# Patient Record
Sex: FEMALE | Race: WHITE | Employment: OTHER | URBAN - METROPOLITAN AREA
[De-identification: names, ages, dates, MRNs, and addresses within clinical notes are randomized per-mention and may not be internally consistent; named-entity substitution may affect disease eponyms.]

---

## 2017-01-27 ENCOUNTER — OFFICE VISIT (OUTPATIENT)
Dept: PULMONOLOGY | Age: 67
End: 2017-01-27

## 2017-01-27 VITALS
TEMPERATURE: 98 F | BODY MASS INDEX: 41.08 KG/M2 | HEIGHT: 66 IN | WEIGHT: 255.6 LBS | OXYGEN SATURATION: 98 % | HEART RATE: 79 BPM | RESPIRATION RATE: 18 BRPM | SYSTOLIC BLOOD PRESSURE: 122 MMHG | DIASTOLIC BLOOD PRESSURE: 74 MMHG

## 2017-01-27 DIAGNOSIS — Z78.9 DIFFICULTY WITH BIPAP USE: ICD-10-CM

## 2017-01-27 DIAGNOSIS — Z72.0 TOBACCO ABUSE: ICD-10-CM

## 2017-01-27 DIAGNOSIS — R06.83 SNORING: ICD-10-CM

## 2017-01-27 DIAGNOSIS — R06.81 WITNESSED APNEIC SPELLS: ICD-10-CM

## 2017-01-27 DIAGNOSIS — J44.9 CHRONIC OBSTRUCTIVE PULMONARY DISEASE, UNSPECIFIED COPD TYPE (HCC): ICD-10-CM

## 2017-01-27 DIAGNOSIS — G47.33 OSA (OBSTRUCTIVE SLEEP APNEA): Primary | ICD-10-CM

## 2017-01-27 DIAGNOSIS — E66.01 OBESITY, CLASS III, BMI 40-49.9 (MORBID OBESITY) (HCC): ICD-10-CM

## 2017-01-27 PROCEDURE — 1090F PRES/ABSN URINE INCON ASSESS: CPT | Performed by: NURSE PRACTITIONER

## 2017-01-27 PROCEDURE — 3014F SCREEN MAMMO DOC REV: CPT | Performed by: NURSE PRACTITIONER

## 2017-01-27 PROCEDURE — 3023F SPIROM DOC REV: CPT | Performed by: NURSE PRACTITIONER

## 2017-01-27 PROCEDURE — 4004F PT TOBACCO SCREEN RCVD TLK: CPT | Performed by: NURSE PRACTITIONER

## 2017-01-27 PROCEDURE — 1123F ACP DISCUSS/DSCN MKR DOCD: CPT | Performed by: NURSE PRACTITIONER

## 2017-01-27 PROCEDURE — 3017F COLORECTAL CA SCREEN DOC REV: CPT | Performed by: NURSE PRACTITIONER

## 2017-01-27 PROCEDURE — G8926 SPIRO NO PERF OR DOC: HCPCS | Performed by: NURSE PRACTITIONER

## 2017-01-27 PROCEDURE — G8484 FLU IMMUNIZE NO ADMIN: HCPCS | Performed by: NURSE PRACTITIONER

## 2017-01-27 PROCEDURE — G8599 NO ASA/ANTIPLAT THER USE RNG: HCPCS | Performed by: NURSE PRACTITIONER

## 2017-01-27 PROCEDURE — 99214 OFFICE O/P EST MOD 30 MIN: CPT | Performed by: NURSE PRACTITIONER

## 2017-01-27 PROCEDURE — G8400 PT W/DXA NO RESULTS DOC: HCPCS | Performed by: NURSE PRACTITIONER

## 2017-01-27 PROCEDURE — 4040F PNEUMOC VAC/ADMIN/RCVD: CPT | Performed by: NURSE PRACTITIONER

## 2017-01-27 PROCEDURE — G8427 DOCREV CUR MEDS BY ELIG CLIN: HCPCS | Performed by: NURSE PRACTITIONER

## 2017-01-27 PROCEDURE — G8419 CALC BMI OUT NRM PARAM NOF/U: HCPCS | Performed by: NURSE PRACTITIONER

## 2017-01-27 RX ORDER — OXYCODONE HYDROCHLORIDE AND ACETAMINOPHEN 5; 325 MG/1; MG/1
1 TABLET ORAL EVERY 8 HOURS PRN
Status: ON HOLD | COMMUNITY
End: 2018-02-16

## 2017-01-27 RX ORDER — ALBUTEROL SULFATE 90 UG/1
2 AEROSOL, METERED RESPIRATORY (INHALATION) EVERY 4 HOURS PRN
COMMUNITY
End: 2018-04-05

## 2017-01-27 ASSESSMENT — SLEEP AND FATIGUE QUESTIONNAIRES
HOW LIKELY ARE YOU TO NOD OFF OR FALL ASLEEP WHEN YOU ARE A PASSENGER IN A CAR FOR AN HOUR WITHOUT A BREAK: 0
HOW LIKELY ARE YOU TO NOD OFF OR FALL ASLEEP WHILE LYING DOWN TO REST IN THE AFTERNOON WHEN CIRCUMSTANCES PERMIT: 0
NECK CIRCUMFERENCE (INCHES): 16.5
HOW LIKELY ARE YOU TO NOD OFF OR FALL ASLEEP WHILE WATCHING TV: 3
HOW LIKELY ARE YOU TO NOD OFF OR FALL ASLEEP WHILE SITTING INACTIVE IN A PUBLIC PLACE: 1
HOW LIKELY ARE YOU TO NOD OFF OR FALL ASLEEP IN A CAR, WHILE STOPPED FOR A FEW MINUTES IN TRAFFIC: 0
HOW LIKELY ARE YOU TO NOD OFF OR FALL ASLEEP WHILE SITTING AND TALKING TO SOMEONE: 0
ESS TOTAL SCORE: 4
HOW LIKELY ARE YOU TO NOD OFF OR FALL ASLEEP WHILE SITTING AND READING: 0
HOW LIKELY ARE YOU TO NOD OFF OR FALL ASLEEP WHILE SITTING QUIETLY AFTER LUNCH WITHOUT ALCOHOL: 0

## 2017-05-25 ENCOUNTER — TELEPHONE (OUTPATIENT)
Dept: PULMONOLOGY | Age: 67
End: 2017-05-25

## 2017-08-21 ENCOUNTER — TELEPHONE (OUTPATIENT)
Dept: CASE MANAGEMENT | Age: 67
End: 2017-08-21

## 2017-08-31 ENCOUNTER — TELEPHONE (OUTPATIENT)
Dept: PULMONOLOGY | Age: 67
End: 2017-08-31

## 2017-09-26 LAB
AVERAGE GLUCOSE: NORMAL
HBA1C MFR BLD: 5.6 %

## 2017-09-28 ENCOUNTER — TELEPHONE (OUTPATIENT)
Dept: PULMONOLOGY | Age: 67
End: 2017-09-28

## 2017-10-04 ENCOUNTER — TELEPHONE (OUTPATIENT)
Dept: CASE MANAGEMENT | Age: 67
End: 2017-10-04

## 2017-10-09 ENCOUNTER — TELEPHONE (OUTPATIENT)
Dept: PULMONOLOGY | Age: 67
End: 2017-10-09

## 2017-10-09 NOTE — TELEPHONE ENCOUNTER
Pt called, states that she uses [employer], but they do not have the machine that works for pt. Pt asking to have orders sent to [facility] in [place] to get a better machine. Pt also asking for concentrator order to be faxed so everything is at the same company.

## 2017-10-09 NOTE — TELEPHONE ENCOUNTER
Please advise which pressure settings to order for pt for new BIPAP? Also, pt was asking for concentrator order as well, but per note, pt was only using O2 during interim while waiting for BIPAP. Pt did not complete split night as ordered at last office visit. OV 1/27/17:    Assessment:      · Severe MEG- BIPAP returned due to noncompliance  · RLS on Requip/neurontin- per PCP  · Snoring and witnessed apnea    · Nocturnal hypoxemia   ·  pack year smoking   · 3 L O2 at night  · Obesity    Not discussed today:  · Severe COPD   · Chronic cough                          Plan:      · Split night PSG- Called [facility] patient needs new study to requalify for BIPAP- per [facility] split night will qualify  · Discussed oral appliance option however patient has dentures  · Discussed acclimation techniques with patient  · Patient to call for sooner appointment if having issues with using BIPAP  · Discussed severity of MEG and importance of BIPAP use  · Continue O2 at night until can get BIPAP back Positional therapy, sleep in nonsupine position. · Decrease sedating medications where able   · Sleep hygiene  · Avoid sedatives, alcohol and caffeinated drinks at bed time. · No driving motorized vehicles or operating heavy machinery while fatigue, drowsy or sleepy. · Weight loss is also recommended as a long-term intervention. · Complications of MEG if not treated were discussed with patient patient to include systemic hypertension, pulmonary hypertension, cardiovascular morbidities, car accidents and all cause mortality. · Smoking cessation counseling.      Previous pulmonary plan- not discussed today:  · Advised to ronn Symbicort 160/4.5 2 puffs BID  · Incruse Ellipta 1 puff daily and Albuterol. · Patient is up to date with Pneumococcal vaccine and influenza vaccine   · Pulmonary rehab referral   · Acapella QID to mobilize respiratory secretions.   · CT chest, low dose protocol, screening for lung cancer 8/2017.  Risks and benefits were discussed with patient.           Follow up with Dr. Rut Barber next available for COPD  Follow up sleep 31-90 days, sooner if needed

## 2017-10-19 NOTE — TELEPHONE ENCOUNTER
I spoke with the pt this morning and she is scheduled for today 10/19/17 with another sleep MD at [facility] sleep center. She will have repeat sleep study there ordered by that sleep MD due to location. No further action needed on this encounter.

## 2017-10-24 NOTE — TELEPHONE ENCOUNTER
Patient called states she has been inpt at [address] [facility] in [address] was on a vent unable to call to cancel appt. States she is still currently inpt and is being released to rehab at local NH will call once released.
Patient called with message left for patient to call back to office.
Pt is scheduled for followup 11/7/17
Spoke with patient whom is scheduled for 11/7/17.
COPD  Follow up sleep 31-90 days, sooner if needed

## 2017-11-07 ENCOUNTER — OFFICE VISIT (OUTPATIENT)
Dept: PULMONOLOGY | Age: 67
End: 2017-11-07

## 2017-11-07 ENCOUNTER — HOSPITAL ENCOUNTER (OUTPATIENT)
Dept: OTHER | Age: 67
Discharge: HOME | End: 2017-11-07
Attending: INTERNAL MEDICINE | Admitting: INTERNAL MEDICINE

## 2017-11-07 VITALS
WEIGHT: 260 LBS | SYSTOLIC BLOOD PRESSURE: 138 MMHG | RESPIRATION RATE: 20 BRPM | HEIGHT: 66 IN | TEMPERATURE: 97.8 F | OXYGEN SATURATION: 96 % | DIASTOLIC BLOOD PRESSURE: 76 MMHG | HEART RATE: 81 BPM | BODY MASS INDEX: 41.78 KG/M2

## 2017-11-07 DIAGNOSIS — R07.9 CHEST PAIN, UNSPECIFIED TYPE: ICD-10-CM

## 2017-11-07 DIAGNOSIS — J44.1 COPD EXACERBATION (HCC): ICD-10-CM

## 2017-11-07 DIAGNOSIS — Z87.891 PERSONAL HISTORY OF TOBACCO USE, PRESENTING HAZARDS TO HEALTH: ICD-10-CM

## 2017-11-07 DIAGNOSIS — F17.200 CURRENT SMOKER: ICD-10-CM

## 2017-11-07 DIAGNOSIS — R07.9 CHEST PAIN, UNSPECIFIED TYPE: Primary | ICD-10-CM

## 2017-11-07 DIAGNOSIS — G25.81 RLS (RESTLESS LEGS SYNDROME): ICD-10-CM

## 2017-11-07 DIAGNOSIS — G47.34 NOCTURNAL HYPOXIA: ICD-10-CM

## 2017-11-07 DIAGNOSIS — G47.33 MODERATE OBSTRUCTIVE SLEEP APNEA: ICD-10-CM

## 2017-11-07 PROCEDURE — G8400 PT W/DXA NO RESULTS DOC: HCPCS | Performed by: INTERNAL MEDICINE

## 2017-11-07 PROCEDURE — G8484 FLU IMMUNIZE NO ADMIN: HCPCS | Performed by: INTERNAL MEDICINE

## 2017-11-07 PROCEDURE — 4040F PNEUMOC VAC/ADMIN/RCVD: CPT | Performed by: INTERNAL MEDICINE

## 2017-11-07 PROCEDURE — 3023F SPIROM DOC REV: CPT | Performed by: INTERNAL MEDICINE

## 2017-11-07 PROCEDURE — G8417 CALC BMI ABV UP PARAM F/U: HCPCS | Performed by: INTERNAL MEDICINE

## 2017-11-07 PROCEDURE — 99214 OFFICE O/P EST MOD 30 MIN: CPT | Performed by: INTERNAL MEDICINE

## 2017-11-07 PROCEDURE — 1123F ACP DISCUSS/DSCN MKR DOCD: CPT | Performed by: INTERNAL MEDICINE

## 2017-11-07 PROCEDURE — G8926 SPIRO NO PERF OR DOC: HCPCS | Performed by: INTERNAL MEDICINE

## 2017-11-07 PROCEDURE — 1090F PRES/ABSN URINE INCON ASSESS: CPT | Performed by: INTERNAL MEDICINE

## 2017-11-07 PROCEDURE — 1036F TOBACCO NON-USER: CPT | Performed by: INTERNAL MEDICINE

## 2017-11-07 PROCEDURE — G8599 NO ASA/ANTIPLAT THER USE RNG: HCPCS | Performed by: INTERNAL MEDICINE

## 2017-11-07 PROCEDURE — 3017F COLORECTAL CA SCREEN DOC REV: CPT | Performed by: INTERNAL MEDICINE

## 2017-11-07 PROCEDURE — 3014F SCREEN MAMMO DOC REV: CPT | Performed by: INTERNAL MEDICINE

## 2017-11-07 PROCEDURE — G8427 DOCREV CUR MEDS BY ELIG CLIN: HCPCS | Performed by: INTERNAL MEDICINE

## 2017-11-07 RX ORDER — BUDESONIDE AND FORMOTEROL FUMARATE DIHYDRATE 160; 4.5 UG/1; UG/1
2 AEROSOL RESPIRATORY (INHALATION) 2 TIMES DAILY
COMMUNITY
Start: 2017-10-02 | End: 2018-04-05

## 2017-11-07 RX ORDER — AMLODIPINE BESYLATE 5 MG/1
TABLET ORAL DAILY
Status: ON HOLD | COMMUNITY
Start: 2017-10-25 | End: 2018-02-05

## 2017-11-07 RX ORDER — GUAIFENESIN 600 MG/1
600 TABLET, EXTENDED RELEASE ORAL 2 TIMES DAILY
Qty: 60 TABLET | Refills: 1 | Status: SHIPPED | OUTPATIENT
Start: 2017-11-07 | End: 2018-02-04

## 2017-11-07 RX ORDER — PREDNISONE 1 MG/1
5 TABLET ORAL DAILY
COMMUNITY
End: 2018-02-04

## 2017-11-07 RX ORDER — BUSPIRONE HYDROCHLORIDE 15 MG/1
15 TABLET ORAL 2 TIMES DAILY
COMMUNITY
Start: 2017-10-20 | End: 2019-05-07

## 2017-11-07 RX ORDER — ROPINIROLE 2 MG/1
3 TABLET, FILM COATED ORAL NIGHTLY
COMMUNITY

## 2017-11-07 RX ORDER — PREDNISONE 10 MG/1
TABLET ORAL
Qty: 30 TABLET | Refills: 0 | Status: SHIPPED | OUTPATIENT
Start: 2017-11-07 | End: 2017-11-17

## 2017-11-07 RX ORDER — CELECOXIB 200 MG/1
200 CAPSULE ORAL DAILY
Status: ON HOLD | COMMUNITY
Start: 2017-11-06 | End: 2018-02-16 | Stop reason: HOSPADM

## 2017-11-07 RX ORDER — ROPINIROLE 1 MG/1
TABLET, FILM COATED ORAL
Status: ON HOLD | COMMUNITY
Start: 2017-11-06 | End: 2018-02-14 | Stop reason: CLARIF

## 2017-11-07 ASSESSMENT — SLEEP AND FATIGUE QUESTIONNAIRES
HOW LIKELY ARE YOU TO NOD OFF OR FALL ASLEEP WHILE SITTING AND TALKING TO SOMEONE: 0
HOW LIKELY ARE YOU TO NOD OFF OR FALL ASLEEP WHILE SITTING AND READING: 0
HOW LIKELY ARE YOU TO NOD OFF OR FALL ASLEEP WHILE SITTING INACTIVE IN A PUBLIC PLACE: 0
HOW LIKELY ARE YOU TO NOD OFF OR FALL ASLEEP WHILE SITTING QUIETLY AFTER LUNCH WITHOUT ALCOHOL: 0
ESS TOTAL SCORE: 6
NECK CIRCUMFERENCE (INCHES): 18.5
HOW LIKELY ARE YOU TO NOD OFF OR FALL ASLEEP IN A CAR, WHILE STOPPED FOR A FEW MINUTES IN TRAFFIC: 0
HOW LIKELY ARE YOU TO NOD OFF OR FALL ASLEEP WHEN YOU ARE A PASSENGER IN A CAR FOR AN HOUR WITHOUT A BREAK: 0
HOW LIKELY ARE YOU TO NOD OFF OR FALL ASLEEP WHILE WATCHING TV: 3
HOW LIKELY ARE YOU TO NOD OFF OR FALL ASLEEP WHILE LYING DOWN TO REST IN THE AFTERNOON WHEN CIRCUMSTANCES PERMIT: 3

## 2017-11-07 ASSESSMENT — COPD QUESTIONNAIRES: COPD: 1

## 2017-11-07 NOTE — PATIENT INSTRUCTIONS
few pounds more. Plus, you can help prevent some weight gain by being more active and eating less. Taking the medicine bupropion might help control weight gain. What else can I do to improve my chances of quitting?  You can:  ?Start exercising. ?Stay away from smokers and places that you associate with smoking. If people close to you smoke, ask them to quit with you. ? Keep gum, hard candy, or something to put in your mouth handy. If you get a craving for a cigarette, try one of these instead. ?Dont give up, even if you start smoking again. It takes most people a few tries before they succeed. You can also get help from a free phone line (8-239-QUIT-NOW) or go online to www.smokefree.gov. Your pulmonary team is here to help you quit. Call for assistance [phone] 50 84    Sleep Hygiene. .. Tips for better sleep. .. Avoid naps. This will ensure you are sleepy at bedtime. If you have to take a nap, sleep less than 1 hour, before 3 pm.  Sleep only when sleepy; this reduces the time you are awake in bed. Regular exercise is recommended to help you deepen your sleep, but not within 4-6 hours of your bedtime. Timing of exercise is important, aim to exercise early in the morning or early afternoon. A light snack may help you fall asleep. Warm milk and foods high in the amino acid tryptophan, such as bananas, may help you to sleep  Be sure to avoid heavy, spicy or sugary foods 4-6 hours before bedtime and avoid at snack time. Stay away from stimulants such as caffeine and nicotine for at least 4-6 hours before bed. Stimulants can interfere with your ability to fall asleep. Caffeine is found in tea, cola, coffee, cocoa and chocolate and is best avoided at bedtime. Nicotine is found in tobacco products. Avoid alcohol 4-6 hours before bedtime. Alcohol has an immediate sleep-inducing effect, after a few hours when alcohol levels fall there is a stimulant or wake-up effect and will cause fragmented sleep. Sleep rituals are important. Give your body clues it is time to slow down and sleep. Examples include; yoga, deep breathing, listen to relaxing music, a hot bath or a few minutes of reading. Have a fixed bedtime and awakening time, Even on weekends! You will feel better keeping a regular sleep cycle, even if you are retired or not working. Get into your favorite sleep position. If not asleep in 30 minutes, get up and do something boring until you feel sleepy. Remember not to expose yourself to bright lights such as TV, phone or tablet screens. Only use your bed for sleeping. Do not use your bed as an office, workroom or recreation room. Use comfortable bedding. Uncomfortable bedding can prevent good sleep. Ensure your bedroom is quiet and comfortable. A cooler room along with enough blankets to stay warm is recommended. If your room is too noisy, try a white noise machine. If too bright, try black out shades or an eye mask. Dont take worries to bed. Leave worries about work, school etc. behind you when you go to bed. Some people find it helpful to assign a worry period in the evening or late afternoon to write down your worries and get them out of your system.

## 2017-11-07 NOTE — PROGRESS NOTES
every 8 hours as needed for Pain ., Disp: , Rfl:     albuterol sulfate HFA (PROAIR HFA) 108 (90 BASE) MCG/ACT inhaler, Inhale 2 puffs into the lungs every 4 hours as needed for Wheezing or Shortness of Breath, Disp: , Rfl:     furosemide (LASIX) 40 MG tablet, Take 40 mg by mouth daily, Disp: , Rfl:     Umeclidinium Bromide (INCRUSE ELLIPTA) 62.5 MCG/INH AEPB, Inhale into the lungs, Disp: , Rfl:     busPIRone (BUSPAR) 10 MG tablet, Take 10 mg by mouth 2 times daily, Disp: , Rfl:     celecoxib (CELEBREX) 100 MG capsule, Take 100 mg by mouth daily, Disp: , Rfl:     gabapentin (NEURONTIN) 600 MG tablet, Take 600 mg by mouth 3 times daily, Disp: , Rfl:     Glycopyrrolate (ROBINUL PO), Take 4 mg by mouth nightly, Disp: , Rfl:     potassium chloride (K-DUR) 10 MEQ tablet,  Take 10 mEq by mouth , Disp: , Rfl:     methadone (DOLOPHINE) 5 MG tablet,  5 mg every 6 hours as needed , Disp: , Rfl:     aspirin 81 MG chewable tablet, Take 1 tablet by mouth daily, Disp: 30 tablet, Rfl: 3    venlafaxine (EFFEXOR-XR) 150 MG XR capsule, Take 150 mg by mouth daily, Disp: , Rfl:     ezetimibe (ZETIA) 10 MG tablet, Take 10 mg by mouth daily, Disp: , Rfl:     Multiple Vitamins-Minerals (THERAPEUTIC MULTIVITAMIN-MINERALS) tablet, Take 1 tablet by mouth daily, Disp: , Rfl:     QUEtiapine (SEROQUEL XR) 300 MG XR tablet, Take 450 mg by mouth nightly , Disp: , Rfl:     ARIPiprazole (ABILIFY) 10 MG tablet,  Take 10 mg by mouth nightly , Disp: , Rfl:     simvastatin (ZOCOR) 20 MG tablet, Take 20 mg by mouth nightly, Disp: , Rfl:     OXYGEN, Inhale into the lungs Indications: 2.5 L nightly , Disp: , Rfl:     budesonide-formoterol (SYMBICORT) 160-4.5 MCG/ACT AERO, Inhale 2 puffs into the lungs daily, Disp: , Rfl:     busPIRone (BUSPAR) 15 MG tablet, daily, Disp: , Rfl:     celecoxib (CELEBREX) 200 MG capsule, daily, Disp: , Rfl:     Pseudoephedrine-DM-GG (TIDAFEN DM PO), Take 300 mg by mouth 3 times daily, Disp: , Rfl:

## 2017-11-08 ENCOUNTER — TELEPHONE (OUTPATIENT)
Dept: PULMONOLOGY | Age: 67
End: 2017-11-08

## 2017-11-08 DIAGNOSIS — R05.9 COUGH: Primary | ICD-10-CM

## 2017-11-08 DIAGNOSIS — J44.9 CHRONIC OBSTRUCTIVE PULMONARY DISEASE, UNSPECIFIED COPD TYPE (HCC): ICD-10-CM

## 2017-11-08 RX ORDER — METRONIDAZOLE 500 MG/1
500 TABLET ORAL EVERY 8 HOURS
Qty: 36 TABLET | Refills: 0 | Status: SHIPPED | OUTPATIENT
Start: 2017-11-08 | End: 2017-11-20

## 2017-11-08 RX ORDER — LEVOFLOXACIN 500 MG/1
500 TABLET, FILM COATED ORAL DAILY
Qty: 7 TABLET | Refills: 0 | Status: SHIPPED | OUTPATIENT
Start: 2017-11-08 | End: 2017-11-15

## 2017-11-14 NOTE — TELEPHONE ENCOUNTER
Patient requesting recent lab results. She is also stating that she finished her last dose of Levaquin 500mg but still feels like she has an infection. Patient wondering if she needs another round of antibiotics. Please advise.

## 2017-12-05 ENCOUNTER — TELEPHONE (OUTPATIENT)
Dept: PULMONOLOGY | Age: 67
End: 2017-12-05

## 2017-12-05 DIAGNOSIS — R07.9 CHEST PAIN, UNSPECIFIED TYPE: Primary | ICD-10-CM

## 2017-12-13 NOTE — TELEPHONE ENCOUNTER
Patient stated she had gone to the medical [place] multiple times and they had not received the order. I faxed the order twice and was a succuss. Patient requesting order be mailed to new address. Address updated in [facility]. I will re-fax order. Spoke with Treva Alonso to confirm the fax. Faxed to [phone]. Will f/u to make sure order was received and to contact patient informing her as well.

## 2018-01-13 LAB
AVERAGE GLUCOSE: NORMAL
HBA1C MFR BLD: 5.9 %

## 2018-01-26 ENCOUNTER — HOSPITAL ENCOUNTER (OUTPATIENT)
Dept: OTHER | Age: 68
Discharge: HOME | End: 2018-01-26
Attending: NURSE PRACTITIONER | Admitting: NURSE PRACTITIONER

## 2018-01-26 DIAGNOSIS — R05.9 COUGH: ICD-10-CM

## 2018-01-26 LAB
A/G RATIO: 1.2 (ref 1.1–2.2)
ALBUMIN SERPL-MCNC: 4.2 G/DL (ref 3.4–5)
ALP BLD-CCNC: 98 U/L (ref 40–129)
ALT SERPL-CCNC: 17 U/L (ref 10–40)
ANION GAP SERPL CALCULATED.3IONS-SCNC: 11 MMOL/L (ref 3–16)
AST SERPL-CCNC: 17 U/L (ref 15–37)
BILIRUB SERPL-MCNC: <0.2 MG/DL (ref 0–1)
BUN BLDV-MCNC: 11 MG/DL (ref 7–20)
CALCIUM SERPL-MCNC: 9.7 MG/DL (ref 8.3–10.6)
CHLORIDE BLD-SCNC: 99 MMOL/L (ref 99–110)
CO2: 31 MMOL/L (ref 21–32)
CREAT SERPL-MCNC: 0.6 MG/DL (ref 0.6–1.2)
GFR AFRICAN AMERICAN: >60
GFR NON-AFRICAN AMERICAN: >60
GLOBULIN: 3.4 G/DL
GLUCOSE BLD-MCNC: 114 MG/DL (ref 70–99)
POTASSIUM SERPL-SCNC: 5.1 MMOL/L (ref 3.5–5.1)
PRO-BNP: 116 PG/ML (ref 0–124)
SODIUM BLD-SCNC: 141 MMOL/L (ref 136–145)
TOTAL PROTEIN: 7.6 G/DL (ref 6.4–8.2)

## 2018-01-30 ENCOUNTER — TELEPHONE (OUTPATIENT)
Dept: PULMONOLOGY | Age: 68
End: 2018-01-30

## 2018-01-30 NOTE — TELEPHONE ENCOUNTER
Tried calling [phone] (M) # not accepting calls at this time. Called [phone] Left message asking patient to return call to office.

## 2018-02-05 PROBLEM — J96.01 ACUTE RESPIRATORY FAILURE WITH HYPOXEMIA (HCC): Status: ACTIVE | Noted: 2018-02-05

## 2018-02-05 PROBLEM — J18.9 PNEUMONIA: Status: ACTIVE | Noted: 2018-02-05

## 2018-02-07 PROBLEM — J94.2 HEMOTHORAX ON RIGHT: Status: ACTIVE | Noted: 2018-02-07

## 2018-02-07 PROBLEM — S22.31XA RIGHT RIB FRACTURE: Status: ACTIVE | Noted: 2018-02-07

## 2018-02-08 PROBLEM — J41.0 SIMPLE CHRONIC BRONCHITIS (HCC): Status: ACTIVE | Noted: 2018-02-08

## 2018-02-08 PROBLEM — J98.11 ATELECTASIS: Status: ACTIVE | Noted: 2018-02-08

## 2018-02-16 PROBLEM — I48.0 PAF (PAROXYSMAL ATRIAL FIBRILLATION) (HCC): Status: ACTIVE | Noted: 2018-02-16

## 2018-02-21 RX ORDER — DILTIAZEM HYDROCHLORIDE 120 MG/1
120 CAPSULE, COATED, EXTENDED RELEASE ORAL DAILY
Qty: 30 CAPSULE | Refills: 3 | Status: SHIPPED | OUTPATIENT
Start: 2018-02-21 | End: 2018-05-23 | Stop reason: SDUPTHER

## 2018-02-22 ENCOUNTER — TELEPHONE (OUTPATIENT)
Dept: CARDIOLOGY CLINIC | Age: 68
End: 2018-02-22

## 2018-02-22 ENCOUNTER — OFFICE VISIT (OUTPATIENT)
Dept: PULMONOLOGY | Age: 68
End: 2018-02-22

## 2018-02-22 ENCOUNTER — TELEPHONE (OUTPATIENT)
Dept: CARDIOTHORACIC SURGERY | Age: 68
End: 2018-02-22

## 2018-02-22 VITALS
HEIGHT: 66 IN | OXYGEN SATURATION: 89 % | DIASTOLIC BLOOD PRESSURE: 68 MMHG | HEART RATE: 81 BPM | RESPIRATION RATE: 18 BRPM | SYSTOLIC BLOOD PRESSURE: 130 MMHG | BODY MASS INDEX: 41.3 KG/M2 | TEMPERATURE: 98.8 F | WEIGHT: 257 LBS

## 2018-02-22 DIAGNOSIS — G47.33 OSA (OBSTRUCTIVE SLEEP APNEA): ICD-10-CM

## 2018-02-22 DIAGNOSIS — J44.9 STAGE 3 SEVERE COPD BY GOLD CLASSIFICATION (HCC): ICD-10-CM

## 2018-02-22 DIAGNOSIS — J94.2 HEMOTHORAX: Primary | ICD-10-CM

## 2018-02-22 DIAGNOSIS — G25.81 RLS (RESTLESS LEGS SYNDROME): ICD-10-CM

## 2018-02-22 DIAGNOSIS — R09.02 HYPOXIA: ICD-10-CM

## 2018-02-22 PROCEDURE — 1036F TOBACCO NON-USER: CPT | Performed by: INTERNAL MEDICINE

## 2018-02-22 PROCEDURE — 99214 OFFICE O/P EST MOD 30 MIN: CPT | Performed by: INTERNAL MEDICINE

## 2018-02-22 PROCEDURE — 1123F ACP DISCUSS/DSCN MKR DOCD: CPT | Performed by: INTERNAL MEDICINE

## 2018-02-22 PROCEDURE — 1090F PRES/ABSN URINE INCON ASSESS: CPT | Performed by: INTERNAL MEDICINE

## 2018-02-22 PROCEDURE — 3014F SCREEN MAMMO DOC REV: CPT | Performed by: INTERNAL MEDICINE

## 2018-02-22 PROCEDURE — G8417 CALC BMI ABV UP PARAM F/U: HCPCS | Performed by: INTERNAL MEDICINE

## 2018-02-22 PROCEDURE — G8482 FLU IMMUNIZE ORDER/ADMIN: HCPCS | Performed by: INTERNAL MEDICINE

## 2018-02-22 PROCEDURE — 4040F PNEUMOC VAC/ADMIN/RCVD: CPT | Performed by: INTERNAL MEDICINE

## 2018-02-22 PROCEDURE — 1111F DSCHRG MED/CURRENT MED MERGE: CPT | Performed by: INTERNAL MEDICINE

## 2018-02-22 PROCEDURE — G8400 PT W/DXA NO RESULTS DOC: HCPCS | Performed by: INTERNAL MEDICINE

## 2018-02-22 PROCEDURE — 3023F SPIROM DOC REV: CPT | Performed by: INTERNAL MEDICINE

## 2018-02-22 PROCEDURE — G8598 ASA/ANTIPLAT THER USED: HCPCS | Performed by: INTERNAL MEDICINE

## 2018-02-22 PROCEDURE — 3017F COLORECTAL CA SCREEN DOC REV: CPT | Performed by: INTERNAL MEDICINE

## 2018-02-22 PROCEDURE — G8926 SPIRO NO PERF OR DOC: HCPCS | Performed by: INTERNAL MEDICINE

## 2018-02-22 PROCEDURE — G8427 DOCREV CUR MEDS BY ELIG CLIN: HCPCS | Performed by: INTERNAL MEDICINE

## 2018-02-22 RX ORDER — OXYCODONE HYDROCHLORIDE AND ACETAMINOPHEN 5; 325 MG/1; MG/1
1 TABLET ORAL EVERY 8 HOURS PRN
COMMUNITY
End: 2018-02-27

## 2018-02-22 RX ORDER — FLUTICASONE FUROATE AND VILANTEROL 100; 25 UG/1; UG/1
1 POWDER RESPIRATORY (INHALATION) DAILY
COMMUNITY
End: 2020-05-12

## 2018-02-22 RX ORDER — FUROSEMIDE 40 MG/1
40 TABLET ORAL DAILY
COMMUNITY
End: 2018-03-07 | Stop reason: CLARIF

## 2018-02-22 RX ORDER — IPRATROPIUM BROMIDE AND ALBUTEROL SULFATE 2.5; .5 MG/3ML; MG/3ML
1 SOLUTION RESPIRATORY (INHALATION) EVERY 4 HOURS PRN
COMMUNITY
Start: 2017-03-18 | End: 2018-04-05

## 2018-02-22 RX ORDER — ATORVASTATIN CALCIUM 10 MG/1
10 TABLET, FILM COATED ORAL DAILY
COMMUNITY
End: 2018-03-28 | Stop reason: ALTCHOICE

## 2018-02-22 ASSESSMENT — SLEEP AND FATIGUE QUESTIONNAIRES
HOW LIKELY ARE YOU TO NOD OFF OR FALL ASLEEP IN A CAR, WHILE STOPPED FOR A FEW MINUTES IN TRAFFIC: 0
HOW LIKELY ARE YOU TO NOD OFF OR FALL ASLEEP WHILE WATCHING TV: 2
HOW LIKELY ARE YOU TO NOD OFF OR FALL ASLEEP WHILE LYING DOWN TO REST IN THE AFTERNOON WHEN CIRCUMSTANCES PERMIT: 3
HOW LIKELY ARE YOU TO NOD OFF OR FALL ASLEEP WHILE SITTING INACTIVE IN A PUBLIC PLACE: 0
NECK CIRCUMFERENCE (INCHES): 17
HOW LIKELY ARE YOU TO NOD OFF OR FALL ASLEEP WHILE SITTING AND READING: 1
HOW LIKELY ARE YOU TO NOD OFF OR FALL ASLEEP WHILE SITTING AND TALKING TO SOMEONE: 1
HOW LIKELY ARE YOU TO NOD OFF OR FALL ASLEEP WHILE SITTING QUIETLY AFTER LUNCH WITHOUT ALCOHOL: 2
HOW LIKELY ARE YOU TO NOD OFF OR FALL ASLEEP WHEN YOU ARE A PASSENGER IN A CAR FOR AN HOUR WITHOUT A BREAK: 1
ESS TOTAL SCORE: 10

## 2018-02-22 ASSESSMENT — COPD QUESTIONNAIRES: COPD: 1

## 2018-02-22 NOTE — PATIENT INSTRUCTIONS
Please keep all of your future appointments scheduled by [address], [facility] Pulmonary office. Plan:      · CXR in 6 weeks   · 2-5LPM O2. Advised to titrate O2 using her pulse oximeter- target O2 sat 90-92%. · Breo and DuoNeb QID   · Patient is up to date with Pneumococcal vaccine and influenza vaccine   · Acapella QID to mobilize respiratory secretions. · Advised to continue with smoking cessation.            Tips to Help You Stop Smoking (taken from Up-To-Date)      Cigarette smoking is a preventable cause of death in the [place]. Quitting smoking now can decrease your risk of getting smoking-related illnesses like heart disease, stroke, cancer & COPD. S = Set a quit date. T = Tell family, friends, and the people around you that you plan to quit. A = Anticipate or plan ahead for the tough times you'll face while quitting. R = Remove cigarettes and other tobacco products from your home, car, and work. T = Talk to your doctor about getting help to quit. Your doctor may give you medicines to reduce your craving for cigarettes & the unpleasant symptoms that happen when you stop smoking (called withdrawal symptoms). What are the symptoms of withdrawal?  The symptoms include:   ?Trouble sleeping   ? Being irritable, anxious or restless   ? Getting frustrated or angry   ? Having trouble thinking clearly  Nicotine replacement therapy eases withdrawal and reduces your bodys craving for nicotine, the main drug found in cigarettes. Non-prescription forms of nicotine replacement include skin patches, lozenges, and gum. Two prescription medications are available that have been proven to help people stop smoking:  ? Bupropion is a prescription medicine that reduces your desire to smoke. This medicine is sold under the brand names Zyban and Wellbutrin & as a generic formulation. ? Varenicline (brand name: Chantix) is a prescription medicine that reduces withdrawal symptoms and cigarette

## 2018-02-22 NOTE — PROGRESS NOTES
2/14/18  · Moderate MEG. Refusing BiPAP. O2 3LPM at night   ·  pack year smoking       Plan:      CXR in 6 weeks   2-5LPM O2. Advised to titrate O2 using her pulse oximeter- target O2 sat 90-92%. Breo and DuoNeb QID   Overnight pulse oximetry on 3 L  Patient is up to date with Pneumococcal vaccine and influenza vaccine   Advised to continue with smoking cessation.    Aggressive PT OT at the nursing home

## 2018-02-22 NOTE — TELEPHONE ENCOUNTER
[facility]. She underwent RVATS w/ decortication, evac of hematoma & blood clots on 2/8/18 with Dr. Nini Chadwick. Discharged to SNF on 2/16/18. Spoke to patient's nurse today. Unfortunately this is her first day with this patient and patient has been away from facility all day at other doctor's appts. She does report though that VSS, O2 94% on oxygen. Incisions WNL. Patient is going to see Dr. Valerie Ahuja today due to weight gain and O2 requirements. From surgical standpoint, stable. No needs at this time. Advised to please call our office if they should have any questions or concerns. Patient already scheduled for follow up with our office on 3/7/18 with Dr. Nini Chadwick.

## 2018-02-23 ENCOUNTER — TELEPHONE (OUTPATIENT)
Dept: PULMONOLOGY | Age: 68
End: 2018-02-23

## 2018-02-26 ENCOUNTER — TELEPHONE (OUTPATIENT)
Dept: PULMONOLOGY | Age: 68
End: 2018-02-26

## 2018-03-07 ENCOUNTER — OFFICE VISIT (OUTPATIENT)
Dept: CARDIOTHORACIC SURGERY | Age: 68
End: 2018-03-07

## 2018-03-07 VITALS
SYSTOLIC BLOOD PRESSURE: 130 MMHG | HEART RATE: 74 BPM | OXYGEN SATURATION: 92 % | WEIGHT: 250 LBS | DIASTOLIC BLOOD PRESSURE: 70 MMHG | BODY MASS INDEX: 40.18 KG/M2 | HEIGHT: 66 IN | TEMPERATURE: 99.2 F

## 2018-03-07 DIAGNOSIS — J94.2 HEMOTHORAX ON RIGHT: ICD-10-CM

## 2018-03-07 DIAGNOSIS — S22.41XG CLOSED FRACTURE OF MULTIPLE RIBS OF RIGHT SIDE WITH DELAYED HEALING, SUBSEQUENT ENCOUNTER: Primary | ICD-10-CM

## 2018-03-07 PROCEDURE — 99999 PR OFFICE/OUTPT VISIT,PROCEDURE ONLY: CPT | Performed by: THORACIC SURGERY (CARDIOTHORACIC VASCULAR SURGERY)

## 2018-03-07 RX ORDER — TORSEMIDE 20 MG/1
20 TABLET ORAL DAILY
COMMUNITY
End: 2018-03-28 | Stop reason: SDUPTHER

## 2018-03-07 NOTE — PROGRESS NOTES
Post op follow-up  Right video-assisted thoracoscopy with evacuation of hematoma  Vital signs stable afebrile  S1 plus S2 +0 no additional sounds  Bilaterally clear to additional sounds  Incision is dry and clean  Soft and distended nontender bowel sounds positive  Plan  Follow-up with us in a p.r.n.  Basis  Patient was encouraged to increase physical activity could benefit from pulmonary rehab I will leave that to her pulmonologist  Wean oxygen as tolerated   follow-up in a p.r.n. basis

## 2018-03-23 ENCOUNTER — HOSPITAL ENCOUNTER (OUTPATIENT)
Dept: OTHER | Age: 68
Discharge: HOME | End: 2018-03-23
Attending: NURSE PRACTITIONER | Admitting: NURSE PRACTITIONER

## 2018-03-23 LAB
A/G RATIO: 1.1 (ref 1.1–2.2)
ALBUMIN SERPL-MCNC: 4.1 G/DL (ref 3.4–5)
ALP BLD-CCNC: 100 U/L (ref 40–129)
ALT SERPL-CCNC: 30 U/L (ref 10–40)
ANION GAP SERPL CALCULATED.3IONS-SCNC: 12 MMOL/L (ref 3–16)
AST SERPL-CCNC: 20 U/L (ref 15–37)
BILIRUB SERPL-MCNC: 0.3 MG/DL (ref 0–1)
BUN BLDV-MCNC: 20 MG/DL (ref 7–20)
CALCIUM SERPL-MCNC: 9.1 MG/DL (ref 8.3–10.6)
CHLORIDE BLD-SCNC: 91 MMOL/L (ref 99–110)
CO2: 33 MMOL/L (ref 21–32)
CREAT SERPL-MCNC: 0.7 MG/DL (ref 0.6–1.2)
GFR AFRICAN AMERICAN: >60
GFR NON-AFRICAN AMERICAN: >60
GLOBULIN: 3.7 G/DL
GLUCOSE BLD-MCNC: 140 MG/DL (ref 70–99)
POTASSIUM SERPL-SCNC: 3.9 MMOL/L (ref 3.5–5.1)
SODIUM BLD-SCNC: 136 MMOL/L (ref 136–145)
TOTAL PROTEIN: 7.8 G/DL (ref 6.4–8.2)

## 2018-03-28 ENCOUNTER — OFFICE VISIT (OUTPATIENT)
Dept: CARDIOLOGY CLINIC | Age: 68
End: 2018-03-28

## 2018-03-28 VITALS
BODY MASS INDEX: 39.86 KG/M2 | DIASTOLIC BLOOD PRESSURE: 78 MMHG | SYSTOLIC BLOOD PRESSURE: 112 MMHG | HEART RATE: 74 BPM | OXYGEN SATURATION: 94 % | HEIGHT: 66 IN | WEIGHT: 248 LBS

## 2018-03-28 DIAGNOSIS — I48.0 PAF (PAROXYSMAL ATRIAL FIBRILLATION) (HCC): ICD-10-CM

## 2018-03-28 DIAGNOSIS — I50.30 DIASTOLIC CONGESTIVE HEART FAILURE, UNSPECIFIED CONGESTIVE HEART FAILURE CHRONICITY: Primary | ICD-10-CM

## 2018-03-28 PROCEDURE — 4040F PNEUMOC VAC/ADMIN/RCVD: CPT | Performed by: INTERNAL MEDICINE

## 2018-03-28 PROCEDURE — 99214 OFFICE O/P EST MOD 30 MIN: CPT | Performed by: INTERNAL MEDICINE

## 2018-03-28 PROCEDURE — G8427 DOCREV CUR MEDS BY ELIG CLIN: HCPCS | Performed by: INTERNAL MEDICINE

## 2018-03-28 PROCEDURE — G8400 PT W/DXA NO RESULTS DOC: HCPCS | Performed by: INTERNAL MEDICINE

## 2018-03-28 PROCEDURE — G8417 CALC BMI ABV UP PARAM F/U: HCPCS | Performed by: INTERNAL MEDICINE

## 2018-03-28 PROCEDURE — G8482 FLU IMMUNIZE ORDER/ADMIN: HCPCS | Performed by: INTERNAL MEDICINE

## 2018-03-28 PROCEDURE — 3017F COLORECTAL CA SCREEN DOC REV: CPT | Performed by: INTERNAL MEDICINE

## 2018-03-28 PROCEDURE — 93000 ELECTROCARDIOGRAM COMPLETE: CPT | Performed by: INTERNAL MEDICINE

## 2018-03-28 PROCEDURE — G8598 ASA/ANTIPLAT THER USED: HCPCS | Performed by: INTERNAL MEDICINE

## 2018-03-28 PROCEDURE — 1090F PRES/ABSN URINE INCON ASSESS: CPT | Performed by: INTERNAL MEDICINE

## 2018-03-28 PROCEDURE — 1123F ACP DISCUSS/DSCN MKR DOCD: CPT | Performed by: INTERNAL MEDICINE

## 2018-03-28 PROCEDURE — 1036F TOBACCO NON-USER: CPT | Performed by: INTERNAL MEDICINE

## 2018-03-28 PROCEDURE — 3014F SCREEN MAMMO DOC REV: CPT | Performed by: INTERNAL MEDICINE

## 2018-03-28 RX ORDER — HYDROXYZINE 50 MG/1
50 TABLET, FILM COATED ORAL
COMMUNITY
End: 2021-01-29 | Stop reason: ALTCHOICE

## 2018-03-28 RX ORDER — TORSEMIDE 20 MG/1
20 TABLET ORAL 2 TIMES DAILY
Qty: 30 TABLET | Refills: 3 | Status: SHIPPED | OUTPATIENT
Start: 2018-03-28 | End: 2018-08-25

## 2018-03-28 RX ORDER — TOPIRAMATE 50 MG/1
50 TABLET, FILM COATED ORAL NIGHTLY
COMMUNITY
End: 2021-10-19

## 2018-03-28 RX ORDER — FUROSEMIDE 80 MG
80 TABLET ORAL 2 TIMES DAILY
COMMUNITY
End: 2018-03-28 | Stop reason: ALTCHOICE

## 2018-03-28 NOTE — LETTER
[address] [place] [address] Cardiology - [facility]  [address] [facility], [place], operated by [facility] [address]  Phone: [phone]  Fax: [phone]    Jahaira Barney MD        March 29, 2018     Vesta Richard PA-C  [address]  [address] [facility] [place] [address]    Patient: Dusty Wang  MR Number: [id number]  YOB: 1950  Date of Visit: 3/28/2018    Dear Dr. Vesta Richard:    Thank you for the request for consultation for Dusty Wang to me for the evaluation. Below are the relevant portions of my assessment and plan of care. [address] Office Note  3/28/2018     Subjective:  Ms. Yolanda Marx presents for cardiology  follow up for [facility]  S/P fall and multiple rt rib fractures  Requiring VATS early feb 2018. c/o swelling of legs short of breath this week. Just finished medrol dose pack and levaquin for pneumonia. Today she reports being SOB and increased edema. EKG today shows sinus arrhythmia, RBBB, rate 76. She was recently taking levequin and a medrol dose pack for pneumonia. She states she just doesn't feel good. She denies chest pain, dizziness, and syncope. HPI:  This patient is a 50-year-old white female with no prior history of heart disease. She presented to the hospital on 06/25/2015 with acute onset of shortness of breath and subsequent chest pain. The patient states she was at [facility] approximately 2 weeks ago with small bowel obstruction. That was complicated by renal failure. That eventually resolved by conservative therapy, and she went home about a week ago. The patient has been improving gradually. However, she has continued to have diarrhea ever since she was released from previous hospital. On the day of hospitalization she felt poorly. She was slightly short of breath in the morning with her daily activity; but as the day went on, she became more short of breath.  She also experienced substernal pressure type of chest  nicotine (NICODERM CQ) 14 MG/24HR Place 1 patch onto the skin daily 30 patch 0    sennosides-docusate sodium (SENOKOT-S) 8.6-50 MG tablet Take 2 tablets by mouth 2 times daily 30 tablet 0    albuterol sulfate HFA (PROAIR HFA) 108 (90 BASE) MCG/ACT inhaler Inhale 2 puffs into the lungs every 4 hours as needed for Wheezing or Shortness of Breath      Glycopyrrolate (ROBINUL PO) Take 4 mg by mouth nightly       No facility-administered encounter medications on file as of 3/28/2018. Lab Data:  Imaging:  ECHO 7/8/16  Normal global wall motion. Visual EF is 55-60%  Doppler evidence of grade I (impaired) diastolic dysfunction. Calculated EF 52%. Left atrial cavity is mildly dilated. Right ventricle cavity is mildly dilated. Structurally normal mitral valve with mild regurgitation. Structurally normal tricuspid valve with trace regurgitation. IVC is dilated with respiratory response of <50%. CXR 2/16/16  atelectasis with bronchial wall thickening      CATH 7/28/15    Signed by Mecca Davila MD on 07/28/15 at 1114    Document Text    Expand All Collapse All   PATIENT NAME: PA #: MR Bonilla Pathak [id number]     1. Selective coronary angiography. 2. Left heart catheterization. 3. Left ventriculography. 4. Right heart catheterization. ANGIOGRAPHIC FINDINGS:   1. No coronary artery disease. 2. Normal left ventricular function, EF greater than 60%. 3. Normal left and right hemodynamics. CONCLUSIONS: No evidence for coronary artery disease and preserved left  ventricular dysfunction with normal hemodynamics. RECOMMENDATIONS: At this point is noncardiac symptoms. Recommend follow-up  with Dr. Fabien Randolph and Alize Barber in 1 to 2 weeks. Bridget Riley MD      ECHO 5/26/15  Summary   Normal left ventricle size and wall thickness. Estimated ejection fraction   of 55%. Apical lateral and mid anterolateral hypokinesia. Impaired left   ventricular relaxation. Trivial mitral regurgitation is present. The aortic leaflets appear to open adequately. Trace aortic regurgitation is present. No evidence of aortic valve stenosis. Assessment:  Sushil Luther was seen today for follow-up from hospital, congestive heart failure, hyperlipidemia, discuss labs, shortness of breath, dizziness, edema and fatigue. Diagnoses and all orders for this visit:    Diastolic congestive heart failure, unspecified congestive heart failure chronicity (HCC)  -     EKG 12 lead    PAF (paroxysmal atrial fibrillation) (HCC)  -     EKG 12 lead    Other orders  -     torsemide (DEMADEX) 20 MG tablet; Take 1 tablet by mouth 2 times daily          Plan:  1. Watch fluid intake - NO MORE THAN 8 CUPS A DAY  (this includes anything that turns to liquid)  2. Watch sodium intake. Do not add any extra salt to your food  3. Return to see me in 8 weeks  4. BMP in 2 weeks. QUALITY MEASURES  1. Tobacco Cessation Counseling: Yes  2. Retake of BP if >140/90:   NA  3. Documentation to PCP/referring for new patient:  Sent to PCP at close of office visit  4. CAD patient on anti-platelet: Yes  5. CAD patient on STATIN therapy:  Yes  6. Patient with CHF and aFib on anticoagulation:  NA      Kye Wright MD 3/28/2018 2:57 PM               If you have questions, please do not hesitate to call me. I look forward to following Sushil Luther along with you.     Sincerely,        Kye Wright MD

## 2018-03-28 NOTE — PROGRESS NOTES
[address] Office Note  3/28/2018     Subjective:  Ms. Sandra Flowers presents for cardiology  follow up for [facility]  S/P fall and multiple rt rib fractures  Requiring VATS early feb 2018. c/o swelling of legs short of breath this week. Just finished medrol dose pack and levaquin for pneumonia. Today she reports being SOB and increased edema. EKG today shows sinus arrhythmia, RBBB, rate 76. She was recently taking levequin and a medrol dose pack for pneumonia. She states she just doesn't feel good. She denies chest pain, dizziness, and syncope. HPI:  This patient is a 68-year-old white female with no prior history of heart disease. She presented to the hospital on 06/25/2015 with acute onset of shortness of breath and subsequent chest pain. The patient states she was at [facility] approximately 2 weeks ago with small bowel obstruction. That was complicated by renal failure. That eventually resolved by conservative therapy, and she went home about a week ago. The patient has been improving gradually. However, she has continued to have diarrhea ever since she was released from previous hospital. On the day of hospitalization she felt poorly. She was slightly short of breath in the morning with her daily activity; but as the day went on, she became more short of breath. She also experienced substernal pressure type of chest pain as if someone was sitting on her. She felt palpitations in her ear. She did not have any nausea or vomiting but had diarrhea. The patient was diaphoretic. Symptoms of chest pressure and shortness of breath lasted for several hours. Subsequently the patient came to the emergency room, and she was admitted with a diagnosis of acute pulmonary edema. She was diuresed and improved. Her troponins were elevated at that time. She was Dx with CDiff during her admission. Echo at that time with EF of 55%. Most recent cardiac cath on 07/28/2015 with normal coronaries.      Review tablet Take 3 mg by mouth nightly       Umeclidinium Bromide (INCRUSE ELLIPTA) 62.5 MCG/INH AEPB Inhale into the lungs daily       gabapentin (NEURONTIN) 600 MG tablet Take 600 mg by mouth 3 times daily      potassium chloride (K-DUR) 10 MEQ tablet Take 20 mEq by mouth daily       venlafaxine (EFFEXOR-XR) 150 MG XR capsule Take 150 mg by mouth daily      ezetimibe (ZETIA) 10 MG tablet Take 10 mg by mouth daily      Multiple Vitamins-Minerals (THERAPEUTIC MULTIVITAMIN-MINERALS) tablet Take 1 tablet by mouth daily      QUEtiapine (SEROQUEL XR) 300 MG XR tablet Take 300 mg by mouth nightly       simvastatin (ZOCOR) 20 MG tablet Take 20 mg by mouth nightly      OXYGEN Inhale into the lungs Indications: 2.5 L nightly       [DISCONTINUED] furosemide (LASIX) 80 MG tablet Take 80 mg by mouth 2 times daily      [DISCONTINUED] HYDRALAZINE HCL PO Take 50 mg by mouth 1-2 tablet 4 times daily as needed      [DISCONTINUED] torsemide (DEMADEX) 20 MG tablet Take 20 mg by mouth daily      [DISCONTINUED] levofloxacin (LEVAQUIN) 250 MG tablet Take 250 mg by mouth daily      [DISCONTINUED] diltiazem (CARDIZEM 12 HR) 120 MG extended release capsule Take 120 mg by mouth nightly      [DISCONTINUED] atorvastatin (LIPITOR) 10 MG tablet Take 10 mg by mouth daily      nicotine (NICODERM CQ) 14 MG/24HR Place 1 patch onto the skin daily 30 patch 0    sennosides-docusate sodium (SENOKOT-S) 8.6-50 MG tablet Take 2 tablets by mouth 2 times daily 30 tablet 0    albuterol sulfate HFA (PROAIR HFA) 108 (90 BASE) MCG/ACT inhaler Inhale 2 puffs into the lungs every 4 hours as needed for Wheezing or Shortness of Breath      Glycopyrrolate (ROBINUL PO) Take 4 mg by mouth nightly       No facility-administered encounter medications on file as of 3/28/2018. Lab Data:  Imaging:  ECHO 7/8/16  Normal global wall motion. Visual EF is 55-60%  Doppler evidence of grade I (impaired) diastolic dysfunction. Calculated EF 52%.   Left TRIG 201 (H) 02/10/2018    TRIG 90 11/02/2015    TRIG 158 (H) 07/28/2015     Lab Results   Component Value Date    HDL 65 11/02/2015    HDL 45 07/28/2015     Lab Results   Component Value Date    LDLCALC 97 11/02/2015    LDLCALC 86 07/28/2015     Lab Results   Component Value Date    LABVLDL 32 07/28/2015     No results found for: CHOLHDLRATIO  PT/INR: No results for input(s): PROTIME, INR in the last 72 hours. A1C:   Lab Results   Component Value Date    LABA1C 5.9 01/13/2018     BNP:  No results for input(s): BNP in the last 72 hours. EKG: Sinus tachycardia with Premature supraventricular complexesRight bundle branch blockCannot rule out Inferior infarct , age undeterminedAnterolateral infarct (cited on or before 25-JUN-2015)Abnormal ECGWhen compared with ECG of 25-JUN-2015 18:02, (unconfirmed)Premature ventricular complexes are no longer PresentPremature supraventricular complexes are now PresentConfirmed by Shilpa Raza MD, [address] ([facility]) on 6/26/2015 6:38:48 AM    Echo: 06/26/2015  Normal left ventricle size and wall thickness. Estimated ejection   fraction  of 55%. Apical lateral and mid anterolateral hypokinesia. Impaired left  ventricular relaxation. Mitral valve is structurally normal.  Mitral valve leaflets appear to open adequately. Trivial mitral regurgitation is present. The aortic leaflets appear to open adequately. Trace aortic regurgitation is present. No evidence of aortic valve stenosis. Assessment:  Jeff Leija was seen today for follow-up from hospital, congestive heart failure, hyperlipidemia, discuss labs, shortness of breath, dizziness, edema and fatigue. Diagnoses and all orders for this visit:    Diastolic congestive heart failure, unspecified congestive heart failure chronicity (HCC)  -     EKG 12 lead    PAF (paroxysmal atrial fibrillation) (HCC)  -     EKG 12 lead    Other orders  -     torsemide (DEMADEX) 20 MG tablet; Take 1 tablet by mouth 2 times daily          Plan:  1.  Watch fluid intake - NO MORE THAN 8 CUPS A DAY  (this includes anything that turns to liquid)  2. Watch sodium intake. Do not add any extra salt to your food  3. Return to see me in 8 weeks  4. BMP in 2 weeks. QUALITY MEASURES  1. Tobacco Cessation Counseling: Yes  2. Retake of BP if >140/90:   NA  3. Documentation to PCP/referring for new patient:  Sent to PCP at close of office visit  4. CAD patient on anti-platelet: Yes  5. CAD patient on STATIN therapy:  Yes  6.  Patient with CHF and aFib on anticoagulation:  NA      Jesica Valerio MD 3/28/2018 2:57 PM

## 2018-03-28 NOTE — PATIENT INSTRUCTIONS
Plan:  1. Watch fluid intake - NO MORE THAN 8 CUPS A DAY  (this includes anything that turns to liquid)  2. Watch sodium intake. Do not add any extra salt to your food  3. Return to see me in 8 weeks  4. BMP in 2 weeks.

## 2018-03-29 NOTE — COMMUNICATION BODY
tablet Take 3 mg by mouth nightly       Umeclidinium Bromide (INCRUSE ELLIPTA) 62.5 MCG/INH AEPB Inhale into the lungs daily       gabapentin (NEURONTIN) 600 MG tablet Take 600 mg by mouth 3 times daily      potassium chloride (K-DUR) 10 MEQ tablet Take 20 mEq by mouth daily       venlafaxine (EFFEXOR-XR) 150 MG XR capsule Take 150 mg by mouth daily      ezetimibe (ZETIA) 10 MG tablet Take 10 mg by mouth daily      Multiple Vitamins-Minerals (THERAPEUTIC MULTIVITAMIN-MINERALS) tablet Take 1 tablet by mouth daily      QUEtiapine (SEROQUEL XR) 300 MG XR tablet Take 300 mg by mouth nightly       simvastatin (ZOCOR) 20 MG tablet Take 20 mg by mouth nightly      OXYGEN Inhale into the lungs Indications: 2.5 L nightly       [DISCONTINUED] furosemide (LASIX) 80 MG tablet Take 80 mg by mouth 2 times daily      [DISCONTINUED] HYDRALAZINE HCL PO Take 50 mg by mouth 1-2 tablet 4 times daily as needed      [DISCONTINUED] torsemide (DEMADEX) 20 MG tablet Take 20 mg by mouth daily      [DISCONTINUED] levofloxacin (LEVAQUIN) 250 MG tablet Take 250 mg by mouth daily      [DISCONTINUED] diltiazem (CARDIZEM 12 HR) 120 MG extended release capsule Take 120 mg by mouth nightly      [DISCONTINUED] atorvastatin (LIPITOR) 10 MG tablet Take 10 mg by mouth daily      nicotine (NICODERM CQ) 14 MG/24HR Place 1 patch onto the skin daily 30 patch 0    sennosides-docusate sodium (SENOKOT-S) 8.6-50 MG tablet Take 2 tablets by mouth 2 times daily 30 tablet 0    albuterol sulfate HFA (PROAIR HFA) 108 (90 BASE) MCG/ACT inhaler Inhale 2 puffs into the lungs every 4 hours as needed for Wheezing or Shortness of Breath      Glycopyrrolate (ROBINUL PO) Take 4 mg by mouth nightly       No facility-administered encounter medications on file as of 3/28/2018. Lab Data:  Imaging:  ECHO 7/8/16  Normal global wall motion. Visual EF is 55-60%  Doppler evidence of grade I (impaired) diastolic dysfunction. Calculated EF 52%.   Left atrial cavity is mildly dilated. Right ventricle cavity is mildly dilated. Structurally normal mitral valve with mild regurgitation. Structurally normal tricuspid valve with trace regurgitation. IVC is dilated with respiratory response of <50%. CXR 2/16/16  atelectasis with bronchial wall thickening      CATH 7/28/15    Signed by Bebe Talbert MD on 07/28/15 at 1114    Document Text    Expand All Collapse All   PATIENT NAME: PA #: MR Jens Willis [id number]     1. Selective coronary angiography. 2. Left heart catheterization. 3. Left ventriculography. 4. Right heart catheterization. ANGIOGRAPHIC FINDINGS:   1. No coronary artery disease. 2. Normal left ventricular function, EF greater than 60%. 3. Normal left and right hemodynamics. CONCLUSIONS: No evidence for coronary artery disease and preserved left  ventricular dysfunction with normal hemodynamics. RECOMMENDATIONS: At this point is noncardiac symptoms. Recommend follow-up  with Dr. Mc Priest and Ryann Rios in 1 to 2 weeks. Katie Momin MD      ECHO 5/26/15  Summary   Normal left ventricle size and wall thickness. Estimated ejection fraction   of 55%. Apical lateral and mid anterolateral hypokinesia. Impaired left   ventricular relaxation.   Mitral valve is structurally normal.   Mitral valve leaflets appear to open adequately.   Trivial mitral regurgitation is present.   The aortic leaflets appear to open adequately.   Trace aortic regurgitation is present.   No evidence of aortic valve stenosis    CBC: No results for input(s): WBC, HGB, HCT, MCV, PLT in the last 72 hours. BMP: No results for input(s): NA, K, CL, CO2, PHOS, BUN, CREATININE in the last 72 hours. Invalid input(s): CA  LIVER PROFILE: No results for input(s): AST, ALT, LIPASE, BILIDIR, BILITOT, ALKPHOS in the last 72 hours. Invalid input(s):   AMYLASE,  ALB  LIPID:   No components found for: CHLPL,  CHOL  Lab Results   Component Value Date

## 2018-04-05 ENCOUNTER — OFFICE VISIT (OUTPATIENT)
Dept: PULMONOLOGY | Age: 68
End: 2018-04-05

## 2018-04-05 ENCOUNTER — HOSPITAL ENCOUNTER (OUTPATIENT)
Dept: OTHER | Age: 68
Discharge: HOME | End: 2018-04-05
Attending: INTERNAL MEDICINE | Admitting: INTERNAL MEDICINE

## 2018-04-05 VITALS
SYSTOLIC BLOOD PRESSURE: 115 MMHG | HEART RATE: 71 BPM | WEIGHT: 248 LBS | OXYGEN SATURATION: 96 % | DIASTOLIC BLOOD PRESSURE: 68 MMHG | RESPIRATION RATE: 16 BRPM | BODY MASS INDEX: 39.86 KG/M2 | HEIGHT: 66 IN | TEMPERATURE: 98.1 F

## 2018-04-05 DIAGNOSIS — R09.02 HYPOXIA: ICD-10-CM

## 2018-04-05 DIAGNOSIS — J44.9 COPD, SEVERE (HCC): Primary | ICD-10-CM

## 2018-04-05 DIAGNOSIS — J94.2 HEMOTHORAX: ICD-10-CM

## 2018-04-05 DIAGNOSIS — G25.81 RLS (RESTLESS LEGS SYNDROME): ICD-10-CM

## 2018-04-05 DIAGNOSIS — G47.33 MODERATE OBSTRUCTIVE SLEEP APNEA: ICD-10-CM

## 2018-04-05 PROCEDURE — 1036F TOBACCO NON-USER: CPT | Performed by: INTERNAL MEDICINE

## 2018-04-05 PROCEDURE — 1090F PRES/ABSN URINE INCON ASSESS: CPT | Performed by: INTERNAL MEDICINE

## 2018-04-05 PROCEDURE — G8417 CALC BMI ABV UP PARAM F/U: HCPCS | Performed by: INTERNAL MEDICINE

## 2018-04-05 PROCEDURE — 99214 OFFICE O/P EST MOD 30 MIN: CPT | Performed by: INTERNAL MEDICINE

## 2018-04-05 PROCEDURE — G8400 PT W/DXA NO RESULTS DOC: HCPCS | Performed by: INTERNAL MEDICINE

## 2018-04-05 PROCEDURE — 4040F PNEUMOC VAC/ADMIN/RCVD: CPT | Performed by: INTERNAL MEDICINE

## 2018-04-05 PROCEDURE — 3017F COLORECTAL CA SCREEN DOC REV: CPT | Performed by: INTERNAL MEDICINE

## 2018-04-05 PROCEDURE — G8427 DOCREV CUR MEDS BY ELIG CLIN: HCPCS | Performed by: INTERNAL MEDICINE

## 2018-04-05 PROCEDURE — 1123F ACP DISCUSS/DSCN MKR DOCD: CPT | Performed by: INTERNAL MEDICINE

## 2018-04-05 PROCEDURE — G8598 ASA/ANTIPLAT THER USED: HCPCS | Performed by: INTERNAL MEDICINE

## 2018-04-05 PROCEDURE — 3014F SCREEN MAMMO DOC REV: CPT | Performed by: INTERNAL MEDICINE

## 2018-04-05 PROCEDURE — G8926 SPIRO NO PERF OR DOC: HCPCS | Performed by: INTERNAL MEDICINE

## 2018-04-05 PROCEDURE — 3023F SPIROM DOC REV: CPT | Performed by: INTERNAL MEDICINE

## 2018-04-05 RX ORDER — ALBUTEROL SULFATE 2.5 MG/3ML
2.5 SOLUTION RESPIRATORY (INHALATION) EVERY 6 HOURS PRN
COMMUNITY
End: 2020-03-27 | Stop reason: SDUPTHER

## 2018-04-05 RX ORDER — ALBUTEROL SULFATE 90 UG/1
2 AEROSOL, METERED RESPIRATORY (INHALATION) EVERY 6 HOURS PRN
COMMUNITY
End: 2020-05-12

## 2018-04-05 ASSESSMENT — SLEEP AND FATIGUE QUESTIONNAIRES
HOW LIKELY ARE YOU TO NOD OFF OR FALL ASLEEP WHILE WATCHING TV: 3
HOW LIKELY ARE YOU TO NOD OFF OR FALL ASLEEP IN A CAR, WHILE STOPPED FOR A FEW MINUTES IN TRAFFIC: 0
HOW LIKELY ARE YOU TO NOD OFF OR FALL ASLEEP WHILE SITTING AND READING: 0
HOW LIKELY ARE YOU TO NOD OFF OR FALL ASLEEP WHILE SITTING INACTIVE IN A PUBLIC PLACE: 3
HOW LIKELY ARE YOU TO NOD OFF OR FALL ASLEEP WHILE SITTING AND TALKING TO SOMEONE: 3
NECK CIRCUMFERENCE (INCHES): 18
HOW LIKELY ARE YOU TO NOD OFF OR FALL ASLEEP WHILE SITTING QUIETLY AFTER LUNCH WITHOUT ALCOHOL: 3
HOW LIKELY ARE YOU TO NOD OFF OR FALL ASLEEP WHEN YOU ARE A PASSENGER IN A CAR FOR AN HOUR WITHOUT A BREAK: 3
HOW LIKELY ARE YOU TO NOD OFF OR FALL ASLEEP WHILE LYING DOWN TO REST IN THE AFTERNOON WHEN CIRCUMSTANCES PERMIT: 3
ESS TOTAL SCORE: 18

## 2018-04-05 ASSESSMENT — COPD QUESTIONNAIRES: COPD: 1

## 2018-04-17 ENCOUNTER — HOSPITAL ENCOUNTER (OUTPATIENT)
Dept: SLEEP MEDICINE | Age: 68
Discharge: HOME | End: 2018-04-17
Attending: INTERNAL MEDICINE | Admitting: INTERNAL MEDICINE

## 2018-04-17 DIAGNOSIS — G47.33 MODERATE OBSTRUCTIVE SLEEP APNEA: ICD-10-CM

## 2018-04-18 DIAGNOSIS — G47.33 OSA (OBSTRUCTIVE SLEEP APNEA): Primary | ICD-10-CM

## 2018-04-30 ENCOUNTER — HOSPITAL ENCOUNTER (OUTPATIENT)
Dept: OTHER | Age: 68
Discharge: HOME | End: 2018-04-30
Attending: INTERNAL MEDICINE | Admitting: INTERNAL MEDICINE

## 2018-04-30 ENCOUNTER — TELEPHONE (OUTPATIENT)
Dept: CARDIOLOGY CLINIC | Age: 68
End: 2018-04-30

## 2018-04-30 LAB
ANION GAP SERPL CALCULATED.3IONS-SCNC: 9 MMOL/L (ref 3–16)
BUN BLDV-MCNC: 11 MG/DL (ref 7–20)
CALCIUM SERPL-MCNC: 9.8 MG/DL (ref 8.3–10.6)
CHLORIDE BLD-SCNC: 97 MMOL/L (ref 99–110)
CO2: 36 MMOL/L (ref 21–32)
CREAT SERPL-MCNC: 0.6 MG/DL (ref 0.6–1.2)
GFR AFRICAN AMERICAN: >60
GFR NON-AFRICAN AMERICAN: >60
GLUCOSE BLD-MCNC: 94 MG/DL (ref 70–99)
POTASSIUM SERPL-SCNC: 3.1 MMOL/L (ref 3.5–5.1)
SODIUM BLD-SCNC: 142 MMOL/L (ref 136–145)

## 2018-04-30 RX ORDER — SPIRONOLACTONE 25 MG/1
25 TABLET ORAL DAILY
Qty: 30 TABLET | Refills: 3 | Status: SHIPPED | OUTPATIENT
Start: 2018-04-30 | End: 2018-08-25

## 2018-05-23 ENCOUNTER — OFFICE VISIT (OUTPATIENT)
Dept: CARDIOLOGY CLINIC | Age: 68
End: 2018-05-23

## 2018-05-23 VITALS
WEIGHT: 235 LBS | OXYGEN SATURATION: 95 % | DIASTOLIC BLOOD PRESSURE: 82 MMHG | SYSTOLIC BLOOD PRESSURE: 120 MMHG | BODY MASS INDEX: 37.77 KG/M2 | HEIGHT: 66 IN | HEART RATE: 79 BPM

## 2018-05-23 DIAGNOSIS — I48.0 PAF (PAROXYSMAL ATRIAL FIBRILLATION) (HCC): ICD-10-CM

## 2018-05-23 DIAGNOSIS — J44.1 CHRONIC OBSTRUCTIVE PULMONARY DISEASE WITH ACUTE EXACERBATION (HCC): ICD-10-CM

## 2018-05-23 DIAGNOSIS — E78.2 MIXED HYPERLIPIDEMIA: ICD-10-CM

## 2018-05-23 DIAGNOSIS — I50.32 CHRONIC DIASTOLIC CONGESTIVE HEART FAILURE (HCC): Primary | ICD-10-CM

## 2018-05-23 PROCEDURE — G8417 CALC BMI ABV UP PARAM F/U: HCPCS | Performed by: INTERNAL MEDICINE

## 2018-05-23 PROCEDURE — 1090F PRES/ABSN URINE INCON ASSESS: CPT | Performed by: INTERNAL MEDICINE

## 2018-05-23 PROCEDURE — G8926 SPIRO NO PERF OR DOC: HCPCS | Performed by: INTERNAL MEDICINE

## 2018-05-23 PROCEDURE — G8400 PT W/DXA NO RESULTS DOC: HCPCS | Performed by: INTERNAL MEDICINE

## 2018-05-23 PROCEDURE — 99214 OFFICE O/P EST MOD 30 MIN: CPT | Performed by: INTERNAL MEDICINE

## 2018-05-23 PROCEDURE — 3023F SPIROM DOC REV: CPT | Performed by: INTERNAL MEDICINE

## 2018-05-23 PROCEDURE — 1123F ACP DISCUSS/DSCN MKR DOCD: CPT | Performed by: INTERNAL MEDICINE

## 2018-05-23 PROCEDURE — G8598 ASA/ANTIPLAT THER USED: HCPCS | Performed by: INTERNAL MEDICINE

## 2018-05-23 PROCEDURE — G8427 DOCREV CUR MEDS BY ELIG CLIN: HCPCS | Performed by: INTERNAL MEDICINE

## 2018-05-23 PROCEDURE — 4040F PNEUMOC VAC/ADMIN/RCVD: CPT | Performed by: INTERNAL MEDICINE

## 2018-05-23 PROCEDURE — 3017F COLORECTAL CA SCREEN DOC REV: CPT | Performed by: INTERNAL MEDICINE

## 2018-05-23 PROCEDURE — 4004F PT TOBACCO SCREEN RCVD TLK: CPT | Performed by: INTERNAL MEDICINE

## 2018-05-23 RX ORDER — SIMVASTATIN 20 MG
20 TABLET ORAL NIGHTLY
Qty: 30 TABLET | Refills: 11 | Status: SHIPPED | OUTPATIENT
Start: 2018-05-23 | End: 2019-05-07

## 2018-05-23 RX ORDER — DILTIAZEM HYDROCHLORIDE 120 MG/1
120 CAPSULE, COATED, EXTENDED RELEASE ORAL DAILY
Qty: 30 CAPSULE | Refills: 11 | Status: SHIPPED | OUTPATIENT
Start: 2018-05-23 | End: 2019-05-07 | Stop reason: SDUPTHER

## 2018-05-23 RX ORDER — EZETIMIBE 10 MG/1
10 TABLET ORAL DAILY
Qty: 30 TABLET | Refills: 11 | Status: SHIPPED | OUTPATIENT
Start: 2018-05-23 | End: 2019-05-07 | Stop reason: SDUPTHER

## 2018-06-08 ENCOUNTER — HOSPITAL ENCOUNTER (OUTPATIENT)
Dept: OTHER | Age: 68
Discharge: HOME | End: 2018-06-08
Attending: INTERNAL MEDICINE | Admitting: INTERNAL MEDICINE

## 2018-06-08 LAB
A/G RATIO: 1.1 (ref 1.1–2.2)
ALBUMIN SERPL-MCNC: 4.1 G/DL (ref 3.4–5)
ALP BLD-CCNC: 120 U/L (ref 40–129)
ALT SERPL-CCNC: 13 U/L (ref 10–40)
ANION GAP SERPL CALCULATED.3IONS-SCNC: 11 MMOL/L (ref 3–16)
AST SERPL-CCNC: 16 U/L (ref 15–37)
BILIRUB SERPL-MCNC: <0.2 MG/DL (ref 0–1)
BUN BLDV-MCNC: 13 MG/DL (ref 7–20)
CALCIUM SERPL-MCNC: 9.5 MG/DL (ref 8.3–10.6)
CHLORIDE BLD-SCNC: 101 MMOL/L (ref 99–110)
CHOLESTEROL, FASTING: 145 MG/DL (ref 0–199)
CO2: 29 MMOL/L (ref 21–32)
CREAT SERPL-MCNC: 0.7 MG/DL (ref 0.6–1.2)
GFR AFRICAN AMERICAN: >60
GFR NON-AFRICAN AMERICAN: >60
GLOBULIN: 3.6 G/DL
GLUCOSE FASTING: 106 MG/DL (ref 70–99)
HDLC SERPL-MCNC: 54 MG/DL (ref 40–60)
LDL CHOLESTEROL CALCULATED: 58 MG/DL
POTASSIUM SERPL-SCNC: 3.8 MMOL/L (ref 3.5–5.1)
SODIUM BLD-SCNC: 141 MMOL/L (ref 136–145)
TOTAL PROTEIN: 7.7 G/DL (ref 6.4–8.2)
TRIGLYCERIDE, FASTING: 167 MG/DL (ref 0–150)
VLDLC SERPL CALC-MCNC: 33 MG/DL

## 2018-06-11 ENCOUNTER — TELEPHONE (OUTPATIENT)
Dept: CARDIOLOGY CLINIC | Age: 68
End: 2018-06-11

## 2018-07-05 RX ORDER — APIXABAN 5 MG/1
5 TABLET, FILM COATED ORAL 2 TIMES DAILY
Qty: 180 TABLET | Refills: 3 | Status: SHIPPED | OUTPATIENT
Start: 2018-07-05 | End: 2019-04-11 | Stop reason: SDUPTHER

## 2018-07-19 ENCOUNTER — TELEPHONE (OUTPATIENT)
Dept: PULMONOLOGY | Age: 68
End: 2018-07-19

## 2018-07-20 ENCOUNTER — OFFICE VISIT (OUTPATIENT)
Dept: PULMONOLOGY | Age: 68
End: 2018-07-20

## 2018-07-20 VITALS
DIASTOLIC BLOOD PRESSURE: 84 MMHG | HEART RATE: 76 BPM | BODY MASS INDEX: 36.8 KG/M2 | OXYGEN SATURATION: 95 % | SYSTOLIC BLOOD PRESSURE: 128 MMHG | RESPIRATION RATE: 18 BRPM | HEIGHT: 66 IN | WEIGHT: 229 LBS

## 2018-07-20 DIAGNOSIS — G25.81 RLS (RESTLESS LEGS SYNDROME): ICD-10-CM

## 2018-07-20 DIAGNOSIS — Z72.0 TOBACCO ABUSE DISORDER: ICD-10-CM

## 2018-07-20 DIAGNOSIS — G47.33 OSA (OBSTRUCTIVE SLEEP APNEA): Primary | ICD-10-CM

## 2018-07-20 DIAGNOSIS — R09.02 HYPOXIA: ICD-10-CM

## 2018-07-20 DIAGNOSIS — J44.1 COPD WITH ACUTE EXACERBATION (HCC): ICD-10-CM

## 2018-07-20 PROCEDURE — 1101F PT FALLS ASSESS-DOCD LE1/YR: CPT | Performed by: NURSE PRACTITIONER

## 2018-07-20 PROCEDURE — G8926 SPIRO NO PERF OR DOC: HCPCS | Performed by: NURSE PRACTITIONER

## 2018-07-20 PROCEDURE — 1090F PRES/ABSN URINE INCON ASSESS: CPT | Performed by: NURSE PRACTITIONER

## 2018-07-20 PROCEDURE — 1123F ACP DISCUSS/DSCN MKR DOCD: CPT | Performed by: NURSE PRACTITIONER

## 2018-07-20 PROCEDURE — G8400 PT W/DXA NO RESULTS DOC: HCPCS | Performed by: NURSE PRACTITIONER

## 2018-07-20 PROCEDURE — 3017F COLORECTAL CA SCREEN DOC REV: CPT | Performed by: NURSE PRACTITIONER

## 2018-07-20 PROCEDURE — 3023F SPIROM DOC REV: CPT | Performed by: NURSE PRACTITIONER

## 2018-07-20 PROCEDURE — G8427 DOCREV CUR MEDS BY ELIG CLIN: HCPCS | Performed by: NURSE PRACTITIONER

## 2018-07-20 PROCEDURE — 4040F PNEUMOC VAC/ADMIN/RCVD: CPT | Performed by: NURSE PRACTITIONER

## 2018-07-20 PROCEDURE — 99214 OFFICE O/P EST MOD 30 MIN: CPT | Performed by: NURSE PRACTITIONER

## 2018-07-20 PROCEDURE — G8598 ASA/ANTIPLAT THER USED: HCPCS | Performed by: NURSE PRACTITIONER

## 2018-07-20 PROCEDURE — 4004F PT TOBACCO SCREEN RCVD TLK: CPT | Performed by: NURSE PRACTITIONER

## 2018-07-20 PROCEDURE — G8417 CALC BMI ABV UP PARAM F/U: HCPCS | Performed by: NURSE PRACTITIONER

## 2018-07-20 RX ORDER — FUROSEMIDE 80 MG
TABLET ORAL
COMMUNITY
End: 2019-05-07

## 2018-07-20 RX ORDER — OXYCODONE HYDROCHLORIDE AND ACETAMINOPHEN 5; 325 MG/1; MG/1
1 TABLET ORAL EVERY 4 HOURS PRN
COMMUNITY
End: 2018-08-25

## 2018-07-20 RX ORDER — DOXYCYCLINE HYCLATE 100 MG
100 TABLET ORAL 2 TIMES DAILY
Qty: 10 TABLET | Refills: 0 | Status: SHIPPED | OUTPATIENT
Start: 2018-07-20 | End: 2018-07-25

## 2018-07-20 RX ORDER — PREDNISONE 10 MG/1
TABLET ORAL
Qty: 15 TABLET | Refills: 0 | Status: ON HOLD | OUTPATIENT
Start: 2018-07-20 | End: 2018-08-25

## 2018-07-20 ASSESSMENT — SLEEP AND FATIGUE QUESTIONNAIRES
NECK CIRCUMFERENCE (INCHES): 16.25
HOW LIKELY ARE YOU TO NOD OFF OR FALL ASLEEP WHILE SITTING INACTIVE IN A PUBLIC PLACE: 0
ESS TOTAL SCORE: 5
HOW LIKELY ARE YOU TO NOD OFF OR FALL ASLEEP WHILE WATCHING TV: 3
HOW LIKELY ARE YOU TO NOD OFF OR FALL ASLEEP WHILE LYING DOWN TO REST IN THE AFTERNOON WHEN CIRCUMSTANCES PERMIT: 1
HOW LIKELY ARE YOU TO NOD OFF OR FALL ASLEEP WHEN YOU ARE A PASSENGER IN A CAR FOR AN HOUR WITHOUT A BREAK: 1
HOW LIKELY ARE YOU TO NOD OFF OR FALL ASLEEP WHILE SITTING AND READING: 0
HOW LIKELY ARE YOU TO NOD OFF OR FALL ASLEEP IN A CAR, WHILE STOPPED FOR A FEW MINUTES IN TRAFFIC: 0
HOW LIKELY ARE YOU TO NOD OFF OR FALL ASLEEP WHILE SITTING QUIETLY AFTER LUNCH WITHOUT ALCOHOL: 0
HOW LIKELY ARE YOU TO NOD OFF OR FALL ASLEEP WHILE SITTING AND TALKING TO SOMEONE: 0

## 2018-07-20 NOTE — PROGRESS NOTES
Take 40 mg by mouth 2 times daily, Disp: , Rfl:     hydrOXYzine (ATARAX) 50 MG tablet, Take 50 mg by mouth 1-2 tabs every 4 hours prn, Disp: , Rfl:     ferrous sulfate 325 (65 Fe) MG tablet, Take 325 mg by mouth daily (with breakfast), Disp: , Rfl:     fluticasone-vilanterol (BREO ELLIPTA) 100-25 MCG/INH AEPB inhaler, Inhale 1 puff into the lungs daily, Disp: , Rfl:     nicotine (NICODERM CQ) 14 MG/24HR, Place 1 patch onto the skin daily, Disp: 30 patch, Rfl: 0    busPIRone (BUSPAR) 15 MG tablet, Take 15 mg by mouth 2 times daily , Disp: , Rfl:     rOPINIRole (REQUIP) 2 MG tablet, Take 3 mg by mouth nightly , Disp: , Rfl:     Umeclidinium Bromide (INCRUSE ELLIPTA) 62.5 MCG/INH AEPB, Inhale into the lungs daily , Disp: , Rfl:     gabapentin (NEURONTIN) 600 MG tablet, Take 300 mg by mouth 3 times daily . , Disp: , Rfl:     Glycopyrrolate (ROBINUL PO), Take 4 mg by mouth nightly, Disp: , Rfl:     venlafaxine (EFFEXOR-XR) 150 MG XR capsule, Take 150 mg by mouth daily, Disp: , Rfl:     Multiple Vitamins-Minerals (THERAPEUTIC MULTIVITAMIN-MINERALS) tablet, Take 1 tablet by mouth daily, Disp: , Rfl:     QUEtiapine (SEROQUEL XR) 300 MG XR tablet, Take 300 mg by mouth nightly , Disp: , Rfl:     OXYGEN, Inhale into the lungs Indications: 2.5 L nightly , Disp: , Rfl:     torsemide (DEMADEX) 20 MG tablet, Take 1 tablet by mouth 2 times daily, Disp: 30 tablet, Rfl: 3    sennosides-docusate sodium (SENOKOT-S) 8.6-50 MG tablet, Take 2 tablets by mouth 2 times daily, Disp: 30 tablet, Rfl: 0        Objective:   PHYSICAL EXAM:    /84   Pulse 76   Resp 18   Ht 5' 6\" (1.676 m)   Wt 229 lb (103.9 kg)   SpO2 95% Comment: RA  BMI 36.96 kg/m²     Physical exam:  Gen: No distress. Obese. BMI of 36.96.    Eyes: PERRL. No sclera icterus. No conjunctival injection. ENT: No discharge. Pharynx clear. Mallampati class IV. Neck: Trachea midline. No obvious mass.  Neck circumference  16.25\"  Resp: No accessory muscle use. No crackles. +wheezes. No rhonchi. No dullness on percussion. Good air entry. CV: Regular rate. Regular rhythm. No murmur or rub. 1+ BLE ankle edema. GI: Non-tender. Non-distended. No hernia. Skin: Warm and dry. No nodule on exposed extremities. Lymph: No cervical LAD. No supraclavicular LAD. M/S: No cyanosis. No joint deformity. No clubbing. Neuro: Awake. Alert. Moves all four extremities. Psych: Oriented x 3. No anxiety. DATA reviewed by me:   PFTs 08/19/2015 FVC 1.78(52%) FEV1 1.27(49%) FEV1/FVC 71 % TLC 4.52(82%)   DLCO 10.23(43%) 6MW 980 F LO2 89%        CT chest 2/27/2018   No evidence of pulmonary embolism  Decreased right-sided effusion with improved aeration of the right middle and lower lobes. Mild persistent bilateral airspace disease which may represent atelectasis or pneumonia. Multiple displaced right-sided rib fractures are again noted with decreased size of the extrapleural hematoma.         Chest x-ray 4/5/2018  images reviewed by me and showed: Residual pleural and parenchymal scarring noted in both lung bases right greater than left -overall appearance is improved over the past 2 months. No definite acute pulmonary finding. Stable mild cardiomegaly.         PSG 8/25/16 AHI 28.4 desaturation to 71%  BiPAP titration 9/9/16 BiPAP 15/11 cmH2O    Download compliance: unavailable for review       Assessment:      Severe COPD with acute exacerbation  Hypoxia  RLS on Requip and Neurontin  Moderate MEG. BiPAP 15/11 cm H2O. Nasal pillow mask. Unknown compliance and efficacy, patient forgot machine, and no download available at this time  Right traumatic displaced rib fractures with hemothorax required VATS decortication and chest tube removed 2/14/18. In improving chest x-ray 4/5/18  801 20-pack-year smokingquit 9/2017. Restarted smoking 2/3 ppd over the last few months. Patient knows she needs to quit.   Wants to quit cold turkey   Plan:    · Doxycycline 100 mg PO BID

## 2018-08-23 ENCOUNTER — HOSPITAL ENCOUNTER (OUTPATIENT)
Dept: GENERAL RADIOLOGY | Age: 68
Discharge: HOME | DRG: 189 | End: 2018-08-23
Payer: MEDICARE

## 2018-08-23 ENCOUNTER — HOSPITAL ENCOUNTER (OUTPATIENT)
Age: 68
Discharge: HOME | DRG: 189 | End: 2018-08-23
Payer: MEDICARE

## 2018-08-23 DIAGNOSIS — J20.9 ACUTE BRONCHITIS, UNSPECIFIED ORGANISM: ICD-10-CM

## 2018-08-23 PROCEDURE — 71046 X-RAY EXAM CHEST 2 VIEWS: CPT

## 2018-08-25 ENCOUNTER — HOSPITAL ENCOUNTER (INPATIENT)
Age: 68
LOS: 2 days | Discharge: HOME | DRG: 189 | End: 2018-08-27
Attending: EMERGENCY MEDICINE | Admitting: INTERNAL MEDICINE
Payer: MEDICARE

## 2018-08-25 ENCOUNTER — APPOINTMENT (OUTPATIENT)
Dept: GENERAL RADIOLOGY | Age: 68
DRG: 189 | End: 2018-08-25
Payer: MEDICARE

## 2018-08-25 DIAGNOSIS — J44.1 COPD EXACERBATION (HCC): Primary | ICD-10-CM

## 2018-08-25 LAB
A/G RATIO: 1.3 (ref 1.1–2.2)
ALBUMIN SERPL-MCNC: 4.4 G/DL (ref 3.4–5)
ALP BLD-CCNC: 131 U/L (ref 40–129)
ALT SERPL-CCNC: 27 U/L (ref 10–40)
ANION GAP SERPL CALCULATED.3IONS-SCNC: 14 MMOL/L (ref 3–16)
AST SERPL-CCNC: 22 U/L (ref 15–37)
BASOPHILS ABSOLUTE: 0.1 K/UL (ref 0–0.2)
BASOPHILS RELATIVE PERCENT: 0.8 %
BILIRUB SERPL-MCNC: 0.4 MG/DL (ref 0–1)
BUN BLDV-MCNC: 21 MG/DL (ref 7–20)
CALCIUM SERPL-MCNC: 9.8 MG/DL (ref 8.3–10.6)
CHLORIDE BLD-SCNC: 101 MMOL/L (ref 99–110)
CO2: 23 MMOL/L (ref 21–32)
CREAT SERPL-MCNC: 0.7 MG/DL (ref 0.6–1.2)
EOSINOPHILS ABSOLUTE: 0.2 K/UL (ref 0–0.6)
EOSINOPHILS RELATIVE PERCENT: 2.9 %
GFR AFRICAN AMERICAN: >60
GFR NON-AFRICAN AMERICAN: >60
GLOBULIN: 3.3 G/DL
GLUCOSE BLD-MCNC: 138 MG/DL (ref 70–99)
HCT VFR BLD CALC: 44.3 % (ref 36–48)
HEMOGLOBIN: 14.2 G/DL (ref 12–16)
LACTIC ACID: 2.5 MMOL/L (ref 0.4–2)
LYMPHOCYTES ABSOLUTE: 2.8 K/UL (ref 1–5.1)
LYMPHOCYTES RELATIVE PERCENT: 36.9 %
MCH RBC QN AUTO: 28.2 PG (ref 26–34)
MCHC RBC AUTO-ENTMCNC: 32.2 G/DL (ref 31–36)
MCV RBC AUTO: 87.8 FL (ref 80–100)
MONOCYTES ABSOLUTE: 0.6 K/UL (ref 0–1.3)
MONOCYTES RELATIVE PERCENT: 7.6 %
NEUTROPHILS ABSOLUTE: 4 K/UL (ref 1.7–7.7)
NEUTROPHILS RELATIVE PERCENT: 51.8 %
PDW BLD-RTO: 14.4 % (ref 12.4–15.4)
PLATELET # BLD: 312 K/UL (ref 135–450)
PMV BLD AUTO: 6.7 FL (ref 5–10.5)
POTASSIUM SERPL-SCNC: 3.8 MMOL/L (ref 3.5–5.1)
PRO-BNP: 64 PG/ML (ref 0–124)
RBC # BLD: 5.04 M/UL (ref 4–5.2)
SODIUM BLD-SCNC: 138 MMOL/L (ref 136–145)
TOTAL PROTEIN: 7.7 G/DL (ref 6.4–8.2)
TROPONIN: <0.01 NG/ML
WBC # BLD: 7.6 K/UL (ref 4–11)

## 2018-08-25 PROCEDURE — 2700000000 HC OXYGEN THERAPY PER DAY

## 2018-08-25 PROCEDURE — 99285 EMERGENCY DEPT VISIT HI MDM: CPT

## 2018-08-25 PROCEDURE — 6370000000 HC RX 637 (ALT 250 FOR IP): Performed by: HOSPITALIST

## 2018-08-25 PROCEDURE — 85025 COMPLETE CBC W/AUTO DIFF WBC: CPT

## 2018-08-25 PROCEDURE — 80053 COMPREHEN METABOLIC PANEL: CPT

## 2018-08-25 PROCEDURE — 93005 ELECTROCARDIOGRAM TRACING: CPT | Performed by: EMERGENCY MEDICINE

## 2018-08-25 PROCEDURE — 6370000000 HC RX 637 (ALT 250 FOR IP): Performed by: INTERNAL MEDICINE

## 2018-08-25 PROCEDURE — 36415 COLL VENOUS BLD VENIPUNCTURE: CPT

## 2018-08-25 PROCEDURE — 87040 BLOOD CULTURE FOR BACTERIA: CPT

## 2018-08-25 PROCEDURE — 94640 AIRWAY INHALATION TREATMENT: CPT

## 2018-08-25 PROCEDURE — 96365 THER/PROPH/DIAG IV INF INIT: CPT

## 2018-08-25 PROCEDURE — 2060000000 HC ICU INTERMEDIATE R&B

## 2018-08-25 PROCEDURE — 83605 ASSAY OF LACTIC ACID: CPT

## 2018-08-25 PROCEDURE — 94664 DEMO&/EVAL PT USE INHALER: CPT

## 2018-08-25 PROCEDURE — 93010 ELECTROCARDIOGRAM REPORT: CPT | Performed by: INTERNAL MEDICINE

## 2018-08-25 PROCEDURE — 83880 ASSAY OF NATRIURETIC PEPTIDE: CPT

## 2018-08-25 PROCEDURE — 94761 N-INVAS EAR/PLS OXIMETRY MLT: CPT

## 2018-08-25 PROCEDURE — 2580000003 HC RX 258: Performed by: INTERNAL MEDICINE

## 2018-08-25 PROCEDURE — 96375 TX/PRO/DX INJ NEW DRUG ADDON: CPT

## 2018-08-25 PROCEDURE — 84484 ASSAY OF TROPONIN QUANT: CPT

## 2018-08-25 PROCEDURE — 6360000002 HC RX W HCPCS: Performed by: EMERGENCY MEDICINE

## 2018-08-25 PROCEDURE — 6370000000 HC RX 637 (ALT 250 FOR IP): Performed by: EMERGENCY MEDICINE

## 2018-08-25 PROCEDURE — G0378 HOSPITAL OBSERVATION PER HR: HCPCS

## 2018-08-25 PROCEDURE — 71045 X-RAY EXAM CHEST 1 VIEW: CPT

## 2018-08-25 RX ORDER — SODIUM CHLORIDE 0.9 % (FLUSH) 0.9 %
10 SYRINGE (ML) INJECTION EVERY 12 HOURS SCHEDULED
Status: DISCONTINUED | OUTPATIENT
Start: 2018-08-25 | End: 2018-08-27 | Stop reason: HOSPADM

## 2018-08-25 RX ORDER — POTASSIUM CHLORIDE 1500 MG/1
20 TABLET, FILM COATED, EXTENDED RELEASE ORAL DAILY
COMMUNITY
End: 2019-05-07

## 2018-08-25 RX ORDER — HYDROXYZINE HYDROCHLORIDE 10 MG/1
50 TABLET, FILM COATED ORAL 3 TIMES DAILY PRN
Status: DISCONTINUED | OUTPATIENT
Start: 2018-08-25 | End: 2018-08-27 | Stop reason: HOSPADM

## 2018-08-25 RX ORDER — QUETIAPINE FUMARATE 50 MG/1
100 TABLET, EXTENDED RELEASE ORAL NIGHTLY
Status: DISCONTINUED | OUTPATIENT
Start: 2018-08-25 | End: 2018-08-27 | Stop reason: HOSPADM

## 2018-08-25 RX ORDER — LEVOFLOXACIN 5 MG/ML
500 INJECTION, SOLUTION INTRAVENOUS ONCE
Status: COMPLETED | OUTPATIENT
Start: 2018-08-25 | End: 2018-08-25

## 2018-08-25 RX ORDER — QUETIAPINE 200 MG/1
200 TABLET, FILM COATED, EXTENDED RELEASE ORAL NIGHTLY
Status: DISCONTINUED | OUTPATIENT
Start: 2018-08-25 | End: 2018-08-27 | Stop reason: HOSPADM

## 2018-08-25 RX ORDER — IPRATROPIUM BROMIDE AND ALBUTEROL SULFATE 2.5; .5 MG/3ML; MG/3ML
1 SOLUTION RESPIRATORY (INHALATION) EVERY 4 HOURS
Status: DISCONTINUED | OUTPATIENT
Start: 2018-08-25 | End: 2018-08-26

## 2018-08-25 RX ORDER — SENNA AND DOCUSATE SODIUM 50; 8.6 MG/1; MG/1
2 TABLET, FILM COATED ORAL 2 TIMES DAILY
Status: DISCONTINUED | OUTPATIENT
Start: 2018-08-25 | End: 2018-08-27 | Stop reason: HOSPADM

## 2018-08-25 RX ORDER — SODIUM CHLORIDE 0.9 % (FLUSH) 0.9 %
10 SYRINGE (ML) INJECTION PRN
Status: DISCONTINUED | OUTPATIENT
Start: 2018-08-25 | End: 2018-08-27 | Stop reason: HOSPADM

## 2018-08-25 RX ORDER — ACETAMINOPHEN 325 MG/1
650 TABLET ORAL EVERY 4 HOURS PRN
Status: DISCONTINUED | OUTPATIENT
Start: 2018-08-25 | End: 2018-08-27 | Stop reason: HOSPADM

## 2018-08-25 RX ORDER — FUROSEMIDE 40 MG/1
80 TABLET ORAL 2 TIMES DAILY
Status: DISCONTINUED | OUTPATIENT
Start: 2018-08-25 | End: 2018-08-26

## 2018-08-25 RX ORDER — LEVOFLOXACIN 5 MG/ML
500 INJECTION, SOLUTION INTRAVENOUS EVERY 24 HOURS
Status: DISCONTINUED | OUTPATIENT
Start: 2018-08-26 | End: 2018-08-26

## 2018-08-25 RX ORDER — ALBUTEROL SULFATE 2.5 MG/3ML
2.5 SOLUTION RESPIRATORY (INHALATION) ONCE
Status: COMPLETED | OUTPATIENT
Start: 2018-08-25 | End: 2018-08-25

## 2018-08-25 RX ORDER — NICOTINE 21 MG/24HR
1 PATCH, TRANSDERMAL 24 HOURS TRANSDERMAL DAILY
Status: DISCONTINUED | OUTPATIENT
Start: 2018-08-25 | End: 2018-08-27 | Stop reason: HOSPADM

## 2018-08-25 RX ORDER — OXYCODONE HYDROCHLORIDE AND ACETAMINOPHEN 5; 325 MG/1; MG/1
1 TABLET ORAL EVERY 8 HOURS PRN
COMMUNITY
End: 2018-11-28 | Stop reason: ALTCHOICE

## 2018-08-25 RX ORDER — TOPIRAMATE 25 MG/1
50 TABLET ORAL NIGHTLY
Status: DISCONTINUED | OUTPATIENT
Start: 2018-08-25 | End: 2018-08-27 | Stop reason: HOSPADM

## 2018-08-25 RX ORDER — SIMVASTATIN 10 MG
20 TABLET ORAL NIGHTLY
Status: DISCONTINUED | OUTPATIENT
Start: 2018-08-25 | End: 2018-08-27 | Stop reason: HOSPADM

## 2018-08-25 RX ORDER — GABAPENTIN 300 MG/1
300 CAPSULE ORAL 3 TIMES DAILY
Status: DISCONTINUED | OUTPATIENT
Start: 2018-08-25 | End: 2018-08-27 | Stop reason: HOSPADM

## 2018-08-25 RX ORDER — VENLAFAXINE HYDROCHLORIDE 75 MG/1
150 CAPSULE, EXTENDED RELEASE ORAL DAILY
Status: DISCONTINUED | OUTPATIENT
Start: 2018-08-26 | End: 2018-08-27 | Stop reason: HOSPADM

## 2018-08-25 RX ORDER — QUETIAPINE 150 MG/1
300 TABLET, FILM COATED, EXTENDED RELEASE ORAL NIGHTLY
Status: DISCONTINUED | OUTPATIENT
Start: 2018-08-25 | End: 2018-08-25 | Stop reason: CLARIF

## 2018-08-25 RX ORDER — IPRATROPIUM BROMIDE AND ALBUTEROL SULFATE 2.5; .5 MG/3ML; MG/3ML
1 SOLUTION RESPIRATORY (INHALATION)
Status: DISCONTINUED | OUTPATIENT
Start: 2018-08-25 | End: 2018-08-25

## 2018-08-25 RX ORDER — DILTIAZEM HYDROCHLORIDE 120 MG/1
120 CAPSULE, COATED, EXTENDED RELEASE ORAL DAILY
Status: DISCONTINUED | OUTPATIENT
Start: 2018-08-25 | End: 2018-08-27 | Stop reason: HOSPADM

## 2018-08-25 RX ORDER — IPRATROPIUM BROMIDE AND ALBUTEROL SULFATE 2.5; .5 MG/3ML; MG/3ML
1 SOLUTION RESPIRATORY (INHALATION) ONCE
Status: COMPLETED | OUTPATIENT
Start: 2018-08-25 | End: 2018-08-25

## 2018-08-25 RX ORDER — ROPINIROLE 1 MG/1
3 TABLET, FILM COATED ORAL NIGHTLY
Status: DISCONTINUED | OUTPATIENT
Start: 2018-08-25 | End: 2018-08-27 | Stop reason: HOSPADM

## 2018-08-25 RX ORDER — ONDANSETRON 2 MG/ML
4 INJECTION INTRAMUSCULAR; INTRAVENOUS EVERY 6 HOURS PRN
Status: DISCONTINUED | OUTPATIENT
Start: 2018-08-25 | End: 2018-08-25 | Stop reason: CLARIF

## 2018-08-25 RX ORDER — METHYLPREDNISOLONE SODIUM SUCCINATE 125 MG/2ML
125 INJECTION, POWDER, LYOPHILIZED, FOR SOLUTION INTRAMUSCULAR; INTRAVENOUS ONCE
Status: COMPLETED | OUTPATIENT
Start: 2018-08-25 | End: 2018-08-25

## 2018-08-25 RX ORDER — FERROUS SULFATE 325(65) MG
325 TABLET ORAL
Status: DISCONTINUED | OUTPATIENT
Start: 2018-08-26 | End: 2018-08-27 | Stop reason: HOSPADM

## 2018-08-25 RX ADMIN — TOPIRAMATE 50 MG: 25 TABLET, FILM COATED ORAL at 22:13

## 2018-08-25 RX ADMIN — IPRATROPIUM BROMIDE AND ALBUTEROL SULFATE 1 AMPULE: .5; 3 SOLUTION RESPIRATORY (INHALATION) at 12:27

## 2018-08-25 RX ADMIN — METHYLPREDNISOLONE SODIUM SUCCINATE 125 MG: 125 INJECTION, POWDER, FOR SOLUTION INTRAMUSCULAR; INTRAVENOUS at 12:27

## 2018-08-25 RX ADMIN — BUSPIRONE HYDROCHLORIDE 15 MG: 10 TABLET ORAL at 22:14

## 2018-08-25 RX ADMIN — IPRATROPIUM BROMIDE AND ALBUTEROL SULFATE 1 AMPULE: .5; 3 SOLUTION RESPIRATORY (INHALATION) at 19:52

## 2018-08-25 RX ADMIN — APIXABAN 5 MG: 5 TABLET, FILM COATED ORAL at 22:14

## 2018-08-25 RX ADMIN — IPRATROPIUM BROMIDE AND ALBUTEROL SULFATE 1 AMPULE: .5; 3 SOLUTION RESPIRATORY (INHALATION) at 23:50

## 2018-08-25 RX ADMIN — ACETAMINOPHEN 650 MG: 325 TABLET ORAL at 20:30

## 2018-08-25 RX ADMIN — Medication 10 ML: at 22:15

## 2018-08-25 RX ADMIN — HYDROXYZINE HYDROCHLORIDE 50 MG: 10 TABLET, FILM COATED ORAL at 22:14

## 2018-08-25 RX ADMIN — Medication 2 PUFF: at 19:52

## 2018-08-25 RX ADMIN — SIMVASTATIN 20 MG: 10 TABLET, FILM COATED ORAL at 22:15

## 2018-08-25 RX ADMIN — ROPINIROLE HYDROCHLORIDE 3 MG: 1 TABLET, FILM COATED ORAL at 22:14

## 2018-08-25 RX ADMIN — QUETIAPINE FUMARATE 200 MG: 200 TABLET, EXTENDED RELEASE ORAL at 22:14

## 2018-08-25 RX ADMIN — QUETIAPINE FUMARATE 100 MG: 50 TABLET, EXTENDED RELEASE ORAL at 22:15

## 2018-08-25 RX ADMIN — ALBUTEROL SULFATE 2.5 MG: 2.5 SOLUTION RESPIRATORY (INHALATION) at 12:27

## 2018-08-25 RX ADMIN — LEVOFLOXACIN 500 MG: 5 INJECTION, SOLUTION INTRAVENOUS at 13:13

## 2018-08-25 ASSESSMENT — PAIN DESCRIPTION - DESCRIPTORS
DESCRIPTORS: HEADACHE
DESCRIPTORS: SORE

## 2018-08-25 ASSESSMENT — ENCOUNTER SYMPTOMS
VOMITING: 0
SHORTNESS OF BREATH: 1
SORE THROAT: 0
ABDOMINAL PAIN: 0
COUGH: 1
DIARRHEA: 0
NAUSEA: 0

## 2018-08-25 ASSESSMENT — PAIN SCALES - GENERAL
PAINLEVEL_OUTOF10: 3
PAINLEVEL_OUTOF10: 5

## 2018-08-25 ASSESSMENT — PAIN DESCRIPTION - ONSET: ONSET: ON-GOING

## 2018-08-25 ASSESSMENT — PAIN DESCRIPTION - PROGRESSION: CLINICAL_PROGRESSION: NOT CHANGED

## 2018-08-25 ASSESSMENT — PAIN DESCRIPTION - PAIN TYPE
TYPE: ACUTE PAIN
TYPE: ACUTE PAIN

## 2018-08-25 ASSESSMENT — PAIN DESCRIPTION - FREQUENCY: FREQUENCY: CONTINUOUS

## 2018-08-25 ASSESSMENT — PAIN DESCRIPTION - LOCATION
LOCATION: CHEST
LOCATION: HEAD

## 2018-08-25 NOTE — ED NOTES
Pt left in stable condition, on monitor, no questions at this time.      Amanda Cedeno RN  08/25/18 1186

## 2018-08-25 NOTE — ED NOTES
Report called to Jagruti Angel RN in sbar. No questions at this time.      Tabitha Mcgee RN  08/25/18 [id number]

## 2018-08-25 NOTE — ED NOTES
[facility] ambulance dispatch called to report that ambulance will be 15 minutes late; TORI PAREDES/ [address], RN  08/25/18 [id number]

## 2018-08-25 NOTE — Clinical Note
Patient Class: Inpatient [101]   REQUIRED: Diagnosis: COPD (chronic obstructive pulmonary disease) ([facility] [829067]   Estimated Length of Stay: Estimated stay of more than 2 midnights   Future Attending Provider: Emily Viramontes [[id number]]   Telemetry Bed Required?: Yes

## 2018-08-25 NOTE — ED PROVIDER NOTES
HISTORY  family history includes Cancer in her mother; Heart Failure in her father. SOCIAL HISTORY   reports that she has been smoking Cigarettes. She has a 50.00 pack-year smoking history. She has never used smokeless tobacco. She reports that she does not drink alcohol or use drugs. HOME MEDICATIONS     Prior to Admission medications    Medication Sig Start Date End Date Taking? Authorizing Provider   furosemide (LASIX) 80 MG tablet Lasix 80 mg tablet   Take 1 tablet twice a day by oral route. Yes Historical Provider, MD   predniSONE (DELTASONE) 10 MG tablet Take 30 mg daily for 5 days.  7/20/18  Yes SATURNINO Roa - CNP   ELIQUIS 5 MG TABS tablet TAKE 1 TABLET BY MOUTH 2 TIMES DAILY 7/5/18  Yes Otila Schaumann, MD   Cholecalciferol (VITAMIN D PO) Take by mouth daily   Yes Historical Provider, MD   simvastatin (ZOCOR) 20 MG tablet Take 1 tablet by mouth nightly 5/23/18  Yes Otila Schaumann, MD   ezetimibe (ZETIA) 10 MG tablet Take 1 tablet by mouth daily 5/23/18  Yes Otila Schaumann, MD   diltiazem (CARDIZEM CD) 120 MG extended release capsule Take 1 capsule by mouth daily 5/23/18  Yes Otila Schaumann, MD   albuterol sulfate HFA (VENTOLIN HFA) 108 (90 Base) MCG/ACT inhaler Inhale 2 puffs into the lungs every 6 hours as needed for Wheezing   Yes Historical Provider, MD   albuterol (PROVENTIL) (2.5 MG/3ML) 0.083% nebulizer solution Take 2.5 mg by nebulization every 6 hours as needed for Wheezing   Yes Historical Provider, MD   topiramate (TOPAMAX) 50 MG tablet Take 50 mg by mouth nightly   Yes Historical Provider, MD   OMEPRAZOLE PO Take 40 mg by mouth 2 times daily   Yes Historical Provider, MD   hydrOXYzine (ATARAX) 50 MG tablet Take 50 mg by mouth 1-2 tabs every 4 hours prn   Yes Historical Provider, MD   ferrous sulfate 325 (65 Fe) MG tablet Take 325 mg by mouth daily (with breakfast)   Yes Historical Provider, MD   fluticasone-vilanterol (BREO ELLIPTA) 100-25 MCG/INH AEPB inhaler Inhale 1 puff into the lungs daily   Yes Historical Provider, MD   nicotine (NICODERM CQ) 14 MG/24HR Place 1 patch onto the skin daily 2/17/18  Yes Little Gaucher, MD   sennosides-docusate sodium (SENOKOT-S) 8.6-50 MG tablet Take 2 tablets by mouth 2 times daily 2/16/18  Yes Little Gaucher, MD   busPIRone (BUSPAR) 15 MG tablet Take 15 mg by mouth 2 times daily  10/20/17  Yes Historical Provider, MD   rOPINIRole (REQUIP) 2 MG tablet Take 3 mg by mouth nightly    Yes Historical Provider, MD   Umeclidinium Bromide (INCRUSE ELLIPTA) 62.5 MCG/INH AEPB Inhale into the lungs daily    Yes Historical Provider, MD   gabapentin (NEURONTIN) 600 MG tablet Take 300 mg by mouth 3 times daily . Yes Historical Provider, MD   Glycopyrrolate (ROBINUL PO) Take 4 mg by mouth nightly   Yes Historical Provider, MD   venlafaxine (EFFEXOR-XR) 150 MG XR capsule Take 150 mg by mouth daily   Yes Historical Provider, MD   Multiple Vitamins-Minerals (THERAPEUTIC MULTIVITAMIN-MINERALS) tablet Take 1 tablet by mouth daily   Yes Historical Provider, MD   QUEtiapine (SEROQUEL XR) 300 MG XR tablet Take 300 mg by mouth nightly    Yes Historical Provider, MD   OXYGEN Inhale into the lungs Indications: 2.5 L nightly    Yes Historical Provider, MD        ALLERGIES  is allergic to lisinopril and penicillins. BP (!) 171/89   Pulse 82   Temp 98.4 °F (36.9 °C) (Oral)   Resp 18   Ht 5' 6\" (1.676 m)   Wt 218 lb (98.9 kg)   SpO2 99%   BMI 35.19 kg/m²      Physical Exam   Constitutional: She is oriented to person, place, and time. She appears well-developed. No distress. HENT:   Head: Normocephalic and atraumatic. Right Ear: External ear normal.   Left Ear: External ear normal.   Mouth/Throat: Oropharynx is clear and moist. No oropharyngeal exudate. Eyes: Pupils are equal, round, and reactive to light. Conjunctivae are normal. Right eye exhibits no discharge. Neck: Normal range of motion. Neck supple.    Cardiovascular: Normal rate, regular rhythm, normal heart sounds and intact distal pulses. Exam reveals no gallop and no friction rub. No murmur heard. Pulmonary/Chest: Effort normal. No stridor. Breath sounds are diminished in the bases with expiratory wheezes throughout and scattered rhonchi. Abdominal: Soft. Bowel sounds are normal. There is no tenderness. There is no rebound and no guarding. Musculoskeletal: Normal range of motion. She exhibits no edema, tenderness or deformity. Neurological: She is alert and oriented to person, place, and time. She exhibits normal muscle tone. Skin: Skin is warm and dry. No rash noted. She is not diaphoretic. Psychiatric: She has a normal mood and affect. Her behavior is normal.   Nursing note and vitals reviewed.       Procedures    MDM      Labs  Results for orders placed or performed during the hospital encounter of 08/25/18   CBC Auto Differential   Result Value Ref Range    WBC 7.6 4.0 - 11.0 K/uL    RBC 5.04 4.00 - 5.20 M/uL    Hemoglobin 14.2 12.0 - 16.0 g/dL    Hematocrit 44.3 36.0 - 48.0 %    MCV 87.8 80.0 - 100.0 fL    MCH 28.2 26.0 - 34.0 pg    MCHC 32.2 31.0 - 36.0 g/dL    RDW 14.4 12.4 - 15.4 %    Platelets 175 502 - 655 K/uL    MPV 6.7 5.0 - 10.5 fL    Neutrophils % 51.8 %    Lymphocytes % 36.9 %    Monocytes % 7.6 %    Eosinophils % 2.9 %    Basophils % 0.8 %    Neutrophils # 4.0 1.7 - 7.7 K/uL    Lymphocytes # 2.8 1.0 - 5.1 K/uL    Monocytes # 0.6 0.0 - 1.3 K/uL    Eosinophils # 0.2 0.0 - 0.6 K/uL    Basophils # 0.1 0.0 - 0.2 K/uL   Comprehensive Metabolic Panel   Result Value Ref Range    Sodium 138 136 - 145 mmol/L    Potassium 3.8 3.5 - 5.1 mmol/L    Chloride 101 99 - 110 mmol/L    CO2 23 21 - 32 mmol/L    Anion Gap 14 3 - 16    Glucose 138 (H) 70 - 99 mg/dL    BUN 21 (H) 7 - 20 mg/dL    CREATININE 0.7 0.6 - 1.2 mg/dL    GFR Non-African American >60 >60    GFR African American >60 >60    Calcium 9.8 8.3 - 10.6 mg/dL    Total Protein 7.7 6.4 - 8.2 g/dL    Alb 4.4 3.4 - 5.0 g/dL    Albumin/Globulin Joo Carrasco, a trained medical scribe. The creation of this document is based on the provider's statements to the medical scribe. The document has been reviewed and approved by the provider. Mau Chowdhury, 11:50 AM 8/25/18 scribing for and in the presence of Adeline Jacques MD.        This dictation was generated by voice recognition computer software. Although all attempts are made to edit the dictation for accuracy, there may be errors in the transcription that are not intended.      The Clinical Impression is COPD exacerbation       Adeline Jacques MD  08/25/18 [id number]

## 2018-08-26 PROCEDURE — 94640 AIRWAY INHALATION TREATMENT: CPT

## 2018-08-26 PROCEDURE — 6370000000 HC RX 637 (ALT 250 FOR IP): Performed by: INTERNAL MEDICINE

## 2018-08-26 PROCEDURE — 94761 N-INVAS EAR/PLS OXIMETRY MLT: CPT

## 2018-08-26 PROCEDURE — 6370000000 HC RX 637 (ALT 250 FOR IP): Performed by: HOSPITALIST

## 2018-08-26 PROCEDURE — 99223 1ST HOSP IP/OBS HIGH 75: CPT | Performed by: INTERNAL MEDICINE

## 2018-08-26 PROCEDURE — 2060000000 HC ICU INTERMEDIATE R&B

## 2018-08-26 PROCEDURE — 2580000003 HC RX 258: Performed by: INTERNAL MEDICINE

## 2018-08-26 PROCEDURE — 2700000000 HC OXYGEN THERAPY PER DAY

## 2018-08-26 PROCEDURE — 6360000002 HC RX W HCPCS: Performed by: INTERNAL MEDICINE

## 2018-08-26 PROCEDURE — 94664 DEMO&/EVAL PT USE INHALER: CPT

## 2018-08-26 RX ORDER — IPRATROPIUM BROMIDE AND ALBUTEROL SULFATE 2.5; .5 MG/3ML; MG/3ML
1 SOLUTION RESPIRATORY (INHALATION)
Status: DISCONTINUED | OUTPATIENT
Start: 2018-08-26 | End: 2018-08-27 | Stop reason: HOSPADM

## 2018-08-26 RX ORDER — LEVOFLOXACIN 5 MG/ML
750 INJECTION, SOLUTION INTRAVENOUS EVERY 24 HOURS
Status: DISCONTINUED | OUTPATIENT
Start: 2018-08-26 | End: 2018-08-27 | Stop reason: HOSPADM

## 2018-08-26 RX ORDER — FUROSEMIDE 40 MG/1
40 TABLET ORAL
Status: DISCONTINUED | OUTPATIENT
Start: 2018-08-26 | End: 2018-08-27 | Stop reason: HOSPADM

## 2018-08-26 RX ORDER — PREDNISONE 20 MG/1
40 TABLET ORAL DAILY
Status: DISCONTINUED | OUTPATIENT
Start: 2018-08-26 | End: 2018-08-27 | Stop reason: HOSPADM

## 2018-08-26 RX ADMIN — DILTIAZEM HYDROCHLORIDE 120 MG: 120 CAPSULE, COATED, EXTENDED RELEASE ORAL at 10:59

## 2018-08-26 RX ADMIN — LEVOFLOXACIN 750 MG: 5 INJECTION, SOLUTION INTRAVENOUS at 13:26

## 2018-08-26 RX ADMIN — TOPIRAMATE 50 MG: 25 TABLET, FILM COATED ORAL at 21:27

## 2018-08-26 RX ADMIN — IPRATROPIUM BROMIDE AND ALBUTEROL SULFATE 1 AMPULE: .5; 3 SOLUTION RESPIRATORY (INHALATION) at 11:46

## 2018-08-26 RX ADMIN — BUSPIRONE HYDROCHLORIDE 15 MG: 10 TABLET ORAL at 10:39

## 2018-08-26 RX ADMIN — PREDNISONE 40 MG: 20 TABLET ORAL at 11:56

## 2018-08-26 RX ADMIN — ROPINIROLE HYDROCHLORIDE 3 MG: 1 TABLET, FILM COATED ORAL at 21:28

## 2018-08-26 RX ADMIN — APIXABAN 5 MG: 5 TABLET, FILM COATED ORAL at 10:40

## 2018-08-26 RX ADMIN — Medication 2 PUFF: at 06:57

## 2018-08-26 RX ADMIN — QUETIAPINE FUMARATE 100 MG: 50 TABLET, EXTENDED RELEASE ORAL at 21:29

## 2018-08-26 RX ADMIN — HYDROXYZINE HYDROCHLORIDE 50 MG: 10 TABLET, FILM COATED ORAL at 21:44

## 2018-08-26 RX ADMIN — Medication 10 ML: at 10:41

## 2018-08-26 RX ADMIN — SIMVASTATIN 20 MG: 10 TABLET, FILM COATED ORAL at 21:28

## 2018-08-26 RX ADMIN — GABAPENTIN 300 MG: 300 CAPSULE ORAL at 15:19

## 2018-08-26 RX ADMIN — FUROSEMIDE 40 MG: 40 TABLET ORAL at 11:01

## 2018-08-26 RX ADMIN — Medication 10 ML: at 21:28

## 2018-08-26 RX ADMIN — GABAPENTIN 300 MG: 300 CAPSULE ORAL at 10:40

## 2018-08-26 RX ADMIN — IPRATROPIUM BROMIDE AND ALBUTEROL SULFATE 1 AMPULE: .5; 3 SOLUTION RESPIRATORY (INHALATION) at 15:06

## 2018-08-26 RX ADMIN — IPRATROPIUM BROMIDE AND ALBUTEROL SULFATE 1 AMPULE: .5; 3 SOLUTION RESPIRATORY (INHALATION) at 19:25

## 2018-08-26 RX ADMIN — VENLAFAXINE HYDROCHLORIDE 150 MG: 75 CAPSULE, EXTENDED RELEASE ORAL at 10:40

## 2018-08-26 RX ADMIN — IPRATROPIUM BROMIDE AND ALBUTEROL SULFATE 1 AMPULE: .5; 3 SOLUTION RESPIRATORY (INHALATION) at 06:57

## 2018-08-26 RX ADMIN — IPRATROPIUM BROMIDE AND ALBUTEROL SULFATE 1 AMPULE: .5; 3 SOLUTION RESPIRATORY (INHALATION) at 03:46

## 2018-08-26 RX ADMIN — APIXABAN 5 MG: 5 TABLET, FILM COATED ORAL at 21:28

## 2018-08-26 RX ADMIN — BUSPIRONE HYDROCHLORIDE 15 MG: 10 TABLET ORAL at 21:27

## 2018-08-26 RX ADMIN — QUETIAPINE FUMARATE 200 MG: 200 TABLET, EXTENDED RELEASE ORAL at 21:30

## 2018-08-26 RX ADMIN — HYDROXYZINE HYDROCHLORIDE 50 MG: 10 TABLET, FILM COATED ORAL at 15:19

## 2018-08-26 RX ADMIN — ACETAMINOPHEN 650 MG: 325 TABLET ORAL at 21:40

## 2018-08-26 RX ADMIN — FERROUS SULFATE TAB 325 MG (65 MG ELEMENTAL FE) 325 MG: 325 (65 FE) TAB at 10:41

## 2018-08-26 ASSESSMENT — PAIN SCALES - GENERAL: PAINLEVEL_OUTOF10: 3

## 2018-08-26 NOTE — PROGRESS NOTES
Sounds: Absent bilaterally and/or with wheezes = 3    Sputum   ,  , Sputum How Obtained: Cough on request  Cough: Strong, spontaneous, non-productive = 0    Vital Signs   BP (!) 153/74   Pulse 80   Temp 99.1 °F (37.3 °C) (Oral)   Resp 16   Ht 5' 6\" (1.676 m)   Wt 218 lb (98.9 kg)   SpO2 94%   BMI 35.19 kg/m²   SPO2 (COPD values may differ): 88-89% on room air or greater than 92% on FiO2 28- 35% = 2    Peak Flow (asthma only): not applicable = 0    RSI: 3-68 = TID (three times daily) and Q4hr PRN for dyspnea        Plan       Goals: medication delivery    Patient/caregiver was educated on the proper method of use for Respiratory Care Devices:  Yes      Level of patient/caregiver understanding able to:   [] Verbalize understanding   [] Demonstrate understanding       [] Teach back        [] Needs reinforcement       []  No available caregiver               []  Other:     Response to education:  Good     Is patient being placed on Home Treatment Regimen? No     Does the patient have everything they need prior to discharge? NA     Comments: chart reviewed and patient assessed    Plan of Care: change duoneb q4wa to duoneb q4 (copd exac x 24)    Electronically signed by Frankie Bazzi RCP on 8/25/2018 at 8:51 PM    Respiratory Protocol Guidelines     1. Assessment and treatment by Respiratory Therapy will be initiated for medication and therapeutic interventions upon initiation of aerosolized medication. 2. Physician will be contacted for respiratory rate (RR) greater than 35 breaths per minute. Therapy will be held for heart rate (HR) greater than 140 beats per minute, pending direction from physician. 3. Bronchodilators will be administered via Metered Dose Inhaler (MDI) with spacer when the following criteria are met:  a. Alert and cooperative     b. HR < 140 bpm  c. RR < 30 bpm                d. Can demonstrate a 2-3 second inspiratory hold  4.  Bronchodilators will be administered via Hand Held Nebulizer (HHN) to patients when ANY of the following criteria are met  a. Incognizant or uncooperative          b. Patients treated with HHN at Home        c. Unable to demonstrate proper use of MDI with spacer     d. RR > 30 bpm   5. Bronchodilators will be delivered via Metered Dose Inhaler (MDI), HHN, Aerogen to intubated patients on mechanical ventilation. 6. Inhalation medication orders will be delivered and/or substituted as outlined below. Aerosolized Medications Ordering and Administration Guidelines:    1. All Medications will be ordered by a physician, and their frequency and/or modality will be adjusted as defined by the patients Respiratory Severity Index (RSI) score. 2. If the patient does not have documented COPD, consider discontinuing anticholinergics when RSI is less than 9.  3. If the bronchospasm worsens (increased RSI), then the bronchodilator frequency can be increased to a maximum of every 4 hours. If greater than every 4 hours is required, the physician will be contacted. 4. If the bronchospasm improves, the frequency of the bronchodilator can be decreased, based on the patient's RSI, but not less than home treatment regimen frequency. 5. Bronchodilator(s) will be discontinued if patient has a RSI less than 9 and has received no scheduled or as needed treatment for 72  Hrs. Patients Ordered on a Mucolytic Agent:    1. Must always be administered with a bronchodilator. 2. Discontinue if patient experiences worsened bronchospasm, or secretions have lessened to the point that the patient is able to clear them with a cough. Anti-inflammatory and Combination Medications:    1. If the patient lacks prior history of lung disease, is not using inhaled anti-inflammatory medication at home, and lacks wheezing by examination or by history for at least 24 hours, contact physician for possible discontinuation.

## 2018-08-26 NOTE — CONSULTS
Reason for referral and CC: SOB    HISTORY OF PRESENT ILLNESS: [de-identified] female with a history of COPD presented with several days of shortness of breath and a productive cough. Phlegm is yellow. Hypoxic on 3 L per minute of oxygen. Chest x-ray showed right lower lobe pneumonia. Past Medical History:   Diagnosis Date    A-fib [facility])     Clostridium difficile diarrhea 6/26/15    COPD (chronic obstructive pulmonary disease) (HCC)     COPD exacerbation (HCC)     [facility]     [facility]physema of lung [facility]     Hyperlipidemia     Hypertension     Pneumonia 2/5/2018    Rib pain     Sleep apnea     Small bowel obstruction (HCC)      Past Surgical History:   Procedure Laterality Date    ANKLE ARTHROSCOPY      x2    CHOLECYSTECTOMY      HYSTERECTOMY      ROTATOR CUFF REPAIR Bilateral      Family History  family history includes Cancer in her mother; Heart Failure in her father. Social History:  reports that she has been smoking Cigarettes. She has a 50.00 pack-year smoking history. She has never used smokeless tobacco.   reports that she does not drink alcohol. ALLERGIES:  Patient is allergic to lisinopril and penicillins.   Continuous Infusions:    Scheduled Meds:   busPIRone  15 mg Oral BID    diltiazem  120 mg Oral Daily    apixaban  5 mg Oral BID    ferrous sulfate  325 mg Oral Daily with breakfast    mometasone-formoterol  2 puff Inhalation BID    furosemide  80 mg Oral BID    gabapentin  300 mg Oral TID    nicotine  1 patch Transdermal Daily    rOPINIRole  3 mg Oral Nightly    sennosides-docusate sodium  2 tablet Oral BID    simvastatin  20 mg Oral Nightly    topiramate  50 mg Oral Nightly    venlafaxine  150 mg Oral Daily    levofloxacin  500 mg Intravenous Q24H    sodium chloride flush  10 mL Intravenous 2 times per day    QUEtiapine  200 mg Oral Nightly    QUEtiapine  100 mg Oral Nightly    ipratropium-albuterol  1 ampule Inhalation Q4H     PRN

## 2018-08-26 NOTE — PLAN OF CARE
Problem: OXYGENATION/RESPIRATORY FUNCTION  Goal: Patient will maintain patent airway  Outcome: Ongoing    Goal: Patient will achieve/maintain normal respiratory rate/effort  Respiratory rate and effort will be within normal limits for the patient   Outcome: Ongoing      Problem: HEMODYNAMIC STATUS  Goal: Patient has stable vital signs and fluid balance  Outcome: Ongoing      Problem: FLUID AND ELECTROLYTE IMBALANCE  Goal: Fluid and electrolyte balance are achieved/maintained  Outcome: Ongoing      Problem: ACTIVITY INTOLERANCE/IMPAIRED MOBILITY  Goal: Mobility/activity is maintained at optimum level for patient  Outcome: Ongoing      Problem: Gas Exchange - Impaired:  Goal: Levels of oxygenation will improve  Levels of oxygenation will improve   Outcome: Ongoing      Problem: PAIN  Goal: Patient's pain/discomfort is manageable  Outcome: Ongoing      Problem: SKIN INTEGRITY  Goal: Skin integrity is maintained or improved  Outcome: Ongoing

## 2018-08-26 NOTE — H&P
Yes Historical Provider, MD   furosemide (LASIX) 80 MG tablet Lasix 40 mg tablet   Take 1 tablet once a day by oral route.    Yes Historical Provider, MD   ELIQUIS 5 MG TABS tablet TAKE 1 TABLET BY MOUTH 2 TIMES DAILY 7/5/18  Yes Kierra Shrestha MD   simvastatin (ZOCOR) 20 MG tablet Take 1 tablet by mouth nightly 5/23/18  Yes Kierra Shrestha MD   ezetimibe (ZETIA) 10 MG tablet Take 1 tablet by mouth daily 5/23/18  Yes Kierra Shrestha MD   diltiazem (CARDIZEM CD) 120 MG extended release capsule Take 1 capsule by mouth daily 5/23/18  Yes Kierra Shrestha MD   albuterol sulfate HFA (VENTOLIN HFA) 108 (90 Base) MCG/ACT inhaler Inhale 2 puffs into the lungs every 6 hours as needed for Wheezing   Yes Historical Provider, MD   albuterol (PROVENTIL) (2.5 MG/3ML) 0.083% nebulizer solution Take 2.5 mg by nebulization every 6 hours as needed for Wheezing   Yes Historical Provider, MD   topiramate (TOPAMAX) 50 MG tablet Take 50 mg by mouth nightly   Yes Historical Provider, MD   OMEPRAZOLE PO Take 40 mg by mouth 2 times daily   Yes Historical Provider, MD   hydrOXYzine (ATARAX) 50 MG tablet Take 50 mg by mouth 1-2 tabs every 4 hours prn   Yes Historical Provider, MD   ferrous sulfate 325 (65 Fe) MG tablet Take 325 mg by mouth daily (with breakfast)   Yes Historical Provider, MD   fluticasone-vilanterol (BREO ELLIPTA) 100-25 MCG/INH AEPB inhaler Inhale 1 puff into the lungs daily   Yes Historical Provider, MD   nicotine (NICODERM CQ) 14 MG/24HR Place 1 patch onto the skin daily 2/17/18  Yes Fran Herron MD   sennosides-docusate sodium (SENOKOT-S) 8.6-50 MG tablet Take 2 tablets by mouth 2 times daily 2/16/18  Yes Fran Herron MD   busPIRone (BUSPAR) 15 MG tablet Take 15 mg by mouth 2 times daily  10/20/17  Yes Historical Provider, MD   rOPINIRole (REQUIP) 2 MG tablet Take 3 mg by mouth nightly    Yes Historical Provider, MD   Umeclidinium Bromide (INCRUSE ELLIPTA) 62.5 MCG/INH AEPB Inhale into the lungs daily    Yes Historical Provider, MD   gabapentin (NEURONTIN) 600 MG tablet Take 300 mg by mouth 2 times daily. .   Yes Historical Provider, MD   venlafaxine (EFFEXOR-XR) 150 MG XR capsule Take 150 mg by mouth daily   Yes Historical Provider, MD   Multiple Vitamins-Minerals (THERAPEUTIC MULTIVITAMIN-MINERALS) tablet Take 1 tablet by mouth daily   Yes Historical Provider, MD   QUEtiapine (SEROQUEL XR) 300 MG XR tablet Take 300 mg by mouth nightly    Yes Historical Provider, MD   OXYGEN Inhale into the lungs Indications: 2.5 L nightly    Yes Historical Provider, MD       Social History     Social History    Marital status:      Spouse name: N/A    Number of children: N/A    Years of education: N/A     Social History Main Topics    Smoking status: Current Some Day Smoker     Packs/day: 1.00     Years: 50.00     Types: Cigarettes    Smokeless tobacco: Never Used    Alcohol use No    Drug use: No    Sexual activity: Not Currently     Other Topics Concern    None     Social History Narrative    None       Family History   Problem Relation Age of Onset    Cancer Mother     Heart Failure Father        REVIEW OF SYSTEMS:   Constitutional: Positive  for fever   HEENT: Negative for sore throat   Eyes: Negative for redness   Respiratory: Positive for dyspnea, cough   Cardiovascular: Negative for chest pain   Gastrointestinal: Negative for vomiting, diarrhea   Genitourinary: Negative for hematuria   Musculoskeletal: Negative for arthralgias   Skin: Negative for rash   Neurological: Negative for syncope   Hematological: Negative for adenopathy   Psychiatric/Behavorial: Negative for anxiety        On Physical Examination    Vitals:    08/26/18 1152   BP: 135/72   Pulse: 72   Resp: 20   Temp: 98.6 °F (37 °C)   SpO2: 98%       Gen: No distress. Alert. Awake and well oriented to time place and person   Eyes: PERRL. No sclera icterus. No conjunctival injection. ENT: No discharge. Pharynx clear. Neck: Trachea midline. Normal thyroid. exacerbation of COPD and acute bronchitis  -Pulmonology consulted  -Continue supplemental oxygen, wean as tolerated. She normally uses oxygen 3 L at night only, currently requiring 3 L continuous  - Continue DuoNeb nebulizers every 4 hours  -Received IV Solu-Medrol yesterday, continue prednisone 40 mg daily  -Continue empiric antibiotic Levaquin    2. Atrial fibrillation   -Heart rate controlled   -In normal sinus rhythm now   -Continue anticoagulation with Eliquis   -Continue Cardizem     3. Chronic diastolic congestive heart failure  -Well compensated  -Continue Lasix as at home    4. Hypertension  -Blood pressure stable, continue home meds    5. Anxiety  -Continue home meds-Seroquel, BuSpar and hydroxyzine as needed    6.  Hyperlipidemia   - on statins    DIET GENERAL;   Full Code      Glendy Sanchez 8/26/2018 12:00 PM

## 2018-08-27 VITALS
SYSTOLIC BLOOD PRESSURE: 150 MMHG | DIASTOLIC BLOOD PRESSURE: 79 MMHG | RESPIRATION RATE: 16 BRPM | OXYGEN SATURATION: 96 % | BODY MASS INDEX: 35.92 KG/M2 | HEART RATE: 76 BPM | WEIGHT: 223.5 LBS | TEMPERATURE: 98.5 F | HEIGHT: 66 IN

## 2018-08-27 LAB
EKG ATRIAL RATE: 77 BPM
EKG DIAGNOSIS: NORMAL
EKG P AXIS: 62 DEGREES
EKG P-R INTERVAL: 158 MS
EKG Q-T INTERVAL: 420 MS
EKG QRS DURATION: 146 MS
EKG QTC CALCULATION (BAZETT): 475 MS
EKG R AXIS: 51 DEGREES
EKG T AXIS: 11 DEGREES
EKG VENTRICULAR RATE: 77 BPM

## 2018-08-27 PROCEDURE — 94640 AIRWAY INHALATION TREATMENT: CPT

## 2018-08-27 PROCEDURE — 6370000000 HC RX 637 (ALT 250 FOR IP): Performed by: INTERNAL MEDICINE

## 2018-08-27 PROCEDURE — 99238 HOSP IP/OBS DSCHRG MGMT 30/<: CPT | Performed by: INTERNAL MEDICINE

## 2018-08-27 PROCEDURE — 94761 N-INVAS EAR/PLS OXIMETRY MLT: CPT

## 2018-08-27 PROCEDURE — 2580000003 HC RX 258: Performed by: INTERNAL MEDICINE

## 2018-08-27 PROCEDURE — 99232 SBSQ HOSP IP/OBS MODERATE 35: CPT | Performed by: INTERNAL MEDICINE

## 2018-08-27 PROCEDURE — 2700000000 HC OXYGEN THERAPY PER DAY

## 2018-08-27 PROCEDURE — 6360000002 HC RX W HCPCS: Performed by: INTERNAL MEDICINE

## 2018-08-27 RX ORDER — LEVOFLOXACIN 750 MG/1
750 TABLET ORAL DAILY
Qty: 5 TABLET | Refills: 0 | Status: SHIPPED | OUTPATIENT
Start: 2018-08-27 | End: 2018-09-01

## 2018-08-27 RX ORDER — PREDNISONE 10 MG/1
TABLET ORAL
Qty: 30 TABLET | Refills: 0 | Status: SHIPPED | OUTPATIENT
Start: 2018-08-27 | End: 2018-09-14 | Stop reason: ALTCHOICE

## 2018-08-27 RX ORDER — PREDNISONE 10 MG/1
TABLET ORAL
Qty: 30 TABLET | Refills: 0 | Status: SHIPPED | OUTPATIENT
Start: 2018-08-27 | End: 2018-08-27 | Stop reason: SDUPTHER

## 2018-08-27 RX ADMIN — Medication 10 ML: at 09:13

## 2018-08-27 RX ADMIN — IPRATROPIUM BROMIDE AND ALBUTEROL SULFATE 1 AMPULE: .5; 3 SOLUTION RESPIRATORY (INHALATION) at 15:28

## 2018-08-27 RX ADMIN — DILTIAZEM HYDROCHLORIDE 120 MG: 120 CAPSULE, COATED, EXTENDED RELEASE ORAL at 09:13

## 2018-08-27 RX ADMIN — HYDROXYZINE HYDROCHLORIDE 50 MG: 10 TABLET, FILM COATED ORAL at 09:29

## 2018-08-27 RX ADMIN — BUSPIRONE HYDROCHLORIDE 15 MG: 10 TABLET ORAL at 09:13

## 2018-08-27 RX ADMIN — IPRATROPIUM BROMIDE AND ALBUTEROL SULFATE 1 AMPULE: .5; 3 SOLUTION RESPIRATORY (INHALATION) at 11:21

## 2018-08-27 RX ADMIN — IPRATROPIUM BROMIDE AND ALBUTEROL SULFATE 1 AMPULE: .5; 3 SOLUTION RESPIRATORY (INHALATION) at 07:09

## 2018-08-27 RX ADMIN — GABAPENTIN 300 MG: 300 CAPSULE ORAL at 09:13

## 2018-08-27 RX ADMIN — FERROUS SULFATE TAB 325 MG (65 MG ELEMENTAL FE) 325 MG: 325 (65 FE) TAB at 09:13

## 2018-08-27 RX ADMIN — VENLAFAXINE HYDROCHLORIDE 150 MG: 75 CAPSULE, EXTENDED RELEASE ORAL at 09:13

## 2018-08-27 RX ADMIN — GABAPENTIN 300 MG: 300 CAPSULE ORAL at 14:22

## 2018-08-27 RX ADMIN — APIXABAN 5 MG: 5 TABLET, FILM COATED ORAL at 09:13

## 2018-08-27 RX ADMIN — PREDNISONE 40 MG: 20 TABLET ORAL at 09:13

## 2018-08-27 RX ADMIN — LEVOFLOXACIN 750 MG: 5 INJECTION, SOLUTION INTRAVENOUS at 14:22

## 2018-08-27 ASSESSMENT — PAIN SCALES - GENERAL: PAINLEVEL_OUTOF10: 0

## 2018-08-27 NOTE — PROGRESS NOTES
Patient assessment per flowsheet - denied complaints of pain, slight expiratory wheezing noted. Sitting at side of bed, call light in patient's reach.

## 2018-08-27 NOTE — PROGRESS NOTES
Pulmonary Progress Note  CC: SOB, pneumonia, COPD    Subjective:  Hypoxia resolved at rest he still hypoxic on room air with activity      EXAM: BP (!) 169/76   Pulse 83   Temp 97.9 °F (36.6 °C) (Oral)   Resp 16   Ht 5' 6\" (1.676 m)   Wt 223 lb 8 oz (101.4 kg)   SpO2 97%   BMI 36.07 kg/m²  on ra  Constitutional:  No acute distress. Eyes: PERRL. Conjunctivae anicteric. ENT: Normal nose. Normal tongue. Neck:  Trachea is midline. No thyroid tenderness. Respiratory: No accessory muscle usage. decreased breath sounds. No wheezes. No rales. No Rhonchi. Cardiovascular: Normal S1S2. No digit clubbing. No digit cyanosis. No LE edema. Psychiatric: No anxiety or Agitation. Alert and Oriented to person, place and time.     Scheduled Meds:   predniSONE  40 mg Oral Daily    furosemide  40 mg Oral Lunch    levofloxacin  750 mg Intravenous Q24H    ipratropium-albuterol  1 ampule Inhalation Q4H While awake    busPIRone  15 mg Oral BID    diltiazem  120 mg Oral Daily    apixaban  5 mg Oral BID    ferrous sulfate  325 mg Oral Daily with breakfast    gabapentin  300 mg Oral TID    nicotine  1 patch Transdermal Daily    rOPINIRole  3 mg Oral Nightly    sennosides-docusate sodium  2 tablet Oral BID    simvastatin  20 mg Oral Nightly    topiramate  50 mg Oral Nightly    venlafaxine  150 mg Oral Daily    sodium chloride flush  10 mL Intravenous 2 times per day    QUEtiapine  200 mg Oral Nightly    QUEtiapine  100 mg Oral Nightly     Continuous Infusions:    PRN Meds:  hydrOXYzine, sodium chloride flush, magnesium hydroxide, prochlorperazine, acetaminophen    Labs:  CBC:   Recent Labs      08/25/18   1210   WBC  7.6   HGB  14.2   HCT  44.3   MCV  87.8   PLT  312     BMP:   Recent Labs      08/25/18   1210   NA  138   K  3.8   CL  101   CO2  23   BUN  21*   CREATININE  0.7       Cultures:  Blood cx ngtd    Chest imaging was reviewed by me and showed 8/25  In this patient with a remote history of rib

## 2018-08-27 NOTE — DISCHARGE SUMMARY
Cardizem      Chronic diastolic congestive heart failure  - Well compensated  - Continuef Lasix as at home     Hypertension  - Blood pressure stable  - continued home meds     Anxiety  - Continued home meds -Seroquel, BuSpar and hydroxyzine as needed     Hyperlipidemia   - on statins    Procedures (Please Review Full Report for Details)  N/A    Consults    Pulmonology    Physical Exam at Discharge:    /77   Pulse 64   Temp 97.8 °F (36.6 °C) (Oral)   Resp 20   Ht 5' 6\" (1.676 m)   Wt 223 lb 8 oz (101.4 kg)   SpO2 96%   BMI 36.07 kg/m²     Gen: No distress. Alert. Awake and well oriented to time place and person   Eyes: PERRL. No sclera icterus. No conjunctival injection. ENT: No discharge. Pharynx clear. Neck: Trachea midline. Normal thyroid. Resp: No accessory muscle use. Diminished breath sounds with minimal wheezes  No crackles. No rhonchi. No dullness on percussion. CV: Regular rate. Regular rhythm. No murmur or rub. No edema. GI: Non-tender. Non-distended. No masses. No organomegaly. Normal bowel sounds. No hernia. Skin: Warm and dry. No nodule on exposed extremities. No rash on exposed extremities. Lymph: No cervical LAD. No supraclavicular LAD. M/S: No cyanosis. No joint deformity. No clubbing. Intact peripheral pulses. Brisk cap refill, < 2 secs  Neuro: Awake. Reflexes 2+ symmetric bilaterally   Psych: Oriented x 3. No anxiety or agitation.      CBC:   Recent Labs      08/25/18   1210   WBC  7.6   HGB  14.2   HCT  44.3   MCV  87.8   PLT  312     BMP:   Recent Labs      08/25/18   1210   NA  138   K  3.8   CL  101   CO2  23   BUN  21*   CREATININE  0.7     LIVER PROFILE:   Recent Labs      08/25/18   1210   AST  22   ALT  27   BILITOT  0.4   ALKPHOS  131*     CULTURES    Blood cx x2: NGTD     RADIOLOGY    XR CHEST PORTABLE 8/25/2018   Final Result   In this patient with a remote history of rib fracture, there is increasing   ground-glass opacification in the right lower lung zone OMEPRAZOLE PO     OXYGEN     potassium chloride 20 MEQ Tbcr extended release tablet  Commonly known as:  KLOR-CON M     QUEtiapine 300 MG extended release tablet  Commonly known as:  SEROQUEL XR     rOPINIRole 2 MG tablet  Commonly known as:  REQUIP     sennosides-docusate sodium 8.6-50 MG tablet  Commonly known as:  SENOKOT-S  Take 2 tablets by mouth 2 times daily     simvastatin 20 MG tablet  Commonly known as:  ZOCOR  Take 1 tablet by mouth nightly     therapeutic multivitamin-minerals tablet     TOPAMAX 50 MG tablet  Generic drug:  topiramate     venlafaxine 150 MG extended release capsule  Commonly known as:  EFFEXOR XR     * VENTOLIN  (90 Base) MCG/ACT inhaler  Generic drug:  albuterol sulfate HFA     * albuterol (2.5 MG/3ML) 0.083% nebulizer solution  Commonly known as:  PROVENTIL        * This list has 2 medication(s) that are the same as other medications prescribed for you. Read the directions carefully, and ask your doctor or other care provider to review them with you. STOP taking these medications    ROBINUL PO     spironolactone 25 MG tablet  Commonly known as:  ALDACTONE     torsemide 20 MG tablet  Commonly known as:  DEMADEX           Where to Get Your Medications      These medications were sent to [place] [address] - P [phone] - F [phone]  [address] [facility] [address] [facility] [address]    Phone:  [phone]   · levofloxacin 750 MG tablet  · predniSONE 10 MG tablet           Discharged in stable condition to home    Follow Up:   Follow up with PCP in 1 week      Luciana Sun 8/27/2018 6:35 PM

## 2018-08-27 NOTE — PROGRESS NOTES
O2 Sat at rest on Room Air is 88%  O2 Sat with Activity is %    O2 Sat at rest with Oxygen at 2L/min is 97%  O2 Sat with Activity at 2L/min.  Is 95%

## 2018-08-31 LAB
BLOOD CULTURE, ROUTINE: NORMAL
CULTURE, BLOOD 2: NORMAL

## 2018-09-10 ENCOUNTER — TELEPHONE (OUTPATIENT)
Dept: CASE MANAGEMENT | Age: 68
End: 2018-09-10

## 2018-09-14 ENCOUNTER — OFFICE VISIT (OUTPATIENT)
Dept: PULMONOLOGY | Age: 68
End: 2018-09-14

## 2018-09-14 VITALS
HEART RATE: 87 BPM | SYSTOLIC BLOOD PRESSURE: 130 MMHG | BODY MASS INDEX: 35.68 KG/M2 | OXYGEN SATURATION: 96 % | WEIGHT: 222 LBS | DIASTOLIC BLOOD PRESSURE: 84 MMHG | RESPIRATION RATE: 18 BRPM | HEIGHT: 66 IN

## 2018-09-14 DIAGNOSIS — J44.1 CHRONIC OBSTRUCTIVE PULMONARY DISEASE WITH ACUTE EXACERBATION (HCC): Primary | ICD-10-CM

## 2018-09-14 DIAGNOSIS — G25.81 RLS (RESTLESS LEGS SYNDROME): ICD-10-CM

## 2018-09-14 DIAGNOSIS — J20.9 ACUTE BRONCHITIS, UNSPECIFIED ORGANISM: ICD-10-CM

## 2018-09-14 DIAGNOSIS — G47.33 OSA TREATED WITH BIPAP: ICD-10-CM

## 2018-09-14 DIAGNOSIS — R09.02 HYPOXIA: ICD-10-CM

## 2018-09-14 DIAGNOSIS — R93.89 ABNORMAL CXR: ICD-10-CM

## 2018-09-14 PROCEDURE — 1123F ACP DISCUSS/DSCN MKR DOCD: CPT | Performed by: NURSE PRACTITIONER

## 2018-09-14 PROCEDURE — 4040F PNEUMOC VAC/ADMIN/RCVD: CPT | Performed by: NURSE PRACTITIONER

## 2018-09-14 PROCEDURE — 3023F SPIROM DOC REV: CPT | Performed by: NURSE PRACTITIONER

## 2018-09-14 PROCEDURE — 1111F DSCHRG MED/CURRENT MED MERGE: CPT | Performed by: NURSE PRACTITIONER

## 2018-09-14 PROCEDURE — 3017F COLORECTAL CA SCREEN DOC REV: CPT | Performed by: NURSE PRACTITIONER

## 2018-09-14 PROCEDURE — 4004F PT TOBACCO SCREEN RCVD TLK: CPT | Performed by: NURSE PRACTITIONER

## 2018-09-14 PROCEDURE — G8400 PT W/DXA NO RESULTS DOC: HCPCS | Performed by: NURSE PRACTITIONER

## 2018-09-14 PROCEDURE — 1090F PRES/ABSN URINE INCON ASSESS: CPT | Performed by: NURSE PRACTITIONER

## 2018-09-14 PROCEDURE — G8427 DOCREV CUR MEDS BY ELIG CLIN: HCPCS | Performed by: NURSE PRACTITIONER

## 2018-09-14 PROCEDURE — G8417 CALC BMI ABV UP PARAM F/U: HCPCS | Performed by: NURSE PRACTITIONER

## 2018-09-14 PROCEDURE — 1101F PT FALLS ASSESS-DOCD LE1/YR: CPT | Performed by: NURSE PRACTITIONER

## 2018-09-14 PROCEDURE — 99214 OFFICE O/P EST MOD 30 MIN: CPT | Performed by: NURSE PRACTITIONER

## 2018-09-14 PROCEDURE — G8598 ASA/ANTIPLAT THER USED: HCPCS | Performed by: NURSE PRACTITIONER

## 2018-09-14 PROCEDURE — G8926 SPIRO NO PERF OR DOC: HCPCS | Performed by: NURSE PRACTITIONER

## 2018-09-14 RX ORDER — AZITHROMYCIN 250 MG/1
TABLET, FILM COATED ORAL
Qty: 1 PACKET | Refills: 0 | Status: SHIPPED | OUTPATIENT
Start: 2018-09-14 | End: 2018-09-18

## 2018-09-14 RX ORDER — PREDNISONE 10 MG/1
TABLET ORAL
Qty: 30 TABLET | Refills: 0 | Status: SHIPPED | OUTPATIENT
Start: 2018-09-14 | End: 2018-11-28 | Stop reason: ALTCHOICE

## 2018-09-14 ASSESSMENT — SLEEP AND FATIGUE QUESTIONNAIRES
HOW LIKELY ARE YOU TO NOD OFF OR FALL ASLEEP WHILE SITTING INACTIVE IN A PUBLIC PLACE: 0
HOW LIKELY ARE YOU TO NOD OFF OR FALL ASLEEP WHILE LYING DOWN TO REST IN THE AFTERNOON WHEN CIRCUMSTANCES PERMIT: 3
HOW LIKELY ARE YOU TO NOD OFF OR FALL ASLEEP IN A CAR, WHILE STOPPED FOR A FEW MINUTES IN TRAFFIC: 0
NECK CIRCUMFERENCE (INCHES): 16.5
HOW LIKELY ARE YOU TO NOD OFF OR FALL ASLEEP WHILE SITTING AND TALKING TO SOMEONE: 0
HOW LIKELY ARE YOU TO NOD OFF OR FALL ASLEEP WHEN YOU ARE A PASSENGER IN A CAR FOR AN HOUR WITHOUT A BREAK: 0
HOW LIKELY ARE YOU TO NOD OFF OR FALL ASLEEP WHILE SITTING AND READING: 0
ESS TOTAL SCORE: 6
HOW LIKELY ARE YOU TO NOD OFF OR FALL ASLEEP WHILE WATCHING TV: 3
HOW LIKELY ARE YOU TO NOD OFF OR FALL ASLEEP WHILE SITTING QUIETLY AFTER LUNCH WITHOUT ALCOHOL: 0

## 2018-09-14 NOTE — PROGRESS NOTES
solution, Take 2.5 mg by nebulization every 6 hours as needed for Wheezing, Disp: , Rfl:     topiramate (TOPAMAX) 50 MG tablet, Take 50 mg by mouth nightly, Disp: , Rfl:     OMEPRAZOLE PO, Take 40 mg by mouth 2 times daily, Disp: , Rfl:     hydrOXYzine (ATARAX) 50 MG tablet, Take 50 mg by mouth 1-2 tabs every 4 hours prn, Disp: , Rfl:     ferrous sulfate 325 (65 Fe) MG tablet, Take 325 mg by mouth daily (with breakfast), Disp: , Rfl:     fluticasone-vilanterol (BREO ELLIPTA) 100-25 MCG/INH AEPB inhaler, Inhale 1 puff into the lungs daily, Disp: , Rfl:     nicotine (NICODERM CQ) 14 MG/24HR, Place 1 patch onto the skin daily, Disp: 30 patch, Rfl: 0    sennosides-docusate sodium (SENOKOT-S) 8.6-50 MG tablet, Take 2 tablets by mouth 2 times daily, Disp: 30 tablet, Rfl: 0    rOPINIRole (REQUIP) 2 MG tablet, Take 3 mg by mouth nightly , Disp: , Rfl:     Umeclidinium Bromide (INCRUSE ELLIPTA) 62.5 MCG/INH AEPB, Inhale into the lungs daily , Disp: , Rfl:     gabapentin (NEURONTIN) 600 MG tablet, Take 300 mg by mouth 2 times daily. ., Disp: , Rfl:     venlafaxine (EFFEXOR-XR) 150 MG XR capsule, Take 150 mg by mouth daily, Disp: , Rfl:     Multiple Vitamins-Minerals (THERAPEUTIC MULTIVITAMIN-MINERALS) tablet, Take 1 tablet by mouth daily, Disp: , Rfl:     QUEtiapine (SEROQUEL XR) 300 MG XR tablet, Take 300 mg by mouth nightly , Disp: , Rfl:     OXYGEN, Inhale into the lungs Indications: 2.5 L nightly , Disp: , Rfl:     predniSONE (DELTASONE) 10 MG tablet, 4 tabs for 3 days 3 tabs for 3 days 2 tabs for 3 days 1 tabs for 3 days, Disp: 30 tablet, Rfl: 0    simvastatin (ZOCOR) 20 MG tablet, Take 1 tablet by mouth nightly, Disp: 30 tablet, Rfl: 11    busPIRone (BUSPAR) 15 MG tablet, Take 15 mg by mouth 2 times daily , Disp: , Rfl:         Objective:   PHYSICAL EXAM:    Ht 5' 6\" (1.676 m)   Wt 222 lb (100.7 kg)   BMI 35.83 kg/m²     Physical exam:  Gen: No distress. Obese. BMI of 35.83.   Diaphoretic from walking inside     Eyes: PERRL. No sclera icterus. No conjunctival injection. ENT: No discharge. Pharynx clear. Mallampati class IV. Neck: Trachea midline. No obvious mass. Neck circumference  16.25\"  Resp: No accessory muscle use. No crackles. +wheezes. No rhonchi. No dullness on percussion. Good air entry. CV: Regular rate. Regular rhythm. No murmur or rub. No edema. GI: Non-tender. Non-distended. No hernia. Skin: Warm and dry. No nodule on exposed extremities. Lymph: No cervical LAD. No supraclavicular LAD. M/S: No cyanosis. No joint deformity. No clubbing. Neuro: Awake. Alert. Moves all four extremities. Psych: Oriented x 3. No anxiety. DATA reviewed by me:   PFTs 08/19/2015 FVC 1.78(52%) FEV1 1.27(49%) FEV1/FVC 71 % TLC 4.52(82%)   DLCO 10.23(43%) 6MW 980 F LO2 89%        CT chest 2/27/2018   No evidence of pulmonary embolism  Decreased right-sided effusion with improved aeration of the right middle and lower lobes. Mild persistent bilateral airspace disease which may represent atelectasis or pneumonia. Multiple displaced right-sided rib fractures are again noted with decreased size of the extrapleural hematoma.         Chest x-ray 4/5/2018  images reviewed by me and showed: Residual pleural and parenchymal scarring noted in both lung bases right greater than left -overall appearance is improved over the past 2 months. No definite acute pulmonary finding. Stable mild cardiomegaly. Chest x-ray 8/25/18: Images reviewed by me and showed: increasing ground-glass opacification in the right lower lung zone with blunting of the costophrenic sulcus.       PSG 8/25/16 AHI 28.4 desaturation to 71%  BiPAP titration 9/9/16 BiPAP 15/11 cmH2O    Download compliance: unavailable for review       Assessment:      Acute bronchitis   Severe COPD with acute exacerbation   Hypoxia  Abnormal CXR - RLL air space disease.   Treated for pneumonia with levaquin for 8 days 8/25/18  RLS on Requip and Neurontin  Moderate MEG. BiPAP 15/11 cm H2O. Nasal pillow mask. Unknown compliance and efficacy, patient did not bring machine, and no download available at this time  Right traumatic displaced rib fractures with hemothorax required VATS decortication and chest tube removed 2/14/18. In improving chest x-ray 4/5/18  801 20-pack-year smokingquit 9/2017. Restarted smoking 2/3 ppd over the last few months. Patient knows she needs to quit. Wants to quit cold turkey   Plan:    · Patient schedule for yearly LDCT scan on Monday, cancel order and schedule 4-6 weeks out from 8/25/18 (abnormal CXR with RLL air space disease)   · Zpak and prednisone taper   · 2-5LPM.  Advise to titrate O2 using pulse oximeter- target O2 sat 90-92%. · Breo and Incruse Ellipta  · Albuterol 2 puffs Q4-6 hrs PRN  · Referral for pulmonary rehab - patient thinks she may want to go  · Patient is up-to-date with pneumococcal vaccine. Influenza vaccine at next visit. · Advised to use BiPAP 6-8 hrs at night and during naps. · Request download compliance   · Replacement of mask, tubing, head straps every 3-6 months or sooner if damaged. · Patient instructed to contact [employer] company for any mask, tubing or machine trouble shooting if problems arise. · Sleep hygiene discussed along with written education in AVS  · Avoid sedatives, alcohol and caffeinated drinks at bed time. · Patient counseled to never drive or operate heavy machinery while fatigue, drowsy or sleepy. · Weight loss is also recommended as a long-term intervention. · Complications of MEG if not treated were discussed with the patient to including: systemic hypertension, pulmonary hypertension, cardiovascular morbidities, car accidents and all cause mortality. · The risks related to smoking were reviewed with the patient. Recommend maintaining a smoke-free lifestyle. Products available for smoking cessation were discussed in detail.   · Options provided for psychiatry per

## 2018-09-14 NOTE — PATIENT INSTRUCTIONS
Please keep all of your future appointments scheduled by [address], [facility] office. Sleep Hygiene. .. Tips for better sleep. .. Avoid naps. This will ensure you are sleepy at bedtime. If you have to take a nap, sleep less than 1 hour, before 3 pm.  Sleep only when sleepy; this reduces the time you are awake in bed. Regular exercise is recommended to help you deepen your sleep, but not within 4-6 hours of your bedtime. Timing of exercise is important, aim to exercise early in the morning or early afternoon. A light snack may help you fall asleep. Warm milk and foods high in the amino acid tryptophan, such as bananas, may help you to sleep  Be sure to avoid heavy, spicy or sugary foods 4-6 hours before bedtime and avoid at snack time. Stay away from stimulants such as caffeine and nicotine for at least 4-6 hours before bed. Stimulants can interfere with your ability to fall asleep. Caffeine is found in tea, cola, coffee, cocoa and chocolate and is best avoided at bedtime. Nicotine is found in tobacco products. Avoid alcohol 4-6 hours before bedtime. Alcohol has an immediate sleep-inducing effect, after a few hours when alcohol levels fall there is a stimulant or wake-up effect and will cause fragmented sleep. Sleep rituals are important. Give your body clues it is time to slow down and sleep. Examples include; yoga, deep breathing, listen to relaxing music, a hot bath or a few minutes of reading. Have a fixed bedtime and awakening time, Even on weekends! You will feel better keeping a regular sleep cycle, even if you are retired or not working. Get into your favorite sleep position. If not asleep in 30 minutes, get up and do something boring until you feel sleepy. Remember not to expose yourself to bright lights such as TV, phone or tablet screens. Only use your bed for sleeping. Do not use your bed as an office, workroom or recreation room. Use comfortable bedding.

## 2018-09-18 ENCOUNTER — TELEPHONE (OUTPATIENT)
Dept: CASE MANAGEMENT | Age: 68
End: 2018-09-18

## 2018-10-02 ENCOUNTER — TELEPHONE (OUTPATIENT)
Dept: CASE MANAGEMENT | Age: 68
End: 2018-10-02

## 2018-10-05 ENCOUNTER — HOSPITAL ENCOUNTER (OUTPATIENT)
Dept: CT IMAGING | Age: 68
Discharge: HOME | End: 2018-10-05
Payer: MEDICARE

## 2018-10-05 ENCOUNTER — TELEPHONE (OUTPATIENT)
Dept: CASE MANAGEMENT | Age: 68
End: 2018-10-05

## 2018-10-05 DIAGNOSIS — F17.200 CURRENT SMOKER: ICD-10-CM

## 2018-10-05 PROCEDURE — G0297 LDCT FOR LUNG CA SCREEN: HCPCS

## 2018-11-20 RX ORDER — SPIRONOLACTONE 25 MG/1
25 TABLET ORAL DAILY
Qty: 90 TABLET | Refills: 1 | Status: SHIPPED | OUTPATIENT
Start: 2018-11-20 | End: 2019-05-07

## 2018-11-28 ENCOUNTER — OFFICE VISIT (OUTPATIENT)
Dept: PULMONOLOGY | Age: 68
End: 2018-11-28
Payer: MEDICARE

## 2018-11-28 VITALS
RESPIRATION RATE: 18 BRPM | HEIGHT: 67 IN | BODY MASS INDEX: 33.59 KG/M2 | SYSTOLIC BLOOD PRESSURE: 128 MMHG | TEMPERATURE: 97.9 F | DIASTOLIC BLOOD PRESSURE: 74 MMHG | HEART RATE: 82 BPM | OXYGEN SATURATION: 96 % | WEIGHT: 214 LBS

## 2018-11-28 DIAGNOSIS — R91.8 MULTIPLE PULMONARY NODULES: ICD-10-CM

## 2018-11-28 DIAGNOSIS — R09.02 HYPOXIA: ICD-10-CM

## 2018-11-28 DIAGNOSIS — G47.33 OSA (OBSTRUCTIVE SLEEP APNEA): ICD-10-CM

## 2018-11-28 DIAGNOSIS — J44.9 CHRONIC OBSTRUCTIVE PULMONARY DISEASE, UNSPECIFIED COPD TYPE (HCC): Primary | ICD-10-CM

## 2018-11-28 PROCEDURE — G8417 CALC BMI ABV UP PARAM F/U: HCPCS | Performed by: NURSE PRACTITIONER

## 2018-11-28 PROCEDURE — G8482 FLU IMMUNIZE ORDER/ADMIN: HCPCS | Performed by: NURSE PRACTITIONER

## 2018-11-28 PROCEDURE — 1101F PT FALLS ASSESS-DOCD LE1/YR: CPT | Performed by: NURSE PRACTITIONER

## 2018-11-28 PROCEDURE — G8427 DOCREV CUR MEDS BY ELIG CLIN: HCPCS | Performed by: NURSE PRACTITIONER

## 2018-11-28 PROCEDURE — 4040F PNEUMOC VAC/ADMIN/RCVD: CPT | Performed by: NURSE PRACTITIONER

## 2018-11-28 PROCEDURE — 3023F SPIROM DOC REV: CPT | Performed by: NURSE PRACTITIONER

## 2018-11-28 PROCEDURE — 4004F PT TOBACCO SCREEN RCVD TLK: CPT | Performed by: NURSE PRACTITIONER

## 2018-11-28 PROCEDURE — G8598 ASA/ANTIPLAT THER USED: HCPCS | Performed by: NURSE PRACTITIONER

## 2018-11-28 PROCEDURE — 1090F PRES/ABSN URINE INCON ASSESS: CPT | Performed by: NURSE PRACTITIONER

## 2018-11-28 PROCEDURE — 1123F ACP DISCUSS/DSCN MKR DOCD: CPT | Performed by: NURSE PRACTITIONER

## 2018-11-28 PROCEDURE — 99214 OFFICE O/P EST MOD 30 MIN: CPT | Performed by: NURSE PRACTITIONER

## 2018-11-28 PROCEDURE — G8926 SPIRO NO PERF OR DOC: HCPCS | Performed by: NURSE PRACTITIONER

## 2018-11-28 PROCEDURE — 3017F COLORECTAL CA SCREEN DOC REV: CPT | Performed by: NURSE PRACTITIONER

## 2018-11-28 PROCEDURE — G8400 PT W/DXA NO RESULTS DOC: HCPCS | Performed by: NURSE PRACTITIONER

## 2018-11-28 ASSESSMENT — SLEEP AND FATIGUE QUESTIONNAIRES
HOW LIKELY ARE YOU TO NOD OFF OR FALL ASLEEP IN A CAR, WHILE STOPPED FOR A FEW MINUTES IN TRAFFIC: 0
HOW LIKELY ARE YOU TO NOD OFF OR FALL ASLEEP WHILE SITTING AND TALKING TO SOMEONE: 0
HOW LIKELY ARE YOU TO NOD OFF OR FALL ASLEEP WHILE WATCHING TV: 3
HOW LIKELY ARE YOU TO NOD OFF OR FALL ASLEEP WHEN YOU ARE A PASSENGER IN A CAR FOR AN HOUR WITHOUT A BREAK: 0
HOW LIKELY ARE YOU TO NOD OFF OR FALL ASLEEP WHILE SITTING QUIETLY AFTER LUNCH WITHOUT ALCOHOL: 0
HOW LIKELY ARE YOU TO NOD OFF OR FALL ASLEEP WHILE SITTING INACTIVE IN A PUBLIC PLACE: 0
NECK CIRCUMFERENCE (INCHES): 16
ESS TOTAL SCORE: 6
HOW LIKELY ARE YOU TO NOD OFF OR FALL ASLEEP WHILE LYING DOWN TO REST IN THE AFTERNOON WHEN CIRCUMSTANCES PERMIT: 3
HOW LIKELY ARE YOU TO NOD OFF OR FALL ASLEEP WHILE SITTING AND READING: 0

## 2018-11-28 NOTE — PROGRESS NOTES
rhythm. No murmur or rub. No edema. GI: Non-tender. Non-distended. No hernia. Skin: Warm and dry. No nodule on exposed extremities. Lymph: No cervical LAD. No supraclavicular LAD. M/S: No cyanosis. No joint deformity. No clubbing. Neuro: Awake. Alert. Moves all four extremities. Psych: Oriented x 3. No anxiety. DATA reviewed by me:   PFTs 08/19/2015 FVC 1.78(52%) FEV1 1.27(49%) FEV1/FVC 71 % TLC 4.52(82%)   DLCO 10.23(43%) 6MW 980 F LO2 89%        CT chest 2/27/2018   No evidence of pulmonary embolism  Decreased right-sided effusion with improved aeration of the right middle and lower lobes. Mild persistent bilateral airspace disease which may represent atelectasis or pneumonia. Multiple displaced right-sided rib fractures are again noted with decreased size of the extrapleural hematoma.        Chest x-ray 4/5/2018  images reviewed by me and showed: Residual pleural and parenchymal scarring noted in both lung bases right greater than left -overall appearance is improved over the past 2 months. No definite acute pulmonary finding. Stable mild cardiomegaly. Chest x-ray 8/25/18: Images reviewed by me and showed: increasing ground-glass opacification in the right lower lung zone with blunting of the costophrenic sulcus.      LDCT 10/5/18:   A few scattered pulmonary nodules measuring up to 4 mm. Potentially Significant Incidentals (LungRADS Category S): Emphysema  Pulmonary incidentals:  Improvement in patchy consolidation in streaky  opacities in the lung bases especially on the right. Other incidentals:  Remote mildly displaced right rib fractures. PSG 8/25/16 AHI 28.4 desaturation to 71%  BiPAP titration 9/9/16 BiPAP 15/11 cmH2O      Assessment:      Severe COPD - stable   Hypoxia  Multiple pulmonary nodules - largest 4 mm on LDCT 10/5/18. LCS-2. Abnormal CXR - RLL air space disease. Treated for pneumonia with levaquin for 8 days 8/25/18  RLS on Requip and Neurontin  Moderate MEG. BiPAP 15/11 cm H2O. Patient declines to wear BiPAP and wants to return her machine   Right traumatic displaced rib fractures with hemothorax required VATS decortication and chest tube removed 2/14/18. In improving chest x-ray 4/5/18  80-1 20-pack-year smoking-quit 9/2017. Restarted smoking due to increased stress at home - caring for friend's 3 grandchildren. Patient knows she needs to quit but not interested at this time  Plan:      · 2-5LPM.  Advise to titrate O2 using pulse oximeter- target O2 sat 90-92%. · Breo and Incruse Ellipta  · Albuterol 2 puffs Q4-6 hrs PRN  · Referral for pulmonary rehab - patient does not think she can go any longer as she has moved further away   · Patient is up-to-date with pneumococcal vaccine. Influenza vaccine at next visit. · Recommend CT chest, low dose protocol, screening for lung cancer yearly. Risks and benefits were discussed with patient. Next 10/2019  · ONPO on 2L supplemental oxygen. Explained to patient this is not an alternative for PAP therapy. · D/C BiPAP as she refuses to use. · Avoid sedatives, alcohol and caffeinated drinks at bed time. · Patient counseled to never drive or operate heavy machinery while fatigue, drowsy or sleepy. · Weight loss is also recommended as a long-term intervention. · Complications of MEG if not treated were discussed with the patient to including: systemic hypertension, pulmonary hypertension, cardiovascular morbidities, car accidents and all cause mortality. · The risks related to smoking were reviewed with the patient. Recommend maintaining a smoke-free lifestyle. Products available for smoking cessation were discussed in detail. · Options provided for psychiatry per patient request - [address]   [address],    Follow up: 3 months with Dr. Tracie Hung

## 2018-12-07 ENCOUNTER — TELEPHONE (OUTPATIENT)
Dept: PULMONOLOGY | Age: 68
End: 2018-12-07

## 2018-12-07 DIAGNOSIS — R09.02 HYPOXEMIA: Primary | ICD-10-CM

## 2018-12-14 ENCOUNTER — TELEPHONE (OUTPATIENT)
Dept: PULMONOLOGY | Age: 68
End: 2018-12-14

## 2018-12-14 DIAGNOSIS — R09.02 HYPOXEMIA: Primary | ICD-10-CM

## 2019-01-02 ENCOUNTER — TELEPHONE (OUTPATIENT)
Dept: PULMONOLOGY | Age: 69
End: 2019-01-02

## 2019-01-02 RX ORDER — PREDNISONE 10 MG/1
TABLET ORAL
Qty: 30 TABLET | Refills: 0 | Status: SHIPPED | OUTPATIENT
Start: 2019-01-02 | End: 2019-02-14 | Stop reason: ALTCHOICE

## 2019-01-02 RX ORDER — DOXYCYCLINE HYCLATE 100 MG
100 TABLET ORAL 2 TIMES DAILY
Qty: 10 TABLET | Refills: 0 | Status: SHIPPED | OUTPATIENT
Start: 2019-01-02 | End: 2019-01-07

## 2019-01-14 RX ORDER — MELOXICAM 15 MG/1
TABLET ORAL
Qty: 30 TABLET | Refills: 0 | Status: SHIPPED | OUTPATIENT
Start: 2019-01-14 | End: 2019-05-07 | Stop reason: ALTCHOICE

## 2019-01-17 ENCOUNTER — TELEPHONE (OUTPATIENT)
Dept: PULMONOLOGY | Age: 69
End: 2019-01-17

## 2019-02-14 ENCOUNTER — OFFICE VISIT (OUTPATIENT)
Dept: PULMONOLOGY | Age: 69
End: 2019-02-14
Payer: MEDICARE

## 2019-02-14 VITALS
SYSTOLIC BLOOD PRESSURE: 120 MMHG | DIASTOLIC BLOOD PRESSURE: 76 MMHG | HEART RATE: 83 BPM | OXYGEN SATURATION: 94 % | WEIGHT: 200 LBS | HEIGHT: 67 IN | BODY MASS INDEX: 31.39 KG/M2 | TEMPERATURE: 97.7 F

## 2019-02-14 DIAGNOSIS — G25.81 RLS (RESTLESS LEGS SYNDROME): ICD-10-CM

## 2019-02-14 DIAGNOSIS — J44.9 COPD, SEVERE (HCC): Primary | ICD-10-CM

## 2019-02-14 DIAGNOSIS — Z87.891 PERSONAL HISTORY OF TOBACCO USE: ICD-10-CM

## 2019-02-14 DIAGNOSIS — G47.33 MODERATE OBSTRUCTIVE SLEEP APNEA: ICD-10-CM

## 2019-02-14 DIAGNOSIS — R09.02 HYPOXIA: ICD-10-CM

## 2019-02-14 PROCEDURE — G8482 FLU IMMUNIZE ORDER/ADMIN: HCPCS | Performed by: INTERNAL MEDICINE

## 2019-02-14 PROCEDURE — 3017F COLORECTAL CA SCREEN DOC REV: CPT | Performed by: INTERNAL MEDICINE

## 2019-02-14 PROCEDURE — 1090F PRES/ABSN URINE INCON ASSESS: CPT | Performed by: INTERNAL MEDICINE

## 2019-02-14 PROCEDURE — 3023F SPIROM DOC REV: CPT | Performed by: INTERNAL MEDICINE

## 2019-02-14 PROCEDURE — 99214 OFFICE O/P EST MOD 30 MIN: CPT | Performed by: INTERNAL MEDICINE

## 2019-02-14 PROCEDURE — G8427 DOCREV CUR MEDS BY ELIG CLIN: HCPCS | Performed by: INTERNAL MEDICINE

## 2019-02-14 PROCEDURE — 1101F PT FALLS ASSESS-DOCD LE1/YR: CPT | Performed by: INTERNAL MEDICINE

## 2019-02-14 PROCEDURE — 4004F PT TOBACCO SCREEN RCVD TLK: CPT | Performed by: INTERNAL MEDICINE

## 2019-02-14 PROCEDURE — 4040F PNEUMOC VAC/ADMIN/RCVD: CPT | Performed by: INTERNAL MEDICINE

## 2019-02-14 PROCEDURE — G8926 SPIRO NO PERF OR DOC: HCPCS | Performed by: INTERNAL MEDICINE

## 2019-02-14 PROCEDURE — G8417 CALC BMI ABV UP PARAM F/U: HCPCS | Performed by: INTERNAL MEDICINE

## 2019-02-14 PROCEDURE — G0296 VISIT TO DETERM LDCT ELIG: HCPCS | Performed by: INTERNAL MEDICINE

## 2019-02-14 PROCEDURE — G8400 PT W/DXA NO RESULTS DOC: HCPCS | Performed by: INTERNAL MEDICINE

## 2019-02-14 PROCEDURE — G8598 ASA/ANTIPLAT THER USED: HCPCS | Performed by: INTERNAL MEDICINE

## 2019-02-14 PROCEDURE — 1123F ACP DISCUSS/DSCN MKR DOCD: CPT | Performed by: INTERNAL MEDICINE

## 2019-02-14 ASSESSMENT — SLEEP AND FATIGUE QUESTIONNAIRES
NECK CIRCUMFERENCE (INCHES): 16
HOW LIKELY ARE YOU TO NOD OFF OR FALL ASLEEP WHEN YOU ARE A PASSENGER IN A CAR FOR AN HOUR WITHOUT A BREAK: 0
HOW LIKELY ARE YOU TO NOD OFF OR FALL ASLEEP WHILE SITTING INACTIVE IN A PUBLIC PLACE: 0
HOW LIKELY ARE YOU TO NOD OFF OR FALL ASLEEP WHILE SITTING QUIETLY AFTER LUNCH WITHOUT ALCOHOL: 0
ESS TOTAL SCORE: 5
HOW LIKELY ARE YOU TO NOD OFF OR FALL ASLEEP IN A CAR, WHILE STOPPED FOR A FEW MINUTES IN TRAFFIC: 0
HOW LIKELY ARE YOU TO NOD OFF OR FALL ASLEEP WHILE LYING DOWN TO REST IN THE AFTERNOON WHEN CIRCUMSTANCES PERMIT: 3
HOW LIKELY ARE YOU TO NOD OFF OR FALL ASLEEP WHILE SITTING AND READING: 0
HOW LIKELY ARE YOU TO NOD OFF OR FALL ASLEEP WHILE SITTING AND TALKING TO SOMEONE: 0
HOW LIKELY ARE YOU TO NOD OFF OR FALL ASLEEP WHILE WATCHING TV: 2

## 2019-02-14 ASSESSMENT — COPD QUESTIONNAIRES: COPD: 1

## 2019-04-12 RX ORDER — APIXABAN 5 MG/1
5 TABLET, FILM COATED ORAL 2 TIMES DAILY
Qty: 180 TABLET | Refills: 0 | Status: SHIPPED | OUTPATIENT
Start: 2019-04-12 | End: 2019-10-01 | Stop reason: SDUPTHER

## 2019-04-22 ENCOUNTER — TELEPHONE (OUTPATIENT)
Dept: PULMONOLOGY | Age: 69
End: 2019-04-22

## 2019-04-22 RX ORDER — PREDNISONE 10 MG/1
TABLET ORAL
Qty: 30 TABLET | Refills: 0 | Status: SHIPPED | OUTPATIENT
Start: 2019-04-22 | End: 2019-05-04

## 2019-04-22 RX ORDER — AZITHROMYCIN 250 MG/1
TABLET, FILM COATED ORAL
Qty: 1 PACKET | Refills: 0 | Status: SHIPPED | OUTPATIENT
Start: 2019-04-22 | End: 2019-05-07 | Stop reason: CLARIF

## 2019-04-22 NOTE — TELEPHONE ENCOUNTER
Do you have the following symptoms? Shortness of Breath  yes  Wheezing  yes  Cough  yes                  Cough Characteristics:                           Productive   yes                           Sputum Color    clear                           Hemoptysis                              Consistency of sputum   thick  Fever:      Temp:  Chills/Sweats:   What other symptoms are you having?:  \"Just doesn't feel good\"    How long have you had these symptoms? 1 week    Pharmacy: Pharmacy:  [address] [place] [address] [phone] - F [phone]          Review medications and allergies: Allergies? Allergies   Allergen Reactions    Lisinopril Other (See Comments)     Low blood pressure per patient 59/29     Penicillins Nausea Only                      Currently on Antibiotics? (Drug/Dose/Frequency and how long on?) no                   Systemic Steroids? (Drug/Dose/Frequency and how long on?) no        Pull in last office note. 02/14/19  Assessment:   · Severe  COPD   · Hypoxia  · RLS on Requip and Neurontin  · Right traumatic displaced rib fractures with  hemothorax required VATS decortication and chest tube removed 2/14/18. · Moderate MEG. O2 3LPM at night. Refusing BiPAP and interested only in O2  ·  pack year smoking- quit 9/2017                  Plan:   · 2-5LPM O2. Advised to titrate O2 using her pulse oximeter- target O2 sat 90-92%  · Continue O2 3 L at night. Complications of MEG if not treated were discussed with patient patient, including systemic hypertension, pulmonary hypertension, cardiovascular morbidities, car accidents and all cause mortality. · Continue Breo and Incruse Ellipta  · Continue Albuterol INH/Neb Q 4 hrs PRN   · CT chest, low dose protocol, screening for lung cancer 1/2020. Risks, benefits and alternatives including doing nothing were discussed with patient.   · Patient is up to date with Pneumococcal vaccine and influenza

## 2019-05-07 ENCOUNTER — OFFICE VISIT (OUTPATIENT)
Dept: CARDIOLOGY CLINIC | Age: 69
End: 2019-05-07
Payer: MEDICARE

## 2019-05-07 VITALS
SYSTOLIC BLOOD PRESSURE: 124 MMHG | HEART RATE: 87 BPM | DIASTOLIC BLOOD PRESSURE: 78 MMHG | HEIGHT: 66 IN | WEIGHT: 197 LBS | OXYGEN SATURATION: 95 % | BODY MASS INDEX: 31.66 KG/M2

## 2019-05-07 DIAGNOSIS — I48.0 PAF (PAROXYSMAL ATRIAL FIBRILLATION) (HCC): Primary | ICD-10-CM

## 2019-05-07 DIAGNOSIS — E78.2 MIXED HYPERLIPIDEMIA: ICD-10-CM

## 2019-05-07 DIAGNOSIS — I10 ESSENTIAL HYPERTENSION: ICD-10-CM

## 2019-05-07 PROCEDURE — G8400 PT W/DXA NO RESULTS DOC: HCPCS | Performed by: INTERNAL MEDICINE

## 2019-05-07 PROCEDURE — 4040F PNEUMOC VAC/ADMIN/RCVD: CPT | Performed by: INTERNAL MEDICINE

## 2019-05-07 PROCEDURE — 99214 OFFICE O/P EST MOD 30 MIN: CPT | Performed by: INTERNAL MEDICINE

## 2019-05-07 PROCEDURE — 4004F PT TOBACCO SCREEN RCVD TLK: CPT | Performed by: INTERNAL MEDICINE

## 2019-05-07 PROCEDURE — G8417 CALC BMI ABV UP PARAM F/U: HCPCS | Performed by: INTERNAL MEDICINE

## 2019-05-07 PROCEDURE — 1123F ACP DISCUSS/DSCN MKR DOCD: CPT | Performed by: INTERNAL MEDICINE

## 2019-05-07 PROCEDURE — G8427 DOCREV CUR MEDS BY ELIG CLIN: HCPCS | Performed by: INTERNAL MEDICINE

## 2019-05-07 PROCEDURE — 3017F COLORECTAL CA SCREEN DOC REV: CPT | Performed by: INTERNAL MEDICINE

## 2019-05-07 PROCEDURE — G8598 ASA/ANTIPLAT THER USED: HCPCS | Performed by: INTERNAL MEDICINE

## 2019-05-07 PROCEDURE — 1090F PRES/ABSN URINE INCON ASSESS: CPT | Performed by: INTERNAL MEDICINE

## 2019-05-07 RX ORDER — DILTIAZEM HYDROCHLORIDE 120 MG/1
120 CAPSULE, COATED, EXTENDED RELEASE ORAL DAILY
Qty: 30 CAPSULE | Refills: 11 | Status: SHIPPED | OUTPATIENT
Start: 2019-05-07 | End: 2019-08-20 | Stop reason: ALTCHOICE

## 2019-05-07 RX ORDER — VARENICLINE TARTRATE 25 MG
KIT ORAL
Qty: 1 BOX | Refills: 0 | Status: SHIPPED | OUTPATIENT
Start: 2019-05-07 | End: 2019-06-25 | Stop reason: SDUPTHER

## 2019-05-07 RX ORDER — EZETIMIBE 10 MG/1
10 TABLET ORAL DAILY
Qty: 30 TABLET | Refills: 11 | Status: SHIPPED | OUTPATIENT
Start: 2019-05-07 | End: 2020-01-22 | Stop reason: ALTCHOICE

## 2019-05-07 NOTE — PATIENT INSTRUCTIONS
[address] Office Note  5/7/2019     Subjective:  Ms. Solitario White presents for cardiology  follow up for dCHF, PAF, HTN, HLD    HPI:    She remains on   O2 at 2 liters at bedtime. Smoking 2 packs cigs  per day. She reports SOB which has been chronic with her COPD. Denies chest pain,  edema, dizziness, palpitations and syncope. She has lost 70 lbs since May 2018 cutting back on pop and sweets. She is smoking 2 packs a day and wants to quit smoking. PMH: This patient is a 56-year-old white female with no prior history of heart disease. She presented to the hospital on 06/25/2015 with acute onset of shortness of breath and subsequent chest pain. The patient states she was at [facility] approximately 2 weeks ago with small bowel obstruction. That was complicated by renal failure. That eventually resolved by conservative therapy, and she went home about a week ago. The patient has been improving gradually. However, she has continued to have diarrhea ever since she was released from previous hospital. On the day of hospitalization she felt poorly. She was slightly short of breath in the morning with her daily activity; but as the day went on, she became more short of breath. She also experienced substernal pressure type of chest pain as if someone was sitting on her. She felt palpitations in her ear. She did not have any nausea or vomiting but had diarrhea. The patient was diaphoretic. Symptoms of chest pressure and shortness of breath lasted for several hours. Subsequently the patient came to the emergency room, and she was admitted with a diagnosis of acute pulmonary edema. She was diuresed and improved. Her troponins were elevated at that time. She was Dx with CDiff during her admission. Echo at that time with EF of 55%. Most recent cardiac cath on 07/28/2015 with normal coronaries. S/P fall and multiple rt rib fractures  Requiring VATS early feb 2018.     Review of Systems:  12 point ROS negative in all areas as listed below except as in [address], EENT, Cardiovascular, pulmonary, GI, , Musculoskeletal, skin, neurological, hematological, endocrine, Psychiatric    Reviewed past medical history, social, and family history. still smoking but trying to quit. No alcohol  MOM cancer of colon  Dad massive heart attack at age 76  Past Medical History:   Diagnosis Date    A-fib [facility])     Clostridium difficile diarrhea 6/26/15    COPD (chronic obstructive pulmonary disease) (HCC)     COPD exacerbation (HCC)     [facility]     Emphysema of lung ([facility]     Hyperlipidemia     Hypertension     Pneumonia 2/5/2018    Rib pain     Sleep apnea     Small bowel obstruction (HCC)      Past Surgical History:   Procedure Laterality Date    ANKLE ARTHROSCOPY      x2    CHOLECYSTECTOMY      HYSTERECTOMY      ROTATOR CUFF REPAIR Bilateral        Objective:   /78   Pulse 87   Ht 5' 6\" (1.676 m)   Wt 197 lb (89.4 kg)   SpO2 95%   Breastfeeding? No   BMI 31.80 kg/m²      Wt Readings from Last 3 Encounters:   05/07/19 197 lb (89.4 kg)   02/14/19 200 lb (90.7 kg)   11/28/18 214 lb (97.1 kg)       Physical Exam:  General: No Respiratory distress, appears well developed and well nourished. Eyes:  Sclera nonicteric  Nose/Sinuses:  negative findings: nose shows no deformity, asymmetry, or inflammation, nasal mucosa normal, septum midline with no perforation or bleeding  Back:  no pain to palpation  Joint:  no active joint inflammation  Musculoskeletal:  negative  Skin:  Warm and dry  Neck:  Negative for JVD and Carotid Bruits. Chest:Diminished  to auscultation, respiration easy  Cardiovascular:  RRR,72 bpm  S1S2 normal, no murmur, no rub or thrill.   Cath site healed well no complaints from patient from the procedure  Extremities: no  edema, clubbing, cyanosis,  Pulses:  pedal pulses are normal.  Neuro: intact    Medications:   Outpatient Encounter Medications as of 5/7/2019 Medication Sig Dispense Refill    ezetimibe (ZETIA) 10 MG tablet Take 1 tablet by mouth daily 30 tablet 11    diltiazem (CARDIZEM CD) 120 MG extended release capsule Take 1 capsule by mouth daily 30 capsule 11    varenicline (CHANTIX STARTING MONTH GUDELIA) 0.5 MG X 11 & 1 MG X 42 tablet Take by mouth.  1 box 0    ELIQUIS 5 MG TABS tablet TAKE 1 TABLET BY MOUTH 2 TIMES DAILY 180 tablet 0    albuterol sulfate HFA (VENTOLIN HFA) 108 (90 Base) MCG/ACT inhaler Inhale 2 puffs into the lungs every 6 hours as needed for Wheezing      albuterol (PROVENTIL) (2.5 MG/3ML) 0.083% nebulizer solution Take 2.5 mg by nebulization every 6 hours as needed for Wheezing      topiramate (TOPAMAX) 50 MG tablet Take 50 mg by mouth nightly      OMEPRAZOLE PO Take 40 mg by mouth 2 times daily      hydrOXYzine (ATARAX) 50 MG tablet Take 50 mg by mouth 1-2 tabs every 4 hours prn      ferrous sulfate 325 (65 Fe) MG tablet Take 325 mg by mouth daily (with breakfast)      fluticasone-vilanterol (BREO ELLIPTA) 100-25 MCG/INH AEPB inhaler Inhale 1 puff into the lungs daily      sennosides-docusate sodium (SENOKOT-S) 8.6-50 MG tablet Take 2 tablets by mouth 2 times daily 30 tablet 0    rOPINIRole (REQUIP) 2 MG tablet Take 3 mg by mouth nightly       Umeclidinium Bromide (INCRUSE ELLIPTA) 62.5 MCG/INH AEPB Inhale into the lungs daily       venlafaxine (EFFEXOR-XR) 150 MG XR capsule Take 150 mg by mouth daily      Multiple Vitamins-Minerals (THERAPEUTIC MULTIVITAMIN-MINERALS) tablet Take 1 tablet by mouth daily      QUEtiapine (SEROQUEL XR) 300 MG XR tablet Take 300 mg by mouth nightly       OXYGEN Inhale into the lungs Indications: 2.5 L nightly       [DISCONTINUED] azithromycin (ZITHROMAX) 250 MG tablet 2 tabs day one, 1 tab days 2 through 5 1 packet 0    [DISCONTINUED] meloxicam (MOBIC) 15 MG tablet TAKE ONE TABLET BY MOUTH ONCE A DAY 30 tablet 0    [DISCONTINUED] spironolactone (ALDACTONE) 25 MG tablet TAKE 1 TABLET BY MOUTH DAILY 90 tablet 1    [DISCONTINUED] potassium chloride (KLOR-CON M) 20 MEQ TBCR extended release tablet Take 20 mEq by mouth daily      [DISCONTINUED] furosemide (LASIX) 80 MG tablet Lasix 40 mg tablet   Take 1 tablet once a day by oral route.  [DISCONTINUED] simvastatin (ZOCOR) 20 MG tablet Take 1 tablet by mouth nightly 30 tablet 11    [DISCONTINUED] ezetimibe (ZETIA) 10 MG tablet Take 1 tablet by mouth daily 30 tablet 11    [DISCONTINUED] diltiazem (CARDIZEM CD) 120 MG extended release capsule Take 1 capsule by mouth daily 30 capsule 11    [DISCONTINUED] nicotine (NICODERM CQ) 14 MG/24HR Place 1 patch onto the skin daily 30 patch 0    [DISCONTINUED] busPIRone (BUSPAR) 15 MG tablet Take 15 mg by mouth 2 times daily        No facility-administered encounter medications on file as of 5/7/2019. Lab Data:  Imaging:  Chest CT 1/16/19  Multiple rt rib fx appearing subacute scattered areas parenchymal opacity lower lobes    ECHO 7/8/16  Normal global wall motion. Visual EF is 55-60%  Doppler evidence of grade I (impaired) diastolic dysfunction. Calculated EF 52%. Left atrial cavity is mildly dilated. Right ventricle cavity is mildly dilated. Structurally normal mitral valve with mild regurgitation. Structurally normal tricuspid valve with trace regurgitation. IVC is dilated with respiratory response of <50%. CXR 2/16/16  atelectasis with bronchial wall thickening      CATH 7/28/15    Signed by Zandra Harkins MD on 07/28/15 at 1114    Document Text    Expand All Collapse All   PATIENT NAME: PA #: MR Denae Jung [id number]     1. Selective coronary angiography. 2. Left heart catheterization. 3. Left ventriculography. 4. Right heart catheterization. ANGIOGRAPHIC FINDINGS:   1. No coronary artery disease. 2. Normal left ventricular function, EF greater than 60%. 3. Normal left and right hemodynamics.   CONCLUSIONS: No evidence for coronary artery disease and preserved left  ventricular dysfunction with normal hemodynamics. RECOMMENDATIONS: At this point is noncardiac symptoms. Recommend follow-up  with Dr. Alyson Marlow and Leta Castillo in 1 to 2 weeks. Alison Wells MD        ECHO 5/26/15  Summary   Normal left ventricle size and wall thickness. Estimated ejection fraction   of 55%. Apical lateral and mid anterolateral hypokinesia. Impaired left   ventricular relaxation.   Mitral valve is structurally normal.   Mitral valve leaflets appear to open adequately.   Trivial mitral regurgitation is present.   The aortic leaflets appear to open adequately.   Trace aortic regurgitation is present.   No evidence of aortic valve stenosis    CBC: No results for input(s): WBC, HGB, HCT, MCV, PLT in the last 72 hours. BMP: No results for input(s): NA, K, CL, CO2, PHOS, BUN, CREATININE in the last 72 hours. Invalid input(s): CA  LIVER PROFILE: No results for input(s): AST, ALT, LIPASE, BILIDIR, BILITOT, ALKPHOS in the last 72 hours. Invalid input(s): AMYLASE,  ALB  LIPID:   No components found for: CHLPL,  CHOL  Lab Results   Component Value Date    TRIG 201 (H) 02/10/2018    TRIG 90 11/02/2015    TRIG 158 (H) 07/28/2015     Lab Results   Component Value Date    HDL 54 06/08/2018    HDL 65 11/02/2015    HDL 45 07/28/2015     Lab Results   Component Value Date    LDLCALC 58 06/08/2018    LDLCALC 97 11/02/2015    LDLCALC 86 07/28/2015     Lab Results   Component Value Date    LABVLDL 33 06/08/2018    LABVLDL 32 07/28/2015     No results found for: CHOLHDLRATIO  PT/INR: No results for input(s): PROTIME, INR in the last 72 hours. A1C:   Lab Results   Component Value Date    LABA1C 5.9 01/13/2018     BNP:  No results for input(s): BNP in the last 72 hours. EKG 3/28/18   shows sinus arrhythmia, RBBB, rate 76.     EKG: Sinus tachycardia with Premature supraventricular complexesRight bundle branch blockCannot rule out Inferior infarct , age undeterminedAnterolateral infarct (cited on or before 25-JUN-2015)Abnormal ECGWhen compared with ECG of 25-JUN-2015 18:02, (unconfirmed)Premature ventricular complexes are no longer PresentPremature supraventricular complexes are now PresentConfirmed by Marcelo Cummings MD, [address] ([id number]) on 6/26/2015 6:38:48 AM    Echo: 06/26/2015  Normal left ventricle size and wall thickness. Estimated ejection   fraction  of 55%. Apical lateral and mid anterolateral hypokinesia. Impaired left  ventricular relaxation. Mitral valve is structurally normal.  Mitral valve leaflets appear to open adequately. Trivial mitral regurgitation is present. The aortic leaflets appear to open adequately. Trace aortic regurgitation is present. No evidence of aortic valve stenosis. Assessment:  Beltran Martinez was seen today for 1 year follow up, atrial fibrillation, congestive heart failure, hyperlipidemia, hypertension, results, discuss labs and shortness of breath. Diagnoses and all orders for this visit:    PAF (paroxysmal atrial fibrillation) ([facility])  -     Comprehensive Metabolic Panel, Fasting; Future    Essential hypertension  -     Comprehensive Metabolic Panel, Fasting; Future    Mixed hyperlipidemia  -     Comprehensive Metabolic Panel, Fasting; Future  -     Lipid, Fasting; Future    Other orders  -     ezetimibe (ZETIA) 10 MG tablet; Take 1 tablet by mouth daily  -     diltiazem (CARDIZEM CD) 120 MG extended release capsule; Take 1 capsule by mouth daily  -     varenicline (CHANTIX STARTING MONTH PAK) 0.5 MG X 11 & 1 MG X 42 tablet; Take by mouth. Last lipids 6/8/18 I personally reviewed labs results in epic and discussed with patient        Plan:  1. Will start Chantix starter pack 0.5mg x 11 doses then 1mg thereafter  If not tolerating  or ineffective consider nortriptyline  2. Smoking cessation reviewed 1-800-quit-now  3. Healthy lifestyle education reviewed including nutrition, exercise and activity  4. Follow up in 6 months  5.  Recheck cmp, lipids in August    QUALITY

## 2019-05-07 NOTE — PROGRESS NOTES
[facility] Office Note  5/7/2019     Subjective:  Ms. Jose Milner presents for cardiology  follow up for dCHF, PAF, HTN, HLD    HPI:    She remains on   O2 at 2 liters at bedtime. Smoking 2 packs cigs  per day. She reports SOB which has been chronic with her COPD. Denies chest pain,  edema, dizziness, palpitations and syncope. She has lost 70 lbs since May 2018 cutting back on pop and sweets. She is smoking 2 packs a day and wants to quit smoking. PMH: This patient is a 69-year-old white female with no prior history of heart disease. She presented to the hospital on 06/25/2015 with acute onset of shortness of breath and subsequent chest pain. The patient states she was at [facility] approximately 2 weeks ago with small bowel obstruction. That was complicated by renal failure. That eventually resolved by conservative therapy, and she went home about a week ago. The patient has been improving gradually. However, she has continued to have diarrhea ever since she was released from previous hospital. On the day of hospitalization she felt poorly. She was slightly short of breath in the morning with her daily activity; but as the day went on, she became more short of breath. She also experienced substernal pressure type of chest pain as if someone was sitting on her. She felt palpitations in her ear. She did not have any nausea or vomiting but had diarrhea. The patient was diaphoretic. Symptoms of chest pressure and shortness of breath lasted for several hours. Subsequently the patient came to the emergency room, and she was admitted with a diagnosis of acute pulmonary edema. She was diuresed and improved. Her troponins were elevated at that time. She was Dx with CDiff during her admission. Echo at that time with EF of 55%. Most recent cardiac cath on 07/28/2015 with normal coronaries. S/P fall and multiple rt rib fractures  Requiring VATS early feb 2018.     Review of Systems:  12 point ROS negative in all areas as listed below except as in [address], EENT, Cardiovascular, pulmonary, GI, , Musculoskeletal, skin, neurological, hematological, endocrine, Psychiatric    Reviewed past medical history, social, and family history. still smoking but trying to quit. No alcohol  MOM cancer of colon  Dad massive heart attack at age 76  Past Medical History:   Diagnosis Date    A-fib [facility])     Clostridium difficile diarrhea 6/26/15    COPD (chronic obstructive pulmonary disease) (HCC)     COPD exacerbation (HCC)     [facility]     Emphysema of lung ([facility]     Hyperlipidemia     Hypertension     Pneumonia 2/5/2018    Rib pain     Sleep apnea     Small bowel obstruction (HCC)      Past Surgical History:   Procedure Laterality Date    ANKLE ARTHROSCOPY      x2    CHOLECYSTECTOMY      HYSTERECTOMY      ROTATOR CUFF REPAIR Bilateral        Objective:   /78   Pulse 87   Ht 5' 6\" (1.676 m)   Wt 197 lb (89.4 kg)   SpO2 95%   Breastfeeding? No   BMI 31.80 kg/m²     Wt Readings from Last 3 Encounters:   05/07/19 197 lb (89.4 kg)   02/14/19 200 lb (90.7 kg)   11/28/18 214 lb (97.1 kg)       Physical Exam:  General: No Respiratory distress, appears well developed and well nourished. Eyes:  Sclera nonicteric  Nose/Sinuses:  negative findings: nose shows no deformity, asymmetry, or inflammation, nasal mucosa normal, septum midline with no perforation or bleeding  Back:  no pain to palpation  Joint:  no active joint inflammation  Musculoskeletal:  negative  Skin:  Warm and dry  Neck:  Negative for JVD and Carotid Bruits. Chest:Diminished  to auscultation, respiration easy  Cardiovascular:  RRR,72 bpm  S1S2 normal, no murmur, no rub or thrill.   Cath site healed well no complaints from patient from the procedure  Extremities: no  edema, clubbing, cyanosis,  Pulses:  pedal pulses are normal.  Neuro: intact    Medications:   Outpatient Encounter Medications as of 5/7/2019 Medication Sig Dispense Refill    ezetimibe (ZETIA) 10 MG tablet Take 1 tablet by mouth daily 30 tablet 11    diltiazem (CARDIZEM CD) 120 MG extended release capsule Take 1 capsule by mouth daily 30 capsule 11    varenicline (CHANTIX STARTING MONTH GUDELIA) 0.5 MG X 11 & 1 MG X 42 tablet Take by mouth.  1 box 0    ELIQUIS 5 MG TABS tablet TAKE 1 TABLET BY MOUTH 2 TIMES DAILY 180 tablet 0    albuterol sulfate HFA (VENTOLIN HFA) 108 (90 Base) MCG/ACT inhaler Inhale 2 puffs into the lungs every 6 hours as needed for Wheezing      albuterol (PROVENTIL) (2.5 MG/3ML) 0.083% nebulizer solution Take 2.5 mg by nebulization every 6 hours as needed for Wheezing      topiramate (TOPAMAX) 50 MG tablet Take 50 mg by mouth nightly      OMEPRAZOLE PO Take 40 mg by mouth 2 times daily      hydrOXYzine (ATARAX) 50 MG tablet Take 50 mg by mouth 1-2 tabs every 4 hours prn      ferrous sulfate 325 (65 Fe) MG tablet Take 325 mg by mouth daily (with breakfast)      fluticasone-vilanterol (BREO ELLIPTA) 100-25 MCG/INH AEPB inhaler Inhale 1 puff into the lungs daily      sennosides-docusate sodium (SENOKOT-S) 8.6-50 MG tablet Take 2 tablets by mouth 2 times daily 30 tablet 0    rOPINIRole (REQUIP) 2 MG tablet Take 3 mg by mouth nightly       Umeclidinium Bromide (INCRUSE ELLIPTA) 62.5 MCG/INH AEPB Inhale into the lungs daily       venlafaxine (EFFEXOR-XR) 150 MG XR capsule Take 150 mg by mouth daily      Multiple Vitamins-Minerals (THERAPEUTIC MULTIVITAMIN-MINERALS) tablet Take 1 tablet by mouth daily      QUEtiapine (SEROQUEL XR) 300 MG XR tablet Take 300 mg by mouth nightly       OXYGEN Inhale into the lungs Indications: 2.5 L nightly       [DISCONTINUED] azithromycin (ZITHROMAX) 250 MG tablet 2 tabs day one, 1 tab days 2 through 5 1 packet 0    [DISCONTINUED] meloxicam (MOBIC) 15 MG tablet TAKE ONE TABLET BY MOUTH ONCE A DAY 30 tablet 0    [DISCONTINUED] spironolactone (ALDACTONE) 25 MG tablet TAKE 1 TABLET BY MOUTH DAILY 90 tablet 1    [DISCONTINUED] potassium chloride (KLOR-CON M) 20 MEQ TBCR extended release tablet Take 20 mEq by mouth daily      [DISCONTINUED] furosemide (LASIX) 80 MG tablet Lasix 40 mg tablet   Take 1 tablet once a day by oral route.  [DISCONTINUED] simvastatin (ZOCOR) 20 MG tablet Take 1 tablet by mouth nightly 30 tablet 11    [DISCONTINUED] ezetimibe (ZETIA) 10 MG tablet Take 1 tablet by mouth daily 30 tablet 11    [DISCONTINUED] diltiazem (CARDIZEM CD) 120 MG extended release capsule Take 1 capsule by mouth daily 30 capsule 11    [DISCONTINUED] nicotine (NICODERM CQ) 14 MG/24HR Place 1 patch onto the skin daily 30 patch 0    [DISCONTINUED] busPIRone (BUSPAR) 15 MG tablet Take 15 mg by mouth 2 times daily        No facility-administered encounter medications on file as of 5/7/2019. Lab Data:  Imaging:  Chest CT 1/16/19  Multiple rt rib fx appearing subacute scattered areas parenchymal opacity lower lobes    ECHO 7/8/16  Normal global wall motion. Visual EF is 55-60%  Doppler evidence of grade I (impaired) diastolic dysfunction. Calculated EF 52%. Left atrial cavity is mildly dilated. Right ventricle cavity is mildly dilated. Structurally normal mitral valve with mild regurgitation. Structurally normal tricuspid valve with trace regurgitation. IVC is dilated with respiratory response of <50%. CXR 2/16/16  atelectasis with bronchial wall thickening      CATH 7/28/15    Signed by Bertrand Monique MD on 07/28/15 at 1114    Document Text    Expand All Collapse All   PATIENT NAME: PA #: MR Soheila Matias [id number]     1. Selective coronary angiography. 2. Left heart catheterization. 3. Left ventriculography. 4. Right heart catheterization. ANGIOGRAPHIC FINDINGS:   1. No coronary artery disease. 2. Normal left ventricular function, EF greater than 60%. 3. Normal left and right hemodynamics.   CONCLUSIONS: No evidence for coronary artery disease and preserved left  ventricular dysfunction with normal hemodynamics. RECOMMENDATIONS: At this point is noncardiac symptoms. Recommend follow-up  with Dr. Perez Friday and Mary Miller in 1 to 2 weeks. Saundra Barnes MD        ECHO 5/26/15  Summary   Normal left ventricle size and wall thickness. Estimated ejection fraction   of 55%. Apical lateral and mid anterolateral hypokinesia. Impaired left   ventricular relaxation.   Mitral valve is structurally normal.   Mitral valve leaflets appear to open adequately.   Trivial mitral regurgitation is present.   The aortic leaflets appear to open adequately.   Trace aortic regurgitation is present.   No evidence of aortic valve stenosis    CBC: No results for input(s): WBC, HGB, HCT, MCV, PLT in the last 72 hours. BMP: No results for input(s): NA, K, CL, CO2, PHOS, BUN, CREATININE in the last 72 hours. Invalid input(s): CA  LIVER PROFILE: No results for input(s): AST, ALT, LIPASE, BILIDIR, BILITOT, ALKPHOS in the last 72 hours. Invalid input(s): AMYLASE,  ALB  LIPID:   No components found for: CHLPL,  CHOL  Lab Results   Component Value Date    TRIG 201 (H) 02/10/2018    TRIG 90 11/02/2015    TRIG 158 (H) 07/28/2015     Lab Results   Component Value Date    HDL 54 06/08/2018    HDL 65 11/02/2015    HDL 45 07/28/2015     Lab Results   Component Value Date    LDLCALC 58 06/08/2018    LDLCALC 97 11/02/2015    LDLCALC 86 07/28/2015     Lab Results   Component Value Date    LABVLDL 33 06/08/2018    LABVLDL 32 07/28/2015     No results found for: CHOLHDLRATIO  PT/INR: No results for input(s): PROTIME, INR in the last 72 hours. A1C:   Lab Results   Component Value Date    LABA1C 5.9 01/13/2018     BNP:  No results for input(s): BNP in the last 72 hours. EKG 3/28/18   shows sinus arrhythmia, RBBB, rate 76.     EKG: Sinus tachycardia with Premature supraventricular complexesRight bundle branch blockCannot rule out Inferior infarct , age undeterminedAnterolateral infarct (cited on or before 25-JUN-2015)Abnormal ECGWhen compared with ECG of 25-JUN-2015 18:02, (unconfirmed)Premature ventricular complexes are no longer PresentPremature supraventricular complexes are now PresentConfirmed by Amilcar Segura MD, [address] ([id number]) on 6/26/2015 6:38:48 AM    Echo: 06/26/2015  Normal left ventricle size and wall thickness. Estimated ejection   fraction  of 55%. Apical lateral and mid anterolateral hypokinesia. Impaired left  ventricular relaxation. Mitral valve is structurally normal.  Mitral valve leaflets appear to open adequately. Trivial mitral regurgitation is present. The aortic leaflets appear to open adequately. Trace aortic regurgitation is present. No evidence of aortic valve stenosis. Assessment:  Bony Miles was seen today for 1 year follow up, atrial fibrillation, congestive heart failure, hyperlipidemia, hypertension, results, discuss labs and shortness of breath. Diagnoses and all orders for this visit:    PAF (paroxysmal atrial fibrillation) ([facility])  -     Comprehensive Metabolic Panel, Fasting; Future    Essential hypertension  -     Comprehensive Metabolic Panel, Fasting; Future    Mixed hyperlipidemia  -     Comprehensive Metabolic Panel, Fasting; Future  -     Lipid, Fasting; Future    Other orders  -     ezetimibe (ZETIA) 10 MG tablet; Take 1 tablet by mouth daily  -     diltiazem (CARDIZEM CD) 120 MG extended release capsule; Take 1 capsule by mouth daily  -     varenicline (CHANTIX STARTING MONTH PAK) 0.5 MG X 11 & 1 MG X 42 tablet; Take by mouth. Last lipids 6/8/18 I personally reviewed labs results in epic and discussed with patient        Plan:  1. Will start Chantix starter pack 0.5mg x 11 doses then 1mg thereafter  If not tolerating  or ineffective consider nortriptyline  2. Smoking cessation reviewed 1-800-quit-now  3. Healthy lifestyle education reviewed including nutrition, exercise and activity  4. Follow up in 6 months  5.  Recheck cmp, lipids in August    QUALITY MEASURES  1. Tobacco Cessation Counseling: Yes  2. Retake of BP if >140/90:   NA  3. Documentation to PCP/referring for new patient:  Sent to PCP at close of office visit  4. CAD patient on anti-platelet: Melissaquis  5. CAD patient on STATIN therapy:  Yes  6. Patient with CHF and aFib on anticoagulation:  eliquis     This note was scribed in the presence of  Bennie Morales MD by Lamin Oneil RN   I, Dr. Bennie Morales, personally performed the services described in this documentation, as scribed by the above signed scribe in my presence. It is both accurate and complete to my knowledge. I agree with the details independently gathered by the clinical support staff, while the remaining scribed note accurately describes my personal service to the patient.       [facility] [place], MD 5/7/2019 2:55 PM

## 2019-05-31 ENCOUNTER — TELEPHONE (OUTPATIENT)
Dept: PULMONOLOGY | Age: 69
End: 2019-05-31

## 2019-05-31 RX ORDER — PREDNISONE 10 MG/1
TABLET ORAL
Qty: 30 TABLET | Refills: 0 | Status: SHIPPED | OUTPATIENT
Start: 2019-05-31 | End: 2019-06-12

## 2019-05-31 RX ORDER — DOXYCYCLINE HYCLATE 100 MG
100 TABLET ORAL 2 TIMES DAILY
Qty: 14 TABLET | Refills: 0 | Status: SHIPPED | OUTPATIENT
Start: 2019-05-31 | End: 2019-06-07

## 2019-06-25 RX ORDER — VARENICLINE TARTRATE 25 MG
KIT ORAL
Qty: 53 TABLET | Refills: 2 | Status: SHIPPED | OUTPATIENT
Start: 2019-06-25 | End: 2019-08-20 | Stop reason: SINTOL

## 2019-08-20 ENCOUNTER — OFFICE VISIT (OUTPATIENT)
Dept: PULMONOLOGY | Age: 69
End: 2019-08-20
Payer: MEDICARE

## 2019-08-20 VITALS
DIASTOLIC BLOOD PRESSURE: 64 MMHG | HEIGHT: 67 IN | WEIGHT: 196.4 LBS | RESPIRATION RATE: 20 BRPM | TEMPERATURE: 97.7 F | OXYGEN SATURATION: 94 % | BODY MASS INDEX: 30.83 KG/M2 | SYSTOLIC BLOOD PRESSURE: 102 MMHG | HEART RATE: 76 BPM

## 2019-08-20 DIAGNOSIS — R09.02 HYPOXIA: ICD-10-CM

## 2019-08-20 DIAGNOSIS — Z87.891 PERSONAL HISTORY OF TOBACCO USE: ICD-10-CM

## 2019-08-20 DIAGNOSIS — J44.9 COPD, SEVERE (HCC): Primary | ICD-10-CM

## 2019-08-20 DIAGNOSIS — G47.33 MODERATE OBSTRUCTIVE SLEEP APNEA: ICD-10-CM

## 2019-08-20 DIAGNOSIS — G25.81 RLS (RESTLESS LEGS SYNDROME): ICD-10-CM

## 2019-08-20 PROCEDURE — 4040F PNEUMOC VAC/ADMIN/RCVD: CPT | Performed by: INTERNAL MEDICINE

## 2019-08-20 PROCEDURE — 3017F COLORECTAL CA SCREEN DOC REV: CPT | Performed by: INTERNAL MEDICINE

## 2019-08-20 PROCEDURE — G8417 CALC BMI ABV UP PARAM F/U: HCPCS | Performed by: INTERNAL MEDICINE

## 2019-08-20 PROCEDURE — 1090F PRES/ABSN URINE INCON ASSESS: CPT | Performed by: INTERNAL MEDICINE

## 2019-08-20 PROCEDURE — 3023F SPIROM DOC REV: CPT | Performed by: INTERNAL MEDICINE

## 2019-08-20 PROCEDURE — G8926 SPIRO NO PERF OR DOC: HCPCS | Performed by: INTERNAL MEDICINE

## 2019-08-20 PROCEDURE — 4004F PT TOBACCO SCREEN RCVD TLK: CPT | Performed by: INTERNAL MEDICINE

## 2019-08-20 PROCEDURE — G8598 ASA/ANTIPLAT THER USED: HCPCS | Performed by: INTERNAL MEDICINE

## 2019-08-20 PROCEDURE — G8427 DOCREV CUR MEDS BY ELIG CLIN: HCPCS | Performed by: INTERNAL MEDICINE

## 2019-08-20 PROCEDURE — G8400 PT W/DXA NO RESULTS DOC: HCPCS | Performed by: INTERNAL MEDICINE

## 2019-08-20 PROCEDURE — 99214 OFFICE O/P EST MOD 30 MIN: CPT | Performed by: INTERNAL MEDICINE

## 2019-08-20 PROCEDURE — 1123F ACP DISCUSS/DSCN MKR DOCD: CPT | Performed by: INTERNAL MEDICINE

## 2019-08-20 RX ORDER — NICOTINE 21 MG/24HR
1 PATCH, TRANSDERMAL 24 HOURS TRANSDERMAL EVERY 24 HOURS
Qty: 42 PATCH | Refills: 0 | Status: SHIPPED | OUTPATIENT
Start: 2019-08-20 | End: 2019-11-14

## 2019-08-20 RX ORDER — NICOTINE 21 MG/24HR
1 PATCH, TRANSDERMAL 24 HOURS TRANSDERMAL EVERY 24 HOURS
Qty: 14 PATCH | Refills: 0 | Status: SHIPPED | OUTPATIENT
Start: 2019-08-20 | End: 2019-11-14

## 2019-08-20 NOTE — PROGRESS NOTES
[facility]                                                            Outpatient Follow Up Note      CHIEF COMPLAINT: Follow up COOP    HPI:  Treated for COPD exacerbation with prednisone and doxy and may 2019   Has been doing really good   Uses Albuterol once a day   Fairly active   Smoking still 1 ppd   Patient is compliant with inhaled bronchodilators and O2. Not using her BiPAP and sent it back - refusing to try       Past Medical History:   Diagnosis Date    A-fib [facility])     Clostridium difficile diarrhea 6/26/15    [facility]     COPD exacerbation [facility]     Diabetes mellitus ([facility] [facility]     Emphysema of lung ([facility] [facility]     Hyperlipidemia     Hypertension     Pneumonia 2/5/2018    Rib pain     Sleep apnea     Small bowel obstruction ([facility] [address] [facility]        Past Surgical History:        Procedure Laterality Date    ANKLE ARTHROSCOPY      x2    CHOLECYSTECTOMY      HYSTERECTOMY      ROTATOR CUFF REPAIR Bilateral        Allergies:  is allergic to lisinopril and penicillins. Social History:    TOBACCO:   reports that she has been smoking cigarettes. She has a 100.00 pack-year smoking history. She has never used smokeless tobacco.  ETOH:   reports that she does not drink alcohol.       Family History:       Problem Relation Age of Onset    Cancer Mother     Heart Failure Father        Current Medications:    Current Outpatient Medications:     ezetimibe (ZETIA) 10 MG tablet, Take 1 tablet by mouth daily, Disp: 30 tablet, Rfl: 11    ELIQUIS 5 MG TABS tablet, TAKE 1 TABLET BY MOUTH 2 TIMES DAILY, Disp: 180 tablet, Rfl: 0    albuterol sulfate HFA (VENTOLIN HFA) 108 (90 Base) MCG/ACT inhaler, Inhale 2 puffs into the lungs every 6 hours as needed for Wheezing, Disp: , Rfl:     albuterol (PROVENTIL) (2.5 MG/3ML) 0.083% nebulizer solution, Take 2.5 mg by nebulization every 6 hours as needed for Wheezing, PFTs 08/19/2015 FVC 1.78(52%) FEV1 1.27(49%) FEV1/FVC 71 % TLC 4.52(82%)   DLCO 10.23(43%) 6MW 980 F LO2 89%      CT chest 1/16/19  Multiple right-sided rib fractures-subacute  No acute intrathoracic abnormalities  Scattered areas of parenchymal opacities lower lobe likely atelectasis      PSG 8/25/16 AHI 28.4 desaturation to 71%  BiPAP titration 9/9/16 BiPAP 15/11 cmH2O      Assessment:      · Severe  COPD   · Hypoxia  · RLS on Requip and Neurontin  · Right traumatic displaced rib fractures with  hemothorax required VATS decortication and chest tube removed 2/14/18. · Moderate MEG. O2 3LPM at night. Refusing BiPAP uses O2   · PAF on Eliquis followed by cardiology   · >120 pack year smoking. Intolerance to chantix        Plan:      Continue 2-5LPM O2. Advised to titrate O2 using her pulse oximeter- target O2 sat 90-92%  Continue O2 3 L at night. Continue Breo and Incruse Ellipta and Albuterol INH/Neb Q 4 hrs PRN   CT chest, low dose protocol, screening for lung cancer 1/2020. Risks, benefits and alternatives including doing nothing were discussed with patient. Patient is up to date with Pneumococcal vaccine   Advised to get influenza vaccine this year   Smoking cessation counseling. Patient was advised to visit smokefree. gov and call 1800QUITNOW for assistance.  Will refer to [facility] smoking cessation program.   Nicotine patches

## 2019-08-20 NOTE — PATIENT INSTRUCTIONS
few pounds more. Plus, you can help prevent some weight gain by being more active and eating less. Taking the medicine bupropion might help control weight gain. What else can I do to improve my chances of quitting? -- You can:  ?Start exercising. ?Stay away from smokers and places that you associate with smoking. If people close to you smoke, ask them to quit with you. ? Keep gum, hard candy, or something to put in your mouth handy. If you get a craving for a cigarette, try one of these instead. ?Dont give up, even if you start smoking again. It takes most people a few tries before they succeed. You can also get help from a free phone line (1-930-QUIT-NOW) or go online to www.smokefree.gov. Your pulmonary team is here to help you quit.   Call for assistance [phone]

## 2019-10-01 RX ORDER — APIXABAN 5 MG/1
5 TABLET, FILM COATED ORAL 2 TIMES DAILY
Qty: 180 TABLET | Refills: 0 | Status: SHIPPED | OUTPATIENT
Start: 2019-10-01 | End: 2020-01-14 | Stop reason: SDUPTHER

## 2019-10-29 ENCOUNTER — TELEPHONE (OUTPATIENT)
Dept: CARDIOLOGY CLINIC | Age: 69
End: 2019-10-29

## 2019-10-29 RX ORDER — DILTIAZEM HYDROCHLORIDE 120 MG/1
120 CAPSULE, COATED, EXTENDED RELEASE ORAL DAILY
Qty: 90 CAPSULE | Refills: 1 | Status: SHIPPED | OUTPATIENT
Start: 2019-10-29 | End: 2020-04-30 | Stop reason: SDUPTHER

## 2019-11-06 ENCOUNTER — TELEPHONE (OUTPATIENT)
Dept: PULMONOLOGY | Age: 69
End: 2019-11-06

## 2019-11-06 DIAGNOSIS — R09.02 HYPOXIA: ICD-10-CM

## 2019-11-06 DIAGNOSIS — J44.9 COPD, SEVERE (HCC): Primary | ICD-10-CM

## 2019-11-14 ENCOUNTER — OFFICE VISIT (OUTPATIENT)
Dept: PULMONOLOGY | Age: 69
End: 2019-11-14
Payer: MEDICARE

## 2019-11-14 ENCOUNTER — OFFICE VISIT (OUTPATIENT)
Dept: CARDIOLOGY CLINIC | Age: 69
End: 2019-11-14
Payer: MEDICARE

## 2019-11-14 ENCOUNTER — HOSPITAL ENCOUNTER (OUTPATIENT)
Dept: PULMONOLOGY | Age: 69
Discharge: HOME | End: 2019-11-14
Payer: MEDICARE

## 2019-11-14 VITALS
RESPIRATION RATE: 16 BRPM | HEIGHT: 67 IN | BODY MASS INDEX: 31.71 KG/M2 | SYSTOLIC BLOOD PRESSURE: 132 MMHG | OXYGEN SATURATION: 92 % | WEIGHT: 202 LBS | DIASTOLIC BLOOD PRESSURE: 80 MMHG | HEART RATE: 86 BPM

## 2019-11-14 VITALS
HEIGHT: 67 IN | OXYGEN SATURATION: 93 % | HEART RATE: 83 BPM | WEIGHT: 203 LBS | BODY MASS INDEX: 31.86 KG/M2 | SYSTOLIC BLOOD PRESSURE: 120 MMHG | DIASTOLIC BLOOD PRESSURE: 70 MMHG

## 2019-11-14 DIAGNOSIS — R09.02 HYPOXIA: ICD-10-CM

## 2019-11-14 DIAGNOSIS — I48.0 PAF (PAROXYSMAL ATRIAL FIBRILLATION) (HCC): Primary | ICD-10-CM

## 2019-11-14 DIAGNOSIS — J44.9 COPD, SEVERE (HCC): Primary | ICD-10-CM

## 2019-11-14 DIAGNOSIS — Z87.891 PERSONAL HISTORY OF TOBACCO USE: ICD-10-CM

## 2019-11-14 DIAGNOSIS — E78.2 MIXED HYPERLIPIDEMIA: ICD-10-CM

## 2019-11-14 DIAGNOSIS — G25.81 RLS (RESTLESS LEGS SYNDROME): ICD-10-CM

## 2019-11-14 DIAGNOSIS — I10 ESSENTIAL HYPERTENSION: ICD-10-CM

## 2019-11-14 PROCEDURE — 3017F COLORECTAL CA SCREEN DOC REV: CPT | Performed by: INTERNAL MEDICINE

## 2019-11-14 PROCEDURE — 4040F PNEUMOC VAC/ADMIN/RCVD: CPT | Performed by: INTERNAL MEDICINE

## 2019-11-14 PROCEDURE — G8598 ASA/ANTIPLAT THER USED: HCPCS | Performed by: INTERNAL MEDICINE

## 2019-11-14 PROCEDURE — 4004F PT TOBACCO SCREEN RCVD TLK: CPT | Performed by: INTERNAL MEDICINE

## 2019-11-14 PROCEDURE — G8400 PT W/DXA NO RESULTS DOC: HCPCS | Performed by: INTERNAL MEDICINE

## 2019-11-14 PROCEDURE — 1090F PRES/ABSN URINE INCON ASSESS: CPT | Performed by: INTERNAL MEDICINE

## 2019-11-14 PROCEDURE — 1123F ACP DISCUSS/DSCN MKR DOCD: CPT | Performed by: INTERNAL MEDICINE

## 2019-11-14 PROCEDURE — G8417 CALC BMI ABV UP PARAM F/U: HCPCS | Performed by: INTERNAL MEDICINE

## 2019-11-14 PROCEDURE — 99213 OFFICE O/P EST LOW 20 MIN: CPT | Performed by: INTERNAL MEDICINE

## 2019-11-14 PROCEDURE — 94618 PULMONARY STRESS TESTING: CPT

## 2019-11-14 PROCEDURE — G8482 FLU IMMUNIZE ORDER/ADMIN: HCPCS | Performed by: INTERNAL MEDICINE

## 2019-11-14 PROCEDURE — G8427 DOCREV CUR MEDS BY ELIG CLIN: HCPCS | Performed by: INTERNAL MEDICINE

## 2019-11-14 PROCEDURE — 3023F SPIROM DOC REV: CPT | Performed by: INTERNAL MEDICINE

## 2019-11-14 PROCEDURE — 99214 OFFICE O/P EST MOD 30 MIN: CPT | Performed by: INTERNAL MEDICINE

## 2019-11-14 PROCEDURE — G8926 SPIRO NO PERF OR DOC: HCPCS | Performed by: INTERNAL MEDICINE

## 2019-11-14 ASSESSMENT — SLEEP AND FATIGUE QUESTIONNAIRES
ESS TOTAL SCORE: 6
HOW LIKELY ARE YOU TO NOD OFF OR FALL ASLEEP WHILE SITTING AND TALKING TO SOMEONE: 0
HOW LIKELY ARE YOU TO NOD OFF OR FALL ASLEEP WHILE SITTING INACTIVE IN A PUBLIC PLACE: 0
HOW LIKELY ARE YOU TO NOD OFF OR FALL ASLEEP IN A CAR, WHILE STOPPED FOR A FEW MINUTES IN TRAFFIC: 0
HOW LIKELY ARE YOU TO NOD OFF OR FALL ASLEEP WHILE SITTING AND READING: 0
NECK CIRCUMFERENCE (INCHES): 15.5
HOW LIKELY ARE YOU TO NOD OFF OR FALL ASLEEP WHILE WATCHING TV: 3
HOW LIKELY ARE YOU TO NOD OFF OR FALL ASLEEP WHEN YOU ARE A PASSENGER IN A CAR FOR AN HOUR WITHOUT A BREAK: 0
HOW LIKELY ARE YOU TO NOD OFF OR FALL ASLEEP WHILE SITTING QUIETLY AFTER LUNCH WITHOUT ALCOHOL: 0
HOW LIKELY ARE YOU TO NOD OFF OR FALL ASLEEP WHILE LYING DOWN TO REST IN THE AFTERNOON WHEN CIRCUMSTANCES PERMIT: 3

## 2019-11-20 ENCOUNTER — APPOINTMENT (RX ONLY)
Dept: URBAN - METROPOLITAN AREA CLINIC 170 | Facility: CLINIC | Age: 69
Setting detail: DERMATOLOGY
End: 2019-11-20

## 2019-11-20 DIAGNOSIS — L82.1 OTHER SEBORRHEIC KERATOSIS: ICD-10-CM

## 2019-11-20 PROBLEM — D48.5 NEOPLASM OF UNCERTAIN BEHAVIOR OF SKIN: Status: ACTIVE | Noted: 2019-11-20

## 2019-11-20 PROCEDURE — ? BIOPSY BY SHAVE METHOD

## 2019-11-20 PROCEDURE — 11102 TANGNTL BX SKIN SINGLE LES: CPT

## 2019-11-20 PROCEDURE — ? FULL BODY SKIN EXAM - DECLINED

## 2019-11-20 ASSESSMENT — LOCATION SIMPLE DESCRIPTION DERM: LOCATION SIMPLE: NECK

## 2019-11-20 ASSESSMENT — LOCATION ZONE DERM: LOCATION ZONE: NECK

## 2019-11-20 ASSESSMENT — LOCATION DETAILED DESCRIPTION DERM: LOCATION DETAILED: RIGHT CENTRAL LATERAL NECK

## 2019-11-20 NOTE — PROCEDURE: MIPS QUALITY
Quality 130: Documentation Of Current Medications In The Medical Record: Current Medications Documented
Detail Level: Detailed
Quality 226: Preventive Care And Screening: Tobacco Use: Screening And Cessation Intervention: Patient screened for tobacco use, is a smoker AND did not received Cessation Counseling for Unknown Reasons
Quality 111:Pneumonia Vaccination Status For Older Adults: Pneumococcal Vaccination Previously Received
Quality 47: Advance Care Plan: Advance Care Planning discussed and documented; advance care plan or surrogate decision maker documented in the medical record.
Quality 431: Preventive Care And Screening: Unhealthy Alcohol Use - Screening: Patient screened for unhealthy alcohol use using a single question and scores less than 2 times per year
Quality 110: Preventive Care And Screening: Influenza Immunization: Influenza Immunization Administered during Influenza season

## 2019-11-20 NOTE — PROCEDURE: BIOPSY BY SHAVE METHOD
Wound Care: Petrolatum
Size Of Lesion In Cm: 0.7
Electrodesiccation Text: The wound bed was treated with electrodesiccation after the biopsy was performed.
Notification Instructions: Patient will be notified of biopsy results. However, patient instructed to call the office if not contacted within 2 weeks.
Bill For Surgical Tray: no
Biopsy Type: H and E
Type Of Destruction Used: Curettage
Consent: Written consent was obtained and risks were reviewed including but not limited to scarring, infection, bleeding, scabbing, incomplete removal, nerve damage and allergy to anesthesia.
Detail Level: Detailed
Electrodesiccation And Curettage Text: The wound bed was treated with electrodesiccation and curettage after the biopsy was performed.
Anesthesia Volume In Cc (Will Not Render If 0): 1
Dressing: Band-Aid
Was A Bandage Applied: Yes
Silver Nitrate Text: The wound bed was treated with silver nitrate after the biopsy was performed.
Anticipated Plan (Based On Presumed Biopsy Results): Call patient with results
Hemostasis: Electrodesiccation and Aluminum Chloride
Biopsy Method: double edge Personna blade
Curettage Text: The wound bed was treated with curettage after the biopsy was performed.
Lab: -[id number]
Billing Type: Third-Party Bill
Additional Anticipated Plans: If malignant consider Mohs surgery
X Size Of Lesion In Cm: 0.9
Depth Of Biopsy: dermis
Cryotherapy Text: The wound bed was treated with cryotherapy after the biopsy was performed.
Post-Care Instructions: I reviewed with the patient in detail post-care instructions. Patient is to keep the biopsy site dry overnight, and then apply bacitracin twice daily until healed. Patient may apply hydrogen peroxide soaks to remove any crusting.
Lab Facility: 3
Anesthesia Type: 1% Xylocaine with epinephrine
Additional Anesthesia Volume In Cc (Will Not Render If 0): 0

## 2019-11-20 NOTE — PROCEDURE: FULL BODY SKIN EXAM - DECLINED
Body Of Note (Please Add Your Own Text Here): Will do in 6 months.
Detail Level: Simple
Instructions: This plan will send the code FBSD to the PM system.  DO NOT or CHANGE the price.
Price (Do Not Change): 0.00

## 2019-12-13 LAB
BUN BLDV-MCNC: 17 MG/DL
CALCIUM SERPL-MCNC: 9.4 MG/DL
CHLORIDE BLD-SCNC: 104 MMOL/L
CO2: 29 MMOL/L
CREAT SERPL-MCNC: 0.7 MG/DL
GFR CALCULATED: NORMAL
GLUCOSE BLD-MCNC: 96 MG/DL
POTASSIUM SERPL-SCNC: 4 MMOL/L
SODIUM BLD-SCNC: 139 MMOL/L

## 2019-12-27 ENCOUNTER — TELEPHONE (OUTPATIENT)
Dept: PULMONOLOGY | Age: 69
End: 2019-12-27

## 2019-12-27 RX ORDER — DOXYCYCLINE HYCLATE 100 MG
100 TABLET ORAL 2 TIMES DAILY
Qty: 14 TABLET | Refills: 0 | Status: SHIPPED | OUTPATIENT
Start: 2019-12-27 | End: 2020-01-03

## 2019-12-27 RX ORDER — PREDNISONE 10 MG/1
TABLET ORAL
Qty: 30 TABLET | Refills: 0 | Status: SHIPPED | OUTPATIENT
Start: 2019-12-27 | End: 2020-01-22 | Stop reason: ALTCHOICE

## 2019-12-30 ENCOUNTER — TELEPHONE (OUTPATIENT)
Dept: CARDIOLOGY CLINIC | Age: 69
End: 2019-12-30

## 2019-12-30 NOTE — TELEPHONE ENCOUNTER
Patient notified of test results, message given.  Patient verbalized understanding Malachi Parker RN

## 2020-01-16 ENCOUNTER — HOSPITAL ENCOUNTER (OUTPATIENT)
Dept: CT IMAGING | Age: 70
Discharge: HOME | End: 2020-01-16
Payer: MEDICARE

## 2020-01-16 PROCEDURE — G0297 LDCT FOR LUNG CA SCREEN: HCPCS

## 2020-01-22 ENCOUNTER — OFFICE VISIT (OUTPATIENT)
Dept: PULMONOLOGY | Age: 70
End: 2020-01-22
Payer: MEDICARE

## 2020-01-22 ENCOUNTER — TELEPHONE (OUTPATIENT)
Dept: PULMONOLOGY | Age: 70
End: 2020-01-22

## 2020-01-22 VITALS
BODY MASS INDEX: 30.73 KG/M2 | SYSTOLIC BLOOD PRESSURE: 128 MMHG | OXYGEN SATURATION: 97 % | HEART RATE: 80 BPM | TEMPERATURE: 97.7 F | DIASTOLIC BLOOD PRESSURE: 78 MMHG | WEIGHT: 195.8 LBS | RESPIRATION RATE: 18 BRPM | HEIGHT: 67 IN

## 2020-01-22 PROCEDURE — 99214 OFFICE O/P EST MOD 30 MIN: CPT | Performed by: INTERNAL MEDICINE

## 2020-01-22 RX ORDER — DOXYCYCLINE HYCLATE 100 MG
100 TABLET ORAL 2 TIMES DAILY
Qty: 14 TABLET | Refills: 0 | Status: SHIPPED | OUTPATIENT
Start: 2020-01-22 | End: 2020-01-29

## 2020-01-22 NOTE — PATIENT INSTRUCTIONS
Tips to Help You Stop Smoking (taken from Up-To-Date)      Cigarette smoking is a preventable cause of death in the [place]. Quitting smoking now can decrease your risk of getting smoking-related illnesses like heart disease, stroke, cancer & COPD. S = Set a quit date. T = Tell family, friends, and the people around you that you plan to quit. A = Anticipate or plan ahead for the tough times you'll face while quitting. R = Remove cigarettes and other tobacco products from your home, car, and work. T = Talk to your doctor about getting help to quit. Your doctor may give you medicines to reduce your craving for cigarettes & the unpleasant symptoms that happen when you stop smoking (called withdrawal symptoms). What are the symptoms of withdrawal? -- The symptoms include:   ?Trouble sleeping   ? Being irritable, anxious or restless   ? Getting frustrated or angry   ? Having trouble thinking clearly  Nicotine replacement therapy eases withdrawal and reduces your bodys craving for nicotine, the main drug found in cigarettes. Non-prescription forms of nicotine replacement include skin patches, lozenges, and gum. Two prescription medications are available that have been proven to help people stop smoking:  ? Bupropion is a prescription medicine that reduces your desire to smoke. This medicine is sold under the brand names Zyban and Wellbutrin & as a generic formulation. ? Varenicline (brand name: Chantix) is a prescription medicine that reduces withdrawal symptoms and cigarette cravings. If you take bupropion or varenicline and you have any new or worsening depressed mood or thoughts of harming yourself or someone else, stop taking the medicine and call your doctor. Buproprion should not be taken by anyone with a history of seizure or epilepsy. Will I gain weight if I quit? -- Yes, you might gain a few pounds.  But quitting smoking will have a much more positive effect on your health than weighing a few pounds more. Plus, you can help prevent some weight gain by being more active and eating less. Taking the medicine bupropion might help control weight gain. What else can I do to improve my chances of quitting? -- You can:  ?Start exercising. ?Stay away from smokers and places that you associate with smoking. If people close to you smoke, ask them to quit with you. ? Keep gum, hard candy, or something to put in your mouth handy. If you get a craving for a cigarette, try one of these instead. ?Dont give up, even if you start smoking again. It takes most people a few tries before they succeed. You can also get help from a free phone line (0-446-QUIT-NOW) or go online to www.smokefree.gov. Your pulmonary team is here to help you quit.   Call for assistance [phone]

## 2020-01-22 NOTE — PROGRESS NOTES
all four extremities. Psych: Oriented x 3. No anxiety. DATA reviewed by me:   6MW 11/14/2019 720 feet Desat 88% 1L on exertin       PFTs 08/19/2015 FVC 1.78(52%) FEV1 1.27(49%) FEV1/FVC 71 % TLC 4.52(82%)   DLCO 10.23(43%) 6MW 980 F LO2 89%      CT chest 1/16/19  Multiple right-sided rib fractures-subacute  No acute intrathoracic abnormalities  Scattered areas of parenchymal opacities lower lobe likely atelectasis      CT chest 1/16/2020  images reviewed by me and showed:   Mediastinum: No enlarged lymph nodes. Normal caliber great vessels. Normal  heart size and pericardium. Suspect incidental benign lipomatous hypertrophy  of the interatrial septum. No significant coronary calcifications. Unremarkable esophagus and included thyroid. Lungs/pleura: Nodular thickening in the subpleural right upper lobe on series  3, image 27; coronal image 101 measuring 20 x 8 mm in axial plane and 7 mm  craniocaudal.  This nodular opacity is low in attenuation values (water  density). Subtle tree-in-bud opacities seen on the prior exam in the right  upper lobe have resolved. These were likely infectious or inflammatory. Linear and bandlike areas of scarring and/or atelectasis at the right lung  base are not substantially changed. Decreased atelectasis at the medial left  lung base. Scattered punctate calcified granulomas. No pleural effusion. Upper Abdomen: Unremarkable. Soft Tissues/Bones: No enlarged axillary or supraclavicular lymph nodes. Multiple healed rib fractures bilaterally, some of which are ununited. PSG 8/25/16 AHI 28.4 desaturation to 71%  BiPAP titration 9/9/16 BiPAP 15/11 cmH2O      Assessment:      · FRANCISCO elongated nodule 20 x 8 mm.  DDx infection/inflammation, scarring, atelectasis and malignancy    · Severe  COPD   · Hypoxia on exertion   · RLS on Requip and Neurontin  · Right traumatic displaced rib fractures with  hemothorax required VATS decortication and chest tube removed

## 2020-02-04 ENCOUNTER — HOSPITAL ENCOUNTER (OUTPATIENT)
Dept: PULMONOLOGY | Age: 70
Discharge: HOME | End: 2020-02-04
Payer: MEDICARE

## 2020-02-04 LAB
DLCO %PRED: 38 %
DLCO PRED: NORMAL
DLCO/VA %PRED: NORMAL
DLCO/VA PRED: NORMAL
DLCO/VA: NORMAL
DLCO: NORMAL
EXPIRATORY TIME-POST: NORMAL
EXPIRATORY TIME: NORMAL
FEF 25-75% %CHNG: NORMAL
FEF 25-75% %PRED-POST: NORMAL
FEF 25-75% %PRED-PRE: NORMAL
FEF 25-75% PRED: NORMAL
FEF 25-75%-POST: NORMAL
FEF 25-75%-PRE: NORMAL
FEV1 %PRED-POST: 46 %
FEV1 %PRED-PRE: 47 %
FEV1 PRED: NORMAL
FEV1-POST: NORMAL
FEV1-PRE: NORMAL
FEV1/FVC %PRED-POST: NORMAL
FEV1/FVC %PRED-PRE: NORMAL
FEV1/FVC PRED: NORMAL
FEV1/FVC-POST: 62 %
FEV1/FVC-PRE: 59 %
FVC %PRED-POST: NORMAL
FVC %PRED-PRE: NORMAL
FVC PRED: NORMAL
FVC-POST: NORMAL
FVC-PRE: NORMAL
GAW %PRED: NORMAL
GAW PRED: NORMAL
GAW: NORMAL
IC %PRED: NORMAL
IC PRED: NORMAL
IC: NORMAL
MEP: NORMAL
MIP: NORMAL
MVV %PRED-PRE: NORMAL
MVV PRED: NORMAL
MVV-PRE: NORMAL
PEF %PRED-POST: NORMAL
PEF %PRED-PRE: NORMAL
PEF PRED: NORMAL
PEF%CHNG: NORMAL
PEF-POST: NORMAL
PEF-PRE: NORMAL
RAW %PRED: NORMAL
RAW PRED: NORMAL
RAW: NORMAL
RV %PRED: NORMAL
RV PRED: NORMAL
RV: NORMAL
SVC %PRED: NORMAL
SVC PRED: NORMAL
SVC: NORMAL
TLC %PRED: 76 %
TLC PRED: NORMAL
TLC: NORMAL
VA %PRED: NORMAL
VA PRED: NORMAL
VA: NORMAL
VTG %PRED: NORMAL
VTG PRED: NORMAL
VTG: NORMAL

## 2020-02-04 PROCEDURE — 6360000002 HC RX W HCPCS: Performed by: INTERNAL MEDICINE

## 2020-02-04 PROCEDURE — 94618 PULMONARY STRESS TESTING: CPT

## 2020-02-04 PROCEDURE — 94060 EVALUATION OF WHEEZING: CPT

## 2020-02-04 PROCEDURE — 94640 AIRWAY INHALATION TREATMENT: CPT

## 2020-02-04 PROCEDURE — 94726 PLETHYSMOGRAPHY LUNG VOLUMES: CPT

## 2020-02-04 PROCEDURE — 94729 DIFFUSING CAPACITY: CPT

## 2020-02-04 RX ORDER — ALBUTEROL SULFATE 2.5 MG/3ML
2.5 SOLUTION RESPIRATORY (INHALATION) ONCE
Status: COMPLETED | OUTPATIENT
Start: 2020-02-04 | End: 2020-02-04

## 2020-02-04 RX ADMIN — ALBUTEROL SULFATE 2.5 MG: 2.5 SOLUTION RESPIRATORY (INHALATION) at 11:00

## 2020-02-04 ASSESSMENT — PULMONARY FUNCTION TESTS
FEV1_PERCENT_PREDICTED_POST: 46
FEV1/FVC_PRE: 59
FEV1_PERCENT_PREDICTED_PRE: 47
FEV1/FVC_POST: 62

## 2020-02-06 ENCOUNTER — HOSPITAL ENCOUNTER (OUTPATIENT)
Dept: PET IMAGING | Age: 70
Discharge: HOME | End: 2020-02-06
Payer: MEDICARE

## 2020-02-06 VITALS — HEIGHT: 67 IN | BODY MASS INDEX: 30.61 KG/M2 | WEIGHT: 195 LBS

## 2020-02-06 PROCEDURE — 3430000000 HC RX DIAGNOSTIC RADIOPHARMACEUTICAL: Performed by: INTERNAL MEDICINE

## 2020-02-06 PROCEDURE — A9552 F18 FDG: HCPCS | Performed by: INTERNAL MEDICINE

## 2020-02-06 PROCEDURE — 78815 PET IMAGE W/CT SKULL-THIGH: CPT

## 2020-02-06 RX ORDER — FLUDEOXYGLUCOSE F 18 200 MCI/ML
15.51 INJECTION, SOLUTION INTRAVENOUS
Status: COMPLETED | OUTPATIENT
Start: 2020-02-06 | End: 2020-02-06

## 2020-02-06 RX ADMIN — FLUDEOXYGLUCOSE F 18 15.51 MILLICURIE: 200 INJECTION, SOLUTION INTRAVENOUS at 12:50

## 2020-02-12 ENCOUNTER — OFFICE VISIT (OUTPATIENT)
Dept: PULMONOLOGY | Age: 70
End: 2020-02-12
Payer: MEDICARE

## 2020-02-12 ENCOUNTER — TELEPHONE (OUTPATIENT)
Dept: PULMONOLOGY | Age: 70
End: 2020-02-12

## 2020-02-12 VITALS
DIASTOLIC BLOOD PRESSURE: 78 MMHG | BODY MASS INDEX: 31.23 KG/M2 | HEIGHT: 67 IN | HEART RATE: 84 BPM | TEMPERATURE: 97.3 F | WEIGHT: 199 LBS | OXYGEN SATURATION: 96 % | SYSTOLIC BLOOD PRESSURE: 144 MMHG | RESPIRATION RATE: 22 BRPM

## 2020-02-12 PROCEDURE — 99214 OFFICE O/P EST MOD 30 MIN: CPT | Performed by: INTERNAL MEDICINE

## 2020-02-12 NOTE — PROGRESS NOTES
[facility]                                                            Outpatient Follow Up Note      CHIEF COMPLAINT: Follow up PET     HPI:  PET scan reviewed by me and noted below. Results were dicussed with patient and multiple good questions were answered. Doing okay   Clear sputum  No hemoptysis   No fever   Uses Albuterol once a week   Smoking still 1 ppd           Past Medical History:   Diagnosis Date    [facility] [address]     Clostridium difficile diarrhea 6/26/15    COPD (chronic obstructive pulmonary disease) (HCC)     COPD exacerbation (HCC)     [facility]     Emphysema of lung ([facility]     Hyperlipidemia     Hypertension     Pneumonia 2/5/2018    Rib pain     Sleep apnea     Small bowel obstruction (HCC)        Past Surgical History:        Procedure Laterality Date    ANKLE ARTHROSCOPY      x2    CHOLECYSTECTOMY      HYSTERECTOMY      ROTATOR CUFF REPAIR Bilateral        Allergies:  is allergic to buspirone; lisinopril; and penicillins. Social History:    TOBACCO:   reports that she has been smoking cigarettes. She has a 50.00 pack-year smoking history. She has never used smokeless tobacco.  ETOH:   reports no history of alcohol use.       Family History:       Problem Relation Age of Onset    Cancer Mother     Heart Failure Father        Current Medications:    Current Outpatient Medications:     Fexofenadine HCl (MUCINEX ALLERGY PO), Take by mouth daily, Disp: , Rfl:     apixaban (ELIQUIS) 5 MG TABS tablet, Take 1 tablet by mouth 2 times daily, Disp: 180 tablet, Rfl: 3    diltiazem (CARDIZEM CD) 120 MG extended release capsule, Take 1 capsule by mouth daily, Disp: 90 capsule, Rfl: 1    albuterol sulfate HFA (VENTOLIN HFA) 108 (90 Base) MCG/ACT inhaler, Inhale 2 puffs into the lungs every 6 hours as needed for Wheezing, Disp: , Rfl:     albuterol (PROVENTIL) (2.5 MG/3ML) 0.083% nebulizer solution, Psych: Oriented x 3. No anxiety. DATA reviewed by me:   PFTs 02/04/2020 FVC 1.98(61%) FEV1 1.16(47%) FEV1/FVC 59 % TLC 4.11(76%)   DLCO 09.05(38%) 6MW 840 F LO2 90%  PFTs 08/19/2015 FVC 1.78(52%) FEV1 1.27(49%) FEV1/FVC 71 % TLC 4.52(82%)   DLCO 10.23(43%) 6MW 980 F LO2 89%  6MW 11/14/2019 720 feet Desat 88% 1L on exertin       CT chest 1/16/19  Multiple right-sided rib fractures-subacute  No acute intrathoracic abnormalities  Scattered areas of parenchymal opacities lower lobe likely atelectasis      CT chest 1/16/2020  images reviewed by me and showed:   Mediastinum: No enlarged lymph nodes. Normal caliber great vessels. Normal  heart size and pericardium. Suspect incidental benign lipomatous hypertrophy  of the interatrial septum. No significant coronary calcifications. Unremarkable esophagus and included thyroid. Lungs/pleura: Nodular thickening in the subpleural right upper lobe on series  3, image 27; coronal image 101 measuring 20 x 8 mm in axial plane and 7 mm  craniocaudal.  This nodular opacity is low in attenuation values (water  density). Subtle tree-in-bud opacities seen on the prior exam in the right  upper lobe have resolved. These were likely infectious or inflammatory. Linear and bandlike areas of scarring and/or atelectasis at the right lung  base are not substantially changed. Decreased atelectasis at the medial left  lung base. Scattered punctate calcified granulomas. No pleural effusion. Upper Abdomen: Unremarkable. Soft Tissues/Bones: No enlarged axillary or supraclavicular lymph nodes. Multiple healed rib fractures bilaterally, some of which are ununited. PET scan 2/6/2020  images reviewed by me and showed:   1.9 cm lateral right upper lobe pulmonary nodule is hypermetabolic, to a  degree concerning for malignancy until proven otherwise. No findings of FDG avid metastatic disease.   Inflammatory change related to healing posterolateral left 8th rib

## 2020-02-12 NOTE — PATIENT INSTRUCTIONS
Tips to Help You Stop Smoking (taken from Up-To-Date)      Cigarette smoking is a preventable cause of death in the [place]. Quitting smoking now can decrease your risk of getting smoking-related illnesses like heart disease, stroke, cancer & COPD. S = Set a quit date. T = Tell family, friends, and the people around you that you plan to quit. A = Anticipate or plan ahead for the tough times you'll face while quitting. R = Remove cigarettes and other tobacco products from your home, car, and work. T = Talk to your doctor about getting help to quit. Your doctor may give you medicines to reduce your craving for cigarettes & the unpleasant symptoms that happen when you stop smoking (called withdrawal symptoms). What are the symptoms of withdrawal? -- The symptoms include:   ?Trouble sleeping   ? Being irritable, anxious or restless   ? Getting frustrated or angry   ? Having trouble thinking clearly  Nicotine replacement therapy eases withdrawal and reduces your bodys craving for nicotine, the main drug found in cigarettes. Non-prescription forms of nicotine replacement include skin patches, lozenges, and gum. Two prescription medications are available that have been proven to help people stop smoking:  ? Bupropion is a prescription medicine that reduces your desire to smoke. This medicine is sold under the brand names Zyban and Wellbutrin & as a generic formulation. ? Varenicline (brand name: Chantix) is a prescription medicine that reduces withdrawal symptoms and cigarette cravings. If you take bupropion or varenicline and you have any new or worsening depressed mood or thoughts of harming yourself or someone else, stop taking the medicine and call your doctor. Buproprion should not be taken by anyone with a history of seizure or epilepsy. Will I gain weight if I quit? -- Yes, you might gain a few pounds.  But quitting smoking will have a much more positive effect on your health than weighing a few pounds more. Plus, you can help prevent some weight gain by being more active and eating less. Taking the medicine bupropion might help control weight gain. What else can I do to improve my chances of quitting? -- You can:  ?Start exercising. ?Stay away from smokers and places that you associate with smoking. If people close to you smoke, ask them to quit with you. ? Keep gum, hard candy, or something to put in your mouth handy. If you get a craving for a cigarette, try one of these instead. ?Dont give up, even if you start smoking again. It takes most people a few tries before they succeed. You can also get help from a free phone line (3-884-QUIT-NOW) or go online to www.smokefree.gov. Your pulmonary team is here to help you quit. Call for assistance [phone]      Tips to Help You Stop Smoking (taken from Up-To-Date)      Cigarette smoking is a preventable cause of death in the [place]. Quitting smoking now can decrease your risk of getting smoking-related illnesses like heart disease, stroke, cancer & COPD. S = Set a quit date. T = Tell family, friends, and the people around you that you plan to quit. A = Anticipate or plan ahead for the tough times you'll face while quitting. R = Remove cigarettes and other tobacco products from your home, car, and work. T = Talk to your doctor about getting help to quit. Your doctor may give you medicines to reduce your craving for cigarettes & the unpleasant symptoms that happen when you stop smoking (called withdrawal symptoms). What are the symptoms of withdrawal? -- The symptoms include:   ?Trouble sleeping   ? Being irritable, anxious or restless   ? Getting frustrated or angry   ? Having trouble thinking clearly  Nicotine replacement therapy eases withdrawal and reduces your bodys craving for nicotine, the main drug found in cigarettes. Non-prescription forms of nicotine replacement include skin patches, lozenges, and gum.      Two prescription medications are available that have been proven to help people stop smoking:  ? Bupropion is a prescription medicine that reduces your desire to smoke. This medicine is sold under the brand names Zyban and Wellbutrin & as a generic formulation. ? Varenicline (brand name: Chantix) is a prescription medicine that reduces withdrawal symptoms and cigarette cravings. If you take bupropion or varenicline and you have any new or worsening depressed mood or thoughts of harming yourself or someone else, stop taking the medicine and call your doctor. Buproprion should not be taken by anyone with a history of seizure or epilepsy. Will I gain weight if I quit? -- Yes, you might gain a few pounds. But quitting smoking will have a much more positive effect on your health than weighing a few pounds more. Plus, you can help prevent some weight gain by being more active and eating less. Taking the medicine bupropion might help control weight gain. What else can I do to improve my chances of quitting? -- You can:  ?Start exercising. ?Stay away from smokers and places that you associate with smoking. If people close to you smoke, ask them to quit with you. ? Keep gum, hard candy, or something to put in your mouth handy. If you get a craving for a cigarette, try one of these instead. ?Dont give up, even if you start smoking again. It takes most people a few tries before they succeed. You can also get help from a free phone line (6-908-QUIT-NOW) or go online to www.smokefree.gov. Your pulmonary team is here to help you quit.   Call for assistance [phone]

## 2020-02-17 ENCOUNTER — OFFICE VISIT (OUTPATIENT)
Dept: CARDIOTHORACIC SURGERY | Age: 70
End: 2020-02-17
Payer: MEDICARE

## 2020-02-17 VITALS
WEIGHT: 200 LBS | HEIGHT: 67 IN | OXYGEN SATURATION: 94 % | DIASTOLIC BLOOD PRESSURE: 84 MMHG | BODY MASS INDEX: 31.39 KG/M2 | HEART RATE: 81 BPM | SYSTOLIC BLOOD PRESSURE: 128 MMHG

## 2020-02-17 PROCEDURE — 99205 OFFICE O/P NEW HI 60 MIN: CPT | Performed by: THORACIC SURGERY (CARDIOTHORACIC VASCULAR SURGERY)

## 2020-02-17 RX ORDER — EZETIMIBE 10 MG/1
10 TABLET ORAL DAILY
COMMUNITY
End: 2020-07-06 | Stop reason: SDUPTHER

## 2020-02-17 NOTE — PROGRESS NOTES
Consultation H&P        Date of Consultation:  2/17/2020    PCP:  Jason Chaudhary PA-C      Chief Complaint: Right upper ling nodule    History of Present Illness: We are asked to see this patient in consultation by Dr. Jacob Sheldon regarding results of PET scan for RUL nodule    Heavy smoker underwent lung screening for found to have right upper lobe lung nodule asymptomatic  PET scan showed PET positive nodule highly suspicious for malignancy     Past Medical History:  Past Medical History:   Diagnosis Date    [facility]fib [facility])     Clostridium difficile diarrhea 6/26/15    COPD [facility]) [facility] [address] [facility]     COPD exacerbation ([facility] [facility]     Diabetes mellitus ([facility] [address] [facility]     Emphysema of lung ([facility] [address] [facility]     Hyperlipidemia     Hypertension     Pneumonia 2/5/2018    Rib pain     Sleep apnea     Small bowel obstruction ([facility] [address] [facility]        Past Surgical History:  Past Surgical History:   Procedure Laterality Date    ANKLE ARTHROSCOPY      x2    CHOLECYSTECTOMY      HYSTERECTOMY      ROTATOR CUFF REPAIR Bilateral        Home Medications:   Prior to Admission medications    Medication Sig Start Date End Date Taking?  Authorizing Provider   ezetimibe (ZETIA) 10 MG tablet Take 10 mg by mouth daily   Yes Historical Provider, MD   Fexofenadine HCl (MUCINEX ALLERGY PO) Take by mouth daily   Yes Historical Provider, MD   apixaban (ELIQUIS) 5 MG TABS tablet Take 1 tablet by mouth 2 times daily 1/14/20  Yes Devonda Goodpasture, MD   diltiazem (CARDIZEM CD) 120 MG extended release capsule Take 1 capsule by mouth daily 10/29/19  Yes Devonda Goodpasture, MD   albuterol sulfate HFA (VENTOLIN HFA) 108 (90 Base) MCG/ACT inhaler Inhale 2 puffs into the lungs every 6 hours as needed for Wheezing   Yes Historical Provider, MD   albuterol (PROVENTIL) (2.5 MG/3ML) 0.083% nebulizer solution Take 2.5 mg by nebulization every 6 hours as needed for Wheezing   Yes Historical Provider, MD   topiramate (TOPAMAX) 50 MG tablet Take 50 mg by mouth nightly   Yes Historical Provider, MD   hydrOXYzine (ATARAX) 50 MG tablet Take 50 mg by mouth 1-2 tabs every 4 hours prn   Yes Historical Provider, MD   ferrous sulfate 325 (65 Fe) MG tablet Take 325 mg by mouth daily (with breakfast)   Yes Historical Provider, MD   fluticasone-vilanterol (BREO ELLIPTA) 100-25 MCG/INH AEPB inhaler Inhale 1 puff into the lungs daily   Yes Historical Provider, MD   rOPINIRole (REQUIP) 2 MG tablet Take 3 mg by mouth nightly    Yes Historical Provider, MD   Umeclidinium Bromide (INCRUSE ELLIPTA) 62.5 MCG/INH AEPB Inhale into the lungs daily    Yes Historical Provider, MD   venlafaxine (EFFEXOR-XR) 150 MG XR capsule Take 150 mg by mouth daily   Yes Historical Provider, MD   Multiple Vitamins-Minerals (THERAPEUTIC MULTIVITAMIN-MINERALS) tablet Take 1 tablet by mouth daily   Yes Historical Provider, MD   QUEtiapine (SEROQUEL XR) 300 MG XR tablet Take 300 mg by mouth nightly    Yes Historical Provider, MD   OXYGEN Inhale into the lungs Indications: 2.5 L nightly    Yes Historical Provider, MD          Allergies: Allergies   Allergen Reactions    Buspirone Other (See Comments)     unsure    Lisinopril Other (See Comments)     Low blood pressure per patient 59/29     Penicillins Nausea Only        Social History:       Social History     Socioeconomic History    Marital status:       Spouse name: Not on file    Number of children: Not on file    Years of education: Not on file    Highest education level: Not on file   Occupational History    Not on file   Social Needs    Financial resource strain: Not on file    Food insecurity:     Worry: Not on file     Inability: Not on file    Transportation needs:     Medical: Not on file     Non-medical: Not on file   Tobacco Use    Smoking status: Current Every Day Smoker     Packs/day: 1.00     Years: 50.00     Pack years: 50.00     Types: Cigarettes    Smokeless tobacco: Never Used   Substance and Sexual Activity    Alcohol use: No     Alcohol/week: 0.0 standard drinks    Drug use: No    Sexual activity: Not Currently   Lifestyle    Physical activity:     Days per week: Not on file     Minutes per session: Not on file    Stress: Not on file   Relationships    Social connections:     Talks on phone: Not on file     Gets together: Not on file     Attends Hinduism service: Not on file     Active member of club or organization: Not on file     Attends meetings of clubs or organizations: Not on file     Relationship status: Not on file    Intimate partner violence:     Fear of current or ex partner: Not on file     Emotionally abused: Not on file     Physically abused: Not on file     Forced sexual activity: Not on file   Other Topics Concern    Not on file   Social History Narrative    Not on file       Family History:        Problem Relation Age of Onset    Cancer Mother     Heart Failure Father        Referred by Dr. Deena James  Review of Systems:  Constitutional:  No night sweats, + headaches, +weight loss- 90 lbs over past year. Eyes:  No glaucoma, cataracts. ENMT:  No nosebleeds, deviated septum. Cardiac:  + CHF, + arrhythmias- a fib on Eliquis. Follows with Dr. Esa Kwon. Vascular:  No claudication, varicosities. GI:  No PUD, heartburn. :  No kidney stones, frequent UTIs  Musculoskeletal:  No arthritis, gout. Respiratory: + O2 qhs + SOB, + emphysema, + cough, asthma. Integumentary:  No dermatitis, itching, rash. Neurological:  No stroke, TIAs, seizures. Psychiatric:  + depression, anxiety. Endocrine: No diabetes, thyroid issues. Hematologic:  No bleeding, easy bruising. Immunologic:  No known cancer, steroid therapies.       Physical Examination:    /84 (Site: Left Upper Arm, Position: Sitting, Cuff Size: Medium Adult)   Pulse 81   Ht 5' 7\" (1.702 m)   Wt 200 lb (90.7 kg)   SpO2 94%   BMI 31.32 kg/m²      BP RUE:  BP LUE:   Admission Weight: 200 lb (90.7 kg)   Hand dominance:    General mediastinal and right   hilar lymph nodes, in keeping with granulomatous disease.  Activity is seen   related to lipomatatous hypertrophy of the interatrial septum, an incidental   finding.  No marked mediastinal jeffry activity above mediastinal background. Mild activity within normal sized axillary lymph nodes bilaterally, likely   reactive.       1.9 x 0.8 cm elongated nodular consolidation within the lateral right upper   lobe does demonstrate hypermetabolism, SUV maximum 7.3.  3.2 x 1.3 cm   elongated focus of minimally complex fluid density along the fissure within   the lateral right mid lung has Hounsfield units 22, SUV maximum 1.9. Calcified granuloma within the right lower lobe.  Scattered linear and   ground-glass opacity, likely atelectasis.  Healing fracture of the   posterolateral left 8th rib with associated activity, SUV maximum 3.4.       Diffuse activity about the shoulders, likely inflammatory.       ABDOMEN/PELVIS: Physiologic activity is seen within the kidneys, bladder,   bowel.  Status post cholecystectomy.  Calcified granulomas within the spleen.           Impression   1.9 cm lateral right upper lobe pulmonary nodule is hypermetabolic, to a   degree concerning for malignancy until proven otherwise.       No findings of FDG avid metastatic disease.       Inflammatory change related to healing posterolateral left 8th rib fracture.       Lipomatous hypertrophy of the interatrial septum, an incidental finding.       Fluid loculated within fissure within the lateral right mid lung demonstrates   low level activity, likely inflammatory. PFT      Labs:   CBC: No results for input(s): WBC, HGB, HCT, MCV, PLT in the last 72 hours. BMP: No results for input(s): NA, K, CL, CO2, PHOS, BUN, CREATININE, CALCIUM, MG in the last 72 hours. Cardiac Enzymes: No results for input(s): CKTOTAL, CKMB, CKMBINDEX, TROPONINI in the last 72 hours.   PT/INR: No results for input(s): PROTIME, INR in the last 72 hours.  APTT: No results for input(s): APTT in the last 72 hours. Liver Profile:  Lab Results   Component Value Date    AST 22 08/25/2018    ALT 27 08/25/2018    BILITOT 0.4 08/25/2018    ALKPHOS 131 08/25/2018     Lab Results   Component Value Date    CHOL 180 11/02/2015    HDL 54 06/08/2018    TRIG 201 02/10/2018     UA:   Lab Results   Component Value Date    COLORU Yellow 06/26/2015    PHUR 6.0 06/26/2015    CLARITYU Clear 06/26/2015    SPECGRAV 1.015 06/26/2015    LEUKOCYTESUR Negative 06/26/2015    UROBILINOGEN 0.2 06/26/2015    BILIRUBINUR Negative 06/26/2015    BLOODU Negative 06/26/2015    GLUCOSEU Negative 06/26/2015       History obtained: chart, pt    Risk factors:      Diagnosis:    Diagnosis Orders   1. S/P chest tube placement ([facility] [address]     2. Nodule of right lung         Plan:   1. Patient is to be scheduled for right upper lobe wedge with possible lobectomy on Thursday 3/5/2020.   2. Patient has to be smoke free for 2 weeks. 3. Patient is to use her spirometer and try to get to  3000.    4. Patient will need cardio clearance. 5. She will need to stop Eliquis 3 days before surgery- will discuss with cardiologist.   6. Patient is to start Spiriva now, prescription will be sent to your pharmacy. [facility] in [address] [place] [address]. Typical periop/postop course reviewed including initial limitations on driving/heavy lifting. Risks, benefits and postoperative complications discussed including bleeding, infection, stroke, death, postop pulmonary and renal issues. I Tami Alanis MA am scribing for and in the presence of Ariana Soto MD on this date of 02/17/20 at 9:11 AM        Felicia Johnson MD personally performed the services described in this documentation as scribed by the Certified Medical Assistant Tami Alanis in my presence and it is both accurate and complete.   Esther Schrader MD FACS [facility]na Wells

## 2020-02-17 NOTE — PROGRESS NOTES
Referred by Dr. Leyda Adkins  Review of Systems:  Constitutional:  No night sweats, + headaches, +weight loss- 90 lbs over past year. Eyes:  No glaucoma, cataracts. ENMT:  No nosebleeds, deviated septum. Cardiac:  + CHF, + arrhythmias- a fib on Eliquis. Follows with Dr. Genevieve Carrillo. Vascular:  No claudication, varicosities. GI:  No PUD, heartburn. :  No kidney stones, frequent UTIs  Musculoskeletal:  No arthritis, gout. Respiratory: + O2 qhs + SOB, + emphysema, + cough, asthma. Integumentary:  No dermatitis, itching, rash. Neurological:  No stroke, TIAs, seizures. Psychiatric:  + depression, anxiety. Endocrine: No diabetes, thyroid issues. Hematologic:  No bleeding, easy bruising. Immunologic:  No known cancer, steroid therapies.

## 2020-02-18 ENCOUNTER — TELEPHONE (OUTPATIENT)
Dept: CARDIOLOGY CLINIC | Age: 70
End: 2020-02-18

## 2020-02-18 NOTE — TELEPHONE ENCOUNTER
Pt is having a rt upper lobectomy with Dr Mirian Jo on March 5th and needs cardiac risk assessment as well as the ok to hold Eliquis for 3 days. Otherwise, should pt be admitted for heparin bridging? Please advise.

## 2020-02-18 NOTE — TELEPHONE ENCOUNTER
OK to send cardiac clearance with intermediate risk. OK to stop blood thinners for 3 days before surgery no need for bridging.

## 2020-02-19 NOTE — TELEPHONE ENCOUNTER
Spoke to [place] with Dr. Jose Alfredo Cesar office. Clearance in [facility] is okay for her, and she will call the patient.

## 2020-02-27 ENCOUNTER — TELEPHONE (OUTPATIENT)
Dept: PULMONOLOGY | Age: 70
End: 2020-02-27

## 2020-02-27 RX ORDER — PREDNISONE 10 MG/1
TABLET ORAL
Qty: 30 TABLET | Refills: 0 | Status: SHIPPED | OUTPATIENT
Start: 2020-02-27 | End: 2020-03-08

## 2020-02-27 RX ORDER — DOXYCYCLINE HYCLATE 100 MG/1
100 CAPSULE ORAL 2 TIMES DAILY
Qty: 10 CAPSULE | Refills: 0 | Status: SHIPPED | OUTPATIENT
Start: 2020-02-27 | End: 2020-03-03

## 2020-02-27 NOTE — TELEPHONE ENCOUNTER
Patient called with message left for patient to call back to office. Meds pended.
Patient informed. Rx already sent to pharmacy.
exertion   · Continue Breo and Incruse Ellipta and Albuterol INH/Neb Q 4 hrs PRN   · Patient is up to date with Pneumococcal vaccine and influenza vaccine   · Smoking cessation counseling

## 2020-03-02 ENCOUNTER — TELEPHONE (OUTPATIENT)
Dept: CARDIOTHORACIC SURGERY | Age: 70
End: 2020-03-02

## 2020-03-04 ENCOUNTER — HOSPITAL ENCOUNTER (OUTPATIENT)
Dept: GENERAL RADIOLOGY | Age: 70
Discharge: HOME | DRG: 163 | End: 2020-03-04
Attending: THORACIC SURGERY (CARDIOTHORACIC VASCULAR SURGERY)
Payer: MEDICARE

## 2020-03-04 ENCOUNTER — HOSPITAL ENCOUNTER (OUTPATIENT)
Dept: PREADMISSION TESTING | Age: 70
Discharge: HOME | DRG: 163 | End: 2020-03-08
Payer: MEDICARE

## 2020-03-04 VITALS
SYSTOLIC BLOOD PRESSURE: 140 MMHG | BODY MASS INDEX: 31.23 KG/M2 | WEIGHT: 199 LBS | DIASTOLIC BLOOD PRESSURE: 71 MMHG | HEART RATE: 82 BPM | TEMPERATURE: 97 F | OXYGEN SATURATION: 97 % | HEIGHT: 67 IN | RESPIRATION RATE: 24 BRPM

## 2020-03-04 LAB
ABO/RH: NORMAL
ALBUMIN SERPL-MCNC: 4.1 G/DL (ref 3.4–5)
ALP BLD-CCNC: 96 U/L (ref 40–129)
ALT SERPL-CCNC: 29 U/L (ref 10–40)
ANION GAP SERPL CALCULATED.3IONS-SCNC: 14 MMOL/L (ref 3–16)
ANTIBODY SCREEN: NORMAL
APTT: 33.3 SEC (ref 24.2–36.2)
AST SERPL-CCNC: 18 U/L (ref 15–37)
BILIRUB SERPL-MCNC: <0.2 MG/DL (ref 0–1)
BILIRUBIN DIRECT: <0.2 MG/DL (ref 0–0.3)
BILIRUBIN URINE: NEGATIVE
BILIRUBIN, INDIRECT: NORMAL MG/DL (ref 0–1)
BLOOD, URINE: NEGATIVE
BUN BLDV-MCNC: 19 MG/DL (ref 7–20)
CALCIUM SERPL-MCNC: 9.7 MG/DL (ref 8.3–10.6)
CHLORIDE BLD-SCNC: 99 MMOL/L (ref 99–110)
CLARITY: CLEAR
CO2: 25 MMOL/L (ref 21–32)
COLOR: YELLOW
CREAT SERPL-MCNC: 0.7 MG/DL (ref 0.6–1.2)
EKG ATRIAL RATE: 71 BPM
EKG DIAGNOSIS: NORMAL
EKG P AXIS: 57 DEGREES
EKG P-R INTERVAL: 154 MS
EKG Q-T INTERVAL: 432 MS
EKG QRS DURATION: 148 MS
EKG QTC CALCULATION (BAZETT): 469 MS
EKG R AXIS: 66 DEGREES
EKG T AXIS: 51 DEGREES
EKG VENTRICULAR RATE: 71 BPM
GFR AFRICAN AMERICAN: >60
GFR NON-AFRICAN AMERICAN: >60
GLUCOSE BLD-MCNC: 111 MG/DL (ref 70–99)
GLUCOSE URINE: NEGATIVE MG/DL
HCT VFR BLD CALC: 41.4 % (ref 36–48)
HEMOGLOBIN: 13.5 G/DL (ref 12–16)
INR BLD: 0.97 (ref 0.86–1.14)
KETONES, URINE: NEGATIVE MG/DL
LEUKOCYTE ESTERASE, URINE: NEGATIVE
MAGNESIUM: 1.8 MG/DL (ref 1.8–2.4)
MCH RBC QN AUTO: 31.3 PG (ref 26–34)
MCHC RBC AUTO-ENTMCNC: 32.7 G/DL (ref 31–36)
MCV RBC AUTO: 95.8 FL (ref 80–100)
MICROSCOPIC EXAMINATION: NORMAL
NITRITE, URINE: NEGATIVE
PDW BLD-RTO: 13.3 % (ref 12.4–15.4)
PH UA: 6 (ref 5–8)
PLATELET # BLD: 310 K/UL (ref 135–450)
PMV BLD AUTO: 6.8 FL (ref 5–10.5)
POTASSIUM SERPL-SCNC: 3.6 MMOL/L (ref 3.5–5.1)
PROTEIN UA: NEGATIVE MG/DL
PROTHROMBIN TIME: 11.2 SEC (ref 10–13.2)
RBC # BLD: 4.32 M/UL (ref 4–5.2)
SODIUM BLD-SCNC: 138 MMOL/L (ref 136–145)
SPECIFIC GRAVITY UA: >=1.03 (ref 1–1.03)
TOTAL PROTEIN: 7 G/DL (ref 6.4–8.2)
URINE TYPE: NORMAL
UROBILINOGEN, URINE: 0.2 E.U./DL
WBC # BLD: 10.8 K/UL (ref 4–11)

## 2020-03-04 PROCEDURE — 80076 HEPATIC FUNCTION PANEL: CPT

## 2020-03-04 PROCEDURE — 81003 URINALYSIS AUTO W/O SCOPE: CPT

## 2020-03-04 PROCEDURE — 85730 THROMBOPLASTIN TIME PARTIAL: CPT

## 2020-03-04 PROCEDURE — 85027 COMPLETE CBC AUTOMATED: CPT

## 2020-03-04 PROCEDURE — 86900 BLOOD TYPING SEROLOGIC ABO: CPT

## 2020-03-04 PROCEDURE — 87086 URINE CULTURE/COLONY COUNT: CPT

## 2020-03-04 PROCEDURE — 80048 BASIC METABOLIC PNL TOTAL CA: CPT

## 2020-03-04 PROCEDURE — 85610 PROTHROMBIN TIME: CPT

## 2020-03-04 PROCEDURE — 86901 BLOOD TYPING SEROLOGIC RH(D): CPT

## 2020-03-04 PROCEDURE — 86850 RBC ANTIBODY SCREEN: CPT

## 2020-03-04 PROCEDURE — 93010 ELECTROCARDIOGRAM REPORT: CPT | Performed by: INTERNAL MEDICINE

## 2020-03-04 PROCEDURE — 93005 ELECTROCARDIOGRAM TRACING: CPT | Performed by: THORACIC SURGERY (CARDIOTHORACIC VASCULAR SURGERY)

## 2020-03-04 PROCEDURE — 83735 ASSAY OF MAGNESIUM: CPT

## 2020-03-04 PROCEDURE — 71046 X-RAY EXAM CHEST 2 VIEWS: CPT

## 2020-03-04 RX ORDER — OMEPRAZOLE 40 MG/1
CAPSULE, DELAYED RELEASE ORAL
COMMUNITY
Start: 2020-02-06 | End: 2020-05-12 | Stop reason: ALTCHOICE

## 2020-03-04 RX ORDER — NYSTATIN 100000 U/G
CREAM TOPICAL
COMMUNITY
Start: 2020-02-19

## 2020-03-04 RX ORDER — HYDROCODONE BITARTRATE AND ACETAMINOPHEN 7.5; 325 MG/1; MG/1
TABLET ORAL
COMMUNITY
Start: 2020-02-12

## 2020-03-04 ASSESSMENT — PAIN DESCRIPTION - ORIENTATION: ORIENTATION: LEFT;RIGHT

## 2020-03-04 ASSESSMENT — PAIN DESCRIPTION - LOCATION: LOCATION: SHOULDER;BACK

## 2020-03-04 ASSESSMENT — PAIN DESCRIPTION - FREQUENCY: FREQUENCY: CONTINUOUS

## 2020-03-04 ASSESSMENT — PAIN SCALES - GENERAL: PAINLEVEL_OUTOF10: 6

## 2020-03-04 ASSESSMENT — PAIN DESCRIPTION - PAIN TYPE: TYPE: CHRONIC PAIN

## 2020-03-04 ASSESSMENT — PAIN DESCRIPTION - DESCRIPTORS: DESCRIPTORS: BURNING

## 2020-03-04 NOTE — PROGRESS NOTES
[facility]                          Date of Procedure 3-11     PRIOR TO PROCEDURE DATE:  1. Please follow any guidelines/instructions prior to your procedure as advised by your surgeon. 2. Arrange for someone to drive you home and be with you for the first 24 hours after discharge for your safety after your procedure for which you received sedation. Ensure it is someone we can share information with regarding your discharge. 3. You must contact your surgeon for instructions IF:   You are taking any blood thinners, aspirin, anti-inflammatory or vitamin E.   There is a change in your physical condition such as a cold, fever, rash, cuts, sores or any other infection, especially near your surgical site. 4. Do not drink alcohol the day before or day of your procedure. 5. A Pre-op History and Physical for surgery MUST be completed by your Physician or Urgent Care within 30 days of your procedure date. Please bring a copy with you on the day of your procedure and along with any other testing performed. THE DAY OF YOUR PROCEDURE:  1. Follow instructions for ARRIVAL TIME as DIRECTED BY YOUR SURGEON. 2. Enter the MAIN entrance from [address] and follow the signs to the free [employer] or [employer] parking (offered free of charge 6am-5pm). 3. Enter the Main Entrance of the hospital (do NOT enter from the lower level of the parking garage). Upon entrance, check in with the  at the main desk on your left. If no one is available at the desk, proceed into the [facility] Waiting Room and go through the door directly into the [facility]. There is a Check-in desk ACROSS from Room 5 (marked with a sign hanging from the ceiling). The phone number for the surgery center is [phone].    4. DO NOT EAT ANYTHING eight hours prior to surgery.   May have 8 ounces of water 4 hours prior to surgery (exception would be medication instructions Power of [place], please bring a copy on the day of your procedure. 15. With your permission, one family member may accompany you while you are being prepared for surgery. Once you are ready, additional family members may join you. HOW WE KEEP YOU SAFE and WORK TO PREVENT SURGICAL SITE INFECTIONS:  1. Health care workers should always check your ID bracelet to verify your name and birth date. You will be asked many times to state your name, date of birth, and allergies. 2. Health care workers should always clean their hands with soap or alcohol gel before providing care to you. It is okay to ask anyone if they cleaned their hands before they touch you. 3. You will be actively involved in verifying the type of procedure you are having and ensuring the correct surgical site. This will be confirmed multiple times prior to your procedure. Do NOT klali your surgery site UNLESS instructed to by your surgeon. 4. Do not shave or wax for 72 hours prior to procedure near your operative site. Shaving with a razor can irritate your skin and make it easier to develop an infection. On the day of your procedure, any hair that needs to be removed near the surgical site will be clipped by a healthcare worker using a special clippers designed to avoid skin irritation. 5. When you are in the operating room, your surgical site will be cleansed with a special soap, and in most cases, you will be given an antibiotic before the surgery begins. What to expect AFTER YOUR PROCEDURE:  1. Immediately following your procedure, your will be taken to the PACU for the first phase of your recovery. Your nurse will help you recover from any potential side effects of anesthesia, such as extreme drowsiness, changes in your vital signs or breathing patterns. Nausea, headache, muscle aches, or sore throat may also occur after anesthesia. Your nurse will help you manage these potential side effects.     2. For comfort and safety, arrange to have someone at home with you for the first 24 hours after discharge. 3. You and your family will be given written instructions about your diet, activity, dressing care, medications, and return visits. 4. Once at home, should issues with nausea, pain, or bleeding occur, or should you notice any signs of infection, you should call your surgeon. 5. Always clean your hands before and after caring for your wound. Do not let your family touch your surgery site without cleaning their hands. 6. Narcotic pain medications can cause significant constipation. You may want to add a stool softener to your postoperative medication schedule or speak to your surgeon on how best to manage this SIDE EFFECT. SPECIAL INSTRUCTIONS     Thank you for allowing us to care for you. We strive to exceed your expectations in the overall delivery of care and service provided to you and your family. If you need to contact us for any reason, please call us at [phone]. Instructions reviewed and copy given to patient during preadmission testing visit. Carissa Pritchard. 3/4/2020 .1:01 PM      ADDITIONAL EDUCATIONAL INFORMATION REVIEWED / PROVIDED TO YOU AND YOUR FAMILY:  Yes Taking Control of Your Pain   Yes FAQs about Surgical Site Infections    No Cardiac Surgery Instructions for AM admission to the hospital  No Bactroban® Nasal Ointment Instructions for Cardiac Surgery  Yes Learning About Preventing Pressure Sores  No Cardiac Surgery Preoperative Hibiclens® Bathing Instructions  No Your Care after Heart Surgery Binder    No Ritchie® Wipes Bathing Instructions (Obtained from:  [email or web address] pdf )  Yes Hibiclens® Bathing Instructions  No Antibacterial Soap      No Other

## 2020-03-05 LAB
ORGANISM: ABNORMAL
URINE CULTURE, ROUTINE: ABNORMAL

## 2020-03-05 RX ORDER — SODIUM CHLORIDE 9 MG/ML
INJECTION, SOLUTION INTRAVENOUS CONTINUOUS
Status: CANCELLED | OUTPATIENT
Start: 2020-03-11

## 2020-03-10 ENCOUNTER — ANESTHESIA EVENT (OUTPATIENT)
Dept: OPERATING ROOM | Age: 70
DRG: 163 | End: 2020-03-10
Payer: MEDICARE

## 2020-03-11 ENCOUNTER — ANESTHESIA (OUTPATIENT)
Dept: OPERATING ROOM | Age: 70
DRG: 163 | End: 2020-03-11
Payer: MEDICARE

## 2020-03-11 ENCOUNTER — HOSPITAL ENCOUNTER (INPATIENT)
Age: 70
LOS: 9 days | Discharge: HOME | DRG: 163 | End: 2020-03-20
Attending: THORACIC SURGERY (CARDIOTHORACIC VASCULAR SURGERY) | Admitting: THORACIC SURGERY (CARDIOTHORACIC VASCULAR SURGERY)
Payer: MEDICARE

## 2020-03-11 ENCOUNTER — APPOINTMENT (OUTPATIENT)
Dept: GENERAL RADIOLOGY | Age: 70
DRG: 163 | End: 2020-03-11
Attending: THORACIC SURGERY (CARDIOTHORACIC VASCULAR SURGERY)
Payer: MEDICARE

## 2020-03-11 VITALS — SYSTOLIC BLOOD PRESSURE: 149 MMHG | OXYGEN SATURATION: 100 % | TEMPERATURE: 96.8 F | DIASTOLIC BLOOD PRESSURE: 70 MMHG

## 2020-03-11 PROBLEM — C34.11 PRIMARY CANCER OF RIGHT UPPER LOBE OF LUNG (HCC): Status: ACTIVE | Noted: 2020-03-11

## 2020-03-11 LAB
ABO/RH: NORMAL
ANTIBODY SCREEN: NORMAL
GLUCOSE BLD-MCNC: 98 MG/DL (ref 70–99)
HCT VFR BLD CALC: 37.9 % (ref 36–48)
HEMOGLOBIN: 12.4 G/DL (ref 12–16)
MCH RBC QN AUTO: 31.5 PG (ref 26–34)
MCHC RBC AUTO-ENTMCNC: 32.7 G/DL (ref 31–36)
MCV RBC AUTO: 96.3 FL (ref 80–100)
PDW BLD-RTO: 13.5 % (ref 12.4–15.4)
PERFORMED ON: NORMAL
PLATELET # BLD: 274 K/UL (ref 135–450)
PMV BLD AUTO: 6.9 FL (ref 5–10.5)
RBC # BLD: 3.94 M/UL (ref 4–5.2)
WBC # BLD: 24.4 K/UL (ref 4–11)

## 2020-03-11 PROCEDURE — 88331 PATH CONSLTJ SURG 1 BLK 1SPC: CPT

## 2020-03-11 PROCEDURE — 32674 THORACOSCOPY LYMPH NODE EXC: CPT | Performed by: THORACIC SURGERY (CARDIOTHORACIC VASCULAR SURGERY)

## 2020-03-11 PROCEDURE — 2500000003 HC RX 250 WO HCPCS: Performed by: NURSE ANESTHETIST, CERTIFIED REGISTERED

## 2020-03-11 PROCEDURE — 2580000003 HC RX 258: Performed by: NURSE ANESTHETIST, CERTIFIED REGISTERED

## 2020-03-11 PROCEDURE — 2580000003 HC RX 258: Performed by: THORACIC SURGERY (CARDIOTHORACIC VASCULAR SURGERY)

## 2020-03-11 PROCEDURE — 86850 RBC ANTIBODY SCREEN: CPT

## 2020-03-11 PROCEDURE — 6370000000 HC RX 637 (ALT 250 FOR IP): Performed by: THORACIC SURGERY (CARDIOTHORACIC VASCULAR SURGERY)

## 2020-03-11 PROCEDURE — 6360000002 HC RX W HCPCS

## 2020-03-11 PROCEDURE — 3700000000 HC ANESTHESIA ATTENDED CARE: Performed by: THORACIC SURGERY (CARDIOTHORACIC VASCULAR SURGERY)

## 2020-03-11 PROCEDURE — 6360000002 HC RX W HCPCS: Performed by: NURSE ANESTHETIST, CERTIFIED REGISTERED

## 2020-03-11 PROCEDURE — 2720000010 HC SURG SUPPLY STERILE: Performed by: THORACIC SURGERY (CARDIOTHORACIC VASCULAR SURGERY)

## 2020-03-11 PROCEDURE — 86901 BLOOD TYPING SEROLOGIC RH(D): CPT

## 2020-03-11 PROCEDURE — 3600000014 HC SURGERY LEVEL 4 ADDTL 15MIN: Performed by: THORACIC SURGERY (CARDIOTHORACIC VASCULAR SURGERY)

## 2020-03-11 PROCEDURE — 0BNF4ZZ RELEASE RIGHT LOWER LUNG LOBE, PERCUTANEOUS ENDOSCOPIC APPROACH: ICD-10-PCS | Performed by: THORACIC SURGERY (CARDIOTHORACIC VASCULAR SURGERY)

## 2020-03-11 PROCEDURE — 6360000002 HC RX W HCPCS: Performed by: THORACIC SURGERY (CARDIOTHORACIC VASCULAR SURGERY)

## 2020-03-11 PROCEDURE — 3E0T3BZ INTRODUCTION OF ANESTHETIC AGENT INTO PERIPHERAL NERVES AND PLEXI, PERCUTANEOUS APPROACH: ICD-10-PCS | Performed by: THORACIC SURGERY (CARDIOTHORACIC VASCULAR SURGERY)

## 2020-03-11 PROCEDURE — 2500000003 HC RX 250 WO HCPCS: Performed by: THORACIC SURGERY (CARDIOTHORACIC VASCULAR SURGERY)

## 2020-03-11 PROCEDURE — 07B74ZX EXCISION OF THORAX LYMPHATIC, PERCUTANEOUS ENDOSCOPIC APPROACH, DIAGNOSTIC: ICD-10-PCS | Performed by: THORACIC SURGERY (CARDIOTHORACIC VASCULAR SURGERY)

## 2020-03-11 PROCEDURE — C1729 CATH, DRAINAGE: HCPCS | Performed by: THORACIC SURGERY (CARDIOTHORACIC VASCULAR SURGERY)

## 2020-03-11 PROCEDURE — 2700000000 HC OXYGEN THERAPY PER DAY

## 2020-03-11 PROCEDURE — 3600000004 HC SURGERY LEVEL 4 BASE: Performed by: THORACIC SURGERY (CARDIOTHORACIC VASCULAR SURGERY)

## 2020-03-11 PROCEDURE — 94669 MECHANICAL CHEST WALL OSCILL: CPT

## 2020-03-11 PROCEDURE — 0BBC4ZZ EXCISION OF RIGHT UPPER LUNG LOBE, PERCUTANEOUS ENDOSCOPIC APPROACH: ICD-10-PCS | Performed by: THORACIC SURGERY (CARDIOTHORACIC VASCULAR SURGERY)

## 2020-03-11 PROCEDURE — 85027 COMPLETE CBC AUTOMATED: CPT

## 2020-03-11 PROCEDURE — 0BTC0ZZ RESECTION OF RIGHT UPPER LUNG LOBE, OPEN APPROACH: ICD-10-PCS | Performed by: THORACIC SURGERY (CARDIOTHORACIC VASCULAR SURGERY)

## 2020-03-11 PROCEDURE — 32663 THORACOSCOPY W/LOBECTOMY: CPT | Performed by: THORACIC SURGERY (CARDIOTHORACIC VASCULAR SURGERY)

## 2020-03-11 PROCEDURE — 71045 X-RAY EXAM CHEST 1 VIEW: CPT

## 2020-03-11 PROCEDURE — 94640 AIRWAY INHALATION TREATMENT: CPT

## 2020-03-11 PROCEDURE — 2709999900 HC NON-CHARGEABLE SUPPLY: Performed by: THORACIC SURGERY (CARDIOTHORACIC VASCULAR SURGERY)

## 2020-03-11 PROCEDURE — 88309 TISSUE EXAM BY PATHOLOGIST: CPT

## 2020-03-11 PROCEDURE — 7100000001 HC PACU RECOVERY - ADDTL 15 MIN: Performed by: THORACIC SURGERY (CARDIOTHORACIC VASCULAR SURGERY)

## 2020-03-11 PROCEDURE — 32668 THORACOSCOPY W/W RESECT DIAG: CPT | Performed by: THORACIC SURGERY (CARDIOTHORACIC VASCULAR SURGERY)

## 2020-03-11 PROCEDURE — 3700000001 HC ADD 15 MINUTES (ANESTHESIA): Performed by: THORACIC SURGERY (CARDIOTHORACIC VASCULAR SURGERY)

## 2020-03-11 PROCEDURE — 1200000000 HC SEMI PRIVATE

## 2020-03-11 PROCEDURE — 6360000002 HC RX W HCPCS: Performed by: STUDENT IN AN ORGANIZED HEALTH CARE EDUCATION/TRAINING PROGRAM

## 2020-03-11 PROCEDURE — 94761 N-INVAS EAR/PLS OXIMETRY MLT: CPT

## 2020-03-11 PROCEDURE — 94150 VITAL CAPACITY TEST: CPT

## 2020-03-11 PROCEDURE — C9290 INJ, BUPIVACAINE LIPOSOME: HCPCS | Performed by: THORACIC SURGERY (CARDIOTHORACIC VASCULAR SURGERY)

## 2020-03-11 PROCEDURE — 88307 TISSUE EXAM BY PATHOLOGIST: CPT

## 2020-03-11 PROCEDURE — 7100000000 HC PACU RECOVERY - FIRST 15 MIN: Performed by: THORACIC SURGERY (CARDIOTHORACIC VASCULAR SURGERY)

## 2020-03-11 PROCEDURE — 6360000002 HC RX W HCPCS: Performed by: ANESTHESIOLOGY

## 2020-03-11 PROCEDURE — 86900 BLOOD TYPING SEROLOGIC ABO: CPT

## 2020-03-11 PROCEDURE — 88341 IMHCHEM/IMCYTCHM EA ADD ANTB: CPT

## 2020-03-11 PROCEDURE — 6370000000 HC RX 637 (ALT 250 FOR IP): Performed by: STUDENT IN AN ORGANIZED HEALTH CARE EDUCATION/TRAINING PROGRAM

## 2020-03-11 PROCEDURE — 88342 IMHCHEM/IMCYTCHM 1ST ANTB: CPT

## 2020-03-11 PROCEDURE — 2060000000 HC ICU INTERMEDIATE R&B

## 2020-03-11 RX ORDER — DEXTROSE, SODIUM CHLORIDE, AND POTASSIUM CHLORIDE 5; .45; .15 G/100ML; G/100ML; G/100ML
INJECTION INTRAVENOUS CONTINUOUS
Status: DISCONTINUED | OUTPATIENT
Start: 2020-03-11 | End: 2020-03-12

## 2020-03-11 RX ORDER — ALBUTEROL SULFATE 2.5 MG/3ML
2.5 SOLUTION RESPIRATORY (INHALATION) EVERY 6 HOURS PRN
Status: DISCONTINUED | OUTPATIENT
Start: 2020-03-11 | End: 2020-03-12

## 2020-03-11 RX ORDER — PROCHLORPERAZINE EDISYLATE 5 MG/ML
5 INJECTION INTRAMUSCULAR; INTRAVENOUS
Status: DISCONTINUED | OUTPATIENT
Start: 2020-03-11 | End: 2020-03-11 | Stop reason: HOSPADM

## 2020-03-11 RX ORDER — ROCURONIUM BROMIDE 10 MG/ML
INJECTION, SOLUTION INTRAVENOUS PRN
Status: DISCONTINUED | OUTPATIENT
Start: 2020-03-11 | End: 2020-03-11 | Stop reason: SDUPTHER

## 2020-03-11 RX ORDER — SODIUM CHLORIDE, SODIUM LACTATE, POTASSIUM CHLORIDE, CALCIUM CHLORIDE 600; 310; 30; 20 MG/100ML; MG/100ML; MG/100ML; MG/100ML
INJECTION, SOLUTION INTRAVENOUS CONTINUOUS
Status: DISCONTINUED | OUTPATIENT
Start: 2020-03-11 | End: 2020-03-11

## 2020-03-11 RX ORDER — ACETAMINOPHEN 500 MG
1500 TABLET ORAL 3 TIMES DAILY PRN
Status: ON HOLD | COMMUNITY
End: 2020-03-20 | Stop reason: HOSPADM

## 2020-03-11 RX ORDER — SODIUM CHLORIDE 9 MG/ML
INJECTION, SOLUTION INTRAVENOUS CONTINUOUS PRN
Status: DISCONTINUED | OUTPATIENT
Start: 2020-03-11 | End: 2020-03-11 | Stop reason: SDUPTHER

## 2020-03-11 RX ORDER — ACETAMINOPHEN 325 MG/1
650 TABLET ORAL EVERY 4 HOURS PRN
Status: DISCONTINUED | OUTPATIENT
Start: 2020-03-11 | End: 2020-03-11

## 2020-03-11 RX ORDER — SODIUM CHLORIDE 0.9 % (FLUSH) 0.9 %
10 SYRINGE (ML) INJECTION PRN
Status: DISCONTINUED | OUTPATIENT
Start: 2020-03-11 | End: 2020-03-20 | Stop reason: HOSPADM

## 2020-03-11 RX ORDER — FENTANYL CITRATE 50 UG/ML
50 INJECTION, SOLUTION INTRAMUSCULAR; INTRAVENOUS EVERY 5 MIN PRN
Status: DISCONTINUED | OUTPATIENT
Start: 2020-03-11 | End: 2020-03-11 | Stop reason: HOSPADM

## 2020-03-11 RX ORDER — FERROUS SULFATE 325(65) MG
325 TABLET ORAL
Status: DISCONTINUED | OUTPATIENT
Start: 2020-03-12 | End: 2020-03-20 | Stop reason: HOSPADM

## 2020-03-11 RX ORDER — DILTIAZEM HYDROCHLORIDE 120 MG/1
120 CAPSULE, COATED, EXTENDED RELEASE ORAL DAILY
Status: DISCONTINUED | OUTPATIENT
Start: 2020-03-12 | End: 2020-03-12

## 2020-03-11 RX ORDER — HYDROXYZINE HYDROCHLORIDE 25 MG/1
50 TABLET, FILM COATED ORAL EVERY 6 HOURS PRN
Status: DISCONTINUED | OUTPATIENT
Start: 2020-03-11 | End: 2020-03-20 | Stop reason: HOSPADM

## 2020-03-11 RX ORDER — OXYCODONE HYDROCHLORIDE AND ACETAMINOPHEN 5; 325 MG/1; MG/1
2 TABLET ORAL PRN
Status: DISCONTINUED | OUTPATIENT
Start: 2020-03-11 | End: 2020-03-11 | Stop reason: HOSPADM

## 2020-03-11 RX ORDER — POLYETHYLENE GLYCOL 3350 17 G/17G
17 POWDER, FOR SOLUTION ORAL DAILY
Status: DISCONTINUED | OUTPATIENT
Start: 2020-03-11 | End: 2020-03-20 | Stop reason: HOSPADM

## 2020-03-11 RX ORDER — MORPHINE SULFATE 4 MG/ML
4 INJECTION, SOLUTION INTRAMUSCULAR; INTRAVENOUS
Status: DISCONTINUED | OUTPATIENT
Start: 2020-03-11 | End: 2020-03-11

## 2020-03-11 RX ORDER — ONDANSETRON 2 MG/ML
4 INJECTION INTRAMUSCULAR; INTRAVENOUS
Status: DISCONTINUED | OUTPATIENT
Start: 2020-03-11 | End: 2020-03-11 | Stop reason: HOSPADM

## 2020-03-11 RX ORDER — SODIUM CHLORIDE 0.9 % (FLUSH) 0.9 %
10 SYRINGE (ML) INJECTION EVERY 12 HOURS SCHEDULED
Status: DISCONTINUED | OUTPATIENT
Start: 2020-03-11 | End: 2020-03-20 | Stop reason: HOSPADM

## 2020-03-11 RX ORDER — SODIUM CHLORIDE, SODIUM LACTATE, POTASSIUM CHLORIDE, CALCIUM CHLORIDE 600; 310; 30; 20 MG/100ML; MG/100ML; MG/100ML; MG/100ML
INJECTION, SOLUTION INTRAVENOUS CONTINUOUS PRN
Status: DISCONTINUED | OUTPATIENT
Start: 2020-03-11 | End: 2020-03-11 | Stop reason: SDUPTHER

## 2020-03-11 RX ORDER — ACETAMINOPHEN 500 MG
1000 TABLET ORAL EVERY 6 HOURS
Status: DISCONTINUED | OUTPATIENT
Start: 2020-03-11 | End: 2020-03-14

## 2020-03-11 RX ORDER — DIPHENHYDRAMINE HYDROCHLORIDE 50 MG/ML
12.5 INJECTION INTRAMUSCULAR; INTRAVENOUS
Status: DISCONTINUED | OUTPATIENT
Start: 2020-03-11 | End: 2020-03-11 | Stop reason: HOSPADM

## 2020-03-11 RX ORDER — PANTOPRAZOLE SODIUM 40 MG/1
40 TABLET, DELAYED RELEASE ORAL
Status: DISCONTINUED | OUTPATIENT
Start: 2020-03-12 | End: 2020-03-14

## 2020-03-11 RX ORDER — MEPERIDINE HYDROCHLORIDE 25 MG/ML
12.5 INJECTION INTRAMUSCULAR; INTRAVENOUS; SUBCUTANEOUS EVERY 5 MIN PRN
Status: DISCONTINUED | OUTPATIENT
Start: 2020-03-11 | End: 2020-03-11 | Stop reason: HOSPADM

## 2020-03-11 RX ORDER — LIDOCAINE HYDROCHLORIDE 20 MG/ML
INJECTION, SOLUTION INTRAVENOUS PRN
Status: DISCONTINUED | OUTPATIENT
Start: 2020-03-11 | End: 2020-03-11 | Stop reason: SDUPTHER

## 2020-03-11 RX ORDER — OXYCODONE HYDROCHLORIDE 5 MG/1
5 TABLET ORAL EVERY 4 HOURS PRN
Status: DISCONTINUED | OUTPATIENT
Start: 2020-03-11 | End: 2020-03-12

## 2020-03-11 RX ORDER — MAGNESIUM HYDROXIDE 1200 MG/15ML
LIQUID ORAL CONTINUOUS PRN
Status: COMPLETED | OUTPATIENT
Start: 2020-03-11 | End: 2020-03-11

## 2020-03-11 RX ORDER — LABETALOL 20 MG/4 ML (5 MG/ML) INTRAVENOUS SYRINGE
5 EVERY 10 MIN PRN
Status: DISCONTINUED | OUTPATIENT
Start: 2020-03-11 | End: 2020-03-11 | Stop reason: HOSPADM

## 2020-03-11 RX ORDER — MORPHINE SULFATE 2 MG/ML
2 INJECTION, SOLUTION INTRAMUSCULAR; INTRAVENOUS
Status: DISCONTINUED | OUTPATIENT
Start: 2020-03-11 | End: 2020-03-11

## 2020-03-11 RX ORDER — ONDANSETRON 2 MG/ML
INJECTION INTRAMUSCULAR; INTRAVENOUS PRN
Status: DISCONTINUED | OUTPATIENT
Start: 2020-03-11 | End: 2020-03-11 | Stop reason: SDUPTHER

## 2020-03-11 RX ORDER — OXYCODONE HYDROCHLORIDE AND ACETAMINOPHEN 5; 325 MG/1; MG/1
1 TABLET ORAL PRN
Status: DISCONTINUED | OUTPATIENT
Start: 2020-03-11 | End: 2020-03-11 | Stop reason: HOSPADM

## 2020-03-11 RX ORDER — OXYCODONE HYDROCHLORIDE 5 MG/1
10 TABLET ORAL EVERY 4 HOURS PRN
Status: DISCONTINUED | OUTPATIENT
Start: 2020-03-11 | End: 2020-03-12

## 2020-03-11 RX ORDER — ALBUTEROL SULFATE 2.5 MG/3ML
SOLUTION RESPIRATORY (INHALATION)
Status: COMPLETED
Start: 2020-03-11 | End: 2020-03-11

## 2020-03-11 RX ORDER — HYDRALAZINE HYDROCHLORIDE 20 MG/ML
5 INJECTION INTRAMUSCULAR; INTRAVENOUS EVERY 10 MIN PRN
Status: DISCONTINUED | OUTPATIENT
Start: 2020-03-11 | End: 2020-03-11 | Stop reason: HOSPADM

## 2020-03-11 RX ORDER — EZETIMIBE 10 MG/1
10 TABLET ORAL DAILY
Status: DISCONTINUED | OUTPATIENT
Start: 2020-03-11 | End: 2020-03-20 | Stop reason: HOSPADM

## 2020-03-11 RX ORDER — ESMOLOL HYDROCHLORIDE 10 MG/ML
INJECTION INTRAVENOUS PRN
Status: DISCONTINUED | OUTPATIENT
Start: 2020-03-11 | End: 2020-03-11 | Stop reason: SDUPTHER

## 2020-03-11 RX ORDER — SODIUM CHLORIDE 9 MG/ML
INJECTION, SOLUTION INTRAVENOUS CONTINUOUS
Status: DISCONTINUED | OUTPATIENT
Start: 2020-03-11 | End: 2020-03-11

## 2020-03-11 RX ORDER — FENTANYL CITRATE 50 UG/ML
25 INJECTION, SOLUTION INTRAMUSCULAR; INTRAVENOUS EVERY 5 MIN PRN
Status: DISCONTINUED | OUTPATIENT
Start: 2020-03-11 | End: 2020-03-11 | Stop reason: HOSPADM

## 2020-03-11 RX ORDER — DEXAMETHASONE SODIUM PHOSPHATE 4 MG/ML
INJECTION, SOLUTION INTRA-ARTICULAR; INTRALESIONAL; INTRAMUSCULAR; INTRAVENOUS; SOFT TISSUE PRN
Status: DISCONTINUED | OUTPATIENT
Start: 2020-03-11 | End: 2020-03-11 | Stop reason: SDUPTHER

## 2020-03-11 RX ORDER — ALBUTEROL SULFATE 2.5 MG/3ML
2.5 SOLUTION RESPIRATORY (INHALATION) ONCE
Status: COMPLETED | OUTPATIENT
Start: 2020-03-11 | End: 2020-03-11

## 2020-03-11 RX ORDER — PROPOFOL 10 MG/ML
INJECTION, EMULSION INTRAVENOUS PRN
Status: DISCONTINUED | OUTPATIENT
Start: 2020-03-11 | End: 2020-03-11 | Stop reason: SDUPTHER

## 2020-03-11 RX ORDER — ALBUTEROL SULFATE 2.5 MG/3ML
2.5 SOLUTION RESPIRATORY (INHALATION)
Status: DISCONTINUED | OUTPATIENT
Start: 2020-03-11 | End: 2020-03-12

## 2020-03-11 RX ADMIN — ESMOLOL HYDROCHLORIDE 30 MG: 10 INJECTION, SOLUTION INTRAVENOUS at 13:15

## 2020-03-11 RX ADMIN — ROCURONIUM BROMIDE 100 MG: 10 INJECTION, SOLUTION INTRAVENOUS at 13:15

## 2020-03-11 RX ADMIN — ALBUTEROL SULFATE 2.5 MG: 2.5 SOLUTION RESPIRATORY (INHALATION) at 21:58

## 2020-03-11 RX ADMIN — SODIUM CHLORIDE, SODIUM LACTATE, POTASSIUM CHLORIDE, AND CALCIUM CHLORIDE: 600; 310; 30; 20 INJECTION, SOLUTION INTRAVENOUS at 13:05

## 2020-03-11 RX ADMIN — PROPOFOL 200 MG: 10 INJECTION, EMULSION INTRAVENOUS at 13:15

## 2020-03-11 RX ADMIN — SODIUM CHLORIDE: 9 INJECTION, SOLUTION INTRAVENOUS at 13:23

## 2020-03-11 RX ADMIN — SODIUM CHLORIDE 1000 ML: 9 INJECTION, SOLUTION INTRAVENOUS at 13:10

## 2020-03-11 RX ADMIN — LIDOCAINE HYDROCHLORIDE 60 MG: 20 INJECTION, SOLUTION INTRAVENOUS at 13:15

## 2020-03-11 RX ADMIN — FENTANYL CITRATE 25 MCG: 50 INJECTION, SOLUTION INTRAMUSCULAR; INTRAVENOUS at 17:12

## 2020-03-11 RX ADMIN — ALBUTEROL SULFATE 2.5 MG: 2.5 SOLUTION RESPIRATORY (INHALATION) at 12:53

## 2020-03-11 RX ADMIN — FENTANYL CITRATE 25 MCG: 50 INJECTION, SOLUTION INTRAMUSCULAR; INTRAVENOUS at 16:39

## 2020-03-11 RX ADMIN — POTASSIUM CHLORIDE, DEXTROSE MONOHYDRATE AND SODIUM CHLORIDE: 150; 5; 450 INJECTION, SOLUTION INTRAVENOUS at 16:32

## 2020-03-11 RX ADMIN — DEXAMETHASONE SODIUM PHOSPHATE 8 MG: 4 INJECTION, SOLUTION INTRAMUSCULAR; INTRAVENOUS at 13:15

## 2020-03-11 RX ADMIN — ROCURONIUM BROMIDE 20 MG: 10 INJECTION, SOLUTION INTRAVENOUS at 14:41

## 2020-03-11 RX ADMIN — OXYCODONE 10 MG: 5 TABLET ORAL at 21:21

## 2020-03-11 RX ADMIN — ACETAMINOPHEN 1000 MG: 500 TABLET ORAL at 22:39

## 2020-03-11 RX ADMIN — MORPHINE SULFATE 4 MG: 4 INJECTION INTRAVENOUS at 20:33

## 2020-03-11 RX ADMIN — HYDROMORPHONE HYDROCHLORIDE 1 MG: 1 INJECTION, SOLUTION INTRAMUSCULAR; INTRAVENOUS; SUBCUTANEOUS at 13:05

## 2020-03-11 RX ADMIN — FENTANYL CITRATE 25 MCG: 50 INJECTION, SOLUTION INTRAMUSCULAR; INTRAVENOUS at 18:13

## 2020-03-11 RX ADMIN — VANCOMYCIN HYDROCHLORIDE 1.25 G: 10 INJECTION, POWDER, LYOPHILIZED, FOR SOLUTION INTRAVENOUS at 13:22

## 2020-03-11 RX ADMIN — APIXABAN 5 MG: 5 TABLET, FILM COATED ORAL at 20:33

## 2020-03-11 RX ADMIN — ONDANSETRON 4 MG: 2 INJECTION INTRAMUSCULAR; INTRAVENOUS at 13:15

## 2020-03-11 RX ADMIN — PHENYLEPHRINE HYDROCHLORIDE 200 MCG: 10 INJECTION, SOLUTION INTRAMUSCULAR; INTRAVENOUS; SUBCUTANEOUS at 13:42

## 2020-03-11 RX ADMIN — SUGAMMADEX 200 MG: 100 INJECTION, SOLUTION INTRAVENOUS at 15:36

## 2020-03-11 RX ADMIN — HYDROMORPHONE HYDROCHLORIDE 1 MG: 1 INJECTION, SOLUTION INTRAMUSCULAR; INTRAVENOUS; SUBCUTANEOUS at 22:39

## 2020-03-11 ASSESSMENT — PULMONARY FUNCTION TESTS
PIF_VALUE: 26
PIF_VALUE: 30
PIF_VALUE: 15
PIF_VALUE: 23
PIF_VALUE: 13
PIF_VALUE: 4
PIF_VALUE: 27
PIF_VALUE: 31
PIF_VALUE: 14
PIF_VALUE: 21
PIF_VALUE: 14
PIF_VALUE: 30
PIF_VALUE: 1
PIF_VALUE: 24
PIF_VALUE: 4
PIF_VALUE: 32
PIF_VALUE: 24
PIF_VALUE: 14
PIF_VALUE: 30
PIF_VALUE: 31
PIF_VALUE: 22
PIF_VALUE: 25
PIF_VALUE: 30
PIF_VALUE: 0
PIF_VALUE: 19
PIF_VALUE: 26
PIF_VALUE: 31
PIF_VALUE: 32
PIF_VALUE: 30
PIF_VALUE: 32
PIF_VALUE: 13
PIF_VALUE: 21
PIF_VALUE: 13
PIF_VALUE: 33
PIF_VALUE: 32
PIF_VALUE: 30
PIF_VALUE: 26
PIF_VALUE: 25
PIF_VALUE: 21
PIF_VALUE: 33
PIF_VALUE: 29
PIF_VALUE: 24
PIF_VALUE: 2
PIF_VALUE: 33
PIF_VALUE: 30
PIF_VALUE: 31
PIF_VALUE: 21
PIF_VALUE: 29
PIF_VALUE: 2
PIF_VALUE: 25
PIF_VALUE: 27
PIF_VALUE: 27
PIF_VALUE: 25
PIF_VALUE: 33
PIF_VALUE: 25
PIF_VALUE: 30
PIF_VALUE: 14
PIF_VALUE: 0
PIF_VALUE: 21
PIF_VALUE: 30
PIF_VALUE: 31
PIF_VALUE: 22
PIF_VALUE: 31
PIF_VALUE: 28
PIF_VALUE: 24
PIF_VALUE: 11
PIF_VALUE: 21
PIF_VALUE: 30
PIF_VALUE: 1
PIF_VALUE: 25
PIF_VALUE: 25
PIF_VALUE: 3
PIF_VALUE: 13
PIF_VALUE: 30
PIF_VALUE: 12
PIF_VALUE: 29
PIF_VALUE: 32
PIF_VALUE: 32
PIF_VALUE: 33
PIF_VALUE: 0
PIF_VALUE: 21
PIF_VALUE: 21
PIF_VALUE: 25
PIF_VALUE: 1
PIF_VALUE: 31
PIF_VALUE: 31
PIF_VALUE: 4
PIF_VALUE: 21
PIF_VALUE: 0
PIF_VALUE: 21
PIF_VALUE: 1
PIF_VALUE: 30
PIF_VALUE: 31
PIF_VALUE: 22
PIF_VALUE: 21
PIF_VALUE: 31
PIF_VALUE: 30
PIF_VALUE: 21
PIF_VALUE: 35
PIF_VALUE: 13
PIF_VALUE: 33
PIF_VALUE: 22
PIF_VALUE: 26
PIF_VALUE: 24
PIF_VALUE: 25
PIF_VALUE: 4
PIF_VALUE: 30
PIF_VALUE: 5
PIF_VALUE: 25
PIF_VALUE: 32
PIF_VALUE: 31
PIF_VALUE: 22
PIF_VALUE: 31
PIF_VALUE: 14
PIF_VALUE: 23
PIF_VALUE: 4
PIF_VALUE: 14
PIF_VALUE: 25
PIF_VALUE: 5
PIF_VALUE: 24
PIF_VALUE: 30
PIF_VALUE: 32
PIF_VALUE: 2
PIF_VALUE: 32
PIF_VALUE: 23
PIF_VALUE: 17
PIF_VALUE: 1
PIF_VALUE: 34
PIF_VALUE: 27
PIF_VALUE: 22
PIF_VALUE: 24
PIF_VALUE: 29
PIF_VALUE: 31
PIF_VALUE: 25
PIF_VALUE: 21
PIF_VALUE: 24
PIF_VALUE: 4
PIF_VALUE: 25
PIF_VALUE: 32
PIF_VALUE: 31
PIF_VALUE: 4
PIF_VALUE: 22
PIF_VALUE: 25
PIF_VALUE: 26
PIF_VALUE: 21
PIF_VALUE: 23
PIF_VALUE: 16
PIF_VALUE: 2
PIF_VALUE: 24
PIF_VALUE: 15
PIF_VALUE: 3
PIF_VALUE: 27
PIF_VALUE: 21
PIF_VALUE: 35
PIF_VALUE: 25
PIF_VALUE: 2
PIF_VALUE: 13
PIF_VALUE: 21
PIF_VALUE: 29
PIF_VALUE: 31
PIF_VALUE: 24
PIF_VALUE: 30
PIF_VALUE: 22
PIF_VALUE: 13
PIF_VALUE: 26
PIF_VALUE: 25
PIF_VALUE: 29
PIF_VALUE: 19
PIF_VALUE: 22
PIF_VALUE: 14
PIF_VALUE: 21
PIF_VALUE: 22
PIF_VALUE: 26
PIF_VALUE: 30
PIF_VALUE: 34
PIF_VALUE: 31

## 2020-03-11 ASSESSMENT — PAIN DESCRIPTION - ORIENTATION
ORIENTATION: RIGHT

## 2020-03-11 ASSESSMENT — PAIN DESCRIPTION - FREQUENCY
FREQUENCY: CONTINUOUS
FREQUENCY: CONTINUOUS
FREQUENCY: INTERMITTENT
FREQUENCY: CONTINUOUS

## 2020-03-11 ASSESSMENT — PAIN DESCRIPTION - DESCRIPTORS
DESCRIPTORS: SHARP
DESCRIPTORS: BURNING;DISCOMFORT
DESCRIPTORS: SHARP
DESCRIPTORS: SHARP

## 2020-03-11 ASSESSMENT — PAIN DESCRIPTION - ONSET
ONSET: AWAKENED FROM SLEEP
ONSET: AWAKENED FROM SLEEP
ONSET: ON-GOING

## 2020-03-11 ASSESSMENT — PAIN DESCRIPTION - PAIN TYPE
TYPE: ACUTE PAIN;SURGICAL PAIN
TYPE: SURGICAL PAIN

## 2020-03-11 ASSESSMENT — PAIN DESCRIPTION - LOCATION
LOCATION: CHEST;INCISION
LOCATION: RIB CAGE
LOCATION: RIB CAGE
LOCATION: RIB CAGE;CHEST;SHOULDER

## 2020-03-11 ASSESSMENT — PAIN DESCRIPTION - PROGRESSION
CLINICAL_PROGRESSION: NOT CHANGED
CLINICAL_PROGRESSION: GRADUALLY IMPROVING
CLINICAL_PROGRESSION: NOT CHANGED
CLINICAL_PROGRESSION: GRADUALLY IMPROVING
CLINICAL_PROGRESSION: NOT CHANGED

## 2020-03-11 ASSESSMENT — PAIN SCALES - GENERAL
PAINLEVEL_OUTOF10: 6
PAINLEVEL_OUTOF10: 10
PAINLEVEL_OUTOF10: 7
PAINLEVEL_OUTOF10: 4
PAINLEVEL_OUTOF10: 5
PAINLEVEL_OUTOF10: 8
PAINLEVEL_OUTOF10: 5
PAINLEVEL_OUTOF10: 10
PAINLEVEL_OUTOF10: 10
PAINLEVEL_OUTOF10: 0

## 2020-03-11 ASSESSMENT — ENCOUNTER SYMPTOMS: SHORTNESS OF BREATH: 1

## 2020-03-11 ASSESSMENT — LIFESTYLE VARIABLES: SMOKING_STATUS: 1

## 2020-03-11 ASSESSMENT — COPD QUESTIONNAIRES: CAT_SEVERITY: SEVERE

## 2020-03-11 NOTE — ANESTHESIA PRE PROCEDURE
Department of Anesthesiology  Preprocedure Note       Name:  Iveth Gamez   Age:  78 y.o.  :  1950                                          MRN:  [id number]         Date:  3/11/2020      Surgeon: Naya Andino):  Rosalinda Main MD    Procedure: RIGHT VIDEO ASSISTED THORACOSCOPIC SURGERY, POSSIBLE THORACOTOMY WITH WEDGE EXCISION OF RIGHT UPPER LOBE NODULE AND POSSIBLE RIGHT UPPER LOBECTOMY (Right )    Medications prior to admission:   Prior to Admission medications    Medication Sig Start Date End Date Taking?  Authorizing Provider   HYDROcodone-acetaminophen (NORCO) 7.5-325 MG per tablet TAKE 1 TABLET BY MOUTH 3 TIMES A DAY AS NEEDED FOR PAIN 20   Historical Provider, MD   nystatin (MYCOSTATIN) 924437 UNIT/GM cream  20   Historical Provider, MD   omeprazole (PRILOSEC) 40 MG delayed release capsule TAKE 1 CAPSULE BY MOUTH TWICE A DAY 20   Historical Provider, MD   ezetimibe (ZETIA) 10 MG tablet Take 10 mg by mouth daily    Historical Provider, MD   tiotropium (Horris Slain) 18 MCG inhalation capsule Inhale 1 capsule into the lungs daily 20   Rosalinda Main MD   Fexofenadine HCl (MUCINEX ALLERGY PO) Take by mouth daily    Historical Provider, MD   apixaban (ELIQUIS) 5 MG TABS tablet Take 1 tablet by mouth 2 times daily 20   Maricruz Pickett MD   diltiazem (CARDIZEM CD) 120 MG extended release capsule Take 1 capsule by mouth daily 10/29/19   Maricruz Pickett MD   albuterol sulfate HFA (VENTOLIN HFA) 108 (90 Base) MCG/ACT inhaler Inhale 2 puffs into the lungs every 6 hours as needed for Wheezing    Historical Provider, MD   albuterol (PROVENTIL) (2.5 MG/3ML) 0.083% nebulizer solution Take 2.5 mg by nebulization every 6 hours as needed for Wheezing    Historical Provider, MD   topiramate (TOPAMAX) 50 MG tablet Take 50 mg by mouth nightly    Historical Provider, MD   hydrOXYzine (ATARAX) 50 MG tablet Take 50 mg by mouth 1-2 tabs every 4 hours prn    Historical Provider, MD   ferrous sulfate 325 (65 Fe) MG tablet Take 325 mg by mouth daily (with breakfast)    Historical Provider, MD   fluticasone-vilanterol (BREO ELLIPTA) 100-25 MCG/INH AEPB inhaler Inhale 1 puff into the lungs daily    Historical Provider, MD   rOPINIRole (REQUIP) 2 MG tablet Take 3 mg by mouth nightly     Historical Provider, MD   Umeclidinium Bromide (INCRUSE ELLIPTA) 62.5 MCG/INH AEPB Inhale into the lungs daily     Historical Provider, MD   venlafaxine (EFFEXOR-XR) 150 MG XR capsule Take 150 mg by mouth daily    Historical Provider, MD   Multiple Vitamins-Minerals (THERAPEUTIC MULTIVITAMIN-MINERALS) tablet Take 1 tablet by mouth daily    Historical Provider, MD   QUEtiapine (SEROQUEL XR) 300 MG XR tablet Take 300 mg by mouth nightly     Historical Provider, MD   OXYGEN Inhale into the lungs Indications: 2.5 L nightly     Historical Provider, MD       Current medications:    Current Facility-Administered Medications   Medication Dose Route Frequency Provider Last Rate Last Dose    lactated ringers infusion   Intravenous Continuous Dionne Rich DO        0.9 % sodium chloride infusion   Intravenous Continuous Yolanda Isaacs MD        vancomycin (VANCOCIN) 1,250 mg in dextrose 5 % 250 mL IVPB  15 mg/kg Intravenous Once Michelle Luong MD           Allergies:     Allergies   Allergen Reactions    Buspirone Other (See Comments)     unsure    Lisinopril Other (See Comments)     Low blood pressure per patient 59/29     Penicillins Nausea Only       Problem List:    Patient Active Problem List   Diagnosis Code    CHF (congestive heart failure) ([facility]) I50.9    COPD (chronic obstructive pulmonary disease) ([facility]) J44.9    Hyperlipidemia E78.5    Essential hypertension I10    Diarrhea R19.7    Diabetes mellitus type 2 in obese ([facility]) E11.69, E66.9    Pulmonary edema J81.1    Sepsis ([facility] A41.9    C. difficile colitis A04.72    NSTEMI (non-ST elevated myocardial infarction) ([facility] I21.4    [facility] exacerb[facility] J44.1    Disorder of electrolytes E87.8    Unstable angina pectoris ([facility]) I20.0    MEG (obstructive sleep apnea) G47.33    Obesity, Class III, BMI 40-49.9 (morbid obesity) ([facility]) E66.01    Acute respiratory failure with hypoxemia ([facility]) J96.01    Pneumonia of right lower lobe due to infectious organism ([facility]) J18.1    Acute hypercapnic respiratory failure ([facility]) J96.02    Hemothorax on right J94.2    Right rib fracture S22.31XA    Atelectasis J98.11    Simple chronic bronchitis ([facility]) J41.0    Multiple closed fractures of ribs of right side S22.41XA    Obesity (BMI 35.0-39.9 without comorbidity) E66.9    S/P chest tube placement ([facility]) Z93.8    PAF (paroxysmal atrial fibrillation) ([facility]) I48.0    Tobacco abuse Z72.0       Past Medical History:        Diagnosis Date    [facility]     Arthritis     Clostridium difficile diarrhea 6/26/15    COPD (chronic obstructive pulmonary disease) ([facility])     COPD exacerbation ([facility])     Diabetes mellitus ([facility])     Emphysema of lung ([facility])     Hyperlipidemia     Hypertension     O2 dependent     2 L/min at night    Pneumonia 2/5/2018    Rib pain     Sleep apnea     does not use cpap    Small bowel obstruction ([facility])     pt denies       Past Surgical History:        Procedure Laterality Date    ANKLE ARTHROSCOPY      x2    APPENDECTOMY      CARPAL TUNNEL RELEASE Left     CHOLECYSTECTOMY      HYSTERECTOMY      ROTATOR CUFF REPAIR Bilateral     TONSILLECTOMY         Social History:    Social History     Tobacco Use    Smoking status: Current Every Day Smoker     Packs/day: 1.00     Years: 50.00     Pack years: 50.00     Types: Cigarettes    Smokeless tobacco: Never Used    Tobacco comment: smoked 3 cigarettes since Feb 18   Substance Use Topics    Alcohol use: No     Alcohol/week: 0.0 standard drinks                                Ready to quit: Not Answered  Counseling given: Not Answered  Comment: smoked 3 cigarettes since Feb 18      Vital Signs (Current):  There asthma: current smoker          Patient did not smoke on day of surgery. Cardiovascular:  Exercise tolerance: poor (<4 METS),   (+) hypertension:, angina:, past MI:, CHF:,                   Neuro/Psych:      (-) seizures and CVA           GI/Hepatic/Renal:   (+) GERD:,           Endo/Other:        (-) diabetes mellitus               Abdominal:           Vascular:                                        Anesthesia Plan      general     ASA 3     (-npo [place]  -recent afib diagnosis, stopped elliquis x4 days  -2 yr ago severe coughing and had many rib fractures- was awake on ventilator and is very anxious about that  -MI 5 yr ago  -quit smoking last month      Echo 2018  ~Left Ventricle: Normal left ventricle size. Vigorous systolic function with no segmental wall motion abnormalities, estimated ejection fraction 70%. Mild concentric left ventricular hypertrophy. Mild diastolic dysfunction.    ~Right Ventricle: normal size and function    ~Left Atrium: normal size    ~Right Atrium: normal size    ~Mitral Valve: minimal annular calcification, valve leaflets structurally normal with normal mobility, trivial regurgitation    ~Aortic Valve: not well visualized, minimally thickened, normal mobility    ~Tricuspid Valve: not well visualized, normal mobility, trivial regurgitation    ~Pulmonic Valve: not well visualized, normal velocity    ~Great Vessels: normal aortic root size    ~Pericardium: No pericardial effusion.)  Induction: intravenous. arterial line  MIPS: Postoperative opioids intended. Anesthetic plan and risks discussed with patient. Plan discussed with CRNA.     Attending anesthesiologist reviewed and agrees with Pre Eval content              Heri Mccullough MD   3/11/2020

## 2020-03-11 NOTE — PROGRESS NOTES
Patient received from the OR to PACU #18 post RIGHT VIDEO ASSISTED THORACOSCOPIC SURGERY; WEDGE EXCISION OF RIGHT UPPER LOBE FOLLOWED BY RIGHT UPPER LOBECTOMY; INTERCOSTAL NERVE BLOCK (Right Chest) of Dr. Ebony Abad. Placed on PACU monitoring equipment. Report given per CRNA and Dr. Blossom Garcia. Per Blossom Garcia, patient can be admitted to PCU after recovery. Per anesthesia report, patient did require some BP support during the procedure. On arrival, patient is still asleep from surgery with no evidence of pain. CT to suction with hearty air leak and bright red drainage. CT is banded.

## 2020-03-11 NOTE — FLOWSHEET NOTE
Family was not present in the [facility] when Dr. Mirian Jo attempted to update them after surgery. Now in the [facility] and asking for an update from the doctor. Dr. Olivia Bray paged.

## 2020-03-11 NOTE — ANESTHESIA PROCEDURE NOTES
Arterial Line:    An arterial line was placed using ultrasound guidance and surface landmarks, in the OR for the following indication(s): continuous blood pressure monitoring and blood sampling needed. A 20 gauge (size), 8 cm (length), Arrow (type) catheter was placed, Seldinger technique used, into the left brachial artery, secured by tape, Tegaderm and suture and BIOPATCH. Anesthesia type: Local and General    Events:  patient tolerated procedure well with no complications and EBL < 5mL.   3/11/2020 3:36 PM3/11/2020 2:43 PM  Anesthesiologist: Annel Smith MD  Performed: Anesthesiologist   Preanesthetic Checklist  Completed: patient identified, site marked, surgical consent, pre-op evaluation, timeout performed, IV checked, risks and benefits discussed, monitors and equipment checked, anesthesia consent given, oxygen available and patient being monitored

## 2020-03-11 NOTE — ANESTHESIA POSTPROCEDURE EVALUATION
Department of Anesthesiology  Postprocedure Note    Patient: Kaya Calderon  MRN: [id number]  YOB: 1950  Date of evaluation: 3/11/2020  Time:  6:54 PM     Procedure Summary     Date:  03/11/20 Room / Location:  [address] [place] [address]    Anesthesia Start:  4774 Anesthesia Stop:  1776    Procedure:  RIGHT VIDEO ASSISTED THORACOSCOPIC SURGERY; WEDGE EXCISION OF RIGHT UPPER LOBE FOLLOWED BY RIGHT UPPER LOBECTOMY; INTERCOSTAL NERVE BLOCK (Right Chest) Diagnosis:       Nodule of right lung      (RIGHT UPPER LOBE LUNG NODULE R91.1)    Surgeon:  Mary Nguyen MD Responsible Provider:  Nicci Jesus DO    Anesthesia Type:  general ASA Status:  3          Anesthesia Type: general    Harman Phase I: Harman Score: 8    Harman Phase II:      Last vitals: Reviewed and per EMR flowsheets.        Anesthesia Post Evaluation    Patient location during evaluation: PACU  Patient participation: complete - patient participated  Level of consciousness: awake and alert  Airway patency: patent  Nausea & Vomiting: no nausea and no vomiting  Cardiovascular status: blood pressure returned to baseline  Respiratory status: acceptable  Hydration status: euvolemic

## 2020-03-12 ENCOUNTER — APPOINTMENT (OUTPATIENT)
Dept: GENERAL RADIOLOGY | Age: 70
DRG: 163 | End: 2020-03-12
Attending: THORACIC SURGERY (CARDIOTHORACIC VASCULAR SURGERY)
Payer: MEDICARE

## 2020-03-12 LAB
A/G RATIO: 1.4 (ref 1.1–2.2)
ALBUMIN SERPL-MCNC: 4.2 G/DL (ref 3.4–5)
ALP BLD-CCNC: 88 U/L (ref 40–129)
ALT SERPL-CCNC: 27 U/L (ref 10–40)
ANION GAP SERPL CALCULATED.3IONS-SCNC: 19 MMOL/L (ref 3–16)
AST SERPL-CCNC: 29 U/L (ref 15–37)
BILIRUB SERPL-MCNC: <0.2 MG/DL (ref 0–1)
BUN BLDV-MCNC: 19 MG/DL (ref 7–20)
CALCIUM SERPL-MCNC: 8.7 MG/DL (ref 8.3–10.6)
CHLORIDE BLD-SCNC: 101 MMOL/L (ref 99–110)
CO2: 16 MMOL/L (ref 21–32)
CREAT SERPL-MCNC: 1 MG/DL (ref 0.6–1.2)
EKG ATRIAL RATE: 110 BPM
EKG DIAGNOSIS: NORMAL
EKG Q-T INTERVAL: 370 MS
EKG QRS DURATION: 152 MS
EKG QTC CALCULATION (BAZETT): 505 MS
EKG R AXIS: 61 DEGREES
EKG T AXIS: 32 DEGREES
EKG VENTRICULAR RATE: 112 BPM
GFR AFRICAN AMERICAN: >60
GFR NON-AFRICAN AMERICAN: 55
GLOBULIN: 3 G/DL
GLUCOSE BLD-MCNC: 161 MG/DL (ref 70–99)
HCT VFR BLD CALC: 39.7 % (ref 36–48)
HEMOGLOBIN: 12.9 G/DL (ref 12–16)
MAGNESIUM: 1.7 MG/DL (ref 1.8–2.4)
MCH RBC QN AUTO: 32 PG (ref 26–34)
MCHC RBC AUTO-ENTMCNC: 32.5 G/DL (ref 31–36)
MCV RBC AUTO: 98.2 FL (ref 80–100)
PDW BLD-RTO: 13.6 % (ref 12.4–15.4)
PHOSPHORUS: 5.1 MG/DL (ref 2.5–4.9)
PLATELET # BLD: 266 K/UL (ref 135–450)
PMV BLD AUTO: 7.5 FL (ref 5–10.5)
POTASSIUM REFLEX MAGNESIUM: 4.6 MMOL/L (ref 3.5–5.1)
RBC # BLD: 4.04 M/UL (ref 4–5.2)
SODIUM BLD-SCNC: 136 MMOL/L (ref 136–145)
TOTAL PROTEIN: 7.2 G/DL (ref 6.4–8.2)
WBC # BLD: 17.1 K/UL (ref 4–11)

## 2020-03-12 PROCEDURE — 36415 COLL VENOUS BLD VENIPUNCTURE: CPT

## 2020-03-12 PROCEDURE — 94669 MECHANICAL CHEST WALL OSCILL: CPT

## 2020-03-12 PROCEDURE — 6370000000 HC RX 637 (ALT 250 FOR IP): Performed by: STUDENT IN AN ORGANIZED HEALTH CARE EDUCATION/TRAINING PROGRAM

## 2020-03-12 PROCEDURE — 84100 ASSAY OF PHOSPHORUS: CPT

## 2020-03-12 PROCEDURE — 93010 ELECTROCARDIOGRAM REPORT: CPT | Performed by: INTERNAL MEDICINE

## 2020-03-12 PROCEDURE — 85027 COMPLETE CBC AUTOMATED: CPT

## 2020-03-12 PROCEDURE — 94640 AIRWAY INHALATION TREATMENT: CPT

## 2020-03-12 PROCEDURE — 2500000003 HC RX 250 WO HCPCS: Performed by: THORACIC SURGERY (CARDIOTHORACIC VASCULAR SURGERY)

## 2020-03-12 PROCEDURE — 80053 COMPREHEN METABOLIC PANEL: CPT

## 2020-03-12 PROCEDURE — 71045 X-RAY EXAM CHEST 1 VIEW: CPT

## 2020-03-12 PROCEDURE — 94761 N-INVAS EAR/PLS OXIMETRY MLT: CPT

## 2020-03-12 PROCEDURE — 6360000002 HC RX W HCPCS: Performed by: STUDENT IN AN ORGANIZED HEALTH CARE EDUCATION/TRAINING PROGRAM

## 2020-03-12 PROCEDURE — 93005 ELECTROCARDIOGRAM TRACING: CPT | Performed by: STUDENT IN AN ORGANIZED HEALTH CARE EDUCATION/TRAINING PROGRAM

## 2020-03-12 PROCEDURE — 83735 ASSAY OF MAGNESIUM: CPT

## 2020-03-12 PROCEDURE — 2700000000 HC OXYGEN THERAPY PER DAY

## 2020-03-12 PROCEDURE — 2580000003 HC RX 258: Performed by: STUDENT IN AN ORGANIZED HEALTH CARE EDUCATION/TRAINING PROGRAM

## 2020-03-12 PROCEDURE — 2580000003 HC RX 258: Performed by: THORACIC SURGERY (CARDIOTHORACIC VASCULAR SURGERY)

## 2020-03-12 PROCEDURE — 6360000002 HC RX W HCPCS: Performed by: THORACIC SURGERY (CARDIOTHORACIC VASCULAR SURGERY)

## 2020-03-12 PROCEDURE — 2060000000 HC ICU INTERMEDIATE R&B

## 2020-03-12 PROCEDURE — 6370000000 HC RX 637 (ALT 250 FOR IP): Performed by: SURGERY

## 2020-03-12 PROCEDURE — 6370000000 HC RX 637 (ALT 250 FOR IP): Performed by: THORACIC SURGERY (CARDIOTHORACIC VASCULAR SURGERY)

## 2020-03-12 RX ORDER — DILTIAZEM HYDROCHLORIDE 180 MG/1
180 CAPSULE, COATED, EXTENDED RELEASE ORAL DAILY
Status: DISCONTINUED | OUTPATIENT
Start: 2020-03-12 | End: 2020-03-20 | Stop reason: HOSPADM

## 2020-03-12 RX ORDER — LEVALBUTEROL 1.25 MG/.5ML
1.25 SOLUTION, CONCENTRATE RESPIRATORY (INHALATION) EVERY 6 HOURS SCHEDULED
Status: DISCONTINUED | OUTPATIENT
Start: 2020-03-12 | End: 2020-03-12

## 2020-03-12 RX ORDER — NICOTINE 21 MG/24HR
1 PATCH, TRANSDERMAL 24 HOURS TRANSDERMAL DAILY
Status: DISCONTINUED | OUTPATIENT
Start: 2020-03-12 | End: 2020-03-12

## 2020-03-12 RX ORDER — LEVALBUTEROL 1.25 MG/.5ML
1.25 SOLUTION, CONCENTRATE RESPIRATORY (INHALATION)
Status: DISCONTINUED | OUTPATIENT
Start: 2020-03-12 | End: 2020-03-20 | Stop reason: HOSPADM

## 2020-03-12 RX ORDER — ALBUTEROL SULFATE 2.5 MG/3ML
2.5 SOLUTION RESPIRATORY (INHALATION) EVERY 4 HOURS PRN
Status: DISCONTINUED | OUTPATIENT
Start: 2020-03-12 | End: 2020-03-20 | Stop reason: HOSPADM

## 2020-03-12 RX ORDER — TRAMADOL HYDROCHLORIDE 50 MG/1
25 TABLET ORAL EVERY 6 HOURS PRN
Status: DISCONTINUED | OUTPATIENT
Start: 2020-03-12 | End: 2020-03-20 | Stop reason: HOSPADM

## 2020-03-12 RX ORDER — MAGNESIUM SULFATE 1 G/100ML
1 INJECTION INTRAVENOUS
Status: COMPLETED | OUTPATIENT
Start: 2020-03-12 | End: 2020-03-12

## 2020-03-12 RX ORDER — DIAZEPAM 5 MG/1
5 TABLET ORAL EVERY 6 HOURS PRN
Status: DISCONTINUED | OUTPATIENT
Start: 2020-03-12 | End: 2020-03-18

## 2020-03-12 RX ORDER — TRAMADOL HYDROCHLORIDE 50 MG/1
50 TABLET ORAL EVERY 6 HOURS PRN
Status: DISCONTINUED | OUTPATIENT
Start: 2020-03-12 | End: 2020-03-20 | Stop reason: HOSPADM

## 2020-03-12 RX ORDER — ROPINIROLE 1 MG/1
3 TABLET, FILM COATED ORAL NIGHTLY
Status: DISCONTINUED | OUTPATIENT
Start: 2020-03-12 | End: 2020-03-20 | Stop reason: HOSPADM

## 2020-03-12 RX ORDER — AMIODARONE HYDROCHLORIDE 200 MG/1
400 TABLET ORAL 2 TIMES DAILY
Status: DISPENSED | OUTPATIENT
Start: 2020-03-12 | End: 2020-03-15

## 2020-03-12 RX ORDER — SODIUM CHLORIDE FOR INHALATION 3 %
15 VIAL, NEBULIZER (ML) INHALATION
Status: DISCONTINUED | OUTPATIENT
Start: 2020-03-12 | End: 2020-03-14

## 2020-03-12 RX ORDER — SODIUM CHLORIDE FOR INHALATION 3 %
15 VIAL, NEBULIZER (ML) INHALATION
Status: DISCONTINUED | OUTPATIENT
Start: 2020-03-12 | End: 2020-03-12

## 2020-03-12 RX ORDER — LEVALBUTEROL INHALATION SOLUTION 0.63 MG/3ML
0.63 SOLUTION RESPIRATORY (INHALATION) EVERY 6 HOURS SCHEDULED
Status: DISCONTINUED | OUTPATIENT
Start: 2020-03-12 | End: 2020-03-12

## 2020-03-12 RX ORDER — ALBUTEROL SULFATE 2.5 MG/3ML
2.5 SOLUTION RESPIRATORY (INHALATION) 4 TIMES DAILY
Status: DISCONTINUED | OUTPATIENT
Start: 2020-03-12 | End: 2020-03-12

## 2020-03-12 RX ORDER — NICOTINE 21 MG/24HR
1 PATCH, TRANSDERMAL 24 HOURS TRANSDERMAL DAILY
Status: DISCONTINUED | OUTPATIENT
Start: 2020-03-12 | End: 2020-03-20 | Stop reason: HOSPADM

## 2020-03-12 RX ADMIN — PANTOPRAZOLE SODIUM 40 MG: 40 TABLET, DELAYED RELEASE ORAL at 06:30

## 2020-03-12 RX ADMIN — EZETIMIBE 10 MG: 10 TABLET ORAL at 09:36

## 2020-03-12 RX ADMIN — OXYCODONE 5 MG: 5 TABLET ORAL at 02:26

## 2020-03-12 RX ADMIN — TRAMADOL HYDROCHLORIDE 50 MG: 50 TABLET, FILM COATED ORAL at 14:57

## 2020-03-12 RX ADMIN — HYDROXYZINE HYDROCHLORIDE 50 MG: 25 TABLET, FILM COATED ORAL at 11:45

## 2020-03-12 RX ADMIN — POTASSIUM CHLORIDE, DEXTROSE MONOHYDRATE AND SODIUM CHLORIDE: 150; 5; 450 INJECTION, SOLUTION INTRAVENOUS at 02:38

## 2020-03-12 RX ADMIN — QUETIAPINE FUMARATE 300 MG: 200 TABLET, EXTENDED RELEASE ORAL at 20:13

## 2020-03-12 RX ADMIN — SODIUM CHLORIDE 30 MG/ML INHALATION SOLUTION 15 ML: 30 SOLUTION INHALANT at 16:52

## 2020-03-12 RX ADMIN — ROPINIROLE HYDROCHLORIDE 3 MG: 1 TABLET, FILM COATED ORAL at 02:25

## 2020-03-12 RX ADMIN — DIAZEPAM 5 MG: 5 TABLET ORAL at 21:44

## 2020-03-12 RX ADMIN — MAGNESIUM SULFATE HEPTAHYDRATE 1 G: 1 INJECTION, SOLUTION INTRAVENOUS at 14:40

## 2020-03-12 RX ADMIN — LEVALBUTEROL HYDROCHLORIDE 1.25 MG: 1.25 SOLUTION, CONCENTRATE RESPIRATORY (INHALATION) at 16:52

## 2020-03-12 RX ADMIN — MAGNESIUM SULFATE HEPTAHYDRATE 1 G: 1 INJECTION, SOLUTION INTRAVENOUS at 16:45

## 2020-03-12 RX ADMIN — ROPINIROLE HYDROCHLORIDE 3 MG: 1 TABLET, FILM COATED ORAL at 20:13

## 2020-03-12 RX ADMIN — ALBUTEROL SULFATE 2.5 MG: 2.5 SOLUTION RESPIRATORY (INHALATION) at 09:55

## 2020-03-12 RX ADMIN — HYDROMORPHONE HYDROCHLORIDE 1 MG: 1 INJECTION, SOLUTION INTRAMUSCULAR; INTRAVENOUS; SUBCUTANEOUS at 09:33

## 2020-03-12 RX ADMIN — HYDROMORPHONE HYDROCHLORIDE 1 MG: 1 INJECTION, SOLUTION INTRAMUSCULAR; INTRAVENOUS; SUBCUTANEOUS at 04:05

## 2020-03-12 RX ADMIN — APIXABAN 5 MG: 5 TABLET, FILM COATED ORAL at 09:33

## 2020-03-12 RX ADMIN — MAGNESIUM SULFATE HEPTAHYDRATE 1 G: 1 INJECTION, SOLUTION INTRAVENOUS at 13:24

## 2020-03-12 RX ADMIN — HYDROMORPHONE HYDROCHLORIDE 1 MG: 1 INJECTION, SOLUTION INTRAMUSCULAR; INTRAVENOUS; SUBCUTANEOUS at 18:26

## 2020-03-12 RX ADMIN — APIXABAN 5 MG: 5 TABLET, FILM COATED ORAL at 20:13

## 2020-03-12 RX ADMIN — SODIUM CHLORIDE 30 MG/ML INHALATION SOLUTION 15 ML: 30 SOLUTION INHALANT at 13:21

## 2020-03-12 RX ADMIN — ACETAMINOPHEN 1000 MG: 500 TABLET ORAL at 11:53

## 2020-03-12 RX ADMIN — ACETAMINOPHEN 1000 MG: 500 TABLET ORAL at 06:30

## 2020-03-12 RX ADMIN — ACETAMINOPHEN 1000 MG: 500 TABLET ORAL at 18:23

## 2020-03-12 RX ADMIN — ACETAMINOPHEN 1000 MG: 500 TABLET ORAL at 23:47

## 2020-03-12 RX ADMIN — HYDROXYZINE HYDROCHLORIDE 50 MG: 25 TABLET, FILM COATED ORAL at 04:25

## 2020-03-12 RX ADMIN — AMIODARONE HYDROCHLORIDE 400 MG: 200 TABLET ORAL at 09:33

## 2020-03-12 RX ADMIN — LEVALBUTEROL HYDROCHLORIDE 1.25 MG: 1.25 SOLUTION, CONCENTRATE RESPIRATORY (INHALATION) at 19:59

## 2020-03-12 RX ADMIN — AMIODARONE HYDROCHLORIDE 400 MG: 200 TABLET ORAL at 20:13

## 2020-03-12 RX ADMIN — SODIUM CHLORIDE 30 MG/ML INHALATION SOLUTION 15 ML: 30 SOLUTION INHALANT at 19:59

## 2020-03-12 RX ADMIN — OXYCODONE 10 MG: 5 TABLET ORAL at 06:30

## 2020-03-12 RX ADMIN — FERROUS SULFATE TAB 325 MG (65 MG ELEMENTAL FE) 325 MG: 325 (65 FE) TAB at 09:34

## 2020-03-12 RX ADMIN — OXYCODONE 10 MG: 5 TABLET ORAL at 11:45

## 2020-03-12 RX ADMIN — Medication 10 ML: at 20:13

## 2020-03-12 RX ADMIN — HYDROXYZINE HYDROCHLORIDE 50 MG: 25 TABLET, FILM COATED ORAL at 20:13

## 2020-03-12 RX ADMIN — DIAZEPAM 5 MG: 5 TABLET ORAL at 14:57

## 2020-03-12 RX ADMIN — DILTIAZEM HYDROCHLORIDE 180 MG: 180 CAPSULE, COATED, EXTENDED RELEASE ORAL at 09:33

## 2020-03-12 RX ADMIN — Medication 10 ML: at 09:34

## 2020-03-12 RX ADMIN — LEVALBUTEROL 1.25 MG: 1.25 SOLUTION RESPIRATORY (INHALATION) at 13:22

## 2020-03-12 RX ADMIN — DIAZEPAM 5 MG: 5 TABLET ORAL at 08:07

## 2020-03-12 ASSESSMENT — PAIN DESCRIPTION - PROGRESSION
CLINICAL_PROGRESSION: NOT CHANGED
CLINICAL_PROGRESSION: NOT CHANGED
CLINICAL_PROGRESSION: GRADUALLY IMPROVING

## 2020-03-12 ASSESSMENT — PAIN SCALES - GENERAL
PAINLEVEL_OUTOF10: 8
PAINLEVEL_OUTOF10: 8
PAINLEVEL_OUTOF10: 10
PAINLEVEL_OUTOF10: 0
PAINLEVEL_OUTOF10: 8
PAINLEVEL_OUTOF10: 5
PAINLEVEL_OUTOF10: 7
PAINLEVEL_OUTOF10: 9
PAINLEVEL_OUTOF10: 4
PAINLEVEL_OUTOF10: 6
PAINLEVEL_OUTOF10: 9
PAINLEVEL_OUTOF10: 4

## 2020-03-12 ASSESSMENT — PAIN DESCRIPTION - DESCRIPTORS
DESCRIPTORS: SHARP

## 2020-03-12 ASSESSMENT — PAIN DESCRIPTION - LOCATION
LOCATION: RIB CAGE

## 2020-03-12 ASSESSMENT — PAIN DESCRIPTION - PAIN TYPE
TYPE: SURGICAL PAIN

## 2020-03-12 ASSESSMENT — PAIN DESCRIPTION - ORIENTATION
ORIENTATION: RIGHT

## 2020-03-12 ASSESSMENT — PAIN DESCRIPTION - FREQUENCY
FREQUENCY: CONTINUOUS
FREQUENCY: CONTINUOUS
FREQUENCY: INTERMITTENT
FREQUENCY: CONTINUOUS

## 2020-03-12 ASSESSMENT — PAIN DESCRIPTION - ONSET
ONSET: AWAKENED FROM SLEEP
ONSET: AWAKENED FROM SLEEP
ONSET: ON-GOING
ONSET: ON-GOING

## 2020-03-12 NOTE — PROGRESS NOTES
[facility]. Patient remains on 3L NC. Currently up in recliner.  Will continue to monitor

## 2020-03-12 NOTE — PROGRESS NOTES
Patient admitted to room 4459 from Same Day Surgery. Patient oriented to room, call light, bed rails, phone, lights and bathroom. Patient instructed about the schedule of the day including: vital sign frequency, lab draws, possible tests, frequency of MD and staff rounds, including RN/MD rounding together at bedside, daily weights, and I &O's. Patient instructed about prescribed diet, how to use 8MENU, and television. Bed alarm in place, patient aware of placement and reason. Telemetry box in place, patient aware of placement and reason. Bed locked, in lowest position, side rails up 2/4, call light within reach. Chest tube hooked up to -20mmHG suction; dressing c/d/i. Assessment completed. Daughters at bedside. Will continue to monitor.   Khanh Moncada

## 2020-03-12 NOTE — PROGRESS NOTES
Cardiothoracic Surgery Daily Progress Note      CC: RUL lung nodule    SUBJECTIVE:  Patient with some pain control issues overnight, was tachycardic, EKG showed sinus tach. Pain is controlled this AM.     ROS:   A 14 point review of systems was conducted, significant findings as noted above. All other systems negative.     OBJECTIVE:    PHYSICAL EXAM:  Vitals:    03/12/20 0000 03/12/20 0038 03/12/20 0224 03/12/20 0405   BP: 111/77  102/75 105/81   Pulse: 113  112 113   Resp:   20 22   Temp:    98.3 °F (36.8 °C)   TempSrc:    Oral   SpO2: 95% 94%  97%   Weight:       Height:           General appearance: alert, no acute distress, grooming appropriate  Chest/Lungs: Increased breath sounds, slight increased effort of breathing, on 3L NC, incision c/d/i, chest tube dressing c/d/i, chest tube to suction, with significant air leak, tidaling, ss output  Cardiovascular: Tachycardic  Abdomen: soft, non-tender, non-distended, incisions c/d/i  : fernandes in place, clear yellow urine  Extremities: no edema, no cyanosis  Neuro: A&Ox3, no focal deficits, sensation intact       ASSESSMENT & PLAN:   This is a 78y.o. year old female with a diagnosis of RUL lung nodule s/p RIGHT VIDEO ASSISTED THORACOSCOPIC SURGERY; WEDGE EXCISION OF RIGHT UPPER LOBE FOLLOWED BY RIGHT UPPER LOBECTOMY; INTERCOSTAL NERVE BLOCK POD1    - AM CXR with persistent pneumothorax versus expected empty space given recent surgery, increased atelectasis  - CT to remain to suction, will discuss with staff timing of water seal  - home dilt re-starting this AM, will continue to monitor heart rate closely  - aggressive pulm toilet : hypertonic saline and albuterol Q4H scheduled, acapella, IS  - OOB, ambulate, PT/OT  - will likely d/c fernandes, please wait for order to remove  - SLIV  - continue general diet    Caroline Villanueva DO  PGY1, General Surgery  03/12/20  6:36 AM  [phone]

## 2020-03-12 NOTE — PROGRESS NOTES
RESPIRATORY THERAPY ASSESSMENT    Name:  Iveth Gamez  Medical Record Number:  [id number]  Age: 78 y.o. Gender: female  : 1950  Today's Date:  3/11/2020  Room:  4106/2519-51    Assessment     Is the patient being admitted for a COPD or Asthma exacerbation? No   (If yes the patient will be seen every 4 hours for the first 24 hours and then reassessed)    Patient Admission Diagnosis      Allergies  Allergies   Allergen Reactions    Buspirone Other (See Comments)     unsure    Lisinopril Other (See Comments)     Low blood pressure per patient 59/29     Penicillins Nausea Only       Minimum Predicted Vital Capacity:     918          Actual Vital Capacity:      750              Pulmonary History:COPD and CHF/Pulmonary Edema. MEG  Home Oxygen Therapy:  2.5 liters/min via nasal cannula at night  Home Respiratory Therapy:Albuterol, Tiotropium Bromide and Vilanterol/Fluticasone Furoate   Current Respiratory Therapy:  Albuterol hhn qid. Marysol Fortino. IS qid. Treatment Type: HHN, Vibratory mucous clearing therapy or intervention, IS  Medications: Albuterol    Respiratory Severity Index(RSI)   Patients with orders for inhalation medications, oxygen, or any therapeutic treatment modality will be placed on Respiratory Protocol. They will be assessed with the first treatment and at least every 72 hours thereafter. The following severity scale will be used to determine frequency of treatment intervention.     Smoking History: Pulmonary Disease or Smoking History, Greater than 15 pack year = 2    Social History  Social History     Tobacco Use    Smoking status: Former Smoker     Packs/day: 1.00     Years: 50.00     Pack years: 50.00     Types: Cigarettes     Last attempt to quit: 2020     Years since quittin.0    Smokeless tobacco: Never Used    Tobacco comment: smoked 3 cigarettes since    Substance Use Topics    Alcohol use: No     Alcohol/week: 0.0 standard drinks    Drug use: No       Recent initiated for medication and therapeutic interventions upon initiation of aerosolized medication. 2. Physician will be contacted for respiratory rate (RR) greater than 35 breaths per minute. Therapy will be held for heart rate (HR) greater than 140 beats per minute, pending direction from physician. 3. Bronchodilators will be administered via Metered Dose Inhaler (MDI) with spacer when the following criteria are met:  a. Alert and cooperative     b. HR < 140 bpm  c. RR < 30 bpm                d. Can demonstrate a 2-3 second inspiratory hold  4. Bronchodilators will be administered via Hand Held Nebulizer [facility]) to patients when ANY of the following criteria are met  a. Incognizant or uncooperative          b. Patients treated with HHN at Home        c. Unable to demonstrate proper use of MDI with spacer     d. RR > 30 bpm   5. Bronchodilators will be delivered via Metered Dose Inhaler (MDI), HHN, Aerogen to intubated patients on mechanical ventilation. 6. Inhalation medication orders will be delivered and/or substituted as outlined below. Aerosolized Medications Ordering and Administration Guidelines:    1. All Medications will be ordered by a physician, and their frequency and/or modality will be adjusted as defined by the patients Respiratory Severity Index (RSI) score. 2. If the patient does not have documented COPD, consider discontinuing anticholinergics when RSI is less than 9.  3. If the bronchospasm worsens (increased RSI), then the bronchodilator frequency can be increased to a maximum of every 4 hours. If greater than every 4 hours is required, the physician will be contacted. 4. If the bronchospasm improves, the frequency of the bronchodilator can be decreased, based on the patient's RSI, but not less than home treatment regimen frequency. 5. Bronchodilator(s) will be discontinued if patient has a RSI less than 9 and has received no scheduled or as needed treatment for 72  Hrs.     Patients Ordered on a Mucolytic Agent:    1. Must always be administered with a bronchodilator. 2. Discontinue if patient experiences worsened bronchospasm, or secretions have lessened to the point that the patient is able to clear them with a cough. Anti-inflammatory and Combination Medications:    1. If the patient lacks prior history of lung disease, is not using inhaled anti-inflammatory medication at home, and lacks wheezing by examination or by history for at least 24 hours, contact physician for possible discontinuation.

## 2020-03-12 NOTE — PROGRESS NOTES
4 Eyes Admission Assessment     I agree as the admission nurse that 2 RN's have performed a thorough Head to Toe Skin Assessment on the patient. ALL assessment sites listed below have been assessed on admission. Areas assessed by both nurses:  [x]   Head, Face, and Ears   [x]   Shoulders, Back, and Chest =; chest tube to right flank/rib cage, x3 lap sites  [x]   Arms, Elbows, and Hands   [x]   Coccyx, Sacrum, and Ischum  [x]   Legs, Feet, and Heels        Does the Patient have Skin Breakdown?   No         Michelet Prevention initiated:  NA   Wound Care Orders initiated:  NA      [facility] nurse consulted for Pressure Injury (Stage 3,4, Unstageable, DTI, NWPT, and Complex wounds):  NA      Nurse 1 eSignature: Electronically signed by Jessica Jackson RN on 3/12/20 at 1:38 AM EDT    **SHARE this note so that the co-signing nurse is able to place an eSignature**    Nurse 2 eSignature: Electronically signed by Sonia Alfaro RN on 3/12/20 at 6:20 AM EDT

## 2020-03-12 NOTE — PROGRESS NOTES
Patient itching after receiving Atarax, 0425. Surg residents making rounds, informed them of this and inquired about Benadryl. Patient also anxious and figgity constantly. Will continue to monitor.

## 2020-03-13 ENCOUNTER — APPOINTMENT (OUTPATIENT)
Dept: GENERAL RADIOLOGY | Age: 70
DRG: 163 | End: 2020-03-13
Attending: THORACIC SURGERY (CARDIOTHORACIC VASCULAR SURGERY)
Payer: MEDICARE

## 2020-03-13 LAB
ALBUMIN SERPL-MCNC: 3.4 G/DL (ref 3.4–5)
ANION GAP SERPL CALCULATED.3IONS-SCNC: 12 MMOL/L (ref 3–16)
BASOPHILS ABSOLUTE: 0 K/UL (ref 0–0.2)
BASOPHILS RELATIVE PERCENT: 0.1 %
BUN BLDV-MCNC: 19 MG/DL (ref 7–20)
CALCIUM SERPL-MCNC: 8.7 MG/DL (ref 8.3–10.6)
CHLORIDE BLD-SCNC: 98 MMOL/L (ref 99–110)
CO2: 24 MMOL/L (ref 21–32)
CREAT SERPL-MCNC: 0.7 MG/DL (ref 0.6–1.2)
EOSINOPHILS ABSOLUTE: 0.1 K/UL (ref 0–0.6)
EOSINOPHILS RELATIVE PERCENT: 0.7 %
GFR AFRICAN AMERICAN: >60
GFR NON-AFRICAN AMERICAN: >60
GLUCOSE BLD-MCNC: 141 MG/DL (ref 70–99)
HCT VFR BLD CALC: 30.1 % (ref 36–48)
HEMOGLOBIN: 9.9 G/DL (ref 12–16)
LYMPHOCYTES ABSOLUTE: 2.6 K/UL (ref 1–5.1)
LYMPHOCYTES RELATIVE PERCENT: 21.2 %
MAGNESIUM: 2 MG/DL (ref 1.8–2.4)
MCH RBC QN AUTO: 31.7 PG (ref 26–34)
MCHC RBC AUTO-ENTMCNC: 33 G/DL (ref 31–36)
MCV RBC AUTO: 96.3 FL (ref 80–100)
MONOCYTES ABSOLUTE: 1.2 K/UL (ref 0–1.3)
MONOCYTES RELATIVE PERCENT: 9.8 %
NEUTROPHILS ABSOLUTE: 8.4 K/UL (ref 1.7–7.7)
NEUTROPHILS RELATIVE PERCENT: 68.2 %
PDW BLD-RTO: 13 % (ref 12.4–15.4)
PHOSPHORUS: 2.8 MG/DL (ref 2.5–4.9)
PLATELET # BLD: 193 K/UL (ref 135–450)
PMV BLD AUTO: 7.3 FL (ref 5–10.5)
POTASSIUM SERPL-SCNC: 4 MMOL/L (ref 3.5–5.1)
RBC # BLD: 3.12 M/UL (ref 4–5.2)
SODIUM BLD-SCNC: 134 MMOL/L (ref 136–145)
WBC # BLD: 12.3 K/UL (ref 4–11)

## 2020-03-13 PROCEDURE — 6370000000 HC RX 637 (ALT 250 FOR IP): Performed by: STUDENT IN AN ORGANIZED HEALTH CARE EDUCATION/TRAINING PROGRAM

## 2020-03-13 PROCEDURE — 80069 RENAL FUNCTION PANEL: CPT

## 2020-03-13 PROCEDURE — 6370000000 HC RX 637 (ALT 250 FOR IP): Performed by: SURGERY

## 2020-03-13 PROCEDURE — 2060000000 HC ICU INTERMEDIATE R&B

## 2020-03-13 PROCEDURE — 71045 X-RAY EXAM CHEST 1 VIEW: CPT

## 2020-03-13 PROCEDURE — 97162 PT EVAL MOD COMPLEX 30 MIN: CPT

## 2020-03-13 PROCEDURE — 94640 AIRWAY INHALATION TREATMENT: CPT

## 2020-03-13 PROCEDURE — 85025 COMPLETE CBC W/AUTO DIFF WBC: CPT

## 2020-03-13 PROCEDURE — 94761 N-INVAS EAR/PLS OXIMETRY MLT: CPT

## 2020-03-13 PROCEDURE — 97535 SELF CARE MNGMENT TRAINING: CPT

## 2020-03-13 PROCEDURE — 94669 MECHANICAL CHEST WALL OSCILL: CPT

## 2020-03-13 PROCEDURE — 97116 GAIT TRAINING THERAPY: CPT

## 2020-03-13 PROCEDURE — 83735 ASSAY OF MAGNESIUM: CPT

## 2020-03-13 PROCEDURE — 6360000002 HC RX W HCPCS: Performed by: THORACIC SURGERY (CARDIOTHORACIC VASCULAR SURGERY)

## 2020-03-13 PROCEDURE — 97530 THERAPEUTIC ACTIVITIES: CPT

## 2020-03-13 PROCEDURE — 97165 OT EVAL LOW COMPLEX 30 MIN: CPT

## 2020-03-13 PROCEDURE — 6360000002 HC RX W HCPCS: Performed by: STUDENT IN AN ORGANIZED HEALTH CARE EDUCATION/TRAINING PROGRAM

## 2020-03-13 PROCEDURE — 6370000000 HC RX 637 (ALT 250 FOR IP): Performed by: THORACIC SURGERY (CARDIOTHORACIC VASCULAR SURGERY)

## 2020-03-13 PROCEDURE — 36415 COLL VENOUS BLD VENIPUNCTURE: CPT

## 2020-03-13 PROCEDURE — 2700000000 HC OXYGEN THERAPY PER DAY

## 2020-03-13 PROCEDURE — 2580000003 HC RX 258: Performed by: THORACIC SURGERY (CARDIOTHORACIC VASCULAR SURGERY)

## 2020-03-13 RX ADMIN — HYDROMORPHONE HYDROCHLORIDE 1 MG: 1 INJECTION, SOLUTION INTRAMUSCULAR; INTRAVENOUS; SUBCUTANEOUS at 23:50

## 2020-03-13 RX ADMIN — PANTOPRAZOLE SODIUM 40 MG: 40 TABLET, DELAYED RELEASE ORAL at 06:23

## 2020-03-13 RX ADMIN — AMIODARONE HYDROCHLORIDE 400 MG: 200 TABLET ORAL at 21:12

## 2020-03-13 RX ADMIN — LEVALBUTEROL HYDROCHLORIDE 1.25 MG: 1.25 SOLUTION, CONCENTRATE RESPIRATORY (INHALATION) at 15:28

## 2020-03-13 RX ADMIN — TRAMADOL HYDROCHLORIDE 50 MG: 50 TABLET, FILM COATED ORAL at 06:22

## 2020-03-13 RX ADMIN — SODIUM CHLORIDE 30 MG/ML INHALATION SOLUTION 15 ML: 30 SOLUTION INHALANT at 08:05

## 2020-03-13 RX ADMIN — SODIUM CHLORIDE 30 MG/ML INHALATION SOLUTION 15 ML: 30 SOLUTION INHALANT at 15:28

## 2020-03-13 RX ADMIN — EZETIMIBE 10 MG: 10 TABLET ORAL at 10:53

## 2020-03-13 RX ADMIN — LEVALBUTEROL HYDROCHLORIDE 1.25 MG: 1.25 SOLUTION, CONCENTRATE RESPIRATORY (INHALATION) at 08:05

## 2020-03-13 RX ADMIN — DIBASIC SODIUM PHOSPHATE, MONOBASIC POTASSIUM PHOSPHATE AND MONOBASIC SODIUM PHOSPHATE 2 TABLET: 852; 155; 130 TABLET ORAL at 08:12

## 2020-03-13 RX ADMIN — APIXABAN 5 MG: 5 TABLET, FILM COATED ORAL at 21:12

## 2020-03-13 RX ADMIN — ACETAMINOPHEN 1000 MG: 500 TABLET ORAL at 07:51

## 2020-03-13 RX ADMIN — QUETIAPINE FUMARATE 300 MG: 200 TABLET, EXTENDED RELEASE ORAL at 21:13

## 2020-03-13 RX ADMIN — HYDROMORPHONE HYDROCHLORIDE 1 MG: 1 INJECTION, SOLUTION INTRAMUSCULAR; INTRAVENOUS; SUBCUTANEOUS at 14:28

## 2020-03-13 RX ADMIN — LEVALBUTEROL HYDROCHLORIDE 1.25 MG: 1.25 SOLUTION, CONCENTRATE RESPIRATORY (INHALATION) at 11:48

## 2020-03-13 RX ADMIN — FERROUS SULFATE TAB 325 MG (65 MG ELEMENTAL FE) 325 MG: 325 (65 FE) TAB at 08:13

## 2020-03-13 RX ADMIN — DIAZEPAM 5 MG: 5 TABLET ORAL at 09:13

## 2020-03-13 RX ADMIN — SODIUM CHLORIDE 30 MG/ML INHALATION SOLUTION 15 ML: 30 SOLUTION INHALANT at 11:48

## 2020-03-13 RX ADMIN — ROPINIROLE HYDROCHLORIDE 3 MG: 1 TABLET, FILM COATED ORAL at 21:12

## 2020-03-13 RX ADMIN — HYDROMORPHONE HYDROCHLORIDE 1 MG: 1 INJECTION, SOLUTION INTRAMUSCULAR; INTRAVENOUS; SUBCUTANEOUS at 18:16

## 2020-03-13 RX ADMIN — ACETAMINOPHEN 1000 MG: 500 TABLET ORAL at 12:20

## 2020-03-13 RX ADMIN — LEVALBUTEROL HYDROCHLORIDE 1.25 MG: 1.25 SOLUTION, CONCENTRATE RESPIRATORY (INHALATION) at 22:40

## 2020-03-13 RX ADMIN — DILTIAZEM HYDROCHLORIDE 180 MG: 180 CAPSULE, COATED, EXTENDED RELEASE ORAL at 09:13

## 2020-03-13 RX ADMIN — ACETAMINOPHEN 1000 MG: 500 TABLET ORAL at 23:50

## 2020-03-13 RX ADMIN — Medication 10 ML: at 08:14

## 2020-03-13 RX ADMIN — SODIUM CHLORIDE 30 MG/ML INHALATION SOLUTION 15 ML: 30 SOLUTION INHALANT at 22:40

## 2020-03-13 RX ADMIN — APIXABAN 5 MG: 5 TABLET, FILM COATED ORAL at 09:14

## 2020-03-13 RX ADMIN — HYDROMORPHONE HYDROCHLORIDE 1 MG: 1 INJECTION, SOLUTION INTRAMUSCULAR; INTRAVENOUS; SUBCUTANEOUS at 08:24

## 2020-03-13 RX ADMIN — DIAZEPAM 5 MG: 5 TABLET ORAL at 15:48

## 2020-03-13 RX ADMIN — Medication 10 ML: at 21:15

## 2020-03-13 ASSESSMENT — PAIN DESCRIPTION - LOCATION
LOCATION: RIB CAGE

## 2020-03-13 ASSESSMENT — PAIN DESCRIPTION - ORIENTATION
ORIENTATION: RIGHT

## 2020-03-13 ASSESSMENT — PAIN DESCRIPTION - FREQUENCY
FREQUENCY: INTERMITTENT
FREQUENCY: CONTINUOUS
FREQUENCY: INTERMITTENT
FREQUENCY: CONTINUOUS

## 2020-03-13 ASSESSMENT — PAIN SCALES - GENERAL
PAINLEVEL_OUTOF10: 7
PAINLEVEL_OUTOF10: 6
PAINLEVEL_OUTOF10: 8
PAINLEVEL_OUTOF10: 8
PAINLEVEL_OUTOF10: 10
PAINLEVEL_OUTOF10: 6
PAINLEVEL_OUTOF10: 9

## 2020-03-13 ASSESSMENT — PAIN DESCRIPTION - PAIN TYPE
TYPE: SURGICAL PAIN
TYPE: ACUTE PAIN;SURGICAL PAIN
TYPE: SURGICAL PAIN
TYPE: ACUTE PAIN;SURGICAL PAIN
TYPE: SURGICAL PAIN
TYPE: ACUTE PAIN;SURGICAL PAIN
TYPE: SURGICAL PAIN
TYPE: SURGICAL PAIN

## 2020-03-13 ASSESSMENT — PAIN DESCRIPTION - ONSET
ONSET: ON-GOING
ONSET: ON-GOING
ONSET: AWAKENED FROM SLEEP
ONSET: ON-GOING
ONSET: ON-GOING
ONSET: AWAKENED FROM SLEEP
ONSET: ON-GOING
ONSET: ON-GOING

## 2020-03-13 ASSESSMENT — PAIN DESCRIPTION - DESCRIPTORS
DESCRIPTORS: ACHING;SHOOTING;STABBING
DESCRIPTORS: SHARP
DESCRIPTORS: ACHING
DESCRIPTORS: SHARP
DESCRIPTORS: SHARP
DESCRIPTORS: ACHING
DESCRIPTORS: SHARP
DESCRIPTORS: SHARP;SHOOTING

## 2020-03-13 ASSESSMENT — PAIN - FUNCTIONAL ASSESSMENT
PAIN_FUNCTIONAL_ASSESSMENT: ACTIVITIES ARE NOT PREVENTED
PAIN_FUNCTIONAL_ASSESSMENT: ACTIVITIES ARE NOT PREVENTED

## 2020-03-13 ASSESSMENT — PAIN DESCRIPTION - PROGRESSION
CLINICAL_PROGRESSION: GRADUALLY IMPROVING
CLINICAL_PROGRESSION: GRADUALLY WORSENING
CLINICAL_PROGRESSION: NOT CHANGED

## 2020-03-13 NOTE — CARE COORDINATION
Case Management Assessment           Initial Evaluation                Date / Time of Evaluation: 3/13/2020 12:37 PM                 Assessment Completed by: Jeremy Zamora    Patient Name: Mary Hollis     YOB: 1950  Diagnosis: Nodule of right lung [R91.1]  Primary cancer of right upper lobe of lung ([facility] [address] [C34.11]  Primary cancer of right upper lobe of lung ([facility] [address] [C34.11]     Date / Time: 3/11/2020 11:54 AM    Patient Admission Status: Inpatient    If patient is discharged prior to next notation, then this note serves as note for discharge by case management.      Current PCP: Rodrigo Valderrama PA-C  Clinic Patient: No    Chart Reviewed: Yes  Patient/ Family Interviewed: Yes    Initial assessment completed at bedside with: patient    Hospitalization in the last 30 days: No    Emergency Contacts:  Extended Emergency Contact Information  Primary Emergency Contact: [facility]  Address: [address] [place], [address] Phone: [phone]  Relation: Child  Secondary Emergency Contact: [address] [place] [address] Phone: [phone]  Relation: Child    Advance Directives:   Code Status: [address]: No  Agent:   Contact Number:     Copy present: No     In paper Chart: No    Scanned into EMR No    Financial  Payor: Hector Dumont / Plan: Terra Hernandez ESSENTIAL/PLUS / Product Type: *No Product type* /     Pre-cert required for SNF: Yes    Pharmacy    [address] [place], [place] [id number]  Phone: [phone] Fax: [phone]    [facility], [address] [facility] [phone] [facility] [phone]  [address] [place] [address]  Phone: [phone] Fax: [phone]    [id number] [place], [address] [place] [address] [phone] - F [phone]  [address] [facility]

## 2020-03-13 NOTE — PROGRESS NOTES
Physical Therapy    Facility/Department: [place] [address] [facility]  Initial Assessment/Treatment    NAME: Rajiv Hernandez  : 1950  MRN: [id number]    Date of Service: 3/13/2020    Discharge Recommendations: Rajiv Hernandez scored a 18/24 on the AM-PAC short mobility form. Current research shows that an AM-PAC score of 18 or greater is typically associated with a discharge to the patient's home setting. Based on the patients AM-PAC score and their current functional mobility deficits, it is recommended that the patient have 2-3 sessions per week of Physical Therapy at d/c to increase the patients independence. If patient discharges prior to next session this note will serve as a discharge summary. Please see below for the latest assessment towards goals. PT Equipment Recommendations   Equipment Needed: Yes (possibly)  Mobility Devices: Raúl [place]: Rolling    Assessment   Body structures, Functions, Activity limitations: Decreased functional mobility ; Decreased endurance;Decreased balance  Assessment: 77 yo POD 2 from VATS, right upper lobectomy. Pt requiring use of walker at this time for safe mobility. Gait is unsteady without the walker & may need one for dc. Pt does demo ROMERO & O2 sats dropped to 84% on 3L when walking. Will continue to follow & progress mobility as tolerated. Plans to recuperate at her daughter's home with 24 hr A. Treatment Diagnosis: impaired mobility  Prognosis: Good  Decision Making: Medium Complexity  PT Education: PT Role;Transfer Training;Goals; Functional Mobility Training;Gait Training  Patient Education: verbalized understanding  REQUIRES PT FOLLOW UP: Yes  Activity Tolerance  Activity Tolerance: Patient limited by endurance  Activity Tolerance: rest breaks needed with walking; O2 sats decreased to 84% on 3L after walking 75' but quickly increased to 95% with rest & pursed lip breathing       Patient Diagnosis(es): The encounter diagnosis was Nodule of right lung.     has a past medical history of A-fib ([facility] [address] Arthritis, Clostridium difficile diarrhea, COPD (chronic obstructive pulmonary disease) ([facility] [address] COPD exacerbation ([facility] [address] Diabetes mellitus ([facility] [address] Emphysema of lung ([facility] [address] Hyperlipidemia, Hypertension, O2 dependent, Pneumonia, Primary cancer of right upper lobe of lung ([facility] [address] Rib pain, Sleep apnea, and Small bowel obstruction ([facility] [address] has a past surgical history that includes Rotator cuff repair (Bilateral); Cholecystectomy; Ankle arthroscopy; Hysterectomy; Carpal tunnel release (Left); Appendectomy; Tonsillectomy; and Thoracoscopy (Right, 3/11/2020). Restrictions- none listed except chest tube to suction             Subjective  General  Chart Reviewed: Yes  Additional Pertinent Hx: s/p RIGHT VIDEO ASSISTED THORACOSCOPIC SURGERY; WEDGE EXCISION OF RIGHT UPPER LOBE FOLLOWED BY RIGHT UPPER LOBECTOMY; INTERCOSTAL NERVE BLOCK on 3/11  Family / Caregiver Present: No  Referring Practitioner: Munir Ji MD  Diagnosis: RIGHT UPPER LOBE LUNG NODULE  Follows Commands: Within Functional Limits  Subjective  Subjective: Found sitting at EOB, agreeable to therapy. Reports walking in [place]jas yesterday. Pain Screening  Patient Currently in Pain: Yes(with coughing, did not rate, RN aware)  Vital Signs  Patient Currently in Pain: Yes(with coughing, did not rate, RN aware)       Orientation  Orientation  Overall Orientation Status: Within Normal Limits  Social/Functional History  Social/Functional History  Lives With: Friend(s)(Normally lives with close friends one of which is dying & she cares for him.)  Type of Home: House(Will go to daughter's home to recuperate from this surgery.   Info below for daughter's home:)  Bathroom Shower/Tub: Tub/Shower unit  Bathroom Toilet: Standard(standard at 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 She doesn't physically have to A him- he has hospice care. Plans to return to daughter's home to recuperate after this but wants to get back to him as soon as possible. Daughter can be home with her. Objective          AROM RLE (degrees)  RLE AROM: WNL  AROM LLE (degrees)  LLE AROM : WNL  Strength RLE  Strength RLE: WFL  Strength LLE  Strength LLE: WFL           Transfers  Sit to Stand: Contact guard assistance(cues to push from bed; pt wanting to grab for therapists hands)  Stand to sit: Contact guard assistance  Ambulation  Ambulation?: Yes  More Ambulation?: Yes  Ambulation 1  Surface: level tile  Device: No Device(HHA)  Other Apparatus: O2(3L, chest tube on portable suction)  Assistance: Contact guard assistance  Quality of Gait: shuffling steps, hand-held A from therapist  Gait Deviations: Slow Charmaine;Decreased step length;Decreased step height  Distance: 10' x 3  Ambulation 2  Surface - 2: level tile  Device 2: Rollator  Other Apparatus 2: O2(3L, chest tube to portable suction)  Assistance 2: Stand by assistance;Contact guard assistance(SBA except when having coughing attack - needing CGA)  Quality of Gait 2: steady gait  Gait Deviations: Slow Charmaine  Distance: 75'     Balance  Sitting - Static: Good(independent at side of bed)      Treatment included gait/transfer training, pt education.     Plan   Plan  Times per week: 2-5  Current Treatment Recommendations: Transfer Training, Strengthening, Balance Training, Gait Training, Functional Mobility Training, Safety Education & Training, Patient/Caregiver Education & Training  Safety Devices  Type of devices: Call light within reach, Chair alarm in place, Left in chair, Nurse notified                                                    [address] Mobility Raw Score : 18 (03/13/20 1018)  AM-PAC Inpatient T-Scale Score : 43.63 (03/13/20 1018)  Mobility Inpatient CMS 0-100% Score: 46.58 (03/13/20 1018)  Mobility Inpatient CMS G-Code Modifier : CK (03/13/20 1018)          Goals  Short term goals  Time Frame for Short term goals: dc  Short term goal 1: Transfers S  Short term goal 2: Ambulate 150' with or without device S. Short term goal 3: Bed Mobility S.   Patient Goals   Patient goals : recuperate at her sisters home; 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       Therapy Time   Individual Concurrent Group Co-treatment   Time In [address]         Minutes 55           Timed Code Treatment Minutes:   40    Total Treatment Minutes:  [address] 3E Floors, [address] [facility]

## 2020-03-13 NOTE — PROGRESS NOTES
encounter diagnosis was Nodule of right lung.     has a past medical history of A-fib ([facility] [address] Arthritis, Clostridium difficile diarrhea, COPD (chronic obstructive pulmonary disease) ([facility] [address] COPD exacerbation ([facility] [address] Diabetes mellitus ([facility] [address] Emphysema of lung ([facility] [address] Hyperlipidemia, Hypertension, O2 dependent, Pneumonia, Primary cancer of right upper lobe of lung ([facility] [address] Rib pain, Sleep apnea, and Small bowel obstruction ([facility] [address] has a past surgical history that includes Rotator cuff repair (Bilateral); Cholecystectomy; Ankle arthroscopy; Hysterectomy; Carpal tunnel release (Left); Appendectomy; Tonsillectomy; and Thoracoscopy (Right, 3/11/2020). Treatment Diagnosis: impaired ADLs/transfers and decreased functional activity tolerance      Restrictions  Position Activity Restriction  Other position/activity restrictions: chest tube to suction; progressive ambulation    Subjective   General  Chart Reviewed: Yes  Additional Pertinent Hx: 78 y.o. F to OR 3/11 for R VATS WEDGE EXCISION OF RIGHT UPPER LOBE FOLLOWED BY RIGHT UPPER LOBECTOMY; INTERCOSTAL NERVE BLOCK. PMH:  RUL Lung CA, HTN, Hyperlipidemia, Emphysema, DM, COPD, Sleep Apnea, O2 Dependent, A-fib. Family / Caregiver Present: No  Referring Practitioner: Dr. Angeles Post  Diagnosis: Nodule R Lung    Subjective  Subjective: Sitting at EOB eating breakfast on entry. Reports she has been ambulating to/from bathroom w/ assist and ambulated in hallway yesterday. Plans are to discharge to her dtr's home where 24 hr A can be provided. Patient Currently in Pain: Yes(with coughing, did not rate, RN aware)      Social/Functional History  Social/Functional History  Lives With: Friend(s)(Normally lives with close friends one of which is dying & she cares for him.)  Type of Home: House(Will go to daughter's home to recuperate from this surgery.   Info below for daughter's home:)  Bathroom Shower/Tub: Tub/Shower unit  Bathroom Toilet: Standard(standard at [facility] home; hand WFL  RUE AROM (degrees)  RUE General AROM: shoulder flex ~90 (baseline), elbow to hand WFL  LUE Strength  Gross LUE Strength: WFL  RUE Strength  Gross RUE Strength: WFL               Pt seen by OT for eval and treat.  Treatment included: functional transfer/mobiltiy, ADL, pt education         Plan   Plan  Times per week: 2-5  Times per day: Daily  Current Treatment Recommendations: Functional Mobility Training, Endurance Training, Safety Education & Training, Self-Care / ADL                                                      AM-PAC Score        AM-[facility] Inpatient Daily Activity Raw Score: 20 (03/13/20 1018)  AM-PAC Inpatient ADL T-Scale Score : 42.03 (03/13/20 1018)  ADL Inpatient CMS 0-100% Score: 38.32 (03/13/20 1018)  ADL Inpatient CMS G-Code Modifier : Dipesh Gaitan (03/13/20 1018)    Goals  Short term goals  Time Frame for Short term goals: Discharge  Short term goal 1: increase standing tolerance to 7 minutes for ADL, SBa  Short term goal 2: toilet transfer w/ Supervision  Short term goal 3: LB dressing w/ Supervision  Short term goal 4: functional mobility for item transport and retrieval using LRAD, Supervision  Patient Goals   Patient goals : to discharge to her dtr's home        Therapy Time   Individual Concurrent Group Co-treatment   Time In [phone]         Time Out 0915         Minutes 60           Timed Code Treatment Minutes:   45    Total Treatment Minutes:  [address] OTR/L #[id number]

## 2020-03-13 NOTE — PROGRESS NOTES
Cardiothoracic Surgery Daily Progress Note      CC: RUL lung nodule    SUBJECTIVE:  No acute events overnight. Pain is improved today. ROS:   A 14 point review of systems was conducted, significant findings as noted above. All other systems negative.     OBJECTIVE:    PHYSICAL EXAM:  Vitals:    03/12/20 2145 03/12/20 2347 03/13/20 0125 03/13/20 0327   BP:  (!) 110/47  (!) 114/46   Pulse: 71 69 66 66   Resp:  18  18   Temp:    98.9 °F (37.2 °C)   TempSrc:    Oral   SpO2:  94%  96%   Weight:    198 lb 3.1 oz (89.9 kg)   Height:           General appearance: alert, no acute distress, grooming appropriate  Chest/Lungs: Increased breath sounds, slight increased effort of breathing, on 4L NC, incision c/d/i, chest tube dressing c/d/i, chest tube to suction, with moderate air leak, tidaling, ss output  Cardiovascular: NSR on monitor with some PVCs, rate in 70s  Abdomen: soft, non-tender, non-distended, incisions c/d/i  Extremities: no edema, no cyanosis  Neuro: A&Ox3, no focal deficits, sensation intact       ASSESSMENT & PLAN:   This is a 78y.o. year old female with a diagnosis of RUL lung nodule s/p RIGHT VIDEO ASSISTED THORACOSCOPIC SURGERY; WEDGE EXCISION OF RIGHT UPPER LOBE FOLLOWED BY RIGHT UPPER LOBECTOMY; INTERCOSTAL NERVE BLOCK POD2    - AM CXR with persistent pneumothorax, possibly larger  - CT to remain to -10 suction, will discuss with staff timing of water seal  - home dilt restarted yesterday, amio started, with good rate control   - aggressive pulm toilet : hypertonic saline and albuterol Q4H scheduled, sylvia IS  - OOB, ambulate, PT/OT  - SLIV  - continue general diet    Leopoldo Babcock, DO  PGY1, General Surgery  03/13/20  6:47 AM  [phone]

## 2020-03-14 ENCOUNTER — APPOINTMENT (OUTPATIENT)
Dept: GENERAL RADIOLOGY | Age: 70
DRG: 163 | End: 2020-03-14
Attending: THORACIC SURGERY (CARDIOTHORACIC VASCULAR SURGERY)
Payer: MEDICARE

## 2020-03-14 LAB
ALBUMIN SERPL-MCNC: 3.2 G/DL (ref 3.4–5)
ANION GAP SERPL CALCULATED.3IONS-SCNC: 10 MMOL/L (ref 3–16)
BASOPHILS ABSOLUTE: 0 K/UL (ref 0–0.2)
BASOPHILS RELATIVE PERCENT: 0.4 %
BUN BLDV-MCNC: 13 MG/DL (ref 7–20)
CALCIUM SERPL-MCNC: 8.8 MG/DL (ref 8.3–10.6)
CHLORIDE BLD-SCNC: 103 MMOL/L (ref 99–110)
CO2: 24 MMOL/L (ref 21–32)
CREAT SERPL-MCNC: 0.5 MG/DL (ref 0.6–1.2)
EOSINOPHILS ABSOLUTE: 0.2 K/UL (ref 0–0.6)
EOSINOPHILS RELATIVE PERCENT: 1.9 %
GFR AFRICAN AMERICAN: >60
GFR NON-AFRICAN AMERICAN: >60
GLUCOSE BLD-MCNC: 121 MG/DL (ref 70–99)
HCT VFR BLD CALC: 26.8 % (ref 36–48)
HEMOGLOBIN: 9.1 G/DL (ref 12–16)
LYMPHOCYTES ABSOLUTE: 2.4 K/UL (ref 1–5.1)
LYMPHOCYTES RELATIVE PERCENT: 23.8 %
MAGNESIUM: 1.7 MG/DL (ref 1.8–2.4)
MCH RBC QN AUTO: 32.2 PG (ref 26–34)
MCHC RBC AUTO-ENTMCNC: 34.1 G/DL (ref 31–36)
MCV RBC AUTO: 94.4 FL (ref 80–100)
MONOCYTES ABSOLUTE: 0.9 K/UL (ref 0–1.3)
MONOCYTES RELATIVE PERCENT: 9.3 %
NEUTROPHILS ABSOLUTE: 6.4 K/UL (ref 1.7–7.7)
NEUTROPHILS RELATIVE PERCENT: 64.6 %
PDW BLD-RTO: 13.1 % (ref 12.4–15.4)
PHOSPHORUS: 2.7 MG/DL (ref 2.5–4.9)
PLATELET # BLD: 181 K/UL (ref 135–450)
PMV BLD AUTO: 7.5 FL (ref 5–10.5)
POTASSIUM SERPL-SCNC: 3.9 MMOL/L (ref 3.5–5.1)
RBC # BLD: 2.83 M/UL (ref 4–5.2)
SODIUM BLD-SCNC: 137 MMOL/L (ref 136–145)
WBC # BLD: 9.9 K/UL (ref 4–11)

## 2020-03-14 PROCEDURE — 6370000000 HC RX 637 (ALT 250 FOR IP): Performed by: THORACIC SURGERY (CARDIOTHORACIC VASCULAR SURGERY)

## 2020-03-14 PROCEDURE — 6370000000 HC RX 637 (ALT 250 FOR IP): Performed by: SURGERY

## 2020-03-14 PROCEDURE — 2580000003 HC RX 258: Performed by: STUDENT IN AN ORGANIZED HEALTH CARE EDUCATION/TRAINING PROGRAM

## 2020-03-14 PROCEDURE — 94761 N-INVAS EAR/PLS OXIMETRY MLT: CPT

## 2020-03-14 PROCEDURE — 6360000002 HC RX W HCPCS: Performed by: THORACIC SURGERY (CARDIOTHORACIC VASCULAR SURGERY)

## 2020-03-14 PROCEDURE — 94640 AIRWAY INHALATION TREATMENT: CPT

## 2020-03-14 PROCEDURE — 83735 ASSAY OF MAGNESIUM: CPT

## 2020-03-14 PROCEDURE — 2700000000 HC OXYGEN THERAPY PER DAY

## 2020-03-14 PROCEDURE — 94150 VITAL CAPACITY TEST: CPT

## 2020-03-14 PROCEDURE — 99024 POSTOP FOLLOW-UP VISIT: CPT | Performed by: THORACIC SURGERY (CARDIOTHORACIC VASCULAR SURGERY)

## 2020-03-14 PROCEDURE — 85025 COMPLETE CBC W/AUTO DIFF WBC: CPT

## 2020-03-14 PROCEDURE — 6360000002 HC RX W HCPCS: Performed by: STUDENT IN AN ORGANIZED HEALTH CARE EDUCATION/TRAINING PROGRAM

## 2020-03-14 PROCEDURE — 2580000003 HC RX 258: Performed by: THORACIC SURGERY (CARDIOTHORACIC VASCULAR SURGERY)

## 2020-03-14 PROCEDURE — 36415 COLL VENOUS BLD VENIPUNCTURE: CPT

## 2020-03-14 PROCEDURE — 94669 MECHANICAL CHEST WALL OSCILL: CPT

## 2020-03-14 PROCEDURE — 71045 X-RAY EXAM CHEST 1 VIEW: CPT

## 2020-03-14 PROCEDURE — 6370000000 HC RX 637 (ALT 250 FOR IP): Performed by: STUDENT IN AN ORGANIZED HEALTH CARE EDUCATION/TRAINING PROGRAM

## 2020-03-14 PROCEDURE — 80069 RENAL FUNCTION PANEL: CPT

## 2020-03-14 PROCEDURE — 2060000000 HC ICU INTERMEDIATE R&B

## 2020-03-14 RX ORDER — ACETYLCYSTEINE 200 MG/ML
600 SOLUTION ORAL; RESPIRATORY (INHALATION)
Status: DISCONTINUED | OUTPATIENT
Start: 2020-03-14 | End: 2020-03-20 | Stop reason: HOSPADM

## 2020-03-14 RX ORDER — CALCIUM CARBONATE 200(500)MG
500 TABLET,CHEWABLE ORAL 3 TIMES DAILY PRN
Status: DISCONTINUED | OUTPATIENT
Start: 2020-03-14 | End: 2020-03-20 | Stop reason: HOSPADM

## 2020-03-14 RX ORDER — ACETYLCYSTEINE 200 MG/ML
600 SOLUTION ORAL; RESPIRATORY (INHALATION) 2 TIMES DAILY
Status: DISCONTINUED | OUTPATIENT
Start: 2020-03-14 | End: 2020-03-14

## 2020-03-14 RX ORDER — ACETAMINOPHEN 325 MG/1
325 TABLET ORAL
Status: DISCONTINUED | OUTPATIENT
Start: 2020-03-14 | End: 2020-03-20 | Stop reason: HOSPADM

## 2020-03-14 RX ORDER — POTASSIUM CHLORIDE 750 MG/1
10 TABLET, EXTENDED RELEASE ORAL ONCE
Status: COMPLETED | OUTPATIENT
Start: 2020-03-14 | End: 2020-03-14

## 2020-03-14 RX ORDER — ACETYLCYSTEINE 200 MG/ML
600 SOLUTION ORAL; RESPIRATORY (INHALATION) EVERY 4 HOURS
Status: DISCONTINUED | OUTPATIENT
Start: 2020-03-14 | End: 2020-03-14

## 2020-03-14 RX ORDER — PANTOPRAZOLE SODIUM 40 MG/1
40 TABLET, DELAYED RELEASE ORAL
Status: DISCONTINUED | OUTPATIENT
Start: 2020-03-14 | End: 2020-03-20 | Stop reason: HOSPADM

## 2020-03-14 RX ORDER — IBUPROFEN 400 MG/1
400 TABLET ORAL
Status: DISCONTINUED | OUTPATIENT
Start: 2020-03-14 | End: 2020-03-20 | Stop reason: HOSPADM

## 2020-03-14 RX ADMIN — LEVALBUTEROL HYDROCHLORIDE 1.25 MG: 1.25 SOLUTION, CONCENTRATE RESPIRATORY (INHALATION) at 21:41

## 2020-03-14 RX ADMIN — ACETYLCYSTEINE 600 MG: 200 SOLUTION ORAL; RESPIRATORY (INHALATION) at 16:53

## 2020-03-14 RX ADMIN — DILTIAZEM HYDROCHLORIDE 180 MG: 180 CAPSULE, COATED, EXTENDED RELEASE ORAL at 10:23

## 2020-03-14 RX ADMIN — ACETYLCYSTEINE 600 MG: 200 SOLUTION ORAL; RESPIRATORY (INHALATION) at 09:27

## 2020-03-14 RX ADMIN — IBUPROFEN 400 MG: 400 TABLET ORAL at 12:46

## 2020-03-14 RX ADMIN — LEVALBUTEROL HYDROCHLORIDE 1.25 MG: 1.25 SOLUTION, CONCENTRATE RESPIRATORY (INHALATION) at 16:53

## 2020-03-14 RX ADMIN — IBUPROFEN 400 MG: 400 TABLET ORAL at 22:00

## 2020-03-14 RX ADMIN — ROPINIROLE HYDROCHLORIDE 3 MG: 1 TABLET, FILM COATED ORAL at 20:18

## 2020-03-14 RX ADMIN — POTASSIUM CHLORIDE 10 MEQ: 10 TABLET, EXTENDED RELEASE ORAL at 10:24

## 2020-03-14 RX ADMIN — QUETIAPINE FUMARATE 300 MG: 200 TABLET, EXTENDED RELEASE ORAL at 20:20

## 2020-03-14 RX ADMIN — AMIODARONE HYDROCHLORIDE 400 MG: 200 TABLET ORAL at 10:23

## 2020-03-14 RX ADMIN — IBUPROFEN 400 MG: 400 TABLET ORAL at 17:18

## 2020-03-14 RX ADMIN — PANTOPRAZOLE SODIUM 40 MG: 40 TABLET, DELAYED RELEASE ORAL at 06:25

## 2020-03-14 RX ADMIN — Medication 10 ML: at 11:36

## 2020-03-14 RX ADMIN — PANTOPRAZOLE SODIUM 40 MG: 40 TABLET, DELAYED RELEASE ORAL at 17:17

## 2020-03-14 RX ADMIN — MAGNESIUM SULFATE HEPTAHYDRATE 3 G: 500 INJECTION, SOLUTION INTRAMUSCULAR; INTRAVENOUS at 11:30

## 2020-03-14 RX ADMIN — APIXABAN 5 MG: 5 TABLET, FILM COATED ORAL at 20:18

## 2020-03-14 RX ADMIN — APIXABAN 5 MG: 5 TABLET, FILM COATED ORAL at 10:23

## 2020-03-14 RX ADMIN — AMIODARONE HYDROCHLORIDE 400 MG: 200 TABLET ORAL at 20:18

## 2020-03-14 RX ADMIN — ACETAMINOPHEN 325 MG: 325 TABLET ORAL at 20:19

## 2020-03-14 RX ADMIN — ACETAMINOPHEN 325 MG: 325 TABLET ORAL at 22:00

## 2020-03-14 RX ADMIN — Medication 10 ML: at 20:19

## 2020-03-14 RX ADMIN — LEVALBUTEROL HYDROCHLORIDE 1.25 MG: 1.25 SOLUTION, CONCENTRATE RESPIRATORY (INHALATION) at 09:27

## 2020-03-14 RX ADMIN — DIAZEPAM 5 MG: 5 TABLET ORAL at 20:28

## 2020-03-14 RX ADMIN — DIAZEPAM 5 MG: 5 TABLET ORAL at 12:46

## 2020-03-14 RX ADMIN — ANTACID TABLETS 500 MG: 500 TABLET, CHEWABLE ORAL at 17:13

## 2020-03-14 RX ADMIN — FERROUS SULFATE TAB 325 MG (65 MG ELEMENTAL FE) 325 MG: 325 (65 FE) TAB at 10:23

## 2020-03-14 RX ADMIN — IBUPROFEN 400 MG: 400 TABLET ORAL at 20:18

## 2020-03-14 RX ADMIN — DIBASIC SODIUM PHOSPHATE, MONOBASIC POTASSIUM PHOSPHATE AND MONOBASIC SODIUM PHOSPHATE 2 TABLET: 852; 155; 130 TABLET ORAL at 10:24

## 2020-03-14 RX ADMIN — ACETAMINOPHEN 325 MG: 325 TABLET ORAL at 17:18

## 2020-03-14 RX ADMIN — EZETIMIBE 10 MG: 10 TABLET ORAL at 10:26

## 2020-03-14 RX ADMIN — ACETAMINOPHEN 1000 MG: 500 TABLET ORAL at 06:25

## 2020-03-14 RX ADMIN — ACETYLCYSTEINE 600 MG: 200 SOLUTION ORAL; RESPIRATORY (INHALATION) at 13:06

## 2020-03-14 RX ADMIN — LEVALBUTEROL HYDROCHLORIDE 1.25 MG: 1.25 SOLUTION, CONCENTRATE RESPIRATORY (INHALATION) at 13:06

## 2020-03-14 ASSESSMENT — PAIN SCALES - GENERAL
PAINLEVEL_OUTOF10: 5
PAINLEVEL_OUTOF10: 0
PAINLEVEL_OUTOF10: 0
PAINLEVEL_OUTOF10: 6
PAINLEVEL_OUTOF10: 0
PAINLEVEL_OUTOF10: 10
PAINLEVEL_OUTOF10: 0
PAINLEVEL_OUTOF10: 10
PAINLEVEL_OUTOF10: 0

## 2020-03-14 ASSESSMENT — PAIN DESCRIPTION - PAIN TYPE: TYPE: SURGICAL PAIN

## 2020-03-14 ASSESSMENT — PAIN DESCRIPTION - DESCRIPTORS: DESCRIPTORS: SORE

## 2020-03-14 ASSESSMENT — PAIN DESCRIPTION - ONSET: ONSET: ON-GOING

## 2020-03-14 ASSESSMENT — PAIN DESCRIPTION - LOCATION: LOCATION: RIB CAGE

## 2020-03-14 ASSESSMENT — PAIN DESCRIPTION - PROGRESSION: CLINICAL_PROGRESSION: NOT CHANGED

## 2020-03-14 ASSESSMENT — PAIN DESCRIPTION - FREQUENCY: FREQUENCY: CONTINUOUS

## 2020-03-14 ASSESSMENT — PAIN DESCRIPTION - ORIENTATION: ORIENTATION: RIGHT

## 2020-03-14 NOTE — PLAN OF CARE
Problem: Falls - Risk of:  Goal: Will remain free from falls  Description: Will remain free from falls  Outcome: Met This Shift  Goal: Absence of physical injury  Description: Absence of physical injury  Outcome: Met This Shift     Problem: Pain:  Description: Pain management should include both nonpharmacologic and pharmacologic interventions. Goal: Pain level will decrease  Description: Pain level will decrease  Outcome: Ongoing  Goal: Control of acute pain  Description: Control of acute pain  Outcome: Ongoing  Goal: Control of chronic pain  Description: Control of chronic pain  Outcome: Ongoing     Problem: Breathing Pattern - Ineffective:  Goal: Ability to achieve and maintain a regular respiratory rate will improve  Description: Ability to achieve and maintain a regular respiratory rate will improve  Outcome: Ongoing   Plan of care reviewed.  Dilip Draper

## 2020-03-14 NOTE — PROGRESS NOTES
Progress Note    S/P RULobectomy    CC: productive cough    Hospital Course:   3/14 having secretions with her coughing    Vital Signs:                                                 BP (!) 155/60   Pulse 76   Temp 99 °F (37.2 °C) (Axillary)   Resp 20   Ht 5' 7\" (1.702 m)   Wt 195 lb 15.8 oz (88.9 kg)   SpO2 98%   BMI 30.70 kg/m²  O2 Flow Rate (L/min): 4 L/min        Admission Weight: 195 lb (88.5 kg)      Vent Settings:  Vent Information  FiO2 : 32 %     Drips:  N/a    I/O:      Intake/Output Summary (Last 24 hours) at 3/14/2020 1356  Last data filed at 3/14/2020 0700  Gross per 24 hour   Intake 360 ml   Output 1240 ml   Net -880 ml     Chest Tube:     O/E  GEN:  NAD  HEENT: unremarkable  CV: reg, wound c/d/i  Pulm: +rhonchi R>L  Abd: soft, nt, nd, no peritoneal signs  Ext: warm, edema, wound c/d/i    Data Review:  CBC:   Recent Labs     03/12/20  0435 03/13/20  0450 03/14/20  0459   WBC 17.1* 12.3* 9.9   HGB 12.9 9.9* 9.1*   HCT 39.7 30.1* 26.8*   MCV 98.2 96.3 94.4    193 181     BMP:   Recent Labs     03/12/20  0435 03/13/20  0450 03/14/20  0459    134* 137   K 4.6 4.0 3.9    98* 103   CO2 16* 24 24   PHOS 5.1* 2.8 2.7   BUN 19 19 13   CREATININE 1.0 0.7 0.5*   CALCIUM 8.7 8.7 8.8   MG 1.70* 2.00 1.70*     Cardiac Enzymes: No results for input(s): CKTOTAL, CKMB, CKMBINDEX, TROPONINI in the last 72 hours. PT/INR: No results for input(s): PROTIME, INR in the last 72 hours. APTT: No results for input(s): APTT in the last 72 hours. Selena Mcwilliams FINAL DIAGNOSIS:       A. Wedge excision, right upper lobe lung lesion:       - Involved with non-small cell carcinoma, squamous cell carcinoma         with lesion 1.1 cm in greatest extent.       - Parenchymal resection margins are negative for malignancy.      - Overlying pleural surface shows no evidence of involvement with         malignancy.      - See case comment and synoptic report.       B.  Regional lymph node excision, right 10:       - 3 lymph nodes with reactive changes and no evidence of metastatic         carcinoma ( 0\3 ).      - Pankeratin\CAM 5.2 stain is performed and supports diagnosis.         C. Right upper lobe lobectomy:       - Organizing biopsy cavity changes with extravasated blood and         reactive features.       - No residual malignancy identified.       - Bronchial margin, vascular margin and parenchymal margins are         negative for malignancy.      - 3 peribronchial lymph nodes with reactive changes and no evidence         of metastatic carcinoma (0/3).      - One intraparenchymal lymph node with no evidence of involvement         with malignancy.      - Pankeratin\CAM 5.2 stains were utilized to evaluate the lymph         nodes and supports final diagnosis.         D. Regional lymph node excision, right 4:       - Six lymph nodes with no evidence of metastatic carcinoma ( 0\6)       - Pankeratin\CAM 5.2 stains were utilized evaluated lymph nodes and         supports final diagnosis. Synoptic report:  Procedure: Wedge excision with subsequent lobectomy. Specimen laterality and tumor site:  Right lung, right upper lobe lung. Tumor size:  1.1 x 1 x 0.8 cm. Tumor focality:  Single focus of invasive carcinoma  Histologic type: Invasive squamous cell carcinoma, focally keratinizing  Histologic grade: G2, moderately differentiated  Spread through airspaces: Present  Visceral pleural invasion: Not identified  Lymphovascular invasion: Not identified  Direct invasion of adjacent structures:  No adjacent structures involved. Resection margins:  Resection margins are negative for malignancy. Bronchial margin is negative for malignancy  Parenchymal margin is negative for malignancy  Vascular margin is negative for malignancy  Regional lymph nodes: 12 lymph nodes with no evidence of metastatic  carcinoma, one parenchymal lymph node with no evidence of carcinoma.   Treatment effect: No known presurgical therapy  Pathologic stage:  Primary tumor: pT1b: Tumor greater than 1 cm but less than 2 cm in  greatest dimension. Regional lymph nodes: pN0: No regional lymph node metastases.   Distant metastases: Not applicable    Assessment/Plan:  POD #3 right upper lobectomy, path [id number]   -mildly lethargic from narcotics, will d/c  -+leak in CT, keep to water seal  -productive cough, keep hob elevated, bronchodilators/mucomyst  -incentive spirometry   -amiodarone / cardizem  -tylenol  / ibuprofen (creatinine normal)  -NEEDS AGGRESSIVE PULMONARY TOILET and AMBULATION  -PT/OT  -no signs of clinical sepsis; CXR reviewed - poor inspiratory effort     Mando Vargas MD  3/14/2020  1:56 PM

## 2020-03-15 ENCOUNTER — APPOINTMENT (OUTPATIENT)
Dept: GENERAL RADIOLOGY | Age: 70
DRG: 163 | End: 2020-03-15
Attending: THORACIC SURGERY (CARDIOTHORACIC VASCULAR SURGERY)
Payer: MEDICARE

## 2020-03-15 ENCOUNTER — ANESTHESIA (OUTPATIENT)
Dept: ENDOSCOPY | Age: 70
DRG: 163 | End: 2020-03-15
Payer: MEDICARE

## 2020-03-15 ENCOUNTER — APPOINTMENT (OUTPATIENT)
Dept: CT IMAGING | Age: 70
DRG: 163 | End: 2020-03-15
Attending: THORACIC SURGERY (CARDIOTHORACIC VASCULAR SURGERY)
Payer: MEDICARE

## 2020-03-15 ENCOUNTER — ANESTHESIA EVENT (OUTPATIENT)
Dept: ENDOSCOPY | Age: 70
DRG: 163 | End: 2020-03-15
Payer: MEDICARE

## 2020-03-15 VITALS — OXYGEN SATURATION: 100 % | SYSTOLIC BLOOD PRESSURE: 139 MMHG | TEMPERATURE: 98 F | DIASTOLIC BLOOD PRESSURE: 78 MMHG

## 2020-03-15 LAB
ALBUMIN SERPL-MCNC: 3 G/DL (ref 3.4–5)
ANION GAP SERPL CALCULATED.3IONS-SCNC: 12 MMOL/L (ref 3–16)
BASOPHILS ABSOLUTE: 0 K/UL (ref 0–0.2)
BASOPHILS RELATIVE PERCENT: 0.5 %
BUN BLDV-MCNC: 20 MG/DL (ref 7–20)
CALCIUM SERPL-MCNC: 8.8 MG/DL (ref 8.3–10.6)
CHLORIDE BLD-SCNC: 102 MMOL/L (ref 99–110)
CO2: 23 MMOL/L (ref 21–32)
CREAT SERPL-MCNC: 0.8 MG/DL (ref 0.6–1.2)
EOSINOPHILS ABSOLUTE: 0.4 K/UL (ref 0–0.6)
EOSINOPHILS RELATIVE PERCENT: 4.2 %
GFR AFRICAN AMERICAN: >60
GFR NON-AFRICAN AMERICAN: >60
GLUCOSE BLD-MCNC: 118 MG/DL (ref 70–99)
GLUCOSE BLD-MCNC: 124 MG/DL (ref 70–99)
HCT VFR BLD CALC: 26.9 % (ref 36–48)
HEMOGLOBIN: 9 G/DL (ref 12–16)
LYMPHOCYTES ABSOLUTE: 2.5 K/UL (ref 1–5.1)
LYMPHOCYTES RELATIVE PERCENT: 25.5 %
MAGNESIUM: 1.8 MG/DL (ref 1.8–2.4)
MCH RBC QN AUTO: 32.3 PG (ref 26–34)
MCHC RBC AUTO-ENTMCNC: 33.3 G/DL (ref 31–36)
MCV RBC AUTO: 97 FL (ref 80–100)
MONOCYTES ABSOLUTE: 0.9 K/UL (ref 0–1.3)
MONOCYTES RELATIVE PERCENT: 8.6 %
NEUTROPHILS ABSOLUTE: 6.1 K/UL (ref 1.7–7.7)
NEUTROPHILS RELATIVE PERCENT: 61.2 %
PDW BLD-RTO: 13.2 % (ref 12.4–15.4)
PERFORMED ON: ABNORMAL
PHOSPHORUS: 3.9 MG/DL (ref 2.5–4.9)
PLATELET # BLD: 206 K/UL (ref 135–450)
PMV BLD AUTO: 7.4 FL (ref 5–10.5)
POTASSIUM SERPL-SCNC: 3.9 MMOL/L (ref 3.5–5.1)
RBC # BLD: 2.77 M/UL (ref 4–5.2)
SODIUM BLD-SCNC: 137 MMOL/L (ref 136–145)
WBC # BLD: 9.9 K/UL (ref 4–11)

## 2020-03-15 PROCEDURE — 2580000003 HC RX 258: Performed by: THORACIC SURGERY (CARDIOTHORACIC VASCULAR SURGERY)

## 2020-03-15 PROCEDURE — 6360000002 HC RX W HCPCS: Performed by: ANESTHESIOLOGY

## 2020-03-15 PROCEDURE — 3700000000 HC ANESTHESIA ATTENDED CARE: Performed by: THORACIC SURGERY (CARDIOTHORACIC VASCULAR SURGERY)

## 2020-03-15 PROCEDURE — 2700000000 HC OXYGEN THERAPY PER DAY

## 2020-03-15 PROCEDURE — 94640 AIRWAY INHALATION TREATMENT: CPT

## 2020-03-15 PROCEDURE — 99223 1ST HOSP IP/OBS HIGH 75: CPT | Performed by: INTERNAL MEDICINE

## 2020-03-15 PROCEDURE — 6370000000 HC RX 637 (ALT 250 FOR IP): Performed by: SURGERY

## 2020-03-15 PROCEDURE — 94150 VITAL CAPACITY TEST: CPT

## 2020-03-15 PROCEDURE — 2580000003 HC RX 258: Performed by: ANESTHESIOLOGY

## 2020-03-15 PROCEDURE — 3700000001 HC ADD 15 MINUTES (ANESTHESIA): Performed by: THORACIC SURGERY (CARDIOTHORACIC VASCULAR SURGERY)

## 2020-03-15 PROCEDURE — 85025 COMPLETE CBC W/AUTO DIFF WBC: CPT

## 2020-03-15 PROCEDURE — 80069 RENAL FUNCTION PANEL: CPT

## 2020-03-15 PROCEDURE — 3609010900 HC BRONCHOSCOPY THERAPUTIC ASPIRATION INITIAL: Performed by: THORACIC SURGERY (CARDIOTHORACIC VASCULAR SURGERY)

## 2020-03-15 PROCEDURE — 0B958ZZ DRAINAGE OF RIGHT MIDDLE LOBE BRONCHUS, VIA NATURAL OR ARTIFICIAL OPENING ENDOSCOPIC: ICD-10-PCS | Performed by: THORACIC SURGERY (CARDIOTHORACIC VASCULAR SURGERY)

## 2020-03-15 PROCEDURE — 71045 X-RAY EXAM CHEST 1 VIEW: CPT

## 2020-03-15 PROCEDURE — 83735 ASSAY OF MAGNESIUM: CPT

## 2020-03-15 PROCEDURE — 2060000000 HC ICU INTERMEDIATE R&B

## 2020-03-15 PROCEDURE — 94669 MECHANICAL CHEST WALL OSCILL: CPT

## 2020-03-15 PROCEDURE — 7100000000 HC PACU RECOVERY - FIRST 15 MIN: Performed by: THORACIC SURGERY (CARDIOTHORACIC VASCULAR SURGERY)

## 2020-03-15 PROCEDURE — 94761 N-INVAS EAR/PLS OXIMETRY MLT: CPT

## 2020-03-15 PROCEDURE — 31629 BRONCHOSCOPY/NEEDLE BX EACH: CPT | Performed by: THORACIC SURGERY (CARDIOTHORACIC VASCULAR SURGERY)

## 2020-03-15 PROCEDURE — 6370000000 HC RX 637 (ALT 250 FOR IP): Performed by: STUDENT IN AN ORGANIZED HEALTH CARE EDUCATION/TRAINING PROGRAM

## 2020-03-15 PROCEDURE — 71250 CT THORAX DX C-: CPT

## 2020-03-15 PROCEDURE — 7100000001 HC PACU RECOVERY - ADDTL 15 MIN: Performed by: THORACIC SURGERY (CARDIOTHORACIC VASCULAR SURGERY)

## 2020-03-15 PROCEDURE — 6360000002 HC RX W HCPCS: Performed by: THORACIC SURGERY (CARDIOTHORACIC VASCULAR SURGERY)

## 2020-03-15 PROCEDURE — 36415 COLL VENOUS BLD VENIPUNCTURE: CPT

## 2020-03-15 PROCEDURE — 6370000000 HC RX 637 (ALT 250 FOR IP): Performed by: THORACIC SURGERY (CARDIOTHORACIC VASCULAR SURGERY)

## 2020-03-15 RX ORDER — PROMETHAZINE HYDROCHLORIDE 25 MG/ML
6.25 INJECTION, SOLUTION INTRAMUSCULAR; INTRAVENOUS
Status: DISCONTINUED | OUTPATIENT
Start: 2020-03-15 | End: 2020-03-15 | Stop reason: HOSPADM

## 2020-03-15 RX ORDER — FENTANYL CITRATE 50 UG/ML
25 INJECTION, SOLUTION INTRAMUSCULAR; INTRAVENOUS EVERY 5 MIN PRN
Status: DISCONTINUED | OUTPATIENT
Start: 2020-03-15 | End: 2020-03-15 | Stop reason: HOSPADM

## 2020-03-15 RX ORDER — LABETALOL 20 MG/4 ML (5 MG/ML) INTRAVENOUS SYRINGE
5 EVERY 10 MIN PRN
Status: DISCONTINUED | OUTPATIENT
Start: 2020-03-15 | End: 2020-03-15 | Stop reason: HOSPADM

## 2020-03-15 RX ORDER — ONDANSETRON 2 MG/ML
4 INJECTION INTRAMUSCULAR; INTRAVENOUS
Status: DISCONTINUED | OUTPATIENT
Start: 2020-03-15 | End: 2020-03-15 | Stop reason: HOSPADM

## 2020-03-15 RX ORDER — OXYCODONE HYDROCHLORIDE AND ACETAMINOPHEN 5; 325 MG/1; MG/1
1 TABLET ORAL
Status: DISCONTINUED | OUTPATIENT
Start: 2020-03-15 | End: 2020-03-15 | Stop reason: HOSPADM

## 2020-03-15 RX ORDER — HYDRALAZINE HYDROCHLORIDE 20 MG/ML
5 INJECTION INTRAMUSCULAR; INTRAVENOUS EVERY 10 MIN PRN
Status: DISCONTINUED | OUTPATIENT
Start: 2020-03-15 | End: 2020-03-15 | Stop reason: HOSPADM

## 2020-03-15 RX ORDER — MAGNESIUM SULFATE IN WATER 40 MG/ML
2 INJECTION, SOLUTION INTRAVENOUS ONCE
Status: DISCONTINUED | OUTPATIENT
Start: 2020-03-15 | End: 2020-03-15

## 2020-03-15 RX ORDER — ONDANSETRON 2 MG/ML
INJECTION INTRAMUSCULAR; INTRAVENOUS PRN
Status: DISCONTINUED | OUTPATIENT
Start: 2020-03-15 | End: 2020-03-15 | Stop reason: SDUPTHER

## 2020-03-15 RX ORDER — FENTANYL CITRATE 50 UG/ML
50 INJECTION, SOLUTION INTRAMUSCULAR; INTRAVENOUS EVERY 5 MIN PRN
Status: DISCONTINUED | OUTPATIENT
Start: 2020-03-15 | End: 2020-03-15 | Stop reason: HOSPADM

## 2020-03-15 RX ORDER — SODIUM CHLORIDE 9 MG/ML
INJECTION, SOLUTION INTRAVENOUS CONTINUOUS PRN
Status: DISCONTINUED | OUTPATIENT
Start: 2020-03-15 | End: 2020-03-15 | Stop reason: SDUPTHER

## 2020-03-15 RX ORDER — PROPOFOL 10 MG/ML
INJECTION, EMULSION INTRAVENOUS PRN
Status: DISCONTINUED | OUTPATIENT
Start: 2020-03-15 | End: 2020-03-15 | Stop reason: SDUPTHER

## 2020-03-15 RX ADMIN — DILTIAZEM HYDROCHLORIDE 180 MG: 180 CAPSULE, COATED, EXTENDED RELEASE ORAL at 09:40

## 2020-03-15 RX ADMIN — DIAZEPAM 5 MG: 5 TABLET ORAL at 20:23

## 2020-03-15 RX ADMIN — IBUPROFEN 400 MG: 400 TABLET ORAL at 04:21

## 2020-03-15 RX ADMIN — PROPOFOL 25 MG: 10 INJECTION, EMULSION INTRAVENOUS at 15:32

## 2020-03-15 RX ADMIN — PANTOPRAZOLE SODIUM 40 MG: 40 TABLET, DELAYED RELEASE ORAL at 06:48

## 2020-03-15 RX ADMIN — Medication 10 ML: at 20:23

## 2020-03-15 RX ADMIN — DIAZEPAM 5 MG: 5 TABLET ORAL at 12:06

## 2020-03-15 RX ADMIN — IBUPROFEN 400 MG: 400 TABLET ORAL at 09:40

## 2020-03-15 RX ADMIN — IBUPROFEN 400 MG: 400 TABLET ORAL at 18:08

## 2020-03-15 RX ADMIN — FENTANYL CITRATE 25 MCG: 50 INJECTION, SOLUTION INTRAMUSCULAR; INTRAVENOUS at 16:16

## 2020-03-15 RX ADMIN — APIXABAN 5 MG: 5 TABLET, FILM COATED ORAL at 09:40

## 2020-03-15 RX ADMIN — APIXABAN 5 MG: 5 TABLET, FILM COATED ORAL at 20:23

## 2020-03-15 RX ADMIN — FENTANYL CITRATE 100 MCG: 50 INJECTION INTRAMUSCULAR; INTRAVENOUS at 15:26

## 2020-03-15 RX ADMIN — SODIUM CHLORIDE: 900 INJECTION, SOLUTION INTRAVENOUS at 15:19

## 2020-03-15 RX ADMIN — IBUPROFEN 400 MG: 400 TABLET ORAL at 20:23

## 2020-03-15 RX ADMIN — ACETAMINOPHEN 325 MG: 325 TABLET ORAL at 04:20

## 2020-03-15 RX ADMIN — IBUPROFEN 400 MG: 400 TABLET ORAL at 01:16

## 2020-03-15 RX ADMIN — EZETIMIBE 10 MG: 10 TABLET ORAL at 09:40

## 2020-03-15 RX ADMIN — PROPOFOL 200 MG: 10 INJECTION, EMULSION INTRAVENOUS at 15:26

## 2020-03-15 RX ADMIN — ACETAMINOPHEN 325 MG: 325 TABLET ORAL at 09:39

## 2020-03-15 RX ADMIN — LEVALBUTEROL HYDROCHLORIDE 1.25 MG: 1.25 SOLUTION, CONCENTRATE RESPIRATORY (INHALATION) at 07:59

## 2020-03-15 RX ADMIN — ACETAMINOPHEN 325 MG: 325 TABLET ORAL at 20:24

## 2020-03-15 RX ADMIN — FENTANYL CITRATE 25 MCG: 50 INJECTION, SOLUTION INTRAMUSCULAR; INTRAVENOUS at 16:39

## 2020-03-15 RX ADMIN — ONDANSETRON 4 MG: 2 INJECTION INTRAMUSCULAR; INTRAVENOUS at 15:39

## 2020-03-15 RX ADMIN — FERROUS SULFATE TAB 325 MG (65 MG ELEMENTAL FE) 325 MG: 325 (65 FE) TAB at 09:40

## 2020-03-15 RX ADMIN — ACETAMINOPHEN 325 MG: 325 TABLET ORAL at 06:48

## 2020-03-15 RX ADMIN — IBUPROFEN 400 MG: 400 TABLET ORAL at 06:49

## 2020-03-15 RX ADMIN — LEVALBUTEROL HYDROCHLORIDE 1.25 MG: 1.25 SOLUTION, CONCENTRATE RESPIRATORY (INHALATION) at 11:08

## 2020-03-15 RX ADMIN — QUETIAPINE FUMARATE 300 MG: 200 TABLET, EXTENDED RELEASE ORAL at 20:24

## 2020-03-15 RX ADMIN — ROPINIROLE HYDROCHLORIDE 3 MG: 1 TABLET, FILM COATED ORAL at 20:23

## 2020-03-15 RX ADMIN — ACETAMINOPHEN 325 MG: 325 TABLET ORAL at 18:08

## 2020-03-15 RX ADMIN — PANTOPRAZOLE SODIUM 40 MG: 40 TABLET, DELAYED RELEASE ORAL at 18:08

## 2020-03-15 RX ADMIN — ACETAMINOPHEN 325 MG: 325 TABLET ORAL at 01:17

## 2020-03-15 ASSESSMENT — PAIN SCALES - GENERAL
PAINLEVEL_OUTOF10: 0
PAINLEVEL_OUTOF10: 4
PAINLEVEL_OUTOF10: 0
PAINLEVEL_OUTOF10: 4
PAINLEVEL_OUTOF10: 0
PAINLEVEL_OUTOF10: 6
PAINLEVEL_OUTOF10: 6
PAINLEVEL_OUTOF10: 0
PAINLEVEL_OUTOF10: 0
PAINLEVEL_OUTOF10: 6
PAINLEVEL_OUTOF10: 5

## 2020-03-15 ASSESSMENT — PAIN DESCRIPTION - LOCATION
LOCATION: RIB CAGE
LOCATION: RIB CAGE

## 2020-03-15 ASSESSMENT — PAIN DESCRIPTION - PROGRESSION
CLINICAL_PROGRESSION: NOT CHANGED
CLINICAL_PROGRESSION: NOT CHANGED

## 2020-03-15 ASSESSMENT — PAIN DESCRIPTION - PAIN TYPE
TYPE: SURGICAL PAIN
TYPE: ACUTE PAIN;SURGICAL PAIN

## 2020-03-15 ASSESSMENT — ENCOUNTER SYMPTOMS: SHORTNESS OF BREATH: 1

## 2020-03-15 ASSESSMENT — PAIN DESCRIPTION - DESCRIPTORS: DESCRIPTORS: ACHING;SORE

## 2020-03-15 ASSESSMENT — COPD QUESTIONNAIRES: CAT_SEVERITY: SEVERE

## 2020-03-15 ASSESSMENT — LIFESTYLE VARIABLES: SMOKING_STATUS: 1

## 2020-03-15 ASSESSMENT — PAIN DESCRIPTION - ORIENTATION: ORIENTATION: RIGHT

## 2020-03-15 NOTE — CONSULTS
CC: Atelectasis    Cheng Vicente is referred by Dr. Josr Mane for evaluation of atelectasis, developing postoperatively after right upper lobectomy for stage I a bronchogenic carcinoma. She underwent surgery on 3/11/2020. She was found to have a non-small cell carcinoma, with all nodes negative and margins negative with a neoplasm <2cm. Since surgery, mobility has been somewhat limited. She reports that she had not been coughing up much sputum, although this morning managed to clear a fairly large amount of blood-tinged sputum. She denies shortness of breath. She has some discomfort with the chest tube but otherwise no pain. Appetite is good, no GI symptoms. She has a chest tube, with continued air leak. Fluid drainage is serosanguineous, approximately 100 mL / 24 hours    PMH:  1. COPD, treated with ICS/LABA and L AMA inhalers. Uses nocturnal oxygen  2. History of right rib fractures with pleural parenchymal bleeding and treatment with chest tube drainage, 2018  3. Atrial fibrillation  4. Diabetes mellitus  5. Hypertension  6. Obstructive sleep apnea, not currently treated    Social history: Patient is , lives in her own home. Up to 2/18/2020 was smoking 1 pack/day, with 50-pack-year accumulation history. She does not drink alcohol    Review of systems: Negative except as noted above on 10 point review    Physical examination: Patient is sitting up in a chair, appears older than her stated age. No acute distress. Alert and coherent. Pulse 69. Blood pressure 131/65. Respirations 18. O2 saturation 100% on nasal oxygen at 4 L/min. Temperature 97.4 degrees, consistently afebrile  HEENT: Pupils equal round reactive to light. No oral lesions. Mucous membranes are somewhat dry. Neck: No lymphadenopathy or thyromegaly. Chest: Right chest tube in place, draining serosanguineous fluid. Air leak is noted. A squeak is heard in the right upper lung zone.   Breath sounds are mildly reduced throughout. No other adventitious sounds. Cardiovascular: Radial pulses 2+ bilaterally. Heart sounds are diminished. Unable to appreciate murmur. Abdomen: Soft, no tenderness. Complete examination precluded by position in chair. Extremities: No cyanosis or edema. Neurologic: Grossly normal motor function and intact sensation in upper and lower extremities bilaterally. Chest x-rays from POD #1 through today are reviewed. A right apical pneumothorax is unchanged on subsequent studies. There has been increasing volume loss in the right lung with mediastinal shift to the right. Chest CT scan today is reviewed: Right apical pneumothorax again seen. Right upper lobe surgically absent. There is a rounded opacity at the suture line, with some compression of the bronchus intermedius. There is volume loss in the remaining right lung. Airways are patent to the level of segmental bronchi, with bronchial wall thickening. A&P: Post right upper lobectomy, there is progressive right lung atelectasis. Airways are patent although there appears to be some compression of the bronchus intermedius. She has had limitations to cough and secretion clearance although today was able to clear more sputum. Bronchoscopy to evaluate airways and clear possible mucous plugging is appropriate. Unfortunately, she has eaten a full breakfast, precluding bronchoscopy at this time. Continue pulmonary toilet/bronchodilators. Increase ambulation. She will have chest x-ray in the morning. I will review this and unless there is a significant improvement in atelectasis, schedule bronchoscopy. She will be n.p.o. after midnight.   Discussed with surgery team

## 2020-03-15 NOTE — PROGRESS NOTES
PACU Transfer Note    Vitals:    03/15/20 1624   BP: 130/62   Pulse: 70   Resp: 27   Temp: 98.1 °F (36.7 °C)   SpO2: 98%       In: 200 [I.V.:200]  Out: -     Pain assessment:  present - adequately treated  Pain Level: (tolerable)    Report given to Receiving unit RN.    3/15/2020 4:33 PM

## 2020-03-15 NOTE — ANESTHESIA POSTPROCEDURE EVALUATION
Department of Anesthesiology  Postprocedure Note    Patient: Negin Gastelum  MRN: [id number]  YOB: 1950  Date of evaluation: 3/15/2020  Time:  3:56 PM     Procedure Summary     Date:  03/15/20 Room / Location:  [facility]    Anesthesia Start:  1592 Anesthesia Stop:  1850    Procedure:  BRONCHOSCOPY THERAPUTIC ASPIRATION INITIAL (N/A ) Diagnosis:       (Evaluate bronchus intermedius / mediastinal shifting)      (S/P RUL lobectomy / wedge resection)    Surgeon:  Lawrence Graf MD Responsible Provider:  Raf Malagon DO    Anesthesia Type:  general ASA Status:  3 - Emergent          Anesthesia Type: general    Harman Phase I: Harman Score: 9    Harman Phase II:      Last vitals: Reviewed and per EMR flowsheets.        Anesthesia Post Evaluation    Patient location during evaluation: PACU  Patient participation: complete - patient participated  Level of consciousness: awake and alert  Airway patency: patent  Nausea & Vomiting: no nausea and no vomiting  Cardiovascular status: blood pressure returned to baseline  Respiratory status: acceptable  Hydration status: euvolemic

## 2020-03-15 NOTE — PLAN OF CARE
Problem: Falls - Risk of:  Goal: Will remain free from falls  Description: Will remain free from falls  Outcome: Met This Shift  Goal: Absence of physical injury  Description: Absence of physical injury  Outcome: Met This Shift     Problem: Pain:  Description: Pain management should include both nonpharmacologic and pharmacologic interventions.   Goal: Pain level will decrease  Description: Pain level will decrease  Outcome: Ongoing  Goal: Control of acute pain  Description: Control of acute pain  Outcome: Ongoing  Goal: Control of chronic pain  Description: Control of chronic pain  Outcome: Ongoing     Problem: Breathing Pattern - Ineffective:  Goal: Ability to achieve and maintain a regular respiratory rate will improve  Description: Ability to achieve and maintain a regular respiratory rate will improve  Outcome: Ongoing

## 2020-03-15 NOTE — PLAN OF CARE
Problem: Falls - Risk of:  Goal: Will remain free from falls  Description: Will remain free from falls  Outcome: Met This Shift     Problem: Pain:  Description: Pain management should include both nonpharmacologic and pharmacologic interventions.   Goal: Pain level will decrease  Description: Pain level will decrease  Outcome: Ongoing  Goal: Control of acute pain  Description: Control of acute pain  Outcome: Ongoing  Goal: Control of chronic pain  Description: Control of chronic pain  Outcome: Ongoing     Problem: Breathing Pattern - Ineffective:  Goal: Ability to achieve and maintain a regular respiratory rate will improve  Description: Ability to achieve and maintain a regular respiratory rate will improve  Outcome: Ongoing

## 2020-03-15 NOTE — PROGRESS NOTES
right lung volumes with wet cough   CXR personally reviewed, CT scan chest ordered with mediastinal shifting, atelectatic lung  I strongly recommend for a urgent bronchoscopy to evaluate the bronchus intermedius  Pulmonology consulted.     Manuel Hall MD  Cardiothoracic Surgery

## 2020-03-15 NOTE — ANESTHESIA PRE PROCEDURE
Department of Anesthesiology  Preprocedure Note       Name:  Dwight Garcia   Age:  78 y.o.  :  1950                                          MRN:  [id number]         Date:  3/15/2020      Surgeon: Kathie Snow):  Juanita Lynn MD    Procedure: BRONCHOSCOPY (N/A )    Medications prior to admission:   Prior to Admission medications    Medication Sig Start Date End Date Taking?  Authorizing Provider   acetaminophen (TYLENOL) 500 MG tablet Take 1,500 mg by mouth 3 times daily as needed for Pain    Historical Provider, MD   HYDROcodone-acetaminophen ([facility]) 7.5-325 MG per tablet TAKE 1 TABLET BY MOUTH 3 TIMES A DAY AS NEEDED FOR PAIN 20   Historical Provider, MD   nystatin (MYCOSTATIN) 529992 UNIT/GM cream  20   Historical Provider, MD   omeprazole (PRILOSEC) 40 MG delayed release capsule TAKE 1 CAPSULE BY MOUTH TWICE A DAY 20   Historical Provider, MD   ezetimibe (ZETIA) 10 MG tablet Take 10 mg by mouth daily    Historical Provider, MD   tiotropium (Triston Leobardo) 18 MCG inhalation capsule Inhale 1 capsule into the lungs daily 20   Juanita Lynn MD   Fexofenadine HCl (MUCINEX ALLERGY PO) Take by mouth daily    Historical Provider, MD   apixaban (ELIQUIS) 5 MG TABS tablet Take 1 tablet by mouth 2 times daily 20   Richelle Clarke MD   diltiazem (CARDIZEM CD) 120 MG extended release capsule Take 1 capsule by mouth daily 10/29/19   Richelle Clarke MD   albuterol sulfate HFA (VENTOLIN HFA) 108 (90 Base) MCG/ACT inhaler Inhale 2 puffs into the lungs every 6 hours as needed for Wheezing    Historical Provider, MD   albuterol (PROVENTIL) (2.5 MG/3ML) 0.083% nebulizer solution Take 2.5 mg by nebulization every 6 hours as needed for Wheezing    Historical Provider, MD   topiramate (TOPAMAX) 50 MG tablet Take 50 mg by mouth nightly    Historical Provider, MD   hydrOXYzine (ATARAX) 50 MG tablet Take 50 mg by mouth 1-2 tabs every 4 hours prn    Historical Provider, MD   ferrous sulfate 325 (65 Fe) MG 300 mg at 03/14/20 2020    nicotine (NICODERM CQ) 21 MG/24HR 1 patch  1 patch Transdermal Daily Lucina Less, DO   1 patch at 03/13/20 0923    albuterol (PROVENTIL) nebulizer solution 2.5 mg  2.5 mg Nebulization Q4H PRN Yolanda Mares MD        diazePAM (VALIUM) tablet 5 mg  5 mg Oral Q6H PRN Lucina Less, DO   5 mg at 03/15/20 1206    dilTIAZem (CARDIZEM CD) extended release capsule 180 mg  180 mg Oral Daily Lucien Hodgkins, MD   180 mg at 03/15/20 0940    traMADol (ULTRAM) tablet 25 mg  25 mg Oral Q6H PRN Aldo SayerDO        Or    traMADol (ULTRAM) tablet 50 mg  50 mg Oral Q6H PRN Aldo SayerDO   50 mg at 03/13/20 0622    levalbuterol (XOPENEX) nebulizer solution 1.25 mg  1.25 mg Nebulization Q4H WA Yolanda Isaacs MD   1.25 mg at 03/15/20 1108    apixaban (ELIQUIS) tablet 5 mg  5 mg Oral BID Yolanda Isaacs MD   5 mg at 03/15/20 0940    ezetimibe (ZETIA) tablet 10 mg  10 mg Oral Daily Yolanda Mares MD   10 mg at 03/15/20 0940    ferrous sulfate (IRON 325) tablet 325 mg  325 mg Oral Daily with breakfast Yolanda Isaacs MD   325 mg at 03/15/20 0940    hydrOXYzine (ATARAX) tablet 50 mg  50 mg Oral Q6H PRN Yolanda Isaacs MD   50 mg at 03/12/20 2013    sodium chloride flush 0.9 % injection 10 mL  10 mL Intravenous 2 times per day Savanna Grimm MD   10 mL at 03/14/20 2019    sodium chloride flush 0.9 % injection 10 mL  10 mL Intravenous PRN Yolanda Mares MD        magnesium hydroxide (MILK OF MAGNESIA) 400 MG/5ML suspension 30 mL  30 mL Oral Daily PRN Yolanda Isaacs MD        polyethylene glycol (GLYCOLAX) packet 17 g  17 g Oral Daily Yolanda Mares MD           Allergies:     Allergies   Allergen Reactions    Buspirone Other (See Comments)     unsure    Lisinopril Other (See Comments)     Low blood pressure per patient 59/29     Penicillins Nausea Only       Problem List:    Patient Active Problem List   Diagnosis Code    CHF (congestive heart failure) ([facility]) I50.9    COPD THORACOSCOPY Right 3/11/2020    RIGHT VIDEO ASSISTED THORACOSCOPIC SURGERY; WEDGE EXCISION OF RIGHT UPPER LOBE FOLLOWED BY RIGHT UPPER LOBECTOMY; INTERCOSTAL NERVE BLOCK performed by Marilee Varma MD at [address]         Social History:    Social History     Tobacco Use    Smoking status: Former Smoker     Packs/day: 1.00     Years: 50.00     Pack years: 50.00     Types: Cigarettes     Last attempt to quit: 2020     Years since quittin.0    Smokeless tobacco: Never Used    Tobacco comment: smoked 3 cigarettes since    Substance Use Topics    Alcohol use: No     Alcohol/week: 0.0 standard drinks                                Counseling given: Not Answered  Comment: smoked 3 cigarettes since       Vital Signs (Current): There were no vitals filed for this visit.                                            BP Readings from Last 3 Encounters:   03/15/20 117/60   20 (!) 149/70   20 (!) 140/71       NPO Status:                                                                                 BMI:   Wt Readings from Last 3 Encounters:   03/15/20 195 lb 14.4 oz (88.9 kg)   20 199 lb (90.3 kg)   20 200 lb (90.7 kg)     There is no height or weight on file to calculate BMI.    CBC:   Lab Results   Component Value Date    WBC 9.9 03/15/2020    RBC 2.77 03/15/2020    HGB 9.0 03/15/2020    HCT 26.9 03/15/2020    MCV 97.0 03/15/2020    RDW 13.2 03/15/2020     03/15/2020       CMP:   Lab Results   Component Value Date     03/15/2020    K 3.9 03/15/2020    K 4.6 2020     03/15/2020    CO2 23 03/15/2020    BUN 20 03/15/2020    CREATININE 0.8 03/15/2020    GFRAA >60 03/15/2020    AGRATIO 1.4 2020    LABGLOM >60 03/15/2020    GLUCOSE 124 03/15/2020    PROT 7.2 2020    CALCIUM 8.8 03/15/2020    BILITOT <0.2 2020    ALKPHOS 88 2020    AST 29 2020    ALT 27 2020       POC Tests: No results for input(s): POCGLU, Teofilo Diss, POCCL, POCBUN, POCHEMO, POCHCT in the last 72 hours. Coags:   Lab Results   Component Value Date    PROTIME 11.2 03/04/2020    INR 0.97 03/04/2020    APTT 33.3 03/04/2020       HCG (If Applicable): No results found for: PREGTESTUR, PREGSERUM, HCG, HCGQUANT     ABGs:   Lab Results   Component Value Date    PHART 7.478 02/13/2018    PO2ART 69.8 02/13/2018    PCG9ZJR 59.4 02/13/2018    LIK2LBO 43.0 02/13/2018    BEART 17.2 02/13/2018    W0HQHHQH 94.1 02/13/2018        Type & Screen (If Applicable):  No results found for: LABABO, LABRH    Anesthesia Evaluation    Airway: Mallampati: I  TM distance: >3 FB   Neck ROM: full  Mouth opening: > = 3 FB Dental:    (+) upper dentures      Pulmonary:   (+) COPD (oxygen at night): severe,  shortness of breath:  sleep apnea:  asthma: current smoker          Patient did not smoke on day of surgery. Cardiovascular:  Exercise tolerance: poor (<4 METS),   (+) hypertension:, angina:, past MI:, CHF:,                   Neuro/Psych:      (-) seizures and CVA           GI/Hepatic/Renal:   (+) GERD:,           Endo/Other:        (-) diabetes mellitus               Abdominal:           Vascular:                                          Anesthesia Plan      general     ASA 3 - emergent       Induction: intravenous. Anesthetic plan and risks discussed with patient.                       Morgan Esquivel DO   3/15/2020

## 2020-03-15 NOTE — PROGRESS NOTES
1550 Admitted to PACU from [place]. Connected to monitor. Report at bedside. Teeth given per request. Awake, oriented reports feels better.

## 2020-03-16 ENCOUNTER — APPOINTMENT (OUTPATIENT)
Dept: GENERAL RADIOLOGY | Age: 70
DRG: 163 | End: 2020-03-16
Attending: THORACIC SURGERY (CARDIOTHORACIC VASCULAR SURGERY)
Payer: MEDICARE

## 2020-03-16 LAB
ALBUMIN SERPL-MCNC: 2.7 G/DL (ref 3.4–5)
ANION GAP SERPL CALCULATED.3IONS-SCNC: 9 MMOL/L (ref 3–16)
BASOPHILS ABSOLUTE: 0.1 K/UL (ref 0–0.2)
BASOPHILS RELATIVE PERCENT: 0.7 %
BUN BLDV-MCNC: 17 MG/DL (ref 7–20)
CALCIUM SERPL-MCNC: 9.1 MG/DL (ref 8.3–10.6)
CHLORIDE BLD-SCNC: 105 MMOL/L (ref 99–110)
CO2: 26 MMOL/L (ref 21–32)
CREAT SERPL-MCNC: 0.6 MG/DL (ref 0.6–1.2)
EOSINOPHILS ABSOLUTE: 0.5 K/UL (ref 0–0.6)
EOSINOPHILS RELATIVE PERCENT: 6.6 %
GFR AFRICAN AMERICAN: >60
GFR NON-AFRICAN AMERICAN: >60
GLUCOSE BLD-MCNC: 120 MG/DL (ref 70–99)
HCT VFR BLD CALC: 26.1 % (ref 36–48)
HEMOGLOBIN: 8.6 G/DL (ref 12–16)
LYMPHOCYTES ABSOLUTE: 2.2 K/UL (ref 1–5.1)
LYMPHOCYTES RELATIVE PERCENT: 28.1 %
MAGNESIUM: 1.8 MG/DL (ref 1.8–2.4)
MCH RBC QN AUTO: 31.9 PG (ref 26–34)
MCHC RBC AUTO-ENTMCNC: 33.1 G/DL (ref 31–36)
MCV RBC AUTO: 96.4 FL (ref 80–100)
MONOCYTES ABSOLUTE: 0.7 K/UL (ref 0–1.3)
MONOCYTES RELATIVE PERCENT: 9.4 %
NEUTROPHILS ABSOLUTE: 4.2 K/UL (ref 1.7–7.7)
NEUTROPHILS RELATIVE PERCENT: 55.2 %
PDW BLD-RTO: 12.9 % (ref 12.4–15.4)
PHOSPHORUS: 4.7 MG/DL (ref 2.5–4.9)
PLATELET # BLD: 227 K/UL (ref 135–450)
PMV BLD AUTO: 7.4 FL (ref 5–10.5)
POTASSIUM SERPL-SCNC: 4.1 MMOL/L (ref 3.5–5.1)
RBC # BLD: 2.71 M/UL (ref 4–5.2)
SODIUM BLD-SCNC: 140 MMOL/L (ref 136–145)
WBC # BLD: 7.7 K/UL (ref 4–11)

## 2020-03-16 PROCEDURE — 2580000003 HC RX 258: Performed by: THORACIC SURGERY (CARDIOTHORACIC VASCULAR SURGERY)

## 2020-03-16 PROCEDURE — 36415 COLL VENOUS BLD VENIPUNCTURE: CPT

## 2020-03-16 PROCEDURE — 97530 THERAPEUTIC ACTIVITIES: CPT

## 2020-03-16 PROCEDURE — 6370000000 HC RX 637 (ALT 250 FOR IP): Performed by: STUDENT IN AN ORGANIZED HEALTH CARE EDUCATION/TRAINING PROGRAM

## 2020-03-16 PROCEDURE — 94669 MECHANICAL CHEST WALL OSCILL: CPT

## 2020-03-16 PROCEDURE — 71045 X-RAY EXAM CHEST 1 VIEW: CPT

## 2020-03-16 PROCEDURE — 99233 SBSQ HOSP IP/OBS HIGH 50: CPT | Performed by: INTERNAL MEDICINE

## 2020-03-16 PROCEDURE — 94640 AIRWAY INHALATION TREATMENT: CPT

## 2020-03-16 PROCEDURE — 6360000002 HC RX W HCPCS: Performed by: THORACIC SURGERY (CARDIOTHORACIC VASCULAR SURGERY)

## 2020-03-16 PROCEDURE — 6370000000 HC RX 637 (ALT 250 FOR IP): Performed by: SURGERY

## 2020-03-16 PROCEDURE — 80069 RENAL FUNCTION PANEL: CPT

## 2020-03-16 PROCEDURE — 85025 COMPLETE CBC W/AUTO DIFF WBC: CPT

## 2020-03-16 PROCEDURE — 94667 MNPJ CHEST WALL 1ST: CPT

## 2020-03-16 PROCEDURE — 94668 MNPJ CHEST WALL SBSQ: CPT

## 2020-03-16 PROCEDURE — 83735 ASSAY OF MAGNESIUM: CPT

## 2020-03-16 PROCEDURE — 94761 N-INVAS EAR/PLS OXIMETRY MLT: CPT

## 2020-03-16 PROCEDURE — 2060000000 HC ICU INTERMEDIATE R&B

## 2020-03-16 PROCEDURE — 6360000002 HC RX W HCPCS: Performed by: STUDENT IN AN ORGANIZED HEALTH CARE EDUCATION/TRAINING PROGRAM

## 2020-03-16 PROCEDURE — 6370000000 HC RX 637 (ALT 250 FOR IP): Performed by: THORACIC SURGERY (CARDIOTHORACIC VASCULAR SURGERY)

## 2020-03-16 PROCEDURE — 97116 GAIT TRAINING THERAPY: CPT

## 2020-03-16 RX ORDER — MAGNESIUM SULFATE IN WATER 40 MG/ML
2 INJECTION, SOLUTION INTRAVENOUS ONCE
Status: COMPLETED | OUTPATIENT
Start: 2020-03-16 | End: 2020-03-16

## 2020-03-16 RX ADMIN — DILTIAZEM HYDROCHLORIDE 180 MG: 180 CAPSULE, COATED, EXTENDED RELEASE ORAL at 10:06

## 2020-03-16 RX ADMIN — ACETYLCYSTEINE 600 MG: 200 SOLUTION ORAL; RESPIRATORY (INHALATION) at 07:49

## 2020-03-16 RX ADMIN — IBUPROFEN 400 MG: 400 TABLET ORAL at 04:02

## 2020-03-16 RX ADMIN — FERROUS SULFATE TAB 325 MG (65 MG ELEMENTAL FE) 325 MG: 325 (65 FE) TAB at 10:21

## 2020-03-16 RX ADMIN — ACETAMINOPHEN 325 MG: 325 TABLET ORAL at 19:28

## 2020-03-16 RX ADMIN — IBUPROFEN 400 MG: 400 TABLET ORAL at 10:06

## 2020-03-16 RX ADMIN — Medication 10 ML: at 19:57

## 2020-03-16 RX ADMIN — IBUPROFEN 400 MG: 400 TABLET ORAL at 13:01

## 2020-03-16 RX ADMIN — LEVALBUTEROL HYDROCHLORIDE 1.25 MG: 1.25 SOLUTION, CONCENTRATE RESPIRATORY (INHALATION) at 15:28

## 2020-03-16 RX ADMIN — ACETAMINOPHEN 325 MG: 325 TABLET ORAL at 16:19

## 2020-03-16 RX ADMIN — IBUPROFEN 400 MG: 400 TABLET ORAL at 19:28

## 2020-03-16 RX ADMIN — QUETIAPINE FUMARATE 300 MG: 200 TABLET, EXTENDED RELEASE ORAL at 22:23

## 2020-03-16 RX ADMIN — APIXABAN 5 MG: 5 TABLET, FILM COATED ORAL at 10:06

## 2020-03-16 RX ADMIN — APIXABAN 5 MG: 5 TABLET, FILM COATED ORAL at 19:57

## 2020-03-16 RX ADMIN — ACETAMINOPHEN 325 MG: 325 TABLET ORAL at 06:38

## 2020-03-16 RX ADMIN — ROPINIROLE HYDROCHLORIDE 3 MG: 1 TABLET, FILM COATED ORAL at 19:57

## 2020-03-16 RX ADMIN — ACETYLCYSTEINE 600 MG: 200 SOLUTION ORAL; RESPIRATORY (INHALATION) at 11:07

## 2020-03-16 RX ADMIN — PANTOPRAZOLE SODIUM 40 MG: 40 TABLET, DELAYED RELEASE ORAL at 16:19

## 2020-03-16 RX ADMIN — EZETIMIBE 10 MG: 10 TABLET ORAL at 10:08

## 2020-03-16 RX ADMIN — TRAMADOL HYDROCHLORIDE 50 MG: 50 TABLET, FILM COATED ORAL at 23:27

## 2020-03-16 RX ADMIN — LEVALBUTEROL HYDROCHLORIDE 1.25 MG: 1.25 SOLUTION, CONCENTRATE RESPIRATORY (INHALATION) at 21:15

## 2020-03-16 RX ADMIN — PANTOPRAZOLE SODIUM 40 MG: 40 TABLET, DELAYED RELEASE ORAL at 06:38

## 2020-03-16 RX ADMIN — ACETYLCYSTEINE 600 MG: 200 SOLUTION ORAL; RESPIRATORY (INHALATION) at 15:28

## 2020-03-16 RX ADMIN — DIAZEPAM 5 MG: 5 TABLET ORAL at 20:04

## 2020-03-16 RX ADMIN — LEVALBUTEROL HYDROCHLORIDE 1.25 MG: 1.25 SOLUTION, CONCENTRATE RESPIRATORY (INHALATION) at 07:49

## 2020-03-16 RX ADMIN — IBUPROFEN 400 MG: 400 TABLET ORAL at 16:19

## 2020-03-16 RX ADMIN — ACETAMINOPHEN 325 MG: 325 TABLET ORAL at 13:00

## 2020-03-16 RX ADMIN — ACETAMINOPHEN 325 MG: 325 TABLET ORAL at 10:06

## 2020-03-16 RX ADMIN — IBUPROFEN 400 MG: 400 TABLET ORAL at 06:38

## 2020-03-16 RX ADMIN — LEVALBUTEROL HYDROCHLORIDE 1.25 MG: 1.25 SOLUTION, CONCENTRATE RESPIRATORY (INHALATION) at 11:07

## 2020-03-16 RX ADMIN — DIAZEPAM 5 MG: 5 TABLET ORAL at 10:21

## 2020-03-16 RX ADMIN — ACETYLCYSTEINE 600 MG: 200 SOLUTION ORAL; RESPIRATORY (INHALATION) at 21:15

## 2020-03-16 RX ADMIN — ACETAMINOPHEN 325 MG: 325 TABLET ORAL at 04:02

## 2020-03-16 RX ADMIN — MAGNESIUM SULFATE HEPTAHYDRATE 2 G: 40 INJECTION, SOLUTION INTRAVENOUS at 10:22

## 2020-03-16 RX ADMIN — Medication 10 ML: at 10:07

## 2020-03-16 RX ADMIN — POLYETHYLENE GLYCOL 3350 17 G: 17 POWDER, FOR SOLUTION ORAL at 10:06

## 2020-03-16 RX ADMIN — BENZOCAINE AND MENTHOL 5 LOZENGE: 15; 3.6 LOZENGE ORAL at 21:03

## 2020-03-16 ASSESSMENT — PAIN SCALES - GENERAL
PAINLEVEL_OUTOF10: 6
PAINLEVEL_OUTOF10: 0
PAINLEVEL_OUTOF10: 6
PAINLEVEL_OUTOF10: 6
PAINLEVEL_OUTOF10: 0
PAINLEVEL_OUTOF10: 7
PAINLEVEL_OUTOF10: 6
PAINLEVEL_OUTOF10: 6
PAINLEVEL_OUTOF10: 0
PAINLEVEL_OUTOF10: 5
PAINLEVEL_OUTOF10: 0
PAINLEVEL_OUTOF10: 7

## 2020-03-16 ASSESSMENT — PAIN DESCRIPTION - PAIN TYPE
TYPE: SURGICAL PAIN

## 2020-03-16 ASSESSMENT — PAIN DESCRIPTION - ONSET
ONSET: ON-GOING

## 2020-03-16 ASSESSMENT — PAIN DESCRIPTION - ORIENTATION
ORIENTATION: LEFT
ORIENTATION: RIGHT

## 2020-03-16 ASSESSMENT — PAIN - FUNCTIONAL ASSESSMENT
PAIN_FUNCTIONAL_ASSESSMENT: ACTIVITIES ARE NOT PREVENTED
PAIN_FUNCTIONAL_ASSESSMENT: PREVENTS OR INTERFERES SOME ACTIVE ACTIVITIES AND ADLS

## 2020-03-16 ASSESSMENT — PAIN DESCRIPTION - LOCATION
LOCATION: RIB CAGE;FLANK
LOCATION: CHEST
LOCATION: RIB CAGE;FLANK

## 2020-03-16 ASSESSMENT — PAIN DESCRIPTION - DESCRIPTORS
DESCRIPTORS: ACHING;SORE
DESCRIPTORS: DISCOMFORT

## 2020-03-16 ASSESSMENT — PAIN DESCRIPTION - FREQUENCY
FREQUENCY: CONTINUOUS

## 2020-03-16 ASSESSMENT — PAIN DESCRIPTION - PROGRESSION
CLINICAL_PROGRESSION: NOT CHANGED
CLINICAL_PROGRESSION: GRADUALLY WORSENING
CLINICAL_PROGRESSION: GRADUALLY WORSENING
CLINICAL_PROGRESSION: NOT CHANGED

## 2020-03-16 NOTE — PROGRESS NOTES
Physical Therapy  Facility/Department: [place] [address] [facility]U  Daily Treatment Note  NAME: Keesha Castillo  : 1950  MRN: [id number]    Date of Service: 3/16/2020    Discharge Recommendations: Keesha Castillo scored a 18/24 on the AM-PAC short mobility form. Current research shows that an AM-PAC score of 18 or greater is typically associated with a discharge to the patient's home setting. Based on the patients AM-PAC score and their current functional mobility deficits, it is recommended that the patient have 2-3 sessions per week of Physical Therapy at d/c to increase the patients independence. If patient discharges prior to next session this note will serve as a discharge summary. Please see below for the latest assessment towards goals. S Level 1   PT Equipment Recommendations  Equipment Needed: Yes  Mobility Devices: Kuldip Specking: Rolling    Assessment   Body structures, Functions, Activity limitations: Decreased functional mobility ; Decreased endurance;Decreased balance  Assessment: Pt showing gradual progress with mobility and gait distance. Pt with ROMERO following gait and took quite a bit of time to recover. Pt plans to d/c home to daughter's home. Will progress as able. Treatment Diagnosis: impaired mobility  Prognosis: Good  Decision Making: Medium Complexity  PT Education: PT Role;Transfer Training;Goals; Functional Mobility Training;Gait Training  Patient Education: Pt verbalized understanding  REQUIRES PT FOLLOW UP: Yes  Activity Tolerance  Activity Tolerance: Patient limited by endurance     Patient Diagnosis(es): The encounter diagnosis was Nodule of right lung.     has a past medical history of A-fib ([address] Arthritis, Clostridium difficile diarrhea, COPD (chronic obstructive pulmonary disease) ([address] COPD exacerbation ([address] Diabetes mellitus ([address] Emphysema of lung ([address] Hyperlipidemia, Hypertension, O2 dependent, Pneumonia, Primary cancer of right upper lobe of lung ([address] Rib pain, Sleep apnea, and Small bowel obstruction ([facility] has a past surgical history that includes Rotator cuff repair (Bilateral); Cholecystectomy; Ankle arthroscopy; Hysterectomy; Carpal tunnel release (Left); Appendectomy; Tonsillectomy; Thoracoscopy (Right, 3/11/2020); and bronchoscopy (N/A, 3/15/2020). Restrictions  Position Activity Restriction  Other position/activity restrictions: chest tube to suction; progressive ambulation     Subjective   General  Chart Reviewed:  Yes  Additional Pertinent Hx: s/p RIGHT VIDEO ASSISTED THORACOSCOPIC SURGERY; WEDGE EXCISION OF RIGHT UPPER LOBE FOLLOWED BY RIGHT UPPER LOBECTOMY; INTERCOSTAL NERVE BLOCK on 3/11  Family / Caregiver Present: No  Referring Practitioner: Ofelia Thomas MD  Subjective  Subjective: Pt sitting up in chair and agreeable to PT  Pain Screening  Patient Currently in Pain: Denies       Orientation  Orientation  Overall Orientation Status: Within Normal Limits    Objective      Transfers  Sit to Stand: Contact guard assistance  Stand to sit: Stand by assistance     Ambulation 1  Device: Rollator  Other Apparatus: O2(4 L and chest tube on portable suction)  Assistance: Stand by assistance  Gait Deviations: Slow Charmaine;Decreased step length;Decreased step height  Distance: 120 feet  Comments: O2 sats 76% following gait, increased to 83%    Goals  Short term goals  Time Frame for Short term goals: by discharge  Short term goal 1: Transfers S  ONGOING  Short term goal 2: Ambulate 80' with or without device S  ONGOING  Short term goal 3: Bed Mobility S  ONGOING  Patient Goals   Patient goals : recuperate at her sisters home; 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    Plan    Plan  Times per week: 2-5  Current Treatment Recommendations: Transfer Training, Strengthening, Balance Training, Gait Training, Functional Mobility Training, Safety Education & Training, Patient/Caregiver Education & Training  Safety Devices  Type of devices: Call light within reach, Chair alarm in place, Left in chair, Nurse notified     Therapy Time   Individual Concurrent Group Co-treatment   Time In [phone]         Time Out 0952         Minutes 45           Timed Code Treatment Minutes:   45    Total Treatment Minutes:  45     If pt d/c'd prior to next treatment, this note serves as a discharge note.   [place], [address]

## 2020-03-16 NOTE — PROGRESS NOTES
Cardiothoracic Surgery  Interval Note    Patient assessed at the bedside. Was able to walk the halls with PT/OT and states that she did not feel short of breath. She remains on 4 L NC. Her IS has improved to 1500 from 500 this AM.  The patient has taken Mucomyst this AM.  Discussed patient with respiratory therapy.   Plan for percussion vest to start this AM.     Will continue to monitor    Madelyn Ronquillo MD  3/16/2020   9:54 AM  [phone]

## 2020-03-16 NOTE — PROGRESS NOTES
Spoke with Dr. Simba Carter regarding Radiology results given via phone per Radiologist.No new orders given.

## 2020-03-16 NOTE — PROGRESS NOTES
Patient ambulated approximately 150 feet. Tolerated well. Pre walk sat 98% on 3 liters per NC.  Oxygen on return 91-92% on 3 liters per NC.

## 2020-03-16 NOTE — PLAN OF CARE
Problem: Falls - Risk of:  Goal: Will remain free from falls  Description: Will remain free from falls  Outcome: Met This Shift  Note: Pt free of falls this shift, bed/chairs locked, call light within reach. Will continue to monitor. Problem: Breathing Pattern - Ineffective:  Goal: Ability to achieve and maintain a regular respiratory rate will improve  Description: Ability to achieve and maintain a regular respiratory rate will improve  Outcome: Ongoing  Note: RR 17-22. SPO2 in the mid 90s on 3L. Pt stated some SOB at nonspecific times. Will continue to  monitor.

## 2020-03-16 NOTE — PROGRESS NOTES
CC: Atelectasis    Patient underwent bronchoscopy per Dr. Su Gutierrez yesterday, clearing mucous plugs from the bronchus intermedius. Subjectively today, she feels about the same, complains of being tired and short of breath. Clearing secretions with some difficulty. Afebrile  WBC  /62. Pulse 70  Heart sounds distant  O2 saturation 97% on O2 at 3 L/min at rest  Breath sounds tinny at the right upper lung zone, decreased in other areas. No rhonchi. Right chest tube evacuating air on suction  Chest x-ray today shows increased right pneumothorax, note that she was on waterseal at that time. Right lung atelectasis about the same as 3/15/2020  Abdomen soft, no tenderness. No peripheral edema. A&P: Right lung atelectasis secondary to difficulties reexpanding right middle and lower lobe post thoracotomy. She has an ongoing air leak and mucus plugging. Continuing efforts to maintain pulmonary toilet with respiratory therapy, ambulation, deep breathing exercise. She may well require repeat bronchoscopy. Ongoing pneumothorax also limits her ability to improve atelectasis.

## 2020-03-17 ENCOUNTER — APPOINTMENT (OUTPATIENT)
Dept: GENERAL RADIOLOGY | Age: 70
DRG: 163 | End: 2020-03-17
Attending: THORACIC SURGERY (CARDIOTHORACIC VASCULAR SURGERY)
Payer: MEDICARE

## 2020-03-17 LAB
ALBUMIN SERPL-MCNC: 2.8 G/DL (ref 3.4–5)
ANION GAP SERPL CALCULATED.3IONS-SCNC: 14 MMOL/L (ref 3–16)
BASOPHILS ABSOLUTE: 0.1 K/UL (ref 0–0.2)
BASOPHILS RELATIVE PERCENT: 1.4 %
BUN BLDV-MCNC: 15 MG/DL (ref 7–20)
CALCIUM SERPL-MCNC: 8.8 MG/DL (ref 8.3–10.6)
CHLORIDE BLD-SCNC: 102 MMOL/L (ref 99–110)
CO2: 22 MMOL/L (ref 21–32)
CREAT SERPL-MCNC: 0.6 MG/DL (ref 0.6–1.2)
EOSINOPHILS ABSOLUTE: 0.4 K/UL (ref 0–0.6)
EOSINOPHILS RELATIVE PERCENT: 5.1 %
GFR AFRICAN AMERICAN: >60
GFR NON-AFRICAN AMERICAN: >60
GLUCOSE BLD-MCNC: 107 MG/DL (ref 70–99)
HCT VFR BLD CALC: 28.1 % (ref 36–48)
HEMOGLOBIN: 9.3 G/DL (ref 12–16)
LYMPHOCYTES ABSOLUTE: 3 K/UL (ref 1–5.1)
LYMPHOCYTES RELATIVE PERCENT: 35.7 %
MAGNESIUM: 1.6 MG/DL (ref 1.8–2.4)
MCH RBC QN AUTO: 31.3 PG (ref 26–34)
MCHC RBC AUTO-ENTMCNC: 33 G/DL (ref 31–36)
MCV RBC AUTO: 94.9 FL (ref 80–100)
MONOCYTES ABSOLUTE: 0.8 K/UL (ref 0–1.3)
MONOCYTES RELATIVE PERCENT: 9.7 %
NEUTROPHILS ABSOLUTE: 4 K/UL (ref 1.7–7.7)
NEUTROPHILS RELATIVE PERCENT: 48.1 %
PDW BLD-RTO: 13.3 % (ref 12.4–15.4)
PHOSPHORUS: 3.8 MG/DL (ref 2.5–4.9)
PLATELET # BLD: 234 K/UL (ref 135–450)
PMV BLD AUTO: 7.6 FL (ref 5–10.5)
POTASSIUM SERPL-SCNC: 5.1 MMOL/L (ref 3.5–5.1)
RBC # BLD: 2.96 M/UL (ref 4–5.2)
SODIUM BLD-SCNC: 138 MMOL/L (ref 136–145)
WBC # BLD: 8.3 K/UL (ref 4–11)

## 2020-03-17 PROCEDURE — 2700000000 HC OXYGEN THERAPY PER DAY

## 2020-03-17 PROCEDURE — 85025 COMPLETE CBC W/AUTO DIFF WBC: CPT

## 2020-03-17 PROCEDURE — 6370000000 HC RX 637 (ALT 250 FOR IP): Performed by: INTERNAL MEDICINE

## 2020-03-17 PROCEDURE — 2580000003 HC RX 258: Performed by: THORACIC SURGERY (CARDIOTHORACIC VASCULAR SURGERY)

## 2020-03-17 PROCEDURE — 6360000002 HC RX W HCPCS: Performed by: THORACIC SURGERY (CARDIOTHORACIC VASCULAR SURGERY)

## 2020-03-17 PROCEDURE — 6370000000 HC RX 637 (ALT 250 FOR IP): Performed by: SURGERY

## 2020-03-17 PROCEDURE — 2500000003 HC RX 250 WO HCPCS: Performed by: INTERNAL MEDICINE

## 2020-03-17 PROCEDURE — 3609010900 HC BRONCHOSCOPY THERAPUTIC ASPIRATION INITIAL: Performed by: INTERNAL MEDICINE

## 2020-03-17 PROCEDURE — 71045 X-RAY EXAM CHEST 1 VIEW: CPT

## 2020-03-17 PROCEDURE — 6370000000 HC RX 637 (ALT 250 FOR IP): Performed by: STUDENT IN AN ORGANIZED HEALTH CARE EDUCATION/TRAINING PROGRAM

## 2020-03-17 PROCEDURE — 99152 MOD SED SAME PHYS/QHP 5/>YRS: CPT | Performed by: INTERNAL MEDICINE

## 2020-03-17 PROCEDURE — 99153 MOD SED SAME PHYS/QHP EA: CPT | Performed by: INTERNAL MEDICINE

## 2020-03-17 PROCEDURE — 2060000000 HC ICU INTERMEDIATE R&B

## 2020-03-17 PROCEDURE — 0B918ZZ DRAINAGE OF TRACHEA, VIA NATURAL OR ARTIFICIAL OPENING ENDOSCOPIC: ICD-10-PCS | Performed by: INTERNAL MEDICINE

## 2020-03-17 PROCEDURE — 2709999900 HC NON-CHARGEABLE SUPPLY: Performed by: INTERNAL MEDICINE

## 2020-03-17 PROCEDURE — 83735 ASSAY OF MAGNESIUM: CPT

## 2020-03-17 PROCEDURE — 36415 COLL VENOUS BLD VENIPUNCTURE: CPT

## 2020-03-17 PROCEDURE — 6370000000 HC RX 637 (ALT 250 FOR IP): Performed by: THORACIC SURGERY (CARDIOTHORACIC VASCULAR SURGERY)

## 2020-03-17 PROCEDURE — 94761 N-INVAS EAR/PLS OXIMETRY MLT: CPT

## 2020-03-17 PROCEDURE — 94150 VITAL CAPACITY TEST: CPT

## 2020-03-17 PROCEDURE — 80069 RENAL FUNCTION PANEL: CPT

## 2020-03-17 PROCEDURE — 94668 MNPJ CHEST WALL SBSQ: CPT

## 2020-03-17 PROCEDURE — 31645 BRNCHSC W/THER ASPIR 1ST: CPT | Performed by: INTERNAL MEDICINE

## 2020-03-17 PROCEDURE — 94640 AIRWAY INHALATION TREATMENT: CPT

## 2020-03-17 PROCEDURE — 94799 UNLISTED PULMONARY SVC/PX: CPT

## 2020-03-17 PROCEDURE — 6360000002 HC RX W HCPCS: Performed by: STUDENT IN AN ORGANIZED HEALTH CARE EDUCATION/TRAINING PROGRAM

## 2020-03-17 PROCEDURE — 94669 MECHANICAL CHEST WALL OSCILL: CPT

## 2020-03-17 PROCEDURE — 7100000010 HC PHASE II RECOVERY - FIRST 15 MIN: Performed by: INTERNAL MEDICINE

## 2020-03-17 PROCEDURE — 6360000002 HC RX W HCPCS: Performed by: SURGERY

## 2020-03-17 PROCEDURE — 6360000002 HC RX W HCPCS: Performed by: INTERNAL MEDICINE

## 2020-03-17 PROCEDURE — 7100000011 HC PHASE II RECOVERY - ADDTL 15 MIN: Performed by: INTERNAL MEDICINE

## 2020-03-17 RX ORDER — LIDOCAINE HYDROCHLORIDE 20 MG/ML
INJECTION, SOLUTION EPIDURAL; INFILTRATION; INTRACAUDAL; PERINEURAL PRN
Status: DISCONTINUED | OUTPATIENT
Start: 2020-03-17 | End: 2020-03-17 | Stop reason: ALTCHOICE

## 2020-03-17 RX ORDER — VENLAFAXINE HYDROCHLORIDE 150 MG/1
150 CAPSULE, EXTENDED RELEASE ORAL DAILY
Status: DISCONTINUED | OUTPATIENT
Start: 2020-03-18 | End: 2020-03-17

## 2020-03-17 RX ORDER — VENLAFAXINE HYDROCHLORIDE 150 MG/1
150 CAPSULE, EXTENDED RELEASE ORAL DAILY
Status: DISCONTINUED | OUTPATIENT
Start: 2020-03-18 | End: 2020-03-20 | Stop reason: HOSPADM

## 2020-03-17 RX ORDER — MAGNESIUM SULFATE IN WATER 40 MG/ML
4 INJECTION, SOLUTION INTRAVENOUS ONCE
Status: COMPLETED | OUTPATIENT
Start: 2020-03-17 | End: 2020-03-17

## 2020-03-17 RX ORDER — FENTANYL CITRATE 50 UG/ML
INJECTION, SOLUTION INTRAMUSCULAR; INTRAVENOUS PRN
Status: DISCONTINUED | OUTPATIENT
Start: 2020-03-17 | End: 2020-03-17 | Stop reason: ALTCHOICE

## 2020-03-17 RX ORDER — MIDAZOLAM HYDROCHLORIDE 1 MG/ML
INJECTION INTRAMUSCULAR; INTRAVENOUS PRN
Status: DISCONTINUED | OUTPATIENT
Start: 2020-03-17 | End: 2020-03-17 | Stop reason: ALTCHOICE

## 2020-03-17 RX ORDER — LIDOCAINE HYDROCHLORIDE 20 MG/ML
JELLY TOPICAL PRN
Status: DISCONTINUED | OUTPATIENT
Start: 2020-03-17 | End: 2020-03-17 | Stop reason: ALTCHOICE

## 2020-03-17 RX ADMIN — QUETIAPINE FUMARATE 300 MG: 200 TABLET, EXTENDED RELEASE ORAL at 23:18

## 2020-03-17 RX ADMIN — APIXABAN 5 MG: 5 TABLET, FILM COATED ORAL at 19:51

## 2020-03-17 RX ADMIN — IBUPROFEN 400 MG: 400 TABLET ORAL at 13:11

## 2020-03-17 RX ADMIN — PANTOPRAZOLE SODIUM 40 MG: 40 TABLET, DELAYED RELEASE ORAL at 05:46

## 2020-03-17 RX ADMIN — Medication 10 ML: at 19:51

## 2020-03-17 RX ADMIN — ACETAMINOPHEN 325 MG: 325 TABLET ORAL at 16:35

## 2020-03-17 RX ADMIN — DORNASE ALFA 2.5 MG: 1 SOLUTION RESPIRATORY (INHALATION) at 20:29

## 2020-03-17 RX ADMIN — FERROUS SULFATE TAB 325 MG (65 MG ELEMENTAL FE) 325 MG: 325 (65 FE) TAB at 10:47

## 2020-03-17 RX ADMIN — DIAZEPAM 5 MG: 5 TABLET ORAL at 19:51

## 2020-03-17 RX ADMIN — Medication 10 ML: at 08:00

## 2020-03-17 RX ADMIN — DIAZEPAM 5 MG: 5 TABLET ORAL at 01:43

## 2020-03-17 RX ADMIN — LEVALBUTEROL HYDROCHLORIDE 1.25 MG: 1.25 SOLUTION, CONCENTRATE RESPIRATORY (INHALATION) at 13:01

## 2020-03-17 RX ADMIN — ACETYLCYSTEINE 600 MG: 200 SOLUTION ORAL; RESPIRATORY (INHALATION) at 13:00

## 2020-03-17 RX ADMIN — ACETAMINOPHEN 325 MG: 325 TABLET ORAL at 13:10

## 2020-03-17 RX ADMIN — TRAMADOL HYDROCHLORIDE 50 MG: 50 TABLET, FILM COATED ORAL at 10:47

## 2020-03-17 RX ADMIN — IBUPROFEN 400 MG: 400 TABLET ORAL at 23:24

## 2020-03-17 RX ADMIN — ROPINIROLE HYDROCHLORIDE 3 MG: 1 TABLET, FILM COATED ORAL at 19:51

## 2020-03-17 RX ADMIN — ACETAMINOPHEN 325 MG: 325 TABLET ORAL at 05:46

## 2020-03-17 RX ADMIN — DILTIAZEM HYDROCHLORIDE 180 MG: 180 CAPSULE, COATED, EXTENDED RELEASE ORAL at 08:23

## 2020-03-17 RX ADMIN — LEVALBUTEROL HYDROCHLORIDE 1.25 MG: 1.25 SOLUTION, CONCENTRATE RESPIRATORY (INHALATION) at 17:10

## 2020-03-17 RX ADMIN — ACETYLCYSTEINE 600 MG: 200 SOLUTION ORAL; RESPIRATORY (INHALATION) at 17:11

## 2020-03-17 RX ADMIN — TRAMADOL HYDROCHLORIDE 50 MG: 50 TABLET, FILM COATED ORAL at 23:24

## 2020-03-17 RX ADMIN — MAGNESIUM SULFATE IN WATER 4 G: 40 INJECTION, SOLUTION INTRAVENOUS at 08:24

## 2020-03-17 RX ADMIN — LEVALBUTEROL HYDROCHLORIDE 1.25 MG: 1.25 SOLUTION, CONCENTRATE RESPIRATORY (INHALATION) at 20:29

## 2020-03-17 RX ADMIN — APIXABAN 5 MG: 5 TABLET, FILM COATED ORAL at 10:47

## 2020-03-17 RX ADMIN — ACETAMINOPHEN 325 MG: 325 TABLET ORAL at 01:41

## 2020-03-17 RX ADMIN — EZETIMIBE 10 MG: 10 TABLET ORAL at 11:55

## 2020-03-17 RX ADMIN — ACETYLCYSTEINE 600 MG: 200 SOLUTION ORAL; RESPIRATORY (INHALATION) at 20:29

## 2020-03-17 RX ADMIN — PANTOPRAZOLE SODIUM 40 MG: 40 TABLET, DELAYED RELEASE ORAL at 16:34

## 2020-03-17 RX ADMIN — BENZOCAINE AND MENTHOL 1 LOZENGE: 15; 3.6 LOZENGE ORAL at 11:55

## 2020-03-17 RX ADMIN — IBUPROFEN 400 MG: 400 TABLET ORAL at 05:46

## 2020-03-17 RX ADMIN — IBUPROFEN 400 MG: 400 TABLET ORAL at 16:34

## 2020-03-17 RX ADMIN — IBUPROFEN 400 MG: 400 TABLET ORAL at 01:41

## 2020-03-17 RX ADMIN — ANTACID TABLETS 500 MG: 500 TABLET, CHEWABLE ORAL at 21:05

## 2020-03-17 ASSESSMENT — PAIN DESCRIPTION - FREQUENCY
FREQUENCY: CONTINUOUS

## 2020-03-17 ASSESSMENT — PAIN - FUNCTIONAL ASSESSMENT
PAIN_FUNCTIONAL_ASSESSMENT: FACES
PAIN_FUNCTIONAL_ASSESSMENT: ACTIVITIES ARE NOT PREVENTED
PAIN_FUNCTIONAL_ASSESSMENT: ACTIVITIES ARE NOT PREVENTED
PAIN_FUNCTIONAL_ASSESSMENT: 0-10
PAIN_FUNCTIONAL_ASSESSMENT: FACES
PAIN_FUNCTIONAL_ASSESSMENT: ACTIVITIES ARE NOT PREVENTED
PAIN_FUNCTIONAL_ASSESSMENT: ACTIVITIES ARE NOT PREVENTED
PAIN_FUNCTIONAL_ASSESSMENT: FACES
PAIN_FUNCTIONAL_ASSESSMENT: 0-10
PAIN_FUNCTIONAL_ASSESSMENT: FACES
PAIN_FUNCTIONAL_ASSESSMENT: FACES

## 2020-03-17 ASSESSMENT — PAIN DESCRIPTION - PAIN TYPE
TYPE: SURGICAL PAIN

## 2020-03-17 ASSESSMENT — PAIN SCALES - GENERAL
PAINLEVEL_OUTOF10: 6
PAINLEVEL_OUTOF10: 6
PAINLEVEL_OUTOF10: 4
PAINLEVEL_OUTOF10: 6
PAINLEVEL_OUTOF10: 4
PAINLEVEL_OUTOF10: 5
PAINLEVEL_OUTOF10: 8
PAINLEVEL_OUTOF10: 7
PAINLEVEL_OUTOF10: 7

## 2020-03-17 ASSESSMENT — PAIN DESCRIPTION - LOCATION
LOCATION: RIB CAGE
LOCATION: CHEST
LOCATION: RIB CAGE;FLANK
LOCATION: RIB CAGE
LOCATION: RIB CAGE

## 2020-03-17 ASSESSMENT — PAIN DESCRIPTION - ONSET
ONSET: ON-GOING

## 2020-03-17 ASSESSMENT — PAIN DESCRIPTION - ORIENTATION
ORIENTATION: RIGHT

## 2020-03-17 ASSESSMENT — PAIN DESCRIPTION - DESCRIPTORS
DESCRIPTORS: ACHING
DESCRIPTORS: ACHING;SORE
DESCRIPTORS: ACHING
DESCRIPTORS: ACHING

## 2020-03-17 ASSESSMENT — PAIN DESCRIPTION - PROGRESSION
CLINICAL_PROGRESSION: GRADUALLY WORSENING
CLINICAL_PROGRESSION: GRADUALLY IMPROVING
CLINICAL_PROGRESSION: NOT CHANGED
CLINICAL_PROGRESSION: GRADUALLY IMPROVING
CLINICAL_PROGRESSION: GRADUALLY IMPROVING

## 2020-03-17 ASSESSMENT — PAIN SCALES - WONG BAKER: WONGBAKER_NUMERICALRESPONSE: 0

## 2020-03-17 NOTE — PROGRESS NOTES
RESPIRATORY THERAPY ASSESSMENT    Name:  [facility]  Medical Record Number:  [id number]  Age: 78 y.o. Gender: female  : 1950  Today's Date:  3/17/2020  Room:  3279/2465-92    Assessment     Is the patient being admitted for a COPD or Asthma exacerbation? No   (If yes the patient will be seen every 4 hours for the first 24 hours and then reassessed)    Patient Admission Diagnosis      Allergies  Allergies   Allergen Reactions    Buspirone Other (See Comments)     unsure    Lisinopril Other (See Comments)     Low blood pressure per patient 59/     Penicillins Nausea Only       Minimum Predicted Vital Capacity:     918          Actual Vital Capacity:      1000              Pulmonary History:COPD, CHF/Pulmonary Edema and Lung CA RUL  Home Oxygen Therapy:  2.5 liters/min via nasal cannula nocturnal  Home Respiratory Therapy:Albuterol, Tiotropium Bromide and Vilanterol/Fluticasone Furoate   Current Respiratory Therapy: Xopenex and Mucomyst Q4W/A Pulmozyme BID, CPT, Acapella, IS Rx's  Treatment Type: Vibratory mucous clearing therapy or intervention  Medications: N-Acetylcysteine, Xopenex and Pulmozyme    Respiratory Severity Index(RSI)   Patients with orders for inhalation medications, oxygen, or any therapeutic treatment modality will be placed on Respiratory Protocol. They will be assessed with the first treatment and at least every 72 hours thereafter. The following severity scale will be used to determine frequency of treatment intervention. Smoking History: Pulmonary Disease or Smoking History, Greater than 15 pack year = 2    Social History  Social History     Tobacco Use    Smoking status: Former Smoker     Packs/day: 1.00     Years: 50.00     Pack years: 50.00     Types: Cigarettes     Last attempt to quit: 2020     Years since quittin.0    Smokeless tobacco: Never Used    Tobacco comment: smoked 3 cigarettes since    Substance Use Topics    Alcohol use:  No Alcohol/week: 0.0 standard drinks    Drug use: No       Recent Surgical History: Thoracic or Pulmonary Resection= 4  Past Surgical History  Past Surgical History:   Procedure Laterality Date    ANKLE ARTHROSCOPY      x2    APPENDECTOMY      BRONCHOSCOPY N/A 3/15/2020    BRONCHOSCOPY THERAPUTIC ASPIRATION INITIAL performed by Dora Bruner MD at [address]  3/17/2020    BRONCHOSCOPY THERAPUTIC ASPIRATION INITIAL performed by Gordon Milian MD at [address] Left     CHOLECYSTECTOMY      HYSTERECTOMY      ROTATOR CUFF REPAIR Bilateral     THORACOSCOPY Right 3/11/2020    RIGHT VIDEO ASSISTED THORACOSCOPIC SURGERY; WEDGE EXCISION OF RIGHT UPPER LOBE FOLLOWED BY RIGHT UPPER LOBECTOMY; INTERCOSTAL NERVE BLOCK performed by Dora Bruner MD at [address]         Level of Consciousness: Alert, Oriented, and Cooperative = 0    Level of Activity: Walking with assistance = 1    Respiratory Pattern: Dyspnea with exertion;Irregular pattern;or RR less than 6 = 2    Breath Sounds: Diminished unilaterally R lung field and occ E whz on L that clears with Rx's= 3    Sputum  Sputum Color: Cloudy, White, Tenacity: None, Sputum How Obtained: Spontaneous cough  Cough: Strong, spontaneous, non-productive = 0    Vital Signs   /70   Pulse 71   Temp 98.1 °F (36.7 °C) (Oral)   Resp 15   Ht 5' 7\" (1.702 m)   Wt 202 lb 6.4 oz (91.8 kg)   SpO2 95%   BMI 31.70 kg/m²   SPO2 (COPD values may differ): 88-89% on room air or greater than 92% on FiO2 28- 35% = 2    Peak Flow (asthma only): not applicable = 0    RSI: 17-77 = Q4WA (every four hours while awake) and Q4hrs PRN        Plan       Goals: medication delivery , vol. expansion, mucociliary clearance  Patient/caregiver was educated on the proper method of use for Respiratory Care Devices:  Yes      Level of patient/caregiver understanding able to:   ? Verbalize understanding   ?  Demonstrate understanding documented COPD, consider discontinuing anticholinergics when RSI is less than 9.  3. If the bronchospasm worsens (increased RSI), then the bronchodilator frequency can be increased to a maximum of every 4 hours. If greater than every 4 hours is required, the physician will be contacted. 4. If the bronchospasm improves, the frequency of the bronchodilator can be decreased, based on the patient's RSI, but not less than home treatment regimen frequency. 5. Bronchodilator(s) will be discontinued if patient has a RSI less than 9 and has received no scheduled or as needed treatment for 72  Hrs. Patients Ordered on a Mucolytic Agent:    1. Must always be administered with a bronchodilator. 2. Discontinue if patient experiences worsened bronchospasm, or secretions have lessened to the point that the patient is able to clear them with a cough. Anti-inflammatory and Combination Medications:    1. If the patient lacks prior history of lung disease, is not using inhaled anti-inflammatory medication at home, and lacks wheezing by examination or by history for at least 24 hours, contact physician for possible discontinuation.

## 2020-03-17 NOTE — PROGRESS NOTES
AM assessment charted. ./75   Pulse 67   Temp 98.1 °F (36.7 °C) (Oral)   Resp 9   Ht 5' 7\" (1.702 m)   Wt 202 lb 6.4 oz (91.8 kg)   SpO2 99%   BMI 31.70 kg/m²   Pt denies any SOB , endorsed some surgical pain but declined scheduled pain meds d/t NPO status, Will reassess later. Will continue to monitor.

## 2020-03-17 NOTE — PLAN OF CARE
Problem: Pain:  Goal: Control of acute pain  Description: Control of acute pain  Outcome: Met This Shift  Note: Pt c/o 7/10 pain, medicated per mar. Reassessed and pt stated pain goal of 6 and that she was comfortable. Will continue to monitor. Problem: Breathing Pattern - Ineffective:  Goal: Ability to achieve and maintain a regular respiratory rate will improve  Description: Ability to achieve and maintain a regular respiratory rate will improve  Outcome: Ongoing  Note: RR 16-24. SPO2 in the mids 90s. Occasional SOB , more so with exertion. Will continue to monitor.

## 2020-03-17 NOTE — DISCHARGE SUMMARY
Discharge Summary      Patient: Tiffany Murphy    Admit Date: 3/11/2020 11:54 AM    Discharge Date: 3/20/2020    Admitting Physician: Michelle Luong MD     Discharge Physician: same    Admitting Diagnosis: Right upper lobe lung nodule     Discharge Diagnosis: same     Past Medical History:   Diagnosis Date    A-fib [facility])     Arthritis     Clostridium difficile diarrhea 6/26/15    [facility]ch[facility]     COPD exacerbation [facility]     Diabetes mellitus ([address]     pt states she is NOT a diabetic    Emphysema of lung ([address]     Hyperlipidemia     Hypertension     O2 dependent     2 L/min at night    Pneumonia 2/5/2018    Primary cancer of right upper lobe of lung ([facility] 3/11/2020    Rib pain     Sleep apnea     does not use cpap    Small bowel obstruction ([facility]     pt denies        Indication for Admission:   Frida Pizarro is a 78year old female with a history of heavy smoking that was found to have a right upper lobe lung nodule found on lung screening. The patient underwent a PET scan and the nodule was reactive and highly suspicious for malignancy. Therefore the patient underwent right video assisted thoracoscopic surgery, wedge excision of right upper lobe followed by right upper lobectomy and intercostal nerve block on 3/11/2020     Hospital Course:   3/11/2020 POD 0- The patient tolerated the procedure well and was transferred to the general surgery floor. Chest tube remained to suction post operatively and was tidaling with an expiratory air leak. 3/12/2020 POD 1-  Overnight the patient had issues with pain control. She was tachycardic and an EKG was preformed that showed sinus tachycardia. Chest x ray showed persistent empty space from lobectomy in the right upper portion of the chest.  Home diltiazem was restarted for tachycardia this AM. Hypertonic saline and Albuterol was used for bronchial dilation.   Chest tube remains to suction at -10 mmHg    3/13/2020 POD 2- No surgery, wedge excision of right upper lobe followed by right upper lobectomy and intercostal nerve block    3/15/2020- bronchoscopy with plugging of the right middle lobe- no BAL obtained     3/17/2020- bronchoscopy with mucus plugging of the distal airways of the middle and right lower lobe    Pathology:   Wedge excision, right upper lobe lung lesion:       - Involved with non-small cell carcinoma, squamous cell carcinoma         with lesion 1.1 cm in greatest extent.       - Parenchymal resection margins are negative for malignancy.      - Overlying pleural surface shows no evidence of involvement with         malignancy.      - See case comment and synoptic report.       Regional lymph node excision, right 10:       - 3 lymph nodes with reactive changes and no evidence of metastatic         carcinoma ( 0\3 ).      - Pankeratin\CAM 5.2 stain is performed and supports diagnosis.       Right upper lobe lobectomy:        - Organizing biopsy cavity changes with extravasated blood and         reactive features.       - No residual malignancy identified.       - Bronchial margin, vascular margin and parenchymal margins are         negative for malignancy.      - 3 peribronchial lymph nodes with reactive changes and no evidence         of metastatic carcinoma (0/3).      - One intraparenchymal lymph node with no evidence of involvement         with malignancy.      - Pankeratin\CAM 5.2 stains were utilized to evaluate the lymph         nodes and supports final diagnosis.    Regional lymph node excision, right 4:       - Six lymph nodes with no evidence of metastatic carcinoma ( 0\6)       - Pankeratin\CAM 5.2 stains were utilized evaluated lymph nodes and         supports final diagnosis.     Consulting services:  Social Work  PT/OT   Pulmonary- Dr. Jeremias Duffy     Discharge physical exam:  General appearance: alert, sitting up in bedside chair, in NAD  Chest/Lungs: normal effort baseline, poor coughing effort, on 3L NC. Incision c/d/i, no crepitus   Cardiovascular: NSR  Abdomen: soft, non-tender, non-distended  Extremities: no edema, no cyanosis  Neuro: A&Ox3, no focal deficits, sensation intact    Disposition:  home    Condition at discharge:  Stable    Discharge Instructions:  See separate form    Patient Instructions:      Medication List      ASK your doctor about these medications    acetaminophen 500 MG tablet  Commonly known as:  TYLENOL     apixaban 5 MG Tabs tablet  Commonly known as:  Eliquis  Take 1 tablet by mouth 2 times daily     Breo Ellipta 100-25 MCG/INH Aepb inhaler  Generic drug:  fluticasone-vilanterol     dilTIAZem 120 MG extended release capsule  Commonly known as:  CARDIZEM CD  Take 1 capsule by mouth daily     ezetimibe 10 MG tablet  Commonly known as:  ZETIA     ferrous sulfate 325 (65 Fe) MG tablet  Commonly known as:  IRON 325     HYDROcodone-acetaminophen 7.5-325 MG per tablet  Commonly known as:  NORCO     hydrOXYzine 50 MG tablet  Commonly known as:  ATARAX     Incruse Ellipta 62.5 MCG/INH Aepb  Generic drug:  Umeclidinium Bromide     MUCINEX ALLERGY PO     nystatin 515260 UNIT/GM cream  Commonly known as:  MYCOSTATIN     omeprazole 40 MG delayed release capsule  Commonly known as:  PRILOSEC     OXYGEN     QUEtiapine 300 MG extended release tablet  Commonly known as:  SEROQUEL XR     rOPINIRole 2 MG tablet  Commonly known as:  REQUIP     therapeutic multivitamin-minerals tablet     tiotropium 18 MCG inhalation capsule  Commonly known as:  Spiriva HandiHaler  Inhale 1 capsule into the lungs daily     Topamax 50 MG tablet  Generic drug:  topiramate     venlafaxine 150 MG extended release capsule  Commonly known as:  EFFEXOR XR     * Ventolin  (90 Base) MCG/ACT inhaler  Generic drug:  albuterol sulfate HFA     * albuterol (2.5 MG/3ML) 0.083% nebulizer solution  Commonly known as:  PROVENTIL         * This list has 2 medication(s) that are the same as other medications prescribed for you.

## 2020-03-17 NOTE — PROGRESS NOTES
Cardiothoracic Surgery Daily Progress Note      CC: RUL lung nodule    SUBJECTIVE:  Patient ambulated with assistance yesterday. She was compliance with breathing treatments. Able to IS to 1500. Pt had an episode of hypotension which resolved spontaneously. ROS:   A 14 point review of systems was conducted, significant findings as noted above. All other systems negative.     OBJECTIVE:    PHYSICAL EXAM:  Vitals:    03/16/20 1901 03/16/20 2007 03/16/20 2112 03/17/20 0430   BP: (!) 112/58 124/63  (!) 105/59   Pulse:  71  72   Resp:  20  18   Temp:  98.6 °F (37 °C)  98 °F (36.7 °C)   TempSrc:  Oral  Oral   SpO2:  100% 98% 100%   Weight:    202 lb 6.4 oz (91.8 kg)   Height:           General appearance: sleepy, awakens to voice, in NAD  Chest/Lungs: normal effort baseline, poor coughing effort, on 4L NC, incision c/d/i, chest tube dressing c/d/i, chest tube to water seal, with expiratory air leak, tidaling, ss output, IS to 1500  Cardiovascular: NSR, rate in 70s  Abdomen: soft, non-tender, non-distended, incisions c/d/i  Extremities: no edema, no cyanosis  Neuro: A&Ox3, no focal deficits, sensation intact       ASSESSMENT & PLAN:   This is a 78y.o. year old female with a diagnosis of RUL lung nodule s/p RIGHT VIDEO ASSISTED THORACOSCOPIC SURGERY; WEDGE EXCISION OF RIGHT UPPER LOBE FOLLOWED BY RIGHT UPPER LOBECTOMY; INTERCOSTAL NERVE BLOCK POD5, s/p Bronchoscopy with therapeutic aspiration (03/15)    AM CXR still with pneumothorax, stable, some improvement in aeration of right middle lobe  CT to suction  Will need for possible repeat bronchoscopy  Patient on scheduled mucomyst and xopenex Q4H scheduled  Discussed with patient importance of coughing and IS   Continue dilt  OOB, ambulate, PT/OT  SLIV  Continue NPO, will resume diet after procedure    Denys Morel MD  PGY-2, General Surgery  03/17/20  6:55 AM  [phone]

## 2020-03-18 ENCOUNTER — APPOINTMENT (OUTPATIENT)
Dept: GENERAL RADIOLOGY | Age: 70
DRG: 163 | End: 2020-03-18
Attending: THORACIC SURGERY (CARDIOTHORACIC VASCULAR SURGERY)
Payer: MEDICARE

## 2020-03-18 LAB
ALBUMIN SERPL-MCNC: 3.3 G/DL (ref 3.4–5)
ANION GAP SERPL CALCULATED.3IONS-SCNC: 13 MMOL/L (ref 3–16)
BASOPHILS ABSOLUTE: 0.1 K/UL (ref 0–0.2)
BASOPHILS RELATIVE PERCENT: 1.1 %
BUN BLDV-MCNC: 14 MG/DL (ref 7–20)
CALCIUM SERPL-MCNC: 8.8 MG/DL (ref 8.3–10.6)
CHLORIDE BLD-SCNC: 101 MMOL/L (ref 99–110)
CO2: 25 MMOL/L (ref 21–32)
CREAT SERPL-MCNC: 0.6 MG/DL (ref 0.6–1.2)
EOSINOPHILS ABSOLUTE: 0.3 K/UL (ref 0–0.6)
EOSINOPHILS RELATIVE PERCENT: 3.9 %
GFR AFRICAN AMERICAN: >60
GFR NON-AFRICAN AMERICAN: >60
GLUCOSE BLD-MCNC: 117 MG/DL (ref 70–99)
HCT VFR BLD CALC: 28.4 % (ref 36–48)
HEMOGLOBIN: 9.5 G/DL (ref 12–16)
LYMPHOCYTES ABSOLUTE: 2 K/UL (ref 1–5.1)
LYMPHOCYTES RELATIVE PERCENT: 25.6 %
MAGNESIUM: 1.7 MG/DL (ref 1.8–2.4)
MCH RBC QN AUTO: 31.9 PG (ref 26–34)
MCHC RBC AUTO-ENTMCNC: 33.5 G/DL (ref 31–36)
MCV RBC AUTO: 95 FL (ref 80–100)
MONOCYTES ABSOLUTE: 0.7 K/UL (ref 0–1.3)
MONOCYTES RELATIVE PERCENT: 9.3 %
NEUTROPHILS ABSOLUTE: 4.6 K/UL (ref 1.7–7.7)
NEUTROPHILS RELATIVE PERCENT: 60.1 %
PDW BLD-RTO: 13 % (ref 12.4–15.4)
PHOSPHORUS: 3.5 MG/DL (ref 2.5–4.9)
PLATELET # BLD: 300 K/UL (ref 135–450)
PMV BLD AUTO: 7.3 FL (ref 5–10.5)
POTASSIUM SERPL-SCNC: 4.4 MMOL/L (ref 3.5–5.1)
RBC # BLD: 2.99 M/UL (ref 4–5.2)
SODIUM BLD-SCNC: 139 MMOL/L (ref 136–145)
WBC # BLD: 7.6 K/UL (ref 4–11)

## 2020-03-18 PROCEDURE — 94640 AIRWAY INHALATION TREATMENT: CPT

## 2020-03-18 PROCEDURE — 83735 ASSAY OF MAGNESIUM: CPT

## 2020-03-18 PROCEDURE — 94761 N-INVAS EAR/PLS OXIMETRY MLT: CPT

## 2020-03-18 PROCEDURE — 94668 MNPJ CHEST WALL SBSQ: CPT

## 2020-03-18 PROCEDURE — 97110 THERAPEUTIC EXERCISES: CPT

## 2020-03-18 PROCEDURE — 71045 X-RAY EXAM CHEST 1 VIEW: CPT

## 2020-03-18 PROCEDURE — 80069 RENAL FUNCTION PANEL: CPT

## 2020-03-18 PROCEDURE — 94669 MECHANICAL CHEST WALL OSCILL: CPT

## 2020-03-18 PROCEDURE — 6360000002 HC RX W HCPCS: Performed by: STUDENT IN AN ORGANIZED HEALTH CARE EDUCATION/TRAINING PROGRAM

## 2020-03-18 PROCEDURE — 6370000000 HC RX 637 (ALT 250 FOR IP): Performed by: THORACIC SURGERY (CARDIOTHORACIC VASCULAR SURGERY)

## 2020-03-18 PROCEDURE — 2700000000 HC OXYGEN THERAPY PER DAY

## 2020-03-18 PROCEDURE — 2580000003 HC RX 258: Performed by: THORACIC SURGERY (CARDIOTHORACIC VASCULAR SURGERY)

## 2020-03-18 PROCEDURE — 6370000000 HC RX 637 (ALT 250 FOR IP): Performed by: STUDENT IN AN ORGANIZED HEALTH CARE EDUCATION/TRAINING PROGRAM

## 2020-03-18 PROCEDURE — 36415 COLL VENOUS BLD VENIPUNCTURE: CPT

## 2020-03-18 PROCEDURE — 99233 SBSQ HOSP IP/OBS HIGH 50: CPT | Performed by: INTERNAL MEDICINE

## 2020-03-18 PROCEDURE — 6360000002 HC RX W HCPCS: Performed by: THORACIC SURGERY (CARDIOTHORACIC VASCULAR SURGERY)

## 2020-03-18 PROCEDURE — 94150 VITAL CAPACITY TEST: CPT

## 2020-03-18 PROCEDURE — 97535 SELF CARE MNGMENT TRAINING: CPT

## 2020-03-18 PROCEDURE — 97530 THERAPEUTIC ACTIVITIES: CPT

## 2020-03-18 PROCEDURE — 97116 GAIT TRAINING THERAPY: CPT

## 2020-03-18 PROCEDURE — 6370000000 HC RX 637 (ALT 250 FOR IP): Performed by: SURGERY

## 2020-03-18 PROCEDURE — 2060000000 HC ICU INTERMEDIATE R&B

## 2020-03-18 PROCEDURE — 85025 COMPLETE CBC W/AUTO DIFF WBC: CPT

## 2020-03-18 RX ORDER — DIAZEPAM 2 MG/1
2 TABLET ORAL ONCE
Status: COMPLETED | OUTPATIENT
Start: 2020-03-18 | End: 2020-03-18

## 2020-03-18 RX ORDER — MAGNESIUM SULFATE 1 G/100ML
1 INJECTION INTRAVENOUS
Status: COMPLETED | OUTPATIENT
Start: 2020-03-18 | End: 2020-03-18

## 2020-03-18 RX ADMIN — PANTOPRAZOLE SODIUM 40 MG: 40 TABLET, DELAYED RELEASE ORAL at 09:22

## 2020-03-18 RX ADMIN — LEVALBUTEROL HYDROCHLORIDE 1.25 MG: 1.25 SOLUTION, CONCENTRATE RESPIRATORY (INHALATION) at 12:19

## 2020-03-18 RX ADMIN — DIAZEPAM 2 MG: 2 TABLET ORAL at 22:48

## 2020-03-18 RX ADMIN — ACETAMINOPHEN 325 MG: 325 TABLET ORAL at 09:21

## 2020-03-18 RX ADMIN — LEVALBUTEROL HYDROCHLORIDE 1.25 MG: 1.25 SOLUTION, CONCENTRATE RESPIRATORY (INHALATION) at 16:42

## 2020-03-18 RX ADMIN — DILTIAZEM HYDROCHLORIDE 180 MG: 180 CAPSULE, COATED, EXTENDED RELEASE ORAL at 09:21

## 2020-03-18 RX ADMIN — DORNASE ALFA 2.5 MG: 1 SOLUTION RESPIRATORY (INHALATION) at 20:35

## 2020-03-18 RX ADMIN — IBUPROFEN 400 MG: 400 TABLET ORAL at 21:48

## 2020-03-18 RX ADMIN — APIXABAN 5 MG: 5 TABLET, FILM COATED ORAL at 20:31

## 2020-03-18 RX ADMIN — IBUPROFEN 400 MG: 400 TABLET ORAL at 13:26

## 2020-03-18 RX ADMIN — MAGNESIUM SULFATE HEPTAHYDRATE 1 G: 1 INJECTION, SOLUTION INTRAVENOUS at 13:27

## 2020-03-18 RX ADMIN — IBUPROFEN 400 MG: 400 TABLET ORAL at 05:37

## 2020-03-18 RX ADMIN — ACETAMINOPHEN 325 MG: 325 TABLET ORAL at 05:38

## 2020-03-18 RX ADMIN — BENZOCAINE AND MENTHOL 1 LOZENGE: 15; 3.6 LOZENGE ORAL at 22:08

## 2020-03-18 RX ADMIN — VENLAFAXINE HYDROCHLORIDE 150 MG: 150 CAPSULE, EXTENDED RELEASE ORAL at 02:19

## 2020-03-18 RX ADMIN — DIAZEPAM 5 MG: 5 TABLET ORAL at 05:38

## 2020-03-18 RX ADMIN — LEVALBUTEROL HYDROCHLORIDE 1.25 MG: 1.25 SOLUTION, CONCENTRATE RESPIRATORY (INHALATION) at 20:35

## 2020-03-18 RX ADMIN — ACETAMINOPHEN 325 MG: 325 TABLET ORAL at 18:23

## 2020-03-18 RX ADMIN — MAGNESIUM SULFATE HEPTAHYDRATE 1 G: 1 INJECTION, SOLUTION INTRAVENOUS at 09:29

## 2020-03-18 RX ADMIN — LEVALBUTEROL HYDROCHLORIDE 1.25 MG: 1.25 SOLUTION, CONCENTRATE RESPIRATORY (INHALATION) at 08:54

## 2020-03-18 RX ADMIN — Medication 10 ML: at 09:22

## 2020-03-18 RX ADMIN — ACETAMINOPHEN 325 MG: 325 TABLET ORAL at 13:26

## 2020-03-18 RX ADMIN — EZETIMIBE 10 MG: 10 TABLET ORAL at 09:24

## 2020-03-18 RX ADMIN — PANTOPRAZOLE SODIUM 40 MG: 40 TABLET, DELAYED RELEASE ORAL at 18:23

## 2020-03-18 RX ADMIN — IBUPROFEN 400 MG: 400 TABLET ORAL at 18:23

## 2020-03-18 RX ADMIN — ACETYLCYSTEINE 600 MG: 200 SOLUTION ORAL; RESPIRATORY (INHALATION) at 08:54

## 2020-03-18 RX ADMIN — APIXABAN 5 MG: 5 TABLET, FILM COATED ORAL at 09:22

## 2020-03-18 RX ADMIN — ACETYLCYSTEINE 600 MG: 200 SOLUTION ORAL; RESPIRATORY (INHALATION) at 12:19

## 2020-03-18 RX ADMIN — ACETAMINOPHEN 325 MG: 325 TABLET ORAL at 02:19

## 2020-03-18 RX ADMIN — ACETYLCYSTEINE 600 MG: 200 SOLUTION ORAL; RESPIRATORY (INHALATION) at 16:42

## 2020-03-18 RX ADMIN — ROPINIROLE HYDROCHLORIDE 3 MG: 1 TABLET, FILM COATED ORAL at 20:31

## 2020-03-18 RX ADMIN — DORNASE ALFA 2.5 MG: 1 SOLUTION RESPIRATORY (INHALATION) at 08:54

## 2020-03-18 RX ADMIN — FERROUS SULFATE TAB 325 MG (65 MG ELEMENTAL FE) 325 MG: 325 (65 FE) TAB at 09:22

## 2020-03-18 RX ADMIN — MAGNESIUM SULFATE HEPTAHYDRATE 1 G: 1 INJECTION, SOLUTION INTRAVENOUS at 11:17

## 2020-03-18 RX ADMIN — TRAMADOL HYDROCHLORIDE 50 MG: 50 TABLET, FILM COATED ORAL at 20:37

## 2020-03-18 RX ADMIN — ACETYLCYSTEINE 600 MG: 200 SOLUTION ORAL; RESPIRATORY (INHALATION) at 20:34

## 2020-03-18 RX ADMIN — ACETAMINOPHEN 325 MG: 325 TABLET ORAL at 21:48

## 2020-03-18 RX ADMIN — IBUPROFEN 400 MG: 400 TABLET ORAL at 09:22

## 2020-03-18 RX ADMIN — QUETIAPINE FUMARATE 300 MG: 200 TABLET, EXTENDED RELEASE ORAL at 21:54

## 2020-03-18 RX ADMIN — Medication 10 ML: at 20:32

## 2020-03-18 ASSESSMENT — PAIN SCALES - WONG BAKER
WONGBAKER_NUMERICALRESPONSE: 0

## 2020-03-18 ASSESSMENT — PAIN SCALES - GENERAL
PAINLEVEL_OUTOF10: 7
PAINLEVEL_OUTOF10: 6
PAINLEVEL_OUTOF10: 0
PAINLEVEL_OUTOF10: 8
PAINLEVEL_OUTOF10: 1
PAINLEVEL_OUTOF10: 8
PAINLEVEL_OUTOF10: 3
PAINLEVEL_OUTOF10: 8
PAINLEVEL_OUTOF10: 0
PAINLEVEL_OUTOF10: 3
PAINLEVEL_OUTOF10: 8

## 2020-03-18 ASSESSMENT — PAIN DESCRIPTION - PROGRESSION
CLINICAL_PROGRESSION: GRADUALLY WORSENING

## 2020-03-18 ASSESSMENT — PAIN DESCRIPTION - FREQUENCY
FREQUENCY: INTERMITTENT
FREQUENCY: INTERMITTENT
FREQUENCY: CONTINUOUS

## 2020-03-18 ASSESSMENT — PAIN DESCRIPTION - PAIN TYPE
TYPE: SURGICAL PAIN

## 2020-03-18 ASSESSMENT — PAIN DESCRIPTION - ONSET
ONSET: GRADUAL
ONSET: ON-GOING
ONSET: ON-GOING

## 2020-03-18 ASSESSMENT — PAIN - FUNCTIONAL ASSESSMENT: PAIN_FUNCTIONAL_ASSESSMENT: PREVENTS OR INTERFERES SOME ACTIVE ACTIVITIES AND ADLS

## 2020-03-18 ASSESSMENT — PAIN DESCRIPTION - DESCRIPTORS
DESCRIPTORS: ACHING
DESCRIPTORS: ACHING
DESCRIPTORS: ACHING;DISCOMFORT

## 2020-03-18 ASSESSMENT — PAIN DESCRIPTION - LOCATION
LOCATION: CHEST

## 2020-03-18 ASSESSMENT — PAIN DESCRIPTION - ORIENTATION: ORIENTATION: RIGHT

## 2020-03-18 NOTE — PROGRESS NOTES
agreeable to activity.     Pain: 6/10, nurse aware  Objective    ADL  Grooming: Setup(to comb hair seated in chair)  LE Dressing: Stand by assistance(to don/doff slippers)  Toileting: (denied need)        Balance  Sitting Balance: Supervision  Standing Balance: Stand by assistance  Standing Balance  Time: ~3 min  Activity: mobility in room/hallway  Functional Mobility  Functional - Mobility Device: 4-Wheeled Walker  Activity: Other  Assist Level: Contact guard assistance     Transfers  Stand Step Transfers: Contact guard assistance(+cues  to/from chair)  Sit to stand: Contact guard assistance(+cues   from chair)  Stand to sit: Contact guard assistance(+cues   to chair)                       Cognition  Overall Cognitive Status: WFL                    Type of ROM/Therapeutic Exercise  Type of ROM/Therapeutic Exercise: AROM  Exercises  Shoulder Flexion: x5  Shoulder AB/ADduction: x5  Horizontal AB/ADduction: x10  Elbow Flexion/Extension: x10                    Plan   Plan  Times per week: 2-5  Times per day: Daily  Current Treatment Recommendations: Functional Mobility Training, Endurance Training, Safety Education & Training, Self-Care / ADL  AM-[facility] Score        -[facility] Inpatient Daily Activity Raw Score: 20 (03/18/20 1101)  AM-PAC Inpatient ADL T-Scale Score : 42.03 (03/18/20 1101)  ADL Inpatient CMS 0-100% Score: 38.32 (03/18/20 1101)  ADL Inpatient CMS G-Code Modifier : Amie Bamberger (03/18/20 1101)    Goals                          No goals met  Short term goals  Time Frame for Short term goals: Discharge  Short term goal 1: increase standing tolerance to 7 minutes for ADL, SBa  Short term goal 2: toilet transfer w/ Supervision  Short term goal 3: LB dressing w/ Supervision  Short term goal 4: functional mobility for item transport and retrieval using LRAD, Supervision  Patient Goals   Patient goals : to discharge to her dtr's home        Therapy Time   Individual Concurrent Group Co-treatment   Time In 1022         Time Out

## 2020-03-18 NOTE — PROGRESS NOTES
Cardiothoracic Surgery Daily Progress Note      CC: RUL lung nodule    SUBJECTIVE:  Patient underwent repeat bronch yesterday with Dr. Dixie Suarez for therapeutic aspiration of secretions. No acute events overnight, has been HDS afebrile. Back to baseline 3L NC this AM. Denies any acute complaints this AM. IS only to 750. ROS:   A 14 point review of systems was conducted, significant findings as noted above. All other systems negative.     OBJECTIVE:    PHYSICAL EXAM:  Vitals:    03/17/20 2319 03/18/20 0220 03/18/20 0521 03/18/20 0529   BP: 105/62  (!) 144/82    Pulse: 70  74    Resp: 18  18    Temp:       TempSrc:       SpO2: 98%  95% 95%   Weight:  203 lb 11.3 oz (92.4 kg)     Height:           General appearance: sleepy, awakens to voice, in NAD  Chest/Lungs: normal effort baseline, poor coughing effort, on 3L NC, incision c/d/i, chest tube dressing c/d/i, chest tube to suction, with expiratory air leak, tidaling, ss output, IS to 750  Cardiovascular: NSR, rate in 70s  Abdomen: soft, non-tender, non-distended, incisions c/d/i  Extremities: no edema, no cyanosis  Neuro: A&Ox3, no focal deficits, sensation intact       ASSESSMENT & PLAN:   This is a 78y.o. year old female with a diagnosis of RUL lung nodule s/p RIGHT VIDEO ASSISTED THORACOSCOPIC SURGERY; WEDGE EXCISION OF RIGHT UPPER LOBE FOLLOWED BY RIGHT UPPER LOBECTOMY; INTERCOSTAL NERVE BLOCK POD7, s/p Bronchoscopy with therapeutic aspiration (03/15, 03/17)      CT to suction, will water seal this AM, CXR 4 hours after water seal  Discontinue valium  Patient on scheduled mucomyst and xopenex Q4H  Discussed with patient importance of coughing and IS   Continue dilt  OOB, ambulate, PT/OT  SLIV  Continue General Diet    Melvin Hernandes DO  PGY1, General Surgery  03/18/20  6:52 AM  [phone]

## 2020-03-18 NOTE — PROGRESS NOTES
obstruction ([address] has a past surgical history that includes Rotator cuff repair (Bilateral); Cholecystectomy; Ankle arthroscopy; Hysterectomy; Carpal tunnel release (Left); Appendectomy; Tonsillectomy; Thoracoscopy (Right, 3/11/2020); bronchoscopy (N/A, 3/15/2020); and bronchoscopy (3/17/2020). Restrictions  Position Activity Restriction  Other position/activity restrictions: chest tube to suction; progressive ambulation  Subjective   General  Chart Reviewed: Yes  Additional Pertinent Hx: s/p RIGHT VIDEO ASSISTED THORACOSCOPIC SURGERY; WEDGE EXCISION OF RIGHT UPPER LOBE FOLLOWED BY RIGHT UPPER LOBECTOMY; INTERCOSTAL NERVE BLOCK on 3/11  Referring Practitioner: Diana Solis MD  Subjective  Subjective: Pt found sitting EOB. RN nearby. Pt agreeable to PT.   Pain Screening  Patient Currently in Pain: Yes(6/10 chest tube insertion site; RN aware)       Orientation  Orientation  Overall Orientation Status: Within Normal Limits  Cognition      Objective      Transfers  Sit to Stand: Minimal Assistance(min cues for safety and impulsivity; from EOB, toilet, chair x 5 trials progressing to CGA with practice)  Stand to sit: Minimal Assistance  Ambulation  Ambulation?: Yes  Ambulation 1  Device: Rollator  Other Apparatus: O2(3L)  Assistance: Contact guard assistance  Quality of Gait: decreased myke, step length and height, no overt LOB although pt unsteady at times  Distance: 60 ft x 2; seated rest break x 5 min between long amb trials  Comments: pt amb 2 x 8 ft without AD with min A and unilateral HHA;  O2 sats assessed multiple times during and post amb - high 90s each assessment     Balance  Sitting - Static: Good  Sitting - Dynamic: Good  Standing - Static: Fair  Standing - Dynamic: Fair  Exercises  Comments: standing ex with walker with CGA to min A for balance: x 5 reps bilateral heel raises, marches, knee flex; x 10 reps B LAQ, APs                        G-Code     OutComes Score AM-PAC Score  AM-PAC Inpatient Mobility Raw Score : 18 (03/18/20 1037)  AM-PAC Inpatient T-Scale Score : 43.63 (03/18/20 1037)  Mobility Inpatient CMS 0-100% Score: 46.58 (03/18/20 1037)  Mobility Inpatient CMS G-Code Modifier : CK (03/18/20 1037)          Goals  Short term goals  Time Frame for Short term goals: by discharge  Short term goal 1: Transfers S  ONGOING  Short term goal 2: Ambulate 80' with or without device S  ONGOING  Short term goal 3: Bed Mobility S  ONGOING  Patient Goals   Patient goals : recuperate at her sisters home; 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    Plan    Plan  Times per week: 2-5  Current Treatment Recommendations: Transfer Training, Strengthening, Balance Training, Gait Training, Functional Mobility Training, Safety Education & Training, Patient/Caregiver Education & Training  Safety Devices  Type of devices: Call light within reach, Left in chair, Nurse notified, Gait belt(OT in room finishing tx)     Therapy Time   Individual Concurrent Group Co-treatment   Time In 0957         Time Out 1050         Minutes 53                 Timed Code Treatment Minutes:  53    Total Treatment Minutes:  53    If patient is discharged prior to next treatment, this note will serve as the discharge summary.   Bushra Main, PT, DPT  [phone]

## 2020-03-19 ENCOUNTER — APPOINTMENT (OUTPATIENT)
Dept: GENERAL RADIOLOGY | Age: 70
DRG: 163 | End: 2020-03-19
Attending: THORACIC SURGERY (CARDIOTHORACIC VASCULAR SURGERY)
Payer: MEDICARE

## 2020-03-19 LAB
ALBUMIN SERPL-MCNC: 2.7 G/DL (ref 3.4–5)
ANION GAP SERPL CALCULATED.3IONS-SCNC: 8 MMOL/L (ref 3–16)
BASOPHILS ABSOLUTE: 0 K/UL (ref 0–0.2)
BASOPHILS RELATIVE PERCENT: 0.4 %
BUN BLDV-MCNC: 16 MG/DL (ref 7–20)
CALCIUM SERPL-MCNC: 9 MG/DL (ref 8.3–10.6)
CHLORIDE BLD-SCNC: 102 MMOL/L (ref 99–110)
CO2: 28 MMOL/L (ref 21–32)
CREAT SERPL-MCNC: <0.5 MG/DL (ref 0.6–1.2)
EOSINOPHILS ABSOLUTE: 0.4 K/UL (ref 0–0.6)
EOSINOPHILS RELATIVE PERCENT: 4 %
GFR AFRICAN AMERICAN: >60
GFR NON-AFRICAN AMERICAN: >60
GLUCOSE BLD-MCNC: 110 MG/DL (ref 70–99)
HCT VFR BLD CALC: 25.4 % (ref 36–48)
HEMOGLOBIN: 8.5 G/DL (ref 12–16)
LYMPHOCYTES ABSOLUTE: 1.6 K/UL (ref 1–5.1)
LYMPHOCYTES RELATIVE PERCENT: 18.6 %
MAGNESIUM: 1.7 MG/DL (ref 1.8–2.4)
MCH RBC QN AUTO: 32 PG (ref 26–34)
MCHC RBC AUTO-ENTMCNC: 33.4 G/DL (ref 31–36)
MCV RBC AUTO: 95.7 FL (ref 80–100)
MONOCYTES ABSOLUTE: 0.9 K/UL (ref 0–1.3)
MONOCYTES RELATIVE PERCENT: 9.7 %
NEUTROPHILS ABSOLUTE: 5.9 K/UL (ref 1.7–7.7)
NEUTROPHILS RELATIVE PERCENT: 67.3 %
PDW BLD-RTO: 12.7 % (ref 12.4–15.4)
PHOSPHORUS: 3.9 MG/DL (ref 2.5–4.9)
PLATELET # BLD: 277 K/UL (ref 135–450)
PMV BLD AUTO: 7.2 FL (ref 5–10.5)
POTASSIUM SERPL-SCNC: 4.5 MMOL/L (ref 3.5–5.1)
RBC # BLD: 2.66 M/UL (ref 4–5.2)
SODIUM BLD-SCNC: 138 MMOL/L (ref 136–145)
WBC # BLD: 8.8 K/UL (ref 4–11)

## 2020-03-19 PROCEDURE — 2700000000 HC OXYGEN THERAPY PER DAY

## 2020-03-19 PROCEDURE — 6370000000 HC RX 637 (ALT 250 FOR IP): Performed by: STUDENT IN AN ORGANIZED HEALTH CARE EDUCATION/TRAINING PROGRAM

## 2020-03-19 PROCEDURE — 83735 ASSAY OF MAGNESIUM: CPT

## 2020-03-19 PROCEDURE — 80069 RENAL FUNCTION PANEL: CPT

## 2020-03-19 PROCEDURE — 6360000002 HC RX W HCPCS: Performed by: THORACIC SURGERY (CARDIOTHORACIC VASCULAR SURGERY)

## 2020-03-19 PROCEDURE — 94669 MECHANICAL CHEST WALL OSCILL: CPT

## 2020-03-19 PROCEDURE — 6370000000 HC RX 637 (ALT 250 FOR IP): Performed by: THORACIC SURGERY (CARDIOTHORACIC VASCULAR SURGERY)

## 2020-03-19 PROCEDURE — 36415 COLL VENOUS BLD VENIPUNCTURE: CPT

## 2020-03-19 PROCEDURE — 97530 THERAPEUTIC ACTIVITIES: CPT

## 2020-03-19 PROCEDURE — 6360000002 HC RX W HCPCS: Performed by: STUDENT IN AN ORGANIZED HEALTH CARE EDUCATION/TRAINING PROGRAM

## 2020-03-19 PROCEDURE — 97535 SELF CARE MNGMENT TRAINING: CPT

## 2020-03-19 PROCEDURE — 94761 N-INVAS EAR/PLS OXIMETRY MLT: CPT

## 2020-03-19 PROCEDURE — 94668 MNPJ CHEST WALL SBSQ: CPT

## 2020-03-19 PROCEDURE — 94640 AIRWAY INHALATION TREATMENT: CPT

## 2020-03-19 PROCEDURE — 6370000000 HC RX 637 (ALT 250 FOR IP): Performed by: SURGERY

## 2020-03-19 PROCEDURE — 71045 X-RAY EXAM CHEST 1 VIEW: CPT

## 2020-03-19 PROCEDURE — 2580000003 HC RX 258: Performed by: THORACIC SURGERY (CARDIOTHORACIC VASCULAR SURGERY)

## 2020-03-19 PROCEDURE — 85025 COMPLETE CBC W/AUTO DIFF WBC: CPT

## 2020-03-19 PROCEDURE — 2060000000 HC ICU INTERMEDIATE R&B

## 2020-03-19 RX ORDER — METHOCARBAMOL 500 MG/1
500 TABLET, FILM COATED ORAL 3 TIMES DAILY
Status: DISCONTINUED | OUTPATIENT
Start: 2020-03-19 | End: 2020-03-20 | Stop reason: HOSPADM

## 2020-03-19 RX ORDER — LANOLIN ALCOHOL/MO/W.PET/CERES
400 CREAM (GRAM) TOPICAL ONCE
Status: COMPLETED | OUTPATIENT
Start: 2020-03-19 | End: 2020-03-19

## 2020-03-19 RX ORDER — LIDOCAINE 4 G/G
1 PATCH TOPICAL DAILY
Status: DISCONTINUED | OUTPATIENT
Start: 2020-03-19 | End: 2020-03-20 | Stop reason: HOSPADM

## 2020-03-19 RX ADMIN — PANTOPRAZOLE SODIUM 40 MG: 40 TABLET, DELAYED RELEASE ORAL at 06:43

## 2020-03-19 RX ADMIN — LEVALBUTEROL HYDROCHLORIDE 1.25 MG: 1.25 SOLUTION, CONCENTRATE RESPIRATORY (INHALATION) at 08:12

## 2020-03-19 RX ADMIN — ACETYLCYSTEINE 600 MG: 200 SOLUTION ORAL; RESPIRATORY (INHALATION) at 11:39

## 2020-03-19 RX ADMIN — ACETYLCYSTEINE 600 MG: 200 SOLUTION ORAL; RESPIRATORY (INHALATION) at 15:44

## 2020-03-19 RX ADMIN — PANTOPRAZOLE SODIUM 40 MG: 40 TABLET, DELAYED RELEASE ORAL at 15:37

## 2020-03-19 RX ADMIN — APIXABAN 5 MG: 5 TABLET, FILM COATED ORAL at 08:15

## 2020-03-19 RX ADMIN — DILTIAZEM HYDROCHLORIDE 180 MG: 180 CAPSULE, COATED, EXTENDED RELEASE ORAL at 08:49

## 2020-03-19 RX ADMIN — Medication 10 ML: at 09:50

## 2020-03-19 RX ADMIN — IBUPROFEN 400 MG: 400 TABLET ORAL at 08:49

## 2020-03-19 RX ADMIN — IBUPROFEN 400 MG: 400 TABLET ORAL at 15:37

## 2020-03-19 RX ADMIN — IBUPROFEN 400 MG: 400 TABLET ORAL at 18:49

## 2020-03-19 RX ADMIN — FERROUS SULFATE TAB 325 MG (65 MG ELEMENTAL FE) 325 MG: 325 (65 FE) TAB at 08:49

## 2020-03-19 RX ADMIN — ROPINIROLE HYDROCHLORIDE 3 MG: 1 TABLET, FILM COATED ORAL at 21:31

## 2020-03-19 RX ADMIN — IBUPROFEN 400 MG: 400 TABLET ORAL at 21:32

## 2020-03-19 RX ADMIN — ACETAMINOPHEN 325 MG: 325 TABLET ORAL at 18:49

## 2020-03-19 RX ADMIN — METHOCARBAMOL TABLETS 500 MG: 500 TABLET, COATED ORAL at 08:49

## 2020-03-19 RX ADMIN — LEVALBUTEROL HYDROCHLORIDE 1.25 MG: 1.25 SOLUTION, CONCENTRATE RESPIRATORY (INHALATION) at 21:36

## 2020-03-19 RX ADMIN — ACETAMINOPHEN 325 MG: 325 TABLET ORAL at 21:31

## 2020-03-19 RX ADMIN — VENLAFAXINE HYDROCHLORIDE 150 MG: 150 CAPSULE, EXTENDED RELEASE ORAL at 08:49

## 2020-03-19 RX ADMIN — METHOCARBAMOL TABLETS 500 MG: 500 TABLET, COATED ORAL at 21:31

## 2020-03-19 RX ADMIN — LEVALBUTEROL HYDROCHLORIDE 1.25 MG: 1.25 SOLUTION, CONCENTRATE RESPIRATORY (INHALATION) at 11:39

## 2020-03-19 RX ADMIN — ACETAMINOPHEN 325 MG: 325 TABLET ORAL at 06:22

## 2020-03-19 RX ADMIN — METHOCARBAMOL TABLETS 500 MG: 500 TABLET, COATED ORAL at 15:37

## 2020-03-19 RX ADMIN — EZETIMIBE 10 MG: 10 TABLET ORAL at 11:35

## 2020-03-19 RX ADMIN — ACETYLCYSTEINE 600 MG: 200 SOLUTION ORAL; RESPIRATORY (INHALATION) at 21:36

## 2020-03-19 RX ADMIN — ACETAMINOPHEN 325 MG: 325 TABLET ORAL at 08:49

## 2020-03-19 RX ADMIN — LEVALBUTEROL HYDROCHLORIDE 1.25 MG: 1.25 SOLUTION, CONCENTRATE RESPIRATORY (INHALATION) at 15:44

## 2020-03-19 RX ADMIN — ACETYLCYSTEINE 600 MG: 200 SOLUTION ORAL; RESPIRATORY (INHALATION) at 08:12

## 2020-03-19 RX ADMIN — QUETIAPINE FUMARATE 300 MG: 200 TABLET, EXTENDED RELEASE ORAL at 21:33

## 2020-03-19 RX ADMIN — Medication 400 MG: at 11:35

## 2020-03-19 RX ADMIN — DORNASE ALFA 2.5 MG: 1 SOLUTION RESPIRATORY (INHALATION) at 08:12

## 2020-03-19 RX ADMIN — DORNASE ALFA 2.5 MG: 1 SOLUTION RESPIRATORY (INHALATION) at 21:37

## 2020-03-19 RX ADMIN — IBUPROFEN 400 MG: 400 TABLET ORAL at 11:35

## 2020-03-19 RX ADMIN — Medication 10 ML: at 21:34

## 2020-03-19 RX ADMIN — APIXABAN 5 MG: 5 TABLET, FILM COATED ORAL at 21:32

## 2020-03-19 RX ADMIN — ACETAMINOPHEN 325 MG: 325 TABLET ORAL at 15:37

## 2020-03-19 RX ADMIN — ACETAMINOPHEN 325 MG: 325 TABLET ORAL at 11:35

## 2020-03-19 RX ADMIN — IBUPROFEN 400 MG: 400 TABLET ORAL at 06:22

## 2020-03-19 ASSESSMENT — PAIN SCALES - GENERAL
PAINLEVEL_OUTOF10: 7
PAINLEVEL_OUTOF10: 9
PAINLEVEL_OUTOF10: 5
PAINLEVEL_OUTOF10: 7
PAINLEVEL_OUTOF10: 6
PAINLEVEL_OUTOF10: 0
PAINLEVEL_OUTOF10: 5
PAINLEVEL_OUTOF10: 0
PAINLEVEL_OUTOF10: 0
PAINLEVEL_OUTOF10: 7

## 2020-03-19 ASSESSMENT — PAIN DESCRIPTION - FREQUENCY
FREQUENCY: INTERMITTENT
FREQUENCY: CONTINUOUS
FREQUENCY: CONTINUOUS

## 2020-03-19 ASSESSMENT — PAIN DESCRIPTION - PAIN TYPE
TYPE: SURGICAL PAIN

## 2020-03-19 ASSESSMENT — PAIN DESCRIPTION - LOCATION
LOCATION: CHEST;RIB CAGE
LOCATION: CHEST;RIB CAGE
LOCATION: CHEST

## 2020-03-19 ASSESSMENT — PAIN SCALES - WONG BAKER
WONGBAKER_NUMERICALRESPONSE: 0

## 2020-03-19 ASSESSMENT — PAIN DESCRIPTION - PROGRESSION
CLINICAL_PROGRESSION: GRADUALLY WORSENING
CLINICAL_PROGRESSION: NOT CHANGED
CLINICAL_PROGRESSION: NOT CHANGED

## 2020-03-19 ASSESSMENT — PAIN DESCRIPTION - ORIENTATION
ORIENTATION: RIGHT

## 2020-03-19 ASSESSMENT — PAIN DESCRIPTION - ONSET
ONSET: AWAKENED FROM SLEEP

## 2020-03-19 ASSESSMENT — PAIN DESCRIPTION - DESCRIPTORS
DESCRIPTORS: ACHING;DISCOMFORT

## 2020-03-19 ASSESSMENT — PAIN - FUNCTIONAL ASSESSMENT: PAIN_FUNCTIONAL_ASSESSMENT: ACTIVITIES ARE NOT PREVENTED

## 2020-03-19 NOTE — PROGRESS NOTES
Cardiothoracic Surgery Daily Progress Note      CC: RUL lung nodule    SUBJECTIVE:  No acute events overnight. She is complaining of some pain this AM to her back. She continues to ambulate. ROS:   A 14 point review of systems was conducted, significant findings as noted above. All other systems negative.     OBJECTIVE:    PHYSICAL EXAM:  Vitals:    03/18/20 2036 03/18/20 2250 03/19/20 0500 03/19/20 0600   BP:  (!) 144/55 (!) 131/56    Pulse:  76 72    Resp:  20 20    Temp:  98.4 °F (36.9 °C) 98.4 °F (36.9 °C)    TempSrc:  Oral Oral    SpO2: 97% 97% 96%    Weight:    205 lb 14.6 oz (93.4 kg)   Height:           General appearance: alert, sitting up in bedside chair, in NAD  Chest/Lungs: normal effort baseline, poor coughing effort, on 3L NC, incision c/d/i, chest tube dressing c/d/i, chest tube to water seal, with expiratory air leak, tidaling, ss output,   Cardiovascular: NSR  Abdomen: soft, non-tender, non-distended  Extremities: no edema, no cyanosis  Neuro: A&Ox3, no focal deficits, sensation intact       ASSESSMENT & PLAN:   This is a 78y.o. year old female with a diagnosis of RUL lung nodule s/p RIGHT VIDEO ASSISTED THORACOSCOPIC SURGERY; WEDGE EXCISION OF RIGHT UPPER LOBE FOLLOWED BY RIGHT UPPER LOBECTOMY; INTERCOSTAL NERVE BLOCK POD7, s/p Bronchoscopy with therapeutic aspiration (03/15, 03/17)    AM CXR with stable pneumothorax/potential space post-operatively and stable aeration of right lung  CT to water seal, will place to dry seal this AM  Will add lidocaine patch and robaxin for pain  Continue scheduled mucomyst and xopenex Q4H  Discussed with patient importance of coughing and IS   Continue dilt  OOB, ambulate, PT/OT  SLIV  Continue General Diet  Anticipate discharge 1-2 days with dry seal     Eliazar Herrera DO  PGY1, General Surgery  03/19/20  7:00 AM  [phone]

## 2020-03-19 NOTE — OP NOTE
[facility], [address]                                OPERATIVE REPORT    PATIENT NAME: Yarelis Sanchez                        :        1950  MED REC NO:   [id number]                          ROOM:       [id number]  ACCOUNT NO:   [de-identified]                           ADMIT DATE: 2020  PROVIDER:     Randall Wylie MD    DATE OF PROCEDURE:  2020    PREOPERATIVE DIAGNOSIS:  Right upper lobe lung nodule. POSTOPERATIVE DIAGNOSIS:  Right upper lobe lung nodule. PROCEDURES:  1. Right video-assisted thoracoscopy. 2.  Lysis of adhesions, 90 minutes. 3.  Wedge excision of right upper lobe. 4.  Right upper lobectomy. 5.  Mediastinal lymphadenectomy. 6.  Intercostal nerve block. STAFF SURGEON:  Randall Wylie MD    FIRST ASSIST:  Greg Greenwood MD   present of first assess required due to the complexity of procedure and expertise of the first assist      ANESTHESIA:  General.    COMPLICATIONS:  None immediate. ESTIMATED BLOOD LOSS:  200 mL. PROCEDURE DESCRIPTION:  The patient was taken to the operating room  after informed written consent was obtained, lying supine under general  anesthesia. ET tube was placed with no difficulty. Flexible  bronchoscopy was performed, confirmed the presence of the double-lumen  tube. Next, the patient was turned to the right side, up. Prepped and  draped in standard fashion. Pause for the cause was done in standard  manner. First port was inserted at the intercostal line. Significant  amount of adhesion was identified. Insufflation was tolerated. I used  a camera to establish the dissection or release the adhesions. We were  successful to insert the second port after 20 minutes of working and  dissecting. Next, gentle dissection was performed. We could release  the lower lobe.   Third port was inserted on top of the diaphragm and  using a hook Bovie, we could release the upper lobe and part of the  middle lobe. The fourth port was inserted and the rest of the lung was  released including the hilum. Took us about 90 minutes to just get to  see the lung deflations. The patient had previous surgical intervention  with hemothorax in her right side. Next, on inspection, no metastatic disease could be identified. With  tactile palpations, we could identify the mass. Corresponding to the CT  scan, wedge resection was performed. Specimen was placed in the bag and  sent for pathology. Confirmed the presence of non-small-cell lung  cancer. At this point, we started establishing; working on the fissure. Fissure was taken down all the way to the pulmonary artery. We went to  the hilum. Dissection of the pulmonary vein was performed. Dissection  around the pulmonary vein was performed and transection of the pulmonary  vein was performed by Endo SEEMA gold load. Next, the apical anterior was  transected by using a gold load. The fissure was completed by using 60  purple. Specimens were removed, placed in a bag, removed, and sent for  pathology. Level 4 right lymph node was dissected, removed, and sent for pathology. Level 7 was skipped due to the extensive amount of adhesions. Copious  amount of irrigation was done. No active bleeding was seen. Intercostal nerve block for eight spaces was performed by using Exparel  and Marcaine. Count, we were told, was correct. One chest tube was  placed. The patient was dispositioned to recovery room in stable  condition without any complication.         Antonia Geller MD    D: 03/18/2020 19:21:22       T: 03/18/2020 22:41:02     [email or web address]  Job#: [id number]     Doc#: [id number]    CC:

## 2020-03-19 NOTE — PROGRESS NOTES
Pts daughter, Ras Benitez, updated concerning patients status and plan of care. All questions answered at this time.

## 2020-03-19 NOTE — PROGRESS NOTES
recovered to >90 w/ rest and PLB  Safety Devices  Safety Devices in place: Yes  Type of devices: Left in chair;Call light within reach; Chair alarm in place;Nurse notified         Patient Diagnosis(es): The encounter diagnosis was Nodule of right lung.      has a past medical history of A-fib ([facility] Arthritis, Clostridium difficile diarrhea, COPD (chronic obstructive pulmonary disease) ([facility] [address] COPD exacerbation ([facility] [address] Diabetes mellitus ([facility] [address] Emphysema of lung ([facility] [address] Hyperlipidemia, Hypertension, O2 dependent, Pneumonia, Primary cancer of right upper lobe of lung ([facility] [address] Rib pain, Sleep apnea, and Small bowel obstruction ([facility] [address] has a past surgical history that includes Rotator cuff repair (Bilateral); Cholecystectomy; Ankle arthroscopy; Hysterectomy; Carpal tunnel release (Left); Appendectomy; Tonsillectomy; Thoracoscopy (Right, 3/11/2020); bronchoscopy (N/A, 3/15/2020); and bronchoscopy (3/17/2020). Restrictions  Position Activity Restriction  Other position/activity restrictions: progressive ambulation; chest tube     Subjective   General  Chart Reviewed: Yes  Additional Pertinent Hx: 78 y.o. F to OR 3/11 for R VATS WEDGE EXCISION OF RIGHT UPPER LOBE FOLLOWED BY RIGHT UPPER LOBECTOMY; INTERCOSTAL NERVE BLOCK. PMH:  RUL Lung CA, HTN, Hyperlipidemia, Emphysema, DM, COPD, Sleep Apnea, O2 Dependent, A-fib. Family / Caregiver Present: No  Referring Practitioner: Dr. Sacha Salas  Diagnosis: Nodule R Lung    Subjective  Subjective: Sitting in chair on entry. \"I was probably sleeping with my mouth open, wasn't I.\"  \"I couldn't have done this yesterday. \" (walking in the hallway)    Vital Signs  Patient Currently in Pain: Denies     Orientation  Orientation  Overall Orientation Status: Within Normal Limits     Objective    ADL  LE Dressing: Stand by assistance(to don slippers - increased SOB w/ reaching forward; encouraged pt to use modified techniques (ie figure four method; also educated about AE that could be

## 2020-03-20 ENCOUNTER — APPOINTMENT (OUTPATIENT)
Dept: GENERAL RADIOLOGY | Age: 70
DRG: 163 | End: 2020-03-20
Attending: THORACIC SURGERY (CARDIOTHORACIC VASCULAR SURGERY)
Payer: MEDICARE

## 2020-03-20 VITALS
RESPIRATION RATE: 16 BRPM | HEART RATE: 74 BPM | TEMPERATURE: 98.2 F | OXYGEN SATURATION: 97 % | DIASTOLIC BLOOD PRESSURE: 66 MMHG | HEIGHT: 67 IN | SYSTOLIC BLOOD PRESSURE: 90 MMHG | WEIGHT: 208.3 LBS | BODY MASS INDEX: 32.69 KG/M2

## 2020-03-20 LAB
ALBUMIN SERPL-MCNC: 2.8 G/DL (ref 3.4–5)
ANION GAP SERPL CALCULATED.3IONS-SCNC: 11 MMOL/L (ref 3–16)
BASOPHILS ABSOLUTE: 0.1 K/UL (ref 0–0.2)
BASOPHILS RELATIVE PERCENT: 0.6 %
BUN BLDV-MCNC: 20 MG/DL (ref 7–20)
CALCIUM SERPL-MCNC: 8.7 MG/DL (ref 8.3–10.6)
CHLORIDE BLD-SCNC: 101 MMOL/L (ref 99–110)
CO2: 26 MMOL/L (ref 21–32)
CREAT SERPL-MCNC: 0.5 MG/DL (ref 0.6–1.2)
EOSINOPHILS ABSOLUTE: 0.3 K/UL (ref 0–0.6)
EOSINOPHILS RELATIVE PERCENT: 3.6 %
GFR AFRICAN AMERICAN: >60
GFR NON-AFRICAN AMERICAN: >60
GLUCOSE BLD-MCNC: 118 MG/DL (ref 70–99)
HCT VFR BLD CALC: 23.6 % (ref 36–48)
HEMOGLOBIN: 7.9 G/DL (ref 12–16)
LYMPHOCYTES ABSOLUTE: 1.8 K/UL (ref 1–5.1)
LYMPHOCYTES RELATIVE PERCENT: 18.9 %
MAGNESIUM: 1.5 MG/DL (ref 1.8–2.4)
MCH RBC QN AUTO: 31.8 PG (ref 26–34)
MCHC RBC AUTO-ENTMCNC: 33.4 G/DL (ref 31–36)
MCV RBC AUTO: 95.1 FL (ref 80–100)
MONOCYTES ABSOLUTE: 1 K/UL (ref 0–1.3)
MONOCYTES RELATIVE PERCENT: 11.2 %
NEUTROPHILS ABSOLUTE: 6.2 K/UL (ref 1.7–7.7)
NEUTROPHILS RELATIVE PERCENT: 65.7 %
PDW BLD-RTO: 12.9 % (ref 12.4–15.4)
PHOSPHORUS: 4.2 MG/DL (ref 2.5–4.9)
PLATELET # BLD: 288 K/UL (ref 135–450)
PMV BLD AUTO: 7 FL (ref 5–10.5)
POTASSIUM SERPL-SCNC: 4.3 MMOL/L (ref 3.5–5.1)
RBC # BLD: 2.48 M/UL (ref 4–5.2)
SODIUM BLD-SCNC: 138 MMOL/L (ref 136–145)
WBC # BLD: 9.4 K/UL (ref 4–11)

## 2020-03-20 PROCEDURE — 2700000000 HC OXYGEN THERAPY PER DAY

## 2020-03-20 PROCEDURE — 6360000002 HC RX W HCPCS: Performed by: THORACIC SURGERY (CARDIOTHORACIC VASCULAR SURGERY)

## 2020-03-20 PROCEDURE — 71045 X-RAY EXAM CHEST 1 VIEW: CPT

## 2020-03-20 PROCEDURE — 83735 ASSAY OF MAGNESIUM: CPT

## 2020-03-20 PROCEDURE — 6370000000 HC RX 637 (ALT 250 FOR IP): Performed by: STUDENT IN AN ORGANIZED HEALTH CARE EDUCATION/TRAINING PROGRAM

## 2020-03-20 PROCEDURE — 99232 SBSQ HOSP IP/OBS MODERATE 35: CPT | Performed by: INTERNAL MEDICINE

## 2020-03-20 PROCEDURE — 6360000002 HC RX W HCPCS: Performed by: STUDENT IN AN ORGANIZED HEALTH CARE EDUCATION/TRAINING PROGRAM

## 2020-03-20 PROCEDURE — 94150 VITAL CAPACITY TEST: CPT

## 2020-03-20 PROCEDURE — 94669 MECHANICAL CHEST WALL OSCILL: CPT

## 2020-03-20 PROCEDURE — 36415 COLL VENOUS BLD VENIPUNCTURE: CPT

## 2020-03-20 PROCEDURE — 80069 RENAL FUNCTION PANEL: CPT

## 2020-03-20 PROCEDURE — 94640 AIRWAY INHALATION TREATMENT: CPT

## 2020-03-20 PROCEDURE — 97530 THERAPEUTIC ACTIVITIES: CPT

## 2020-03-20 PROCEDURE — 97116 GAIT TRAINING THERAPY: CPT

## 2020-03-20 PROCEDURE — 94761 N-INVAS EAR/PLS OXIMETRY MLT: CPT

## 2020-03-20 PROCEDURE — 2580000003 HC RX 258: Performed by: THORACIC SURGERY (CARDIOTHORACIC VASCULAR SURGERY)

## 2020-03-20 PROCEDURE — 85025 COMPLETE CBC W/AUTO DIFF WBC: CPT

## 2020-03-20 PROCEDURE — 6370000000 HC RX 637 (ALT 250 FOR IP): Performed by: THORACIC SURGERY (CARDIOTHORACIC VASCULAR SURGERY)

## 2020-03-20 PROCEDURE — 6360000002 HC RX W HCPCS: Performed by: SURGERY

## 2020-03-20 PROCEDURE — 6370000000 HC RX 637 (ALT 250 FOR IP): Performed by: SURGERY

## 2020-03-20 RX ORDER — MAGNESIUM SULFATE IN WATER 40 MG/ML
4 INJECTION, SOLUTION INTRAVENOUS ONCE
Status: COMPLETED | OUTPATIENT
Start: 2020-03-20 | End: 2020-03-20

## 2020-03-20 RX ADMIN — IBUPROFEN 400 MG: 400 TABLET ORAL at 06:26

## 2020-03-20 RX ADMIN — LEVALBUTEROL HYDROCHLORIDE 1.25 MG: 1.25 SOLUTION, CONCENTRATE RESPIRATORY (INHALATION) at 12:29

## 2020-03-20 RX ADMIN — IBUPROFEN 400 MG: 400 TABLET ORAL at 08:27

## 2020-03-20 RX ADMIN — IBUPROFEN 400 MG: 400 TABLET ORAL at 00:49

## 2020-03-20 RX ADMIN — Medication 10 ML: at 08:39

## 2020-03-20 RX ADMIN — PANTOPRAZOLE SODIUM 40 MG: 40 TABLET, DELAYED RELEASE ORAL at 06:26

## 2020-03-20 RX ADMIN — PANTOPRAZOLE SODIUM 40 MG: 40 TABLET, DELAYED RELEASE ORAL at 16:57

## 2020-03-20 RX ADMIN — IBUPROFEN 400 MG: 400 TABLET ORAL at 11:59

## 2020-03-20 RX ADMIN — MAGNESIUM SULFATE HEPTAHYDRATE 4 G: 40 INJECTION, SOLUTION INTRAVENOUS at 08:27

## 2020-03-20 RX ADMIN — ACETAMINOPHEN 325 MG: 325 TABLET ORAL at 06:26

## 2020-03-20 RX ADMIN — ACETYLCYSTEINE 600 MG: 200 SOLUTION ORAL; RESPIRATORY (INHALATION) at 16:00

## 2020-03-20 RX ADMIN — ACETAMINOPHEN 325 MG: 325 TABLET ORAL at 11:59

## 2020-03-20 RX ADMIN — METHOCARBAMOL TABLETS 500 MG: 500 TABLET, COATED ORAL at 08:26

## 2020-03-20 RX ADMIN — ACETAMINOPHEN 325 MG: 325 TABLET ORAL at 08:26

## 2020-03-20 RX ADMIN — EZETIMIBE 10 MG: 10 TABLET ORAL at 08:26

## 2020-03-20 RX ADMIN — ACETAMINOPHEN 325 MG: 325 TABLET ORAL at 16:57

## 2020-03-20 RX ADMIN — ACETAMINOPHEN 325 MG: 325 TABLET ORAL at 03:53

## 2020-03-20 RX ADMIN — DORNASE ALFA 2.5 MG: 1 SOLUTION RESPIRATORY (INHALATION) at 09:32

## 2020-03-20 RX ADMIN — METHOCARBAMOL TABLETS 500 MG: 500 TABLET, COATED ORAL at 16:57

## 2020-03-20 RX ADMIN — DILTIAZEM HYDROCHLORIDE 180 MG: 180 CAPSULE, COATED, EXTENDED RELEASE ORAL at 08:26

## 2020-03-20 RX ADMIN — APIXABAN 5 MG: 5 TABLET, FILM COATED ORAL at 08:27

## 2020-03-20 RX ADMIN — LEVALBUTEROL HYDROCHLORIDE 1.25 MG: 1.25 SOLUTION, CONCENTRATE RESPIRATORY (INHALATION) at 09:29

## 2020-03-20 RX ADMIN — IBUPROFEN 400 MG: 400 TABLET ORAL at 03:53

## 2020-03-20 RX ADMIN — ACETYLCYSTEINE 600 MG: 200 SOLUTION ORAL; RESPIRATORY (INHALATION) at 12:29

## 2020-03-20 RX ADMIN — LEVALBUTEROL HYDROCHLORIDE 1.25 MG: 1.25 SOLUTION, CONCENTRATE RESPIRATORY (INHALATION) at 16:00

## 2020-03-20 RX ADMIN — FERROUS SULFATE TAB 325 MG (65 MG ELEMENTAL FE) 325 MG: 325 (65 FE) TAB at 08:26

## 2020-03-20 RX ADMIN — ACETAMINOPHEN 325 MG: 325 TABLET ORAL at 00:48

## 2020-03-20 RX ADMIN — ACETYLCYSTEINE 600 MG: 200 SOLUTION ORAL; RESPIRATORY (INHALATION) at 09:29

## 2020-03-20 RX ADMIN — IBUPROFEN 400 MG: 400 TABLET ORAL at 16:57

## 2020-03-20 RX ADMIN — VENLAFAXINE HYDROCHLORIDE 150 MG: 150 CAPSULE, EXTENDED RELEASE ORAL at 08:27

## 2020-03-20 ASSESSMENT — PAIN DESCRIPTION - FREQUENCY
FREQUENCY: CONTINUOUS

## 2020-03-20 ASSESSMENT — PAIN SCALES - GENERAL
PAINLEVEL_OUTOF10: 5
PAINLEVEL_OUTOF10: 6
PAINLEVEL_OUTOF10: 5
PAINLEVEL_OUTOF10: 0
PAINLEVEL_OUTOF10: 8
PAINLEVEL_OUTOF10: 6
PAINLEVEL_OUTOF10: 0

## 2020-03-20 ASSESSMENT — PAIN DESCRIPTION - ORIENTATION
ORIENTATION: RIGHT

## 2020-03-20 ASSESSMENT — PAIN DESCRIPTION - PROGRESSION
CLINICAL_PROGRESSION: NOT CHANGED

## 2020-03-20 ASSESSMENT — PAIN DESCRIPTION - PAIN TYPE
TYPE: SURGICAL PAIN

## 2020-03-20 ASSESSMENT — PAIN DESCRIPTION - LOCATION
LOCATION: CHEST;RIB CAGE

## 2020-03-20 ASSESSMENT — PAIN DESCRIPTION - ONSET
ONSET: AWAKENED FROM SLEEP

## 2020-03-20 ASSESSMENT — PAIN DESCRIPTION - DESCRIPTORS
DESCRIPTORS: ACHING;DISCOMFORT

## 2020-03-20 NOTE — PLAN OF CARE
Problem: Falls - Risk of:  Goal: Will remain free from falls  Description: Will remain free from falls  Outcome: Ongoing  Note: Pt free from injury or falls at this time, fall precautions in place, bed in low position, side rail up x2, Britt Fall Risk: High (45 and higher), bed alarm on, reoriented to room and call light, reminded not to get up without assistance, call light in reach, will continue to monitor. Pt verbalizes understanding of fall risk procedures. Problem: Breathing Pattern - Ineffective:  Goal: Ability to achieve and maintain a regular respiratory rate will improve  Description: Ability to achieve and maintain a regular respiratory rate will improve  Note: Pt continues to become dyspneic on exertion, Spo2 remains >92 % on 3 L NC. Chest tube to dry seal, no drainage this shift. Will continue to monitor.

## 2020-03-20 NOTE — PROGRESS NOTES
Cardiothoracic Surgery Daily Progress Note      CC: RUL lung nodule    SUBJECTIVE:  Chest tube was retracted overnight and was removed in the AM. Denies dyspnea, maintaining saturation at 3 L NC.       ROS:   A 14 point review of systems was conducted, significant findings as noted above. All other systems negative. OBJECTIVE:    PHYSICAL EXAM:  Vitals:    03/19/20 2135 03/20/20 0048 03/20/20 0408 03/20/20 0626   BP:  (!) 105/50 (!) 122/53    Pulse:  67 63    Resp: 18 18 18    Temp:  97.1 °F (36.2 °C) 98.2 °F (36.8 °C)    TempSrc:  Oral Axillary    SpO2: 96% 99% 97%    Weight:    208 lb 4.8 oz (94.5 kg)   Height:           General appearance: alert, sitting up in bedside chair, in NAD  Chest/Lungs: normal effort baseline, poor coughing effort, on 3L NC.  Incision c/d/i, no crepitus   Cardiovascular: NSR  Abdomen: soft, non-tender, non-distended  Extremities: no edema, no cyanosis  Neuro: A&Ox3, no focal deficits, sensation intact       ASSESSMENT & PLAN:   This is a 78y.o. year old female with a diagnosis of RUL lung nodule s/p RIGHT VIDEO ASSISTED THORACOSCOPIC SURGERY; WEDGE EXCISION OF RIGHT UPPER LOBE FOLLOWED BY RIGHT UPPER LOBECTOMY; INTERCOSTAL NERVE BLOCK POD7, s/p Bronchoscopy with therapeutic aspiration (03/15, 03/17)    - AM CXR with stable pneumothorax/potential space without visualization of chest tube, unsure with timing of removal  - CXR in 4 hr  - Continue scheduled mucomyst and xopenex Q4H  - Discussed with patient importance of coughing and IS   - Continue dilt, eliquis  - OOB, ambulate, PT/OT  - SLIV, continue General Diet  - Anticipate discharge today    Soy Prince MD  General Surgery Resident  03/20/20 7:25 AM  [phone]

## 2020-03-20 NOTE — PROGRESS NOTES
Received call from Radiology, chest tube not visualized on CXR but pnuemothorax remains the same. Went to check patient, chest tube appears to be intact, edge of dressing removed and tube insertion visualized and stitches noted to be in place. Surgical Residents on unit rounding and notified of results.     Judge Escalante 3/20/2020

## 2020-03-20 NOTE — PROGRESS NOTES
Pt ambulated down to 4S RN station this shift with RN. Pt became dyspneic on exertion, utilizing pursed lip breathing breathing, reminded to focus on breathing not talking while walking.  Walking on 4 L NC.

## 2020-03-20 NOTE — PROGRESS NOTES
1524)  AM-PAC Inpatient T-Scale Score : 43.63 (03/20/20 1524)  Mobility Inpatient CMS 0-100% Score: 46.58 (03/20/20 1524)  Mobility Inpatient CMS G-Code Modifier : CK (03/20/20 1524)          Goals  Short term goals  Time Frame for Short term goals: by discharge  Short term goal 1: Transfers S  ONGOING  Short term goal 2: Ambulate 80' with or without device S  ONGOING  Short term goal 3: Bed Mobility S MET 3/20 updated goal: mod I  Patient Goals   Patient goals : recuperate at her sisters home; 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    Plan    Plan  Times per week: 2-5  Current Treatment Recommendations: Transfer Training, Strengthening, Balance Training, Gait Training, Functional Mobility Training, Safety Education & Training, Patient/Caregiver Education & Training  Safety Devices  Type of devices: Call light within reach, Left in chair, Nurse notified, Gait belt, Chair alarm in place(OT in room finishing tx)     Therapy Time   Individual Concurrent Group Co-treatment   Time In 1405         Time Out 1446         Minutes 41           Timed Code Treatment Minutes:  41    Total Treatment Minutes:  41    If the patient is discharged before the next treatment session, this note will serve as the discharge summary.      Elif Bell, PT, DPT [id number]

## 2020-03-21 ENCOUNTER — CARE COORDINATION (OUTPATIENT)
Dept: CASE MANAGEMENT | Age: 70
End: 2020-03-21

## 2020-03-21 NOTE — CARE COORDINATION
Patient contacted regarding recent discharge and COVID-19 risk   Care Transition Nurse/ Ambulatory Care Manager contacted the patient by telephone to perform post discharge assessment. Verified name and  with patient as identifiers. Patient has following risk factors of: heart failure, asthma, sepsis, acute respiratory failure and pregnancy. CTN/ACM reviewed discharge instructions, medical action plan and red flags related to discharge diagnosis. Reviewed and educated them on any new and changed medications related to discharge diagnosis. Advised obtaining a 90-day supply of all daily and as-needed medications. Education provided regarding infection prevention, and signs and symptoms of COVID-19 and when to seek medical attention with patient who verbalized understanding. Discussed exposure protocols and quarantine from [address] you at higher risk for severe illness  and given an opportunity for questions and concerns. The patient agrees to contact the COVID-19 hotline [phone] or PCP office for questions related to their healthcare. CTN/ACM provided contact information for future reference. From CDC: Are you at higher risk for severe illness?  Wash your hands often.  Avoid close contact (6 feet, which is about two arm lengths) with people who are sick.  Put distance between yourself and other people if COVID-19 is spreading in your community.  Clean and disinfect frequently touched surfaces.  Avoid all cruise travel and non-essential air travel.  Call your healthcare professional if you have concerns about COVID-19 and your underlying condition or if you are sick.     For more information on steps you can take to protect yourself, see CDC's How to Protect Yourself     Thank You,    Denis Madrid RN  Care Transition Coordinator  Contact JKGDLX:[phone]

## 2020-03-24 ENCOUNTER — OFFICE VISIT (OUTPATIENT)
Dept: CARDIOTHORACIC SURGERY | Age: 70
End: 2020-03-24

## 2020-03-24 ENCOUNTER — HOSPITAL ENCOUNTER (OUTPATIENT)
Age: 70
Discharge: HOME | End: 2020-03-24
Payer: MEDICARE

## 2020-03-24 ENCOUNTER — HOSPITAL ENCOUNTER (OUTPATIENT)
Dept: GENERAL RADIOLOGY | Age: 70
Discharge: HOME | End: 2020-03-24
Payer: MEDICARE

## 2020-03-24 VITALS
BODY MASS INDEX: 31.23 KG/M2 | DIASTOLIC BLOOD PRESSURE: 60 MMHG | SYSTOLIC BLOOD PRESSURE: 96 MMHG | HEIGHT: 67 IN | TEMPERATURE: 98.5 F | HEART RATE: 68 BPM | OXYGEN SATURATION: 93 % | WEIGHT: 199 LBS

## 2020-03-24 PROCEDURE — 71046 X-RAY EXAM CHEST 2 VIEWS: CPT

## 2020-03-24 PROCEDURE — 99024 POSTOP FOLLOW-UP VISIT: CPT | Performed by: THORACIC SURGERY (CARDIOTHORACIC VASCULAR SURGERY)

## 2020-03-24 RX ORDER — POTASSIUM CHLORIDE 750 MG/1
10 CAPSULE, EXTENDED RELEASE ORAL DAILY
COMMUNITY
End: 2020-05-12 | Stop reason: ALTCHOICE

## 2020-03-24 RX ORDER — FUROSEMIDE 40 MG/1
40 TABLET ORAL DAILY
COMMUNITY
End: 2020-05-12 | Stop reason: ALTCHOICE

## 2020-03-24 NOTE — PROGRESS NOTES
ROTATOR CUFF REPAIR Bilateral     THORACOSCOPY Right 3/11/2020    RIGHT VIDEO ASSISTED THORACOSCOPIC SURGERY; WEDGE EXCISION OF RIGHT UPPER LOBE FOLLOWED BY RIGHT UPPER LOBECTOMY; INTERCOSTAL NERVE BLOCK performed by Americo Alvarado MD at [place] [address] Medications:   Prior to Admission medications    Medication Sig Start Date End Date Taking?  Authorizing Provider   furosemide (LASIX) 40 MG tablet Take 40 mg by mouth daily   Yes Historical Provider, MD   potassium chloride (MICRO-K) 10 MEQ extended release capsule Take 10 mEq by mouth daily   Yes Historical Provider, MD   HYDROcodone-acetaminophen (NORCO) 7.5-325 MG per tablet TAKE 1 TABLET BY MOUTH 3 TIMES A DAY AS NEEDED FOR PAIN 2/12/20  Yes Historical Provider, MD   nystatin (MYCOSTATIN) 224163 UNIT/GM cream  2/19/20  Yes Historical Provider, MD   omeprazole (PRILOSEC) 40 MG delayed release capsule TAKE 1 CAPSULE BY MOUTH TWICE A DAY 2/6/20  Yes Historical Provider, MD   ezetimibe (ZETIA) 10 MG tablet Take 10 mg by mouth daily   Yes Historical Provider, MD   tiotropium ([place]) 18 MCG inhalation capsule Inhale 1 capsule into the lungs daily 2/17/20  Yes Americo Alvarado MD   Fexofenadine HCl (MUCINEX ALLERGY PO) Take by mouth daily   Yes Historical Provider, MD   apixaban (ELIQUIS) 5 MG TABS tablet Take 1 tablet by mouth 2 times daily 1/14/20  Yes Padma Priest MD   diltiazem (CARDIZEM CD) 120 MG extended release capsule Take 1 capsule by mouth daily 10/29/19  Yes Padma Priest MD   albuterol sulfate HFA (VENTOLIN HFA) 108 (90 Base) MCG/ACT inhaler Inhale 2 puffs into the lungs every 6 hours as needed for Wheezing   Yes Historical Provider, MD   albuterol (PROVENTIL) (2.5 MG/3ML) 0.083% nebulizer solution Take 2.5 mg by nebulization every 6 hours as needed for Wheezing   Yes Historical Provider, MD   topiramate (TOPAMAX) 50 MG tablet Take 50 mg by mouth nightly   Yes Historical Provider, MD   hydrOXYzine (ATARAX) 50 MG tablet Take 50 mg by mouth 1-2 tabs every 4 hours prn   Yes Historical Provider, MD   ferrous sulfate 325 (65 Fe) MG tablet Take 325 mg by mouth daily (with breakfast)   Yes Historical Provider, MD   fluticasone-vilanterol (BREO ELLIPTA) 100-25 MCG/INH AEPB inhaler Inhale 1 puff into the lungs daily   Yes Historical Provider, MD   rOPINIRole (REQUIP) 2 MG tablet Take 3 mg by mouth nightly    Yes Historical Provider, MD   Umeclidinium Bromide (INCRUSE ELLIPTA) 62.5 MCG/INH AEPB Inhale into the lungs daily    Yes Historical Provider, MD   venlafaxine (EFFEXOR-XR) 150 MG XR capsule Take 150 mg by mouth daily   Yes Historical Provider, MD   Multiple Vitamins-Minerals (THERAPEUTIC MULTIVITAMIN-MINERALS) tablet Take 1 tablet by mouth daily   Yes Historical Provider, MD   QUEtiapine (SEROQUEL XR) 300 MG XR tablet Take 300 mg by mouth nightly    Yes Historical Provider, MD   OXYGEN Inhale into the lungs Indications: 2.5 L nightly    Yes Historical Provider, MD        Allergies: Allergies   Allergen Reactions    Buspirone Other (See Comments)     unsure    Lisinopril Other (See Comments)     Low blood pressure per patient 59/29     Penicillins Nausea Only        Social History:    Working:   Caffeine:   Lifestyle:    Social History     Socioeconomic History    Marital status:       Spouse name: Not on file    Number of children: Not on file    Years of education: Not on file    Highest education level: Not on file   Occupational History    Not on file   Social Needs    Financial resource strain: Not on file    Food insecurity     Worry: Not on file     Inability: Not on file    Transportation needs     Medical: Not on file     Non-medical: Not on file   Tobacco Use    Smoking status: Former Smoker     Packs/day: 1.00     Years: 50.00     Pack years: 50.00     Types: Cigarettes     Last attempt to quit: 2020     Years since quittin.0    Smokeless tobacco: Never Used    Tobacco comment: smoked 3 cigarettes since Feb 18   Substance and Sexual Activity    Alcohol use: No     Alcohol/week: 0.0 standard drinks    Drug use: No    Sexual activity: Not Currently   Lifestyle    Physical activity     Days per week: Not on file     Minutes per session: Not on file    Stress: Not on file   Relationships    Social connections     Talks on phone: Not on file     Gets together: Not on file     Attends Latter day service: Not on file     Active member of club or organization: Not on file     Attends meetings of clubs or organizations: Not on file     Relationship status: Not on file    Intimate partner violence     Fear of current or ex partner: Not on file     Emotionally abused: Not on file     Physically abused: Not on file     Forced sexual activity: Not on file   Other Topics Concern    Not on file   Social History Narrative    Not on file       Family History:      Problem Relation Age of Onset    Cancer Mother     Heart Failure Father        Review of Systems:  Reviewed, negative unless noted  Constitutional: weight change, weakness, impairment of ADLs  Eyes: eyestrain, redness, discharges  ENMT: post nasal drip, sinus pain, discharge   Cardiovascular: faintness, vertigo, color changes in fingers/toes  Respiratory: cough, sputum, hemoptysis  GI: excessive thirst, changes in bowel habits, abdominal swelling  : painful urination, pyuria, incontinence  Musculoskeletal: cramps, swelling, limitation of motor activity  Integumentary: cyanosis, changes in skin, dryness  Neurological: paralysis, tingling, tremors  Psychiatric: restlessness, irritability, mood swings  Endocrine: heat/cold intolerance, excessive sweating, hair loss  Hematologic/lymphatic: swollen glands, anemia, easy bruising/bleeding      Physical Examination:    BP 96/60 (Site: Left Upper Arm, Position: Sitting, Cuff Size: Medium Adult)   Pulse 68   Temp 98.5 °F (36.9 °C) (Oral)   Ht 5' 7\" (1.702 m)   Wt 199 lb (90.3 kg)   SpO2 93%   BMI pneumothorax with a air-fluid level in the pleural space. The pneumothorax continues to increase over previous studies. CT examination is recommended for evaluation. Labs:   CBC:   Lab Results   Component Value Date    WBC 9.4 03/20/2020    HGB 7.9 03/20/2020    HCT 23.6 03/20/2020    MCV 95.1 03/20/2020     03/20/2020     BMP:   Lab Results   Component Value Date     03/20/2020    K 4.3 03/20/2020    K 4.6 03/12/2020     03/20/2020    CO2 26 03/20/2020    PHOS 4.2 03/20/2020    BUN 20 03/20/2020    CREATININE 0.5 03/20/2020    CALCIUM 8.7 03/20/2020    MG 1.50 03/20/2020     Cardiac Enzymes:   Lab Results   Component Value Date    TROPONINI <0.01 08/25/2018     PT/INR:   Lab Results   Component Value Date    PROTIME 11.2 03/04/2020    INR 0.97 03/04/2020     APTT:   Lab Results   Component Value Date    APTT 33.3 03/04/2020     Liver Profile:  Lab Results   Component Value Date    AST 29 03/12/2020    ALT 27 03/12/2020    BILIDIR <0.2 03/04/2020    BILITOT <0.2 03/12/2020    ALKPHOS 88 03/12/2020    LABALBU 2.8 03/20/2020     Lab Results   Component Value Date    CHOL 180 11/02/2015    HDL 54 06/08/2018    TRIG 201 02/10/2018     TSH:  Lab Results   Component Value Date    TSH 0.73 10/02/2015     HgbA1c:  Lab Results   Component Value Date    LABA1C 5.9 01/13/2018     UA:   Lab Results   Component Value Date    COLORU Yellow 03/04/2020    PHUR 6.0 03/04/2020    CLARITYU Clear 03/04/2020    SPECGRAV >=1.030 03/04/2020    LEUKOCYTESUR Negative 03/04/2020    UROBILINOGEN 0.2 03/04/2020    BILIRUBINUR Negative 03/04/2020    BLOODU Negative 03/04/2020    GLUCOSEU Negative 03/04/2020       This month and plan:   65-year-old woman status post right upper lobectomy found to have non-small cell cancer, T1b N0 M0. She remains on home oxygen of approximately 3 to 4 L throughout the day. Surgical pathology was reviewed and a printout was given to her.   Clinically she denies any acute shortness of air, cough, sputum production, fevers, or chills. Her x-rays are reviewed with more aeration that her x-ray from March 12, show postoperative x-ray. No concern for bronchopleural fistula. During her inpatient stay she underwent a bronchoscopy which she had thick secretions in the bronchus intermedius. Subsequent toilet bronchoscopy was performed by critical care team.  Today she is doing well, states that her appetite is improved, she attempts to walk with assistance and denies any real pulmonary complaints.       Aishwarya Casiano MD  3/24/2020  11:18 AM

## 2020-03-27 RX ORDER — ALBUTEROL SULFATE 2.5 MG/3ML
2.5 SOLUTION RESPIRATORY (INHALATION) EVERY 6 HOURS PRN
Qty: 120 EACH | Refills: 5 | Status: SHIPPED | OUTPATIENT
Start: 2020-03-27 | End: 2021-09-23 | Stop reason: SDUPTHER

## 2020-04-02 ENCOUNTER — TELEPHONE (OUTPATIENT)
Dept: CARDIOTHORACIC SURGERY | Age: 70
End: 2020-04-02

## 2020-04-13 ENCOUNTER — VIRTUAL VISIT (OUTPATIENT)
Dept: PULMONOLOGY | Age: 70
End: 2020-04-13
Payer: MEDICARE

## 2020-04-13 ENCOUNTER — TELEPHONE (OUTPATIENT)
Dept: PULMONOLOGY | Age: 70
End: 2020-04-13

## 2020-04-13 VITALS — WEIGHT: 189 LBS | BODY MASS INDEX: 29.66 KG/M2 | HEIGHT: 67 IN

## 2020-04-13 PROCEDURE — 99214 OFFICE O/P EST MOD 30 MIN: CPT | Performed by: INTERNAL MEDICINE

## 2020-04-13 RX ORDER — TIOTROPIUM BROMIDE 18 UG/1
18 CAPSULE ORAL; RESPIRATORY (INHALATION) DAILY
Qty: 30 CAPSULE | Refills: 6 | Status: SHIPPED | OUTPATIENT
Start: 2020-04-13 | End: 2020-09-16 | Stop reason: SDUPTHER

## 2020-04-13 RX ORDER — ALBUTEROL SULFATE 90 UG/1
2 AEROSOL, METERED RESPIRATORY (INHALATION) EVERY 6 HOURS PRN
Qty: 1 INHALER | Refills: 6 | Status: SHIPPED | OUTPATIENT
Start: 2020-04-13 | End: 2020-09-16 | Stop reason: SDUPTHER

## 2020-04-13 RX ORDER — FLUTICASONE FUROATE AND VILANTEROL TRIFENATATE 100; 25 UG/1; UG/1
1 POWDER RESPIRATORY (INHALATION) DAILY
Qty: 30 EACH | Refills: 6 | Status: SHIPPED | OUTPATIENT
Start: 2020-04-13 | End: 2020-09-16 | Stop reason: SDUPTHER

## 2020-04-13 NOTE — TELEPHONE ENCOUNTER
the terms described in the Terms of Service and this Telehealth Consent. The patient was read the following statement and has consented to the visit as of 4/13/20. The patient has been scheduled for their first telehealth visit on 4/13/2020 with Dr Joleen Smith.

## 2020-04-13 NOTE — PROGRESS NOTES
TLC 4.52(82%)   DLCO 10.23(43%) 6MW 980 F LO2 89%  6MW 11/14/2019 720 feet Desat 88% 1L on exertin       CT chest 1/16/19  Multiple right-sided rib fractures-subacute  No acute intrathoracic abnormalities  Scattered areas of parenchymal opacities lower lobe likely atelectasis      CT chest 1/16/2020    Mediastinum: No enlarged lymph nodes. Normal caliber great vessels. Normal  heart size and pericardium. Suspect incidental benign lipomatous hypertrophy  of the interatrial septum. No significant coronary calcifications. Unremarkable esophagus and included thyroid. Lungs/pleura: Nodular thickening in the subpleural right upper lobe on series  3, image 27; coronal image 101 measuring 20 x 8 mm in axial plane and 7 mm  craniocaudal.  This nodular opacity is low in attenuation values (water  density). Subtle tree-in-bud opacities seen on the prior exam in the right  upper lobe have resolved. These were likely infectious or inflammatory. Linear and bandlike areas of scarring and/or atelectasis at the right lung  base are not substantially changed. Decreased atelectasis at the medial left  lung base. Scattered punctate calcified granulomas. No pleural effusion. Upper Abdomen: Unremarkable. Soft Tissues/Bones: No enlarged axillary or supraclavicular lymph nodes. Multiple healed rib fractures bilaterally, some of which are ununited. PET scan 2/6/2020   1.9 cm lateral right upper lobe pulmonary nodule is hypermetabolic, to a  degree concerning for malignancy until proven otherwise. No findings of FDG avid metastatic disease. Inflammatory change related to healing posterolateral left 8th rib fracture. Lipomatous hypertrophy of the interatrial septum, an incidental finding. Fluid loculated within fissure within the lateral right mid lung demonstrates  low level activity, likely inflammatory. RUL lobectomy 3/11/2020  A.  Wedge excision, right upper lobe lung lesion:       - Involved with non-small cell carcinoma, squamous cell carcinoma         with lesion 1.1 cm in greatest extent.       - Parenchymal resection margins are negative for malignancy.      - Overlying pleural surface shows no evidence of involvement with         malignancy.      - See case comment and synoptic report.       B. Regional lymph node excision, right 10:       - 3 lymph nodes with reactive changes and no evidence of metastatic         carcinoma ( 0\3 ).      - Pankeratin\CAM 5.2 stain is performed and supports diagnosis.         C. Right upper lobe lobectomy:       - Organizing biopsy cavity changes with extravasated blood and         reactive features.       - No residual malignancy identified.       - Bronchial margin, vascular margin and parenchymal margins are         negative for malignancy.      - 3 peribronchial lymph nodes with reactive changes and no evidence         of metastatic carcinoma (0/3).      - One intraparenchymal lymph node with no evidence of involvement         with malignancy.      - Pankeratin\CAM 5.2 stains were utilized to evaluate the lymph         nodes and supports final diagnosis.         D. Regional lymph node excision, right 4:       - Six lymph nodes with no evidence of metastatic carcinoma ( 0\6)       - Pankeratin\CAM 5.2 stains were utilized evaluated lymph nodes and         supports final diagnosis. PSG 8/25/16 AHI 28.4 desaturation to 71%  BiPAP titration 9/9/16 BiPAP 15/11 cmH2O      Assessment:      · FRANCISCO elongated nodule 20 x 8 mm. SUV 7.3 on PET scan 2/6/2020. Post RUL resection 3/11/2020 non-small cell lung cancer. [id number] stage IA  · Severe COPD   · Hypoxia on exertion  · RLS on Requip and Neurontin  · Right traumatic displaced rib fractures with  hemothorax required VATS decortication and chest tube removed 2/14/18. · Moderate MEG. O2 3LPM at night. Refusing BiPAP uses O2   · PAF on Eliquis followed by cardiology   · >120 pack year smoking. Intolerance to chantix .   Quit

## 2020-04-23 ENCOUNTER — TELEPHONE (OUTPATIENT)
Dept: CARDIOTHORACIC SURGERY | Age: 70
End: 2020-04-23

## 2020-04-23 NOTE — TELEPHONE ENCOUNTER
Patient called today to report that she is doing well. Only wearing O2 at night. Tolerating ADLs with O2 sat WNL. Does not feel follow up is necessary with Dr. Petros Machado. She had one OV with Dr. Ck Sosa and has also seen Dr. Timo Carrasco. Pulmonology will order surveillance CTs. I will update Dr. Petros Machado.

## 2020-04-28 ENCOUNTER — TELEPHONE (OUTPATIENT)
Dept: CARDIOTHORACIC SURGERY | Age: 70
End: 2020-04-28

## 2020-04-28 ENCOUNTER — TELEPHONE (OUTPATIENT)
Dept: PULMONOLOGY | Age: 70
End: 2020-04-28

## 2020-04-28 RX ORDER — PREDNISONE 10 MG/1
TABLET ORAL
Qty: 30 TABLET | Refills: 0 | Status: SHIPPED | OUTPATIENT
Start: 2020-04-28 | End: 2020-05-08

## 2020-04-28 NOTE — TELEPHONE ENCOUNTER
Lm informed patient; rx pended.
hrs PRN   · Patient is up to date with Pneumococcal vaccine and influenza vaccine   · Continue with smoking cessation

## 2020-04-28 NOTE — TELEPHONE ENCOUNTER
Patient is doing well and feels follow up OV with Dr. Marisel Shields is not necessary at this time. I reviewed with Dr. Marisel Shields. He reviewed pathology report. Patient does not need follow up with oncology. He wants her to keep follow up with Dr. Heather Smyth for surveillance. Spoke to patient again today. She is agreeable to plan. Will maintain follow up with pulmonology and call if she should need anything in the future from our office.

## 2020-04-30 RX ORDER — DILTIAZEM HYDROCHLORIDE 120 MG/1
120 CAPSULE, COATED, EXTENDED RELEASE ORAL DAILY
Qty: 90 CAPSULE | Refills: 1 | Status: SHIPPED | OUTPATIENT
Start: 2020-04-30 | End: 2020-10-26

## 2020-05-12 ENCOUNTER — APPOINTMENT (OUTPATIENT)
Dept: GENERAL RADIOLOGY | Age: 70
DRG: 190 | End: 2020-05-12
Payer: MEDICARE

## 2020-05-12 ENCOUNTER — HOSPITAL ENCOUNTER (INPATIENT)
Age: 70
LOS: 2 days | Discharge: HOME | DRG: 190 | End: 2020-05-14
Attending: EMERGENCY MEDICINE | Admitting: INTERNAL MEDICINE
Payer: MEDICARE

## 2020-05-12 ENCOUNTER — VIRTUAL VISIT (OUTPATIENT)
Dept: PULMONOLOGY | Age: 70
End: 2020-05-12
Payer: MEDICARE

## 2020-05-12 VITALS — HEIGHT: 67 IN | BODY MASS INDEX: 29.03 KG/M2 | OXYGEN SATURATION: 92 % | WEIGHT: 185 LBS

## 2020-05-12 LAB
A/G RATIO: 1.4 (ref 1.1–2.2)
A/G RATIO: 1.5 (ref 1.1–2.2)
ALBUMIN SERPL-MCNC: 3.9 G/DL (ref 3.4–5)
ALBUMIN SERPL-MCNC: 4.3 G/DL (ref 3.4–5)
ALP BLD-CCNC: 106 U/L (ref 40–129)
ALP BLD-CCNC: 116 U/L (ref 40–129)
ALT SERPL-CCNC: 13 U/L (ref 10–40)
ALT SERPL-CCNC: 13 U/L (ref 10–40)
ANION GAP SERPL CALCULATED.3IONS-SCNC: 12 MMOL/L (ref 3–16)
ANION GAP SERPL CALCULATED.3IONS-SCNC: 14 MMOL/L (ref 3–16)
APTT: 34.3 SEC (ref 24.2–36.2)
AST SERPL-CCNC: 14 U/L (ref 15–37)
AST SERPL-CCNC: 20 U/L (ref 15–37)
BASOPHILS ABSOLUTE: 0.1 K/UL (ref 0–0.2)
BASOPHILS ABSOLUTE: 0.1 K/UL (ref 0–0.2)
BASOPHILS RELATIVE PERCENT: 0.9 %
BASOPHILS RELATIVE PERCENT: 1.1 %
BILIRUB SERPL-MCNC: <0.2 MG/DL (ref 0–1)
BILIRUB SERPL-MCNC: <0.2 MG/DL (ref 0–1)
BUN BLDV-MCNC: 13 MG/DL (ref 7–20)
BUN BLDV-MCNC: 15 MG/DL (ref 7–20)
CALCIUM SERPL-MCNC: 9 MG/DL (ref 8.3–10.6)
CALCIUM SERPL-MCNC: 9.2 MG/DL (ref 8.3–10.6)
CHLORIDE BLD-SCNC: 103 MMOL/L (ref 99–110)
CHLORIDE BLD-SCNC: 95 MMOL/L (ref 99–110)
CO2: 22 MMOL/L (ref 21–32)
CO2: 24 MMOL/L (ref 21–32)
CREAT SERPL-MCNC: 0.6 MG/DL (ref 0.6–1.2)
CREAT SERPL-MCNC: <0.5 MG/DL (ref 0.6–1.2)
D DIMER: 256 NG/ML DDU (ref 0–229)
EKG ATRIAL RATE: 78 BPM
EKG DIAGNOSIS: NORMAL
EKG P AXIS: 58 DEGREES
EKG P-R INTERVAL: 152 MS
EKG Q-T INTERVAL: 418 MS
EKG QRS DURATION: 138 MS
EKG QTC CALCULATION (BAZETT): 476 MS
EKG R AXIS: 49 DEGREES
EKG T AXIS: 41 DEGREES
EKG VENTRICULAR RATE: 78 BPM
EOSINOPHILS ABSOLUTE: 0 K/UL (ref 0–0.6)
EOSINOPHILS ABSOLUTE: 0.2 K/UL (ref 0–0.6)
EOSINOPHILS RELATIVE PERCENT: 0.2 %
EOSINOPHILS RELATIVE PERCENT: 2.5 %
GFR AFRICAN AMERICAN: >60
GFR AFRICAN AMERICAN: >60
GFR NON-AFRICAN AMERICAN: >60
GFR NON-AFRICAN AMERICAN: >60
GLOBULIN: 2.8 G/DL
GLOBULIN: 2.9 G/DL
GLUCOSE BLD-MCNC: 124 MG/DL (ref 70–99)
GLUCOSE BLD-MCNC: 151 MG/DL (ref 70–99)
HCT VFR BLD CALC: 37.5 % (ref 36–48)
HCT VFR BLD CALC: 39.7 % (ref 36–48)
HEMOGLOBIN: 12 G/DL (ref 12–16)
HEMOGLOBIN: 12.7 G/DL (ref 12–16)
INR BLD: 0.99 (ref 0.86–1.14)
LACTATE DEHYDROGENASE: 188 U/L (ref 100–190)
LYMPHOCYTES ABSOLUTE: 0.6 K/UL (ref 1–5.1)
LYMPHOCYTES ABSOLUTE: 2.4 K/UL (ref 1–5.1)
LYMPHOCYTES RELATIVE PERCENT: 30.7 %
LYMPHOCYTES RELATIVE PERCENT: 7.4 %
MAGNESIUM: 2 MG/DL (ref 1.8–2.4)
MCH RBC QN AUTO: 30.1 PG (ref 26–34)
MCH RBC QN AUTO: 30.2 PG (ref 26–34)
MCHC RBC AUTO-ENTMCNC: 32.1 G/DL (ref 31–36)
MCHC RBC AUTO-ENTMCNC: 32.1 G/DL (ref 31–36)
MCV RBC AUTO: 93.8 FL (ref 80–100)
MCV RBC AUTO: 94.3 FL (ref 80–100)
MONOCYTES ABSOLUTE: 0.1 K/UL (ref 0–1.3)
MONOCYTES ABSOLUTE: 0.6 K/UL (ref 0–1.3)
MONOCYTES RELATIVE PERCENT: 1 %
MONOCYTES RELATIVE PERCENT: 8.2 %
NEUTROPHILS ABSOLUTE: 4.4 K/UL (ref 1.7–7.7)
NEUTROPHILS ABSOLUTE: 7 K/UL (ref 1.7–7.7)
NEUTROPHILS RELATIVE PERCENT: 57.7 %
NEUTROPHILS RELATIVE PERCENT: 90.3 %
PDW BLD-RTO: 13.8 % (ref 12.4–15.4)
PDW BLD-RTO: 14.2 % (ref 12.4–15.4)
PLATELET # BLD: 284 K/UL (ref 135–450)
PLATELET # BLD: 292 K/UL (ref 135–450)
PMV BLD AUTO: 6.6 FL (ref 5–10.5)
PMV BLD AUTO: 6.9 FL (ref 5–10.5)
POTASSIUM REFLEX MAGNESIUM: 3.5 MMOL/L (ref 3.5–5.1)
POTASSIUM REFLEX MAGNESIUM: 4.1 MMOL/L (ref 3.5–5.1)
PRO-BNP: 297 PG/ML (ref 0–124)
PROCALCITONIN: 0.04 NG/ML (ref 0–0.15)
PROTHROMBIN TIME: 11.5 SEC (ref 10–13.2)
RAPID INFLUENZA  B AGN: NEGATIVE
RAPID INFLUENZA A AGN: NEGATIVE
RBC # BLD: 4 M/UL (ref 4–5.2)
RBC # BLD: 4.21 M/UL (ref 4–5.2)
SODIUM BLD-SCNC: 129 MMOL/L (ref 136–145)
SODIUM BLD-SCNC: 141 MMOL/L (ref 136–145)
TOTAL PROTEIN: 6.7 G/DL (ref 6.4–8.2)
TOTAL PROTEIN: 7.2 G/DL (ref 6.4–8.2)
TROPONIN: <0.01 NG/ML
WBC # BLD: 7.7 K/UL (ref 4–11)
WBC # BLD: 7.7 K/UL (ref 4–11)

## 2020-05-12 PROCEDURE — 71046 X-RAY EXAM CHEST 2 VIEWS: CPT

## 2020-05-12 PROCEDURE — 85025 COMPLETE CBC W/AUTO DIFF WBC: CPT

## 2020-05-12 PROCEDURE — 6370000000 HC RX 637 (ALT 250 FOR IP): Performed by: PHYSICIAN ASSISTANT

## 2020-05-12 PROCEDURE — G0378 HOSPITAL OBSERVATION PER HR: HCPCS

## 2020-05-12 PROCEDURE — 36415 COLL VENOUS BLD VENIPUNCTURE: CPT

## 2020-05-12 PROCEDURE — 93010 ELECTROCARDIOGRAM REPORT: CPT | Performed by: INTERNAL MEDICINE

## 2020-05-12 PROCEDURE — 86140 C-REACTIVE PROTEIN: CPT

## 2020-05-12 PROCEDURE — 96367 TX/PROPH/DG ADDL SEQ IV INF: CPT

## 2020-05-12 PROCEDURE — 84484 ASSAY OF TROPONIN QUANT: CPT

## 2020-05-12 PROCEDURE — 83880 ASSAY OF NATRIURETIC PEPTIDE: CPT

## 2020-05-12 PROCEDURE — 96365 THER/PROPH/DIAG IV INF INIT: CPT

## 2020-05-12 PROCEDURE — 6360000002 HC RX W HCPCS: Performed by: PHYSICIAN ASSISTANT

## 2020-05-12 PROCEDURE — 2580000003 HC RX 258: Performed by: PHYSICIAN ASSISTANT

## 2020-05-12 PROCEDURE — 85610 PROTHROMBIN TIME: CPT

## 2020-05-12 PROCEDURE — 94640 AIRWAY INHALATION TREATMENT: CPT

## 2020-05-12 PROCEDURE — 1200000000 HC SEMI PRIVATE

## 2020-05-12 PROCEDURE — 85379 FIBRIN DEGRADATION QUANT: CPT

## 2020-05-12 PROCEDURE — 93005 ELECTROCARDIOGRAM TRACING: CPT | Performed by: EMERGENCY MEDICINE

## 2020-05-12 PROCEDURE — 80053 COMPREHEN METABOLIC PANEL: CPT

## 2020-05-12 PROCEDURE — 87804 INFLUENZA ASSAY W/OPTIC: CPT

## 2020-05-12 PROCEDURE — 96368 THER/DIAG CONCURRENT INF: CPT

## 2020-05-12 PROCEDURE — 6370000000 HC RX 637 (ALT 250 FOR IP): Performed by: INTERNAL MEDICINE

## 2020-05-12 PROCEDURE — 96375 TX/PRO/DX INJ NEW DRUG ADDON: CPT

## 2020-05-12 PROCEDURE — 2580000003 HC RX 258: Performed by: INTERNAL MEDICINE

## 2020-05-12 PROCEDURE — 96374 THER/PROPH/DIAG INJ IV PUSH: CPT

## 2020-05-12 PROCEDURE — 83615 LACTATE (LD) (LDH) ENZYME: CPT

## 2020-05-12 PROCEDURE — 83735 ASSAY OF MAGNESIUM: CPT

## 2020-05-12 PROCEDURE — 94761 N-INVAS EAR/PLS OXIMETRY MLT: CPT

## 2020-05-12 PROCEDURE — 85730 THROMBOPLASTIN TIME PARTIAL: CPT

## 2020-05-12 PROCEDURE — 99214 OFFICE O/P EST MOD 30 MIN: CPT | Performed by: INTERNAL MEDICINE

## 2020-05-12 PROCEDURE — 2700000000 HC OXYGEN THERAPY PER DAY

## 2020-05-12 PROCEDURE — 84145 PROCALCITONIN (PCT): CPT

## 2020-05-12 PROCEDURE — 99285 EMERGENCY DEPT VISIT HI MDM: CPT

## 2020-05-12 PROCEDURE — 96366 THER/PROPH/DIAG IV INF ADDON: CPT

## 2020-05-12 PROCEDURE — 6360000002 HC RX W HCPCS: Performed by: INTERNAL MEDICINE

## 2020-05-12 RX ORDER — SODIUM CHLORIDE 0.9 % (FLUSH) 0.9 %
10 SYRINGE (ML) INJECTION PRN
Status: DISCONTINUED | OUTPATIENT
Start: 2020-05-12 | End: 2020-05-14 | Stop reason: HOSPADM

## 2020-05-12 RX ORDER — ROPINIROLE 1 MG/1
3 TABLET, FILM COATED ORAL NIGHTLY
Status: DISCONTINUED | OUTPATIENT
Start: 2020-05-12 | End: 2020-05-14 | Stop reason: HOSPADM

## 2020-05-12 RX ORDER — POLYETHYLENE GLYCOL 3350 17 G/17G
17 POWDER, FOR SOLUTION ORAL DAILY PRN
Status: DISCONTINUED | OUTPATIENT
Start: 2020-05-12 | End: 2020-05-14 | Stop reason: HOSPADM

## 2020-05-12 RX ORDER — ACETAMINOPHEN 650 MG/1
650 SUPPOSITORY RECTAL EVERY 6 HOURS PRN
Status: DISCONTINUED | OUTPATIENT
Start: 2020-05-12 | End: 2020-05-14 | Stop reason: HOSPADM

## 2020-05-12 RX ORDER — ALBUTEROL SULFATE 90 UG/1
2 AEROSOL, METERED RESPIRATORY (INHALATION) ONCE
Status: COMPLETED | OUTPATIENT
Start: 2020-05-12 | End: 2020-05-12

## 2020-05-12 RX ORDER — METHYLPREDNISOLONE SODIUM SUCCINATE 125 MG/2ML
60 INJECTION, POWDER, LYOPHILIZED, FOR SOLUTION INTRAMUSCULAR; INTRAVENOUS ONCE
Status: COMPLETED | OUTPATIENT
Start: 2020-05-12 | End: 2020-05-12

## 2020-05-12 RX ORDER — VENLAFAXINE HYDROCHLORIDE 75 MG/1
150 CAPSULE, EXTENDED RELEASE ORAL DAILY
Status: DISCONTINUED | OUTPATIENT
Start: 2020-05-13 | End: 2020-05-14 | Stop reason: HOSPADM

## 2020-05-12 RX ORDER — ONDANSETRON 2 MG/ML
4 INJECTION INTRAMUSCULAR; INTRAVENOUS EVERY 6 HOURS PRN
Status: DISCONTINUED | OUTPATIENT
Start: 2020-05-12 | End: 2020-05-14 | Stop reason: HOSPADM

## 2020-05-12 RX ORDER — SODIUM CHLORIDE 0.9 % (FLUSH) 0.9 %
10 SYRINGE (ML) INJECTION EVERY 12 HOURS SCHEDULED
Status: DISCONTINUED | OUTPATIENT
Start: 2020-05-12 | End: 2020-05-14 | Stop reason: HOSPADM

## 2020-05-12 RX ORDER — EZETIMIBE 10 MG/1
10 TABLET ORAL DAILY
Status: DISCONTINUED | OUTPATIENT
Start: 2020-05-13 | End: 2020-05-14 | Stop reason: HOSPADM

## 2020-05-12 RX ORDER — BUDESONIDE AND FORMOTEROL FUMARATE DIHYDRATE 80; 4.5 UG/1; UG/1
2 AEROSOL RESPIRATORY (INHALATION) 2 TIMES DAILY
Status: DISCONTINUED | OUTPATIENT
Start: 2020-05-12 | End: 2020-05-14 | Stop reason: HOSPADM

## 2020-05-12 RX ORDER — DILTIAZEM HYDROCHLORIDE 120 MG/1
120 CAPSULE, COATED, EXTENDED RELEASE ORAL DAILY
Status: DISCONTINUED | OUTPATIENT
Start: 2020-05-12 | End: 2020-05-14 | Stop reason: HOSPADM

## 2020-05-12 RX ORDER — FLUTICASONE FUROATE AND VILANTEROL 100; 25 UG/1; UG/1
1 POWDER RESPIRATORY (INHALATION) DAILY
Status: DISCONTINUED | OUTPATIENT
Start: 2020-05-12 | End: 2020-05-12 | Stop reason: CLARIF

## 2020-05-12 RX ORDER — TOPIRAMATE 25 MG/1
50 TABLET ORAL NIGHTLY
Status: DISCONTINUED | OUTPATIENT
Start: 2020-05-12 | End: 2020-05-14 | Stop reason: HOSPADM

## 2020-05-12 RX ORDER — ACETAMINOPHEN 325 MG/1
650 TABLET ORAL EVERY 6 HOURS PRN
Status: DISCONTINUED | OUTPATIENT
Start: 2020-05-12 | End: 2020-05-14 | Stop reason: HOSPADM

## 2020-05-12 RX ORDER — GABAPENTIN 300 MG/1
600 CAPSULE ORAL 2 TIMES DAILY
Status: DISCONTINUED | OUTPATIENT
Start: 2020-05-12 | End: 2020-05-14 | Stop reason: HOSPADM

## 2020-05-12 RX ORDER — FERROUS SULFATE 325(65) MG
325 TABLET ORAL
Status: DISCONTINUED | OUTPATIENT
Start: 2020-05-13 | End: 2020-05-14 | Stop reason: HOSPADM

## 2020-05-12 RX ORDER — HYDROXYZINE PAMOATE 25 MG/1
50 CAPSULE ORAL EVERY 6 HOURS PRN
Status: DISCONTINUED | OUTPATIENT
Start: 2020-05-12 | End: 2020-05-14 | Stop reason: HOSPADM

## 2020-05-12 RX ORDER — PROMETHAZINE HYDROCHLORIDE 25 MG/1
12.5 TABLET ORAL EVERY 6 HOURS PRN
Status: DISCONTINUED | OUTPATIENT
Start: 2020-05-12 | End: 2020-05-14 | Stop reason: HOSPADM

## 2020-05-12 RX ORDER — GABAPENTIN 400 MG/1
800 CAPSULE ORAL 4 TIMES DAILY
COMMUNITY

## 2020-05-12 RX ORDER — LEVOFLOXACIN 5 MG/ML
500 INJECTION, SOLUTION INTRAVENOUS ONCE
Status: COMPLETED | OUTPATIENT
Start: 2020-05-12 | End: 2020-05-12

## 2020-05-12 RX ORDER — QUETIAPINE 150 MG/1
300 TABLET, FILM COATED, EXTENDED RELEASE ORAL NIGHTLY
Status: DISCONTINUED | OUTPATIENT
Start: 2020-05-12 | End: 2020-05-14 | Stop reason: HOSPADM

## 2020-05-12 RX ADMIN — ALBUTEROL SULFATE 2 PUFF: 90 AEROSOL, METERED RESPIRATORY (INHALATION) at 17:20

## 2020-05-12 RX ADMIN — VANCOMYCIN HYDROCHLORIDE 750 MG: 10 INJECTION, POWDER, LYOPHILIZED, FOR SOLUTION INTRAVENOUS at 21:47

## 2020-05-12 RX ADMIN — PIPERACILLIN SODIUM AND TAZOBACTAM SODIUM 3.38 G: 3; .375 INJECTION, POWDER, LYOPHILIZED, FOR SOLUTION INTRAVENOUS at 21:47

## 2020-05-12 RX ADMIN — METHYLPREDNISOLONE SODIUM SUCCINATE 60 MG: 125 INJECTION, POWDER, FOR SOLUTION INTRAMUSCULAR; INTRAVENOUS at 16:37

## 2020-05-12 RX ADMIN — APIXABAN 5 MG: 5 TABLET, FILM COATED ORAL at 21:45

## 2020-05-12 RX ADMIN — LEVOFLOXACIN 500 MG: 5 INJECTION, SOLUTION INTRAVENOUS at 16:37

## 2020-05-12 RX ADMIN — GABAPENTIN 600 MG: 300 CAPSULE ORAL at 21:46

## 2020-05-12 RX ADMIN — TOPIRAMATE 50 MG: 25 TABLET, FILM COATED ORAL at 21:46

## 2020-05-12 RX ADMIN — ALBUTEROL SULFATE 2 PUFF: 90 AEROSOL, METERED RESPIRATORY (INHALATION) at 16:37

## 2020-05-12 RX ADMIN — Medication 2 PUFF: at 20:27

## 2020-05-12 RX ADMIN — QUETIAPINE FUMARATE 300 MG: 150 TABLET, EXTENDED RELEASE ORAL at 21:46

## 2020-05-12 RX ADMIN — Medication 10 ML: at 21:51

## 2020-05-12 RX ADMIN — ROPINIROLE HYDROCHLORIDE 3 MG: 1 TABLET, FILM COATED ORAL at 21:45

## 2020-05-12 ASSESSMENT — PAIN SCALES - GENERAL: PAINLEVEL_OUTOF10: 6

## 2020-05-12 ASSESSMENT — ENCOUNTER SYMPTOMS
ABDOMINAL PAIN: 0
WHEEZING: 1
SHORTNESS OF BREATH: 1
VOMITING: 0
COUGH: 1

## 2020-05-12 ASSESSMENT — PAIN DESCRIPTION - LOCATION: LOCATION: CHEST

## 2020-05-12 ASSESSMENT — PAIN DESCRIPTION - DESCRIPTORS: DESCRIPTORS: STABBING

## 2020-05-12 ASSESSMENT — PAIN DESCRIPTION - PAIN TYPE: TYPE: ACUTE PAIN

## 2020-05-12 NOTE — ED NOTES
Patient to Radiology via wheelchair and returned to room without incident.        Oscar Lozano RN  05/12/20 [id number]

## 2020-05-12 NOTE — ED NOTES
Attempted to call report to [facility]. Receiving nurse unavailable and will call back.      Sheri Zamorano RN  05/12/20 3516

## 2020-05-12 NOTE — ED PROVIDER NOTES
[facility] 49        Pt Name: Ana Kapoor  MRN: [id number]  [place] 1950  Date of evaluation: 5/12/2020  Provider: Tere Hill PA-C  PCP: Chanell Olivares PA-C    Shared Visit or Autonomous Visit:  I have seen and evaluated this patient with my supervising physician Frida Gray MD.    [address] [place] [address]       Chief Complaint   Patient presents with    Shortness of Breath     Shortness of breath has been ongoing for a week but worsened last night. Recent surgery for lung CA. Virtual visit with Dr. Caro Patel referred to ED. HISTORY OF PRESENT ILLNESS   (Location/Symptom, Timing/Onset, Context/Setting, Quality, Duration, Modifying Factors, Severity)  Note limiting factors. Ana Kapoor is a 78 y.o. female presenting to ER with complaint of shortness of breath for the past 4 days. Cough for the past 2 days. States she called her pulmonologist Dr. Caro Patel again today states thought she was getting a bug and she requested steroids and antibiotics states he sent her here for a chest x-ray to make sure she did not have a collapsed lung. History of pneumothorax 2 months ago states they had removed lung cancer RUL resection. COPD. Former smoker. States using nebulizer morning and at night. Used once this morning. O2 sat 87% on room air upon arrival. 93-95% if sitting at rest.  The history is provided by the patient. Shortness of Breath   Onset quality:  Gradual  Duration:  4 days  Progression:  Worsening  Chronicity:  New  Associated symptoms: chest pain, cough and wheezing    Associated symptoms: no abdominal pain, no fever, no syncope and no vomiting        Nursing Notes were reviewed    REVIEW OF SYSTEMS    (2-9 systems for level 4, 10 or more for level 5)     Review of Systems   Constitutional: Negative for fever. Respiratory: Positive for cough, shortness of breath and wheezing. Cardiovascular: Positive for chest pain.  Negative for Take 3 mLs by nebulization every 6 hours as needed for Wheezing  Qty: 120 each, Refills: 5    Associated Diagnoses: COPD, severe (HCC)      HYDROcodone-acetaminophen (NORCO) 7.5-325 MG per tablet TAKE 1 TABLET BY MOUTH 3 TIMES A DAY AS NEEDED FOR PAIN      apixaban (ELIQUIS) 5 MG TABS tablet Take 1 tablet by mouth 2 times daily  Qty: 180 tablet, Refills: 3      dilTIAZem (CARDIZEM CD) 120 MG extended release capsule Take 1 capsule by mouth daily  Qty: 90 capsule, Refills: 1      fluticasone-vilanterol (BREO ELLIPTA) 100-25 MCG/INH AEPB inhaler Inhale 1 puff into the lungs daily  Qty: 30 each, Refills: 6      tiotropium (SPIRIVA HANDIHALER) 18 MCG inhalation capsule Inhale 1 capsule into the lungs daily  Qty: 30 capsule, Refills: 6      nystatin (MYCOSTATIN) 612333 UNIT/GM cream       ezetimibe (ZETIA) 10 MG tablet Take 10 mg by mouth daily      Fexofenadine HCl (MUCINEX ALLERGY PO) Take by mouth daily      topiramate (TOPAMAX) 50 MG tablet Take 50 mg by mouth nightly      hydrOXYzine (ATARAX) 50 MG tablet Take 50 mg by mouth 1-2 tabs every 4 hours prn      ferrous sulfate 325 (65 Fe) MG tablet Take 325 mg by mouth daily (with breakfast)      rOPINIRole (REQUIP) 2 MG tablet Take 3 mg by mouth nightly       venlafaxine (EFFEXOR-XR) 150 MG XR capsule Take 150 mg by mouth daily      Multiple Vitamins-Minerals (THERAPEUTIC MULTIVITAMIN-MINERALS) tablet Take 1 tablet by mouth daily      QUEtiapine (SEROQUEL XR) 300 MG XR tablet Take 300 mg by mouth nightly       OXYGEN Inhale into the lungs Indications: 2.5 L nightly                ALLERGIES     Buspirone; Doxycycline; Lisinopril; and Penicillins    FAMILYHISTORY       Family History   Problem Relation Age of Onset    Cancer Mother     Heart Failure Father           SOCIAL HISTORY       Social History     Socioeconomic History    Marital status:       Spouse name: None    Number of children: None    Years of education: None    Highest education level: None Date: 5/12/2020  EXAMINATION: TWO XRAY VIEWS OF THE CHEST 5/12/2020 3:30 pm COMPARISON: Frontal and lateral views of the chest 03/24/2020, frontal view of the chest 03/20/2020, CT thorax 03/15/2020, PET-CT 02/06/2020. HISTORY: ORDERING SYSTEM PROVIDED HISTORY: shortness of breath x2 weeks TECHNOLOGIST PROVIDED HISTORY: Reason for exam:->shortness of breath x2 weeks Reason for Exam: sob x 2 wks, recent surgery for right lung cancer Acuity: Acute Type of Exam: Initial Relevant Medical/Surgical History: best images for lateral pt had rotator cuff issues bilateral shoulders FINDINGS: Redemonstration of surgical changes of a remote right upper lobectomy. A right apical hydropneumothorax/postsurgical residual space is redemonstrated. There is increasing opacity at the right lung apex reflecting fluid filling the postsurgical residual space. The measurement between the visceral pleura and parietal pleura now measures 3 cm, decreased in the interval from the prior study dated 03/24/2020 when it measured up to 5.6 cm. There is stable rightward deviation of the mobile structures of the mediastinum on the basis of volume loss secondary to the right upper lobectomy changes. Mild interval improvement in streaky opacities in the aerated portions of the right lung. Right-sided calcified granulomas again noted. No focal opacity is identified in the left lung. 1. Interval decrease in size of a right apical hydropneumothorax/postsurgical residual space. Increasing opacity at the right lung apex reflects fluid filling the postsurgical residual space. Please see body of report for details. 2. Interval improvement in streaky opacities within the aerated right lung. 3. No focal opacity identified in the left lung.            PROCEDURES   Unless otherwise noted below, none     Procedures    CRITICAL CARE TIME   N/A    CONSULTS:  IP CONSULT TO PULMONOLOGY  PHARMACY TO DOSE VANCOMYCIN      EMERGENCY DEPARTMENT COURSE and DIFFERENTIAL DIAGNOSIS/MDM:   Vitals:    Vitals:    05/12/20 1653 05/12/20 1730 05/12/20 1810 05/12/20 1847   BP: 119/80 122/84 137/62 (!) 153/79   Pulse: 67 68 73 72   Resp: 22 22 20 22   Temp:    98.2 °F (36.8 °C)   TempSrc:    Oral   SpO2: 94% 95% (!) 84% 93%   Weight:       Height:           Patient was given thefollowing medications:  Medications   apixaban (ELIQUIS) tablet 5 mg (has no administration in time range)   dilTIAZem (CARDIZEM CD) extended release capsule 120 mg (has no administration in time range)   ezetimibe (ZETIA) tablet 10 mg (has no administration in time range)   ferrous sulfate (IRON 325) tablet 325 mg (has no administration in time range)   fluticasone-vilanterol (BREO ELLIPTA) 100-25 MCG/INH inhaler 1 puff (has no administration in time range)   gabapentin (NEURONTIN) capsule 600 mg (has no administration in time range)   hydrOXYzine (ATARAX) tablet 50 mg (has no administration in time range)   venlafaxine (EFFEXOR XR) extended release capsule 150 mg (has no administration in time range)   rOPINIRole (REQUIP) tablet 3 mg (has no administration in time range)   topiramate (TOPAMAX) tablet 50 mg (has no administration in time range)   tiotropium (SPIRIVA RESPIMAT) 2.5 MCG/ACT inhaler 2 puff (has no administration in time range)   QUEtiapine (SEROQUEL XR) 150 MG extended release tablet 300 mg (has no administration in time range)   sodium chloride flush 0.9 % injection 10 mL (has no administration in time range)   sodium chloride flush 0.9 % injection 10 mL (has no administration in time range)   acetaminophen (TYLENOL) tablet 650 mg (has no administration in time range)     Or   acetaminophen (TYLENOL) suppository 650 mg (has no administration in time range)   polyethylene glycol (GLYCOLAX) packet 17 g (has no administration in time range)   promethazine (PHENERGAN) tablet 12.5 mg (has no administration in time range)     Or   ondansetron (ZOFRAN) injection 4 mg (has no administration in time range)

## 2020-05-12 NOTE — ED NOTES
I have personally performed and/or participated in the history, exam and medical decision making and spent time face to face with the patient and agree with all pertinent clinical information. My history and physical revealed: This patient presented with SOB. She states she called Dr Savana Kaplan for SOB and he sent her for an xray. She states 3 days ago she became progressively SOB. Pt does have COPD. She states she has been outside working for days. She states she does use her nebulizer twice a day. She quit smoking. She states she becomes more SOB when walking across the room. She denies abd pain n/v or diarrhea. She denies CP. Sharyn Grubbs Exam-HEENT: Normocephalic and atraumatic. Pupils equal round and reactive. TMs are clear. Mouth: mucous membranes pink and dry with no erythema and no exudates. Lungs are diminished with expiratory wheezes no rales rhonchi retractions or stridor. Heart has regular rate and rhythm. Normal S1-S2 without gallop or murmur. Abdomen is soft and non-tender with no guarding, rebound, or masses. Good bowel sounds. Extremities have no cyanosis, erythema, edema, cords, or Danisha's sign. Alert, nontoxic in appearance   Vitals viewed.        Results for orders placed or performed during the hospital encounter of 05/12/20   CBC Auto Differential   Result Value Ref Range    WBC 7.7 4.0 - 11.0 K/uL    RBC 4.00 4.00 - 5.20 M/uL    Hemoglobin 12.0 12.0 - 16.0 g/dL    Hematocrit 37.5 36.0 - 48.0 %    MCV 93.8 80.0 - 100.0 fL    MCH 30.1 26.0 - 34.0 pg    MCHC 32.1 31.0 - 36.0 g/dL    RDW 13.8 12.4 - 15.4 %    Platelets 109 440 - 699 K/uL    MPV 6.9 5.0 - 10.5 fL    Neutrophils % 57.7 %    Lymphocytes % 30.7 %    Monocytes % 8.2 %    Eosinophils % 2.5 %    Basophils % 0.9 %    Neutrophils Absolute 4.4 1.7 - 7.7 K/uL    Lymphocytes Absolute 2.4 1.0 - 5.1 K/uL    Monocytes Absolute 0.6 0.0 - 1.3 K/uL    Eosinophils Absolute 0.2 0.0 - 0.6 K/uL    Basophils Absolute 0.1 0.0 - 0.2 K/uL   Troponin Result Value Ref Range    Troponin <0.01 <0.01 ng/mL   Comprehensive Metabolic Panel w/ Reflex to MG   Result Value Ref Range    Sodium 141 136 - 145 mmol/L    Potassium reflex Magnesium 4.1 3.5 - 5.1 mmol/L    Chloride 103 99 - 110 mmol/L    CO2 24 21 - 32 mmol/L    Anion Gap 14 3 - 16    Glucose 124 (H) 70 - 99 mg/dL    BUN 15 7 - 20 mg/dL    CREATININE <0.5 (L) 0.6 - 1.2 mg/dL    GFR Non-African American >60 >60    GFR African American >60 >60    Calcium 9.0 8.3 - 10.6 mg/dL    Total Protein 6.7 6.4 - 8.2 g/dL    Alb 3.9 3.4 - 5.0 g/dL    Albumin/Globulin Ratio 1.4 1.1 - 2.2    Total Bilirubin <0.2 0.0 - 1.0 mg/dL    Alkaline Phosphatase 106 40 - 129 U/L    ALT 13 10 - 40 U/L    AST 20 15 - 37 U/L    Globulin 2.8 g/dL   Brain Natriuretic Peptide   Result Value Ref Range    Pro- (H) 0 - 124 pg/mL   EKG 12 Lead   Result Value Ref Range    Ventricular Rate 78 BPM    Atrial Rate 78 BPM    P-R Interval 152 ms    QRS Duration 138 ms    Q-T Interval 418 ms    QTc Calculation (Bazett) 476 ms    P Axis 58 degrees    R Axis 49 degrees    T Axis 41 degrees    Diagnosis       Normal sinus rhythmRight bundle branch blockAbnormal ECGNo previous ECGs availableConfirmed by BELINDA SPANGLER, LELO ([id number]) on 5/12/2020 4:13:25 PM     EKG interpreted by No att. providers found:    Rhythm: normal sinus   Rate: 78  Axis: normal  Ectopy: none  Conduction: right bundle branch block   ST Segments: no acute change  T Waves: no acute change  Q Waves: none        I was present during the key portions of the examination and treatment of the patient. Case discussed with MLP. Concur with treatment and disposition. BP (!) 140/65   Pulse 76   Temp 98.4 °F (36.9 °C) (Oral)   Resp 19   Ht 5' 7\" (1.702 m)   Wt 190 lb (86.2 kg)   SpO2 98%   BMI 29.76 kg/m²     4:31 PM EDT  Spoke with Dr Analisa Lisa patient's presentation examination were discussed and he agrees  with admission and suggests Levaquin.     4:37 PM EDT:  Discussed results, diagnosis and plan with patient and/or family. Questions addressed. Patient initially states she does not wish to be admitted to the hospital.  I discussed with her that certainly if she wished to sign out AMA that was fine I did have my concerns given that her sat was 87% on room air. And normally she does not use accident except at night. Patient states she understands that she is at risk and that she has been intubated twice and agrees to be admitted she asked about going by private car again I suggest that she go by ambulance as she has an IV and has had hypoxia on room air. All entries by Nicki Dominguez are made while acting as a scribe for No att. providers found. This dictation was generated by voice recognition computer software. Although all attempts are made to edit the dictation for accuracy, there may be errors in the transcription that are not intended.          Deb Quesada MD  05/12/20 [id number]

## 2020-05-12 NOTE — ED NOTES
Report [facility] for transport to [facility].      Manuel Manning RN  05/12/20 [id number]

## 2020-05-12 NOTE — PROGRESS NOTES
Allergies:  is allergic to buspirone; doxycycline; lisinopril; and penicillins. Social History:    TOBACCO:   reports that she quit smoking about 2 months ago. Her smoking use included cigarettes. She has a 50.00 pack-year smoking history. She has never used smokeless tobacco.  ETOH:   reports no history of alcohol use.       Family History:       Problem Relation Age of Onset    Cancer Mother     Heart Failure Father        Current Medications:    Current Outpatient Medications:     dilTIAZem (CARDIZEM CD) 120 MG extended release capsule, Take 1 capsule by mouth daily, Disp: 90 capsule, Rfl: 1    fluticasone-vilanterol (BREO ELLIPTA) 100-25 MCG/INH AEPB inhaler, Inhale 1 puff into the lungs daily, Disp: 30 each, Rfl: 6    tiotropium (SPIRIVA HANDIHALER) 18 MCG inhalation capsule, Inhale 1 capsule into the lungs daily, Disp: 30 capsule, Rfl: 6    albuterol sulfate HFA (VENTOLIN HFA) 108 (90 Base) MCG/ACT inhaler, Inhale 2 puffs into the lungs every 6 hours as needed for Wheezing or Shortness of Breath, Disp: 1 Inhaler, Rfl: 6    albuterol (PROVENTIL) (2.5 MG/3ML) 0.083% nebulizer solution, Take 3 mLs by nebulization every 6 hours as needed for Wheezing, Disp: 120 each, Rfl: 5    furosemide (LASIX) 40 MG tablet, Take 40 mg by mouth daily, Disp: , Rfl:     potassium chloride (MICRO-K) 10 MEQ extended release capsule, Take 10 mEq by mouth daily, Disp: , Rfl:     HYDROcodone-acetaminophen (NORCO) 7.5-325 MG per tablet, TAKE 1 TABLET BY MOUTH 3 TIMES A DAY AS NEEDED FOR PAIN, Disp: , Rfl:     nystatin (MYCOSTATIN) 676693 UNIT/GM cream, , Disp: , Rfl:     omeprazole (PRILOSEC) 40 MG delayed release capsule, TAKE 1 CAPSULE BY MOUTH TWICE A DAY, Disp: , Rfl:     ezetimibe (ZETIA) 10 MG tablet, Take 10 mg by mouth daily, Disp: , Rfl:     tiotropium (SPIRIVA HANDIHALER) 18 MCG inhalation capsule, Inhale 1 capsule into the lungs daily, Disp: 30 capsule, Rfl: 0    Fexofenadine HCl (MUCINEX ALLERGY PO), Take for worsening conditions or problems, and seek emergency medical treatment and/or call 911 if deemed necessary. Services were provided through a video synchronous discussion virtually to substitute for in-person clinic visit. Patient was located in her home, provider was located in his office. --Sunni Jean MD on 5/12/2020 at 1:44 PM    An electronic signature was used to authenticate this note.

## 2020-05-13 LAB
A/G RATIO: 1.4 (ref 1.1–2.2)
ALBUMIN SERPL-MCNC: 3.8 G/DL (ref 3.4–5)
ALP BLD-CCNC: 104 U/L (ref 40–129)
ALT SERPL-CCNC: 13 U/L (ref 10–40)
ANION GAP SERPL CALCULATED.3IONS-SCNC: 13 MMOL/L (ref 3–16)
APTT: 34.1 SEC (ref 24.2–36.2)
AST SERPL-CCNC: 11 U/L (ref 15–37)
BASOPHILS ABSOLUTE: 0 K/UL (ref 0–0.2)
BASOPHILS RELATIVE PERCENT: 0.2 %
BILIRUB SERPL-MCNC: <0.2 MG/DL (ref 0–1)
BUN BLDV-MCNC: 14 MG/DL (ref 7–20)
C-REACTIVE PROTEIN: 2.8 MG/L (ref 0–5.1)
CALCIUM SERPL-MCNC: 8.8 MG/DL (ref 8.3–10.6)
CHLORIDE BLD-SCNC: 105 MMOL/L (ref 99–110)
CO2: 23 MMOL/L (ref 21–32)
CREAT SERPL-MCNC: <0.5 MG/DL (ref 0.6–1.2)
D DIMER: <200 NG/ML DDU (ref 0–229)
EOSINOPHILS ABSOLUTE: 0 K/UL (ref 0–0.6)
EOSINOPHILS RELATIVE PERCENT: 0 %
GFR AFRICAN AMERICAN: >60
GFR NON-AFRICAN AMERICAN: >60
GLOBULIN: 2.8 G/DL
GLUCOSE BLD-MCNC: 136 MG/DL (ref 70–99)
HCT VFR BLD CALC: 37.7 % (ref 36–48)
HEMOGLOBIN: 11.9 G/DL (ref 12–16)
INR BLD: 1.2 (ref 0.86–1.14)
LYMPHOCYTES ABSOLUTE: 0.7 K/UL (ref 1–5.1)
LYMPHOCYTES RELATIVE PERCENT: 13 %
MCH RBC QN AUTO: 29.3 PG (ref 26–34)
MCHC RBC AUTO-ENTMCNC: 31.7 G/DL (ref 31–36)
MCV RBC AUTO: 92.5 FL (ref 80–100)
MONOCYTES ABSOLUTE: 0.1 K/UL (ref 0–1.3)
MONOCYTES RELATIVE PERCENT: 2.7 %
NEUTROPHILS ABSOLUTE: 4.2 K/UL (ref 1.7–7.7)
NEUTROPHILS RELATIVE PERCENT: 84.1 %
PDW BLD-RTO: 13.9 % (ref 12.4–15.4)
PLATELET # BLD: 296 K/UL (ref 135–450)
PMV BLD AUTO: 7 FL (ref 5–10.5)
POTASSIUM REFLEX MAGNESIUM: 4.3 MMOL/L (ref 3.5–5.1)
PROTHROMBIN TIME: 14 SEC (ref 10–13.2)
RBC # BLD: 4.08 M/UL (ref 4–5.2)
SARS-COV-2, PCR: NOT DETECTED
SODIUM BLD-SCNC: 141 MMOL/L (ref 136–145)
TOTAL PROTEIN: 6.6 G/DL (ref 6.4–8.2)
WBC # BLD: 5 K/UL (ref 4–11)

## 2020-05-13 PROCEDURE — 87449 NOS EACH ORGANISM AG IA: CPT

## 2020-05-13 PROCEDURE — 1200000000 HC SEMI PRIVATE

## 2020-05-13 PROCEDURE — 85610 PROTHROMBIN TIME: CPT

## 2020-05-13 PROCEDURE — 6370000000 HC RX 637 (ALT 250 FOR IP): Performed by: INTERNAL MEDICINE

## 2020-05-13 PROCEDURE — 85025 COMPLETE CBC W/AUTO DIFF WBC: CPT

## 2020-05-13 PROCEDURE — 36415 COLL VENOUS BLD VENIPUNCTURE: CPT

## 2020-05-13 PROCEDURE — 2700000000 HC OXYGEN THERAPY PER DAY

## 2020-05-13 PROCEDURE — 6370000000 HC RX 637 (ALT 250 FOR IP): Performed by: PHYSICIAN ASSISTANT

## 2020-05-13 PROCEDURE — 6360000002 HC RX W HCPCS: Performed by: PHYSICIAN ASSISTANT

## 2020-05-13 PROCEDURE — G0378 HOSPITAL OBSERVATION PER HR: HCPCS

## 2020-05-13 PROCEDURE — 6360000002 HC RX W HCPCS: Performed by: INTERNAL MEDICINE

## 2020-05-13 PROCEDURE — 94640 AIRWAY INHALATION TREATMENT: CPT

## 2020-05-13 PROCEDURE — 80053 COMPREHEN METABOLIC PANEL: CPT

## 2020-05-13 PROCEDURE — U0003 INFECTIOUS AGENT DETECTION BY NUCLEIC ACID (DNA OR RNA); SEVERE ACUTE RESPIRATORY SYNDROME CORONAVIRUS 2 (SARS-COV-2) (CORONAVIRUS DISEASE [COVID-19]), AMPLIFIED PROBE TECHNIQUE, MAKING USE OF HIGH THROUGHPUT TECHNOLOGIES AS DESCRIBED BY CMS-2020-01-R: HCPCS

## 2020-05-13 PROCEDURE — 2580000003 HC RX 258: Performed by: PHYSICIAN ASSISTANT

## 2020-05-13 PROCEDURE — 85730 THROMBOPLASTIN TIME PARTIAL: CPT

## 2020-05-13 PROCEDURE — 2580000003 HC RX 258: Performed by: INTERNAL MEDICINE

## 2020-05-13 PROCEDURE — 96366 THER/PROPH/DIAG IV INF ADDON: CPT

## 2020-05-13 PROCEDURE — 85379 FIBRIN DEGRADATION QUANT: CPT

## 2020-05-13 PROCEDURE — 99222 1ST HOSP IP/OBS MODERATE 55: CPT | Performed by: INTERNAL MEDICINE

## 2020-05-13 PROCEDURE — 99223 1ST HOSP IP/OBS HIGH 75: CPT | Performed by: INTERNAL MEDICINE

## 2020-05-13 PROCEDURE — 94761 N-INVAS EAR/PLS OXIMETRY MLT: CPT

## 2020-05-13 PROCEDURE — 87040 BLOOD CULTURE FOR BACTERIA: CPT

## 2020-05-13 RX ORDER — PREDNISONE 20 MG/1
40 TABLET ORAL DAILY
Status: DISCONTINUED | OUTPATIENT
Start: 2020-05-13 | End: 2020-05-13

## 2020-05-13 RX ORDER — PREDNISONE 20 MG/1
40 TABLET ORAL DAILY
Status: DISCONTINUED | OUTPATIENT
Start: 2020-05-14 | End: 2020-05-14 | Stop reason: HOSPADM

## 2020-05-13 RX ORDER — SODIUM CHLORIDE 9 MG/ML
INJECTION, SOLUTION INTRAVENOUS
Status: DISPENSED
Start: 2020-05-13 | End: 2020-05-13

## 2020-05-13 RX ORDER — LEVOFLOXACIN 500 MG/1
500 TABLET, FILM COATED ORAL DAILY
Status: DISCONTINUED | OUTPATIENT
Start: 2020-05-13 | End: 2020-05-14 | Stop reason: HOSPADM

## 2020-05-13 RX ADMIN — APIXABAN 5 MG: 5 TABLET, FILM COATED ORAL at 22:54

## 2020-05-13 RX ADMIN — ROPINIROLE HYDROCHLORIDE 3 MG: 1 TABLET, FILM COATED ORAL at 22:56

## 2020-05-13 RX ADMIN — LEVOFLOXACIN 500 MG: 500 TABLET, FILM COATED ORAL at 16:59

## 2020-05-13 RX ADMIN — TIOTROPIUM BROMIDE INHALATION SPRAY 2 PUFF: 3.12 SPRAY, METERED RESPIRATORY (INHALATION) at 07:21

## 2020-05-13 RX ADMIN — Medication 2 PUFF: at 19:53

## 2020-05-13 RX ADMIN — PREDNISONE 40 MG: 20 TABLET ORAL at 14:19

## 2020-05-13 RX ADMIN — FERROUS SULFATE TAB 325 MG (65 MG ELEMENTAL FE) 325 MG: 325 (65 FE) TAB at 08:08

## 2020-05-13 RX ADMIN — EZETIMIBE 10 MG: 10 TABLET ORAL at 08:08

## 2020-05-13 RX ADMIN — DILTIAZEM HYDROCHLORIDE 120 MG: 120 CAPSULE, COATED, EXTENDED RELEASE ORAL at 08:08

## 2020-05-13 RX ADMIN — APIXABAN 5 MG: 5 TABLET, FILM COATED ORAL at 08:08

## 2020-05-13 RX ADMIN — TOPIRAMATE 50 MG: 25 TABLET, FILM COATED ORAL at 22:55

## 2020-05-13 RX ADMIN — Medication 2 PUFF: at 07:20

## 2020-05-13 RX ADMIN — QUETIAPINE FUMARATE 300 MG: 150 TABLET, EXTENDED RELEASE ORAL at 22:53

## 2020-05-13 RX ADMIN — VANCOMYCIN HYDROCHLORIDE 750 MG: 10 INJECTION, POWDER, LYOPHILIZED, FOR SOLUTION INTRAVENOUS at 08:09

## 2020-05-13 RX ADMIN — PIPERACILLIN SODIUM AND TAZOBACTAM SODIUM 3.38 G: 3; .375 INJECTION, POWDER, LYOPHILIZED, FOR SOLUTION INTRAVENOUS at 04:32

## 2020-05-13 RX ADMIN — VENLAFAXINE HYDROCHLORIDE 150 MG: 75 CAPSULE, EXTENDED RELEASE ORAL at 08:08

## 2020-05-13 ASSESSMENT — PAIN SCALES - GENERAL
PAINLEVEL_OUTOF10: 0
PAINLEVEL_OUTOF10: 6

## 2020-05-13 ASSESSMENT — PAIN DESCRIPTION - LOCATION: LOCATION: BACK

## 2020-05-13 ASSESSMENT — PAIN DESCRIPTION - DESCRIPTORS: DESCRIPTORS: ACHING

## 2020-05-13 ASSESSMENT — PAIN DESCRIPTION - PAIN TYPE: TYPE: CHRONIC PAIN

## 2020-05-13 NOTE — PROGRESS NOTES
Pt resting as manifested by eyes closed in dark room. Respirations even and easy. Call light and bedside table in reach. Bed in low locked position. Denies any needs at this time. 4 eyes assessment refused, pt states that she just has a few bruises and scabs on legs and arms. Pt A/O x4.

## 2020-05-13 NOTE — PROGRESS NOTES
Was called from [place]. [facility] ER, with them stating that they did not collect COVID-19 test. When given report yesterday was told that it was collected. Test will be collected this AM during assessment.

## 2020-05-13 NOTE — ACP (ADVANCE CARE PLANNING)
POLST/POST/MOLST/MOST prepared for Provider review and signature      Follow-up plan:    [] Schedule follow-up conversation to continue planning  [] Referred individual to Provider for additional questions/concerns   [] Advised patient/agent/surrogate to review completed ACP document and update if needed with changes in condition, patient preferences or care setting    [] This note routed to one or more involved healthcare providers

## 2020-05-13 NOTE — CONSULTS
Pharmacy to dose IV Vanco:  Please give 750mg IV Vanco Q12hrs to provide Gram+ organism coverage to include MRSA. Trough 5/14 at 0830.   Daniel Fix PharmD 5/12/2020 8:26 PM
Bilateral o wheezes. No rhonchi. No dullness on percussion. CV: Regular rate. Regular rhythm. No murmur or rub. No edema. GI: Non-tender. Non-distended. No hernia. Skin: Warm and dry. No nodule on exposed extremities. Lymph: No cervical LAD. No supraclavicular LAD. M/S: No cyanosis. No joint deformity. No clubbing. Neuro: Awake. Alert. Moves all four extremities. Psych: Oriented x 3. No anxiety. LABS:  CBC:   Recent Labs     05/12/20 1515 05/1950 05/13/20  0541   WBC 7.7 7.7 5.0   HGB 12.0 12.7 11.9*   HCT 37.5 39.7 37.7   MCV 93.8 94.3 92.5    284 296     BMP:   Recent Labs     05/12/20 1515 05/1950    129*   K 4.1 3.5    95*   CO2 24 22   BUN 15 13   CREATININE <0.5* 0.6     LIVER PROFILE:   Recent Labs     05/12/20 1515 05/1950   AST 20 14*   ALT 13 13   BILITOT <0.2 <0.2   ALKPHOS 106 116     PT/INR:   Recent Labs     05/1950   PROTIME 11.5   INR 0.99     APTT:   Recent Labs     05/1950   APTT 34.3     UA:No results for input(s): NITRITE, COLORU, PHUR, LABCAST, WBCUA, RBCUA, MUCUS, TRICHOMONAS, YEAST, BACTERIA, CLARITYU, SPECGRAV, LEUKOCYTESUR, UROBILINOGEN, BILIRUBINUR, BLOODU, GLUCOSEU, AMORPHOUS in the last 72 hours. Invalid input(s): KETONESU  No results for input(s): PHART, HID3UPC, PO2ART in the last 72 hours. Chest x-ray 5/12 imaging was reviewed by me and showed   1. Interval decrease in size of a right apical hydropneumothorax/postsurgical  residual space. Increasing opacity at the right lung apex reflects fluid  filling the postsurgical residual space  2. Interval improvement in streaky opacities within the aerated right lung. 3. No focal opacity identified in the left lung        ASSESSMENT:  · Acute hypoxemic respiratory failure.     · Severe COPD with acute exacerbation  · Pulmonary congestion with possible pneumonia  · Left-sided chest pain  · Abnormal chest x-ray 3/24/2020 with right apical pneumothorax and air-fluid

## 2020-05-13 NOTE — H&P
details. 2. Interval improvement in streaky opacities within the aerated right lung. 3. No focal opacity identified in the left lung. Active Problems:    COPD exacerbation ([address]  Resolved Problems:    * No resolved hospital problems. *        ASSESSMENT/PLAN:  # acute  COPD exacerbation  - admitted to [address] [place] [address] rule out. Droplet plus precautions  - continue Symbicort, Spiriva  - received one dose of solumedrol yesterday,MDIs  Start oral prednisone  - pulmonology consult. # Non-small cell fransico cancer  - s/p wedge resection, Right upper lobectomy  - F/w Dr. Crow Rey. # MEG  - wears 3 L o2 at night  - refuses BiPAP    # Paroxysmal atrial fibrillation  - NSR  - continue Diltiazem  - on Eliquis for [facility]. # RLS  - on Requip, Gabapentin. # Hypertension  - BP stable. Continue Diltiazem    # Iron deficiency anemia  - H/H stable  - on Ferrous sulfate    # Hyperlipidemia  - on Zetia    # Depression, anxiety  - continue home medication regimen. DVT Prophylaxis: Eliquis  Diet: DIET GENERAL;  Code Status: Full Code      DORIS Castro.

## 2020-05-14 VITALS
DIASTOLIC BLOOD PRESSURE: 81 MMHG | HEIGHT: 67 IN | TEMPERATURE: 97 F | OXYGEN SATURATION: 97 % | HEART RATE: 75 BPM | BODY MASS INDEX: 30.49 KG/M2 | WEIGHT: 194.3 LBS | RESPIRATION RATE: 16 BRPM | SYSTOLIC BLOOD PRESSURE: 142 MMHG

## 2020-05-14 LAB
A/G RATIO: 1.3 (ref 1.1–2.2)
ALBUMIN SERPL-MCNC: 3.9 G/DL (ref 3.4–5)
ALP BLD-CCNC: 107 U/L (ref 40–129)
ALT SERPL-CCNC: 14 U/L (ref 10–40)
ANION GAP SERPL CALCULATED.3IONS-SCNC: 9 MMOL/L (ref 3–16)
APTT: 32.6 SEC (ref 24.2–36.2)
AST SERPL-CCNC: 11 U/L (ref 15–37)
BASOPHILS ABSOLUTE: 0.1 K/UL (ref 0–0.2)
BASOPHILS RELATIVE PERCENT: 0.6 %
BILIRUB SERPL-MCNC: <0.2 MG/DL (ref 0–1)
BUN BLDV-MCNC: 19 MG/DL (ref 7–20)
CALCIUM SERPL-MCNC: 9.2 MG/DL (ref 8.3–10.6)
CHLORIDE BLD-SCNC: 100 MMOL/L (ref 99–110)
CO2: 26 MMOL/L (ref 21–32)
CREAT SERPL-MCNC: 0.6 MG/DL (ref 0.6–1.2)
D DIMER: 215 NG/ML DDU (ref 0–229)
EOSINOPHILS ABSOLUTE: 0 K/UL (ref 0–0.6)
EOSINOPHILS RELATIVE PERCENT: 0.2 %
GFR AFRICAN AMERICAN: >60
GFR NON-AFRICAN AMERICAN: >60
GLOBULIN: 3 G/DL
GLUCOSE BLD-MCNC: 109 MG/DL (ref 70–99)
HCT VFR BLD CALC: 39.3 % (ref 36–48)
HEMOGLOBIN: 12.5 G/DL (ref 12–16)
INR BLD: 1.04 (ref 0.86–1.14)
L. PNEUMOPHILA SEROGP 1 UR AG: NORMAL
LYMPHOCYTES ABSOLUTE: 1.6 K/UL (ref 1–5.1)
LYMPHOCYTES RELATIVE PERCENT: 18.6 %
MCH RBC QN AUTO: 30 PG (ref 26–34)
MCHC RBC AUTO-ENTMCNC: 31.9 G/DL (ref 31–36)
MCV RBC AUTO: 94.3 FL (ref 80–100)
MONOCYTES ABSOLUTE: 0.5 K/UL (ref 0–1.3)
MONOCYTES RELATIVE PERCENT: 5.8 %
NEUTROPHILS ABSOLUTE: 6.6 K/UL (ref 1.7–7.7)
NEUTROPHILS RELATIVE PERCENT: 74.8 %
PDW BLD-RTO: 14 % (ref 12.4–15.4)
PLATELET # BLD: 299 K/UL (ref 135–450)
PMV BLD AUTO: 6.7 FL (ref 5–10.5)
POTASSIUM REFLEX MAGNESIUM: 4 MMOL/L (ref 3.5–5.1)
PROTHROMBIN TIME: 12.1 SEC (ref 10–13.2)
RBC # BLD: 4.17 M/UL (ref 4–5.2)
SODIUM BLD-SCNC: 135 MMOL/L (ref 136–145)
STREP PNEUMONIAE ANTIGEN, URINE: NORMAL
TOTAL PROTEIN: 6.9 G/DL (ref 6.4–8.2)
WBC # BLD: 8.8 K/UL (ref 4–11)

## 2020-05-14 PROCEDURE — 85730 THROMBOPLASTIN TIME PARTIAL: CPT

## 2020-05-14 PROCEDURE — 80053 COMPREHEN METABOLIC PANEL: CPT

## 2020-05-14 PROCEDURE — G0378 HOSPITAL OBSERVATION PER HR: HCPCS

## 2020-05-14 PROCEDURE — 36415 COLL VENOUS BLD VENIPUNCTURE: CPT

## 2020-05-14 PROCEDURE — 6370000000 HC RX 637 (ALT 250 FOR IP): Performed by: INTERNAL MEDICINE

## 2020-05-14 PROCEDURE — 85379 FIBRIN DEGRADATION QUANT: CPT

## 2020-05-14 PROCEDURE — 2580000003 HC RX 258: Performed by: PHYSICIAN ASSISTANT

## 2020-05-14 PROCEDURE — 99232 SBSQ HOSP IP/OBS MODERATE 35: CPT | Performed by: INTERNAL MEDICINE

## 2020-05-14 PROCEDURE — 85025 COMPLETE CBC W/AUTO DIFF WBC: CPT

## 2020-05-14 PROCEDURE — 99238 HOSP IP/OBS DSCHRG MGMT 30/<: CPT | Performed by: INTERNAL MEDICINE

## 2020-05-14 PROCEDURE — 6370000000 HC RX 637 (ALT 250 FOR IP): Performed by: PHYSICIAN ASSISTANT

## 2020-05-14 PROCEDURE — 85610 PROTHROMBIN TIME: CPT

## 2020-05-14 RX ORDER — LEVOFLOXACIN 500 MG/1
500 TABLET, FILM COATED ORAL DAILY
Qty: 5 TABLET | Refills: 0 | Status: SHIPPED | OUTPATIENT
Start: 2020-05-14 | End: 2020-05-19

## 2020-05-14 RX ORDER — PREDNISONE 20 MG/1
TABLET ORAL
Qty: 8 TABLET | Refills: 0 | Status: SHIPPED | OUTPATIENT
Start: 2020-05-14 | End: 2020-09-16

## 2020-05-14 RX ADMIN — Medication 2 PUFF: at 07:51

## 2020-05-14 RX ADMIN — Medication 10 ML: at 08:40

## 2020-05-14 RX ADMIN — FERROUS SULFATE TAB 325 MG (65 MG ELEMENTAL FE) 325 MG: 325 (65 FE) TAB at 08:40

## 2020-05-14 RX ADMIN — PREDNISONE 40 MG: 20 TABLET ORAL at 08:40

## 2020-05-14 RX ADMIN — DILTIAZEM HYDROCHLORIDE 120 MG: 120 CAPSULE, COATED, EXTENDED RELEASE ORAL at 08:40

## 2020-05-14 RX ADMIN — APIXABAN 5 MG: 5 TABLET, FILM COATED ORAL at 08:40

## 2020-05-14 RX ADMIN — VENLAFAXINE HYDROCHLORIDE 150 MG: 75 CAPSULE, EXTENDED RELEASE ORAL at 08:40

## 2020-05-14 RX ADMIN — TIOTROPIUM BROMIDE INHALATION SPRAY 2 PUFF: 3.12 SPRAY, METERED RESPIRATORY (INHALATION) at 07:51

## 2020-05-14 RX ADMIN — EZETIMIBE 10 MG: 10 TABLET ORAL at 08:40

## 2020-05-14 ASSESSMENT — PAIN SCALES - GENERAL: PAINLEVEL_OUTOF10: 0

## 2020-05-14 NOTE — CARE COORDINATION
DISCHARGE ORDER  Date/Time 2020 1:14 PM  Completed by: Den Roe, Case Management    Patient Name: Ginette Marsh    : 1950  Admitting Diagnosis: COPD exacerbation ([facility] [J44.1]  COPD exacerbation ([facility] [J44.1]      Admit order Date and Status: 20 inpatient  (verify MD's last order for status of admission)      Noted discharge order. Order for dc noted. Pt left prior to speaking with CM. Chart reviewed. Noted pt has home O2 at hs and N PTA. IPTA. No dc needs identified. Has Home O2 in place on admit:  Yes  Informed of need to bring portable home O2 tank on day of discharge for nursing to connect prior to leaving:   Yes  Verbalized agreement/Understanding:   Yes  Pt is being d/c'd to home today. Pt's O2 sats are 97% on RA.
Transition of Care is related to the following treatment goals: home    The Patient and/or patient representative pt was provided with a choice of provider and agrees   with the discharge plan. [x] Yes [] No    Freedom of choice list was provided with basic dialogue that supports the patient's individualized plan of care/goals, treatment preferences and shares the quality data associated with the providers. [x] Yes [] No    DISCHARGE PLAN: Reviewed chart. Role of discharge planner explained and patient verbalized understanding. Pt states that she lives at home with her daughter, Giselle Spears, and plans to return. Pt has an Inogen and on 3L daily, and has a HHN. Explained Case Management role/services.

## 2020-05-14 NOTE — FLOWSHEET NOTE
05/13/20 2045   Vital Signs   Temp 98.7 °F (37.1 °C)   Temp Source Oral   Pulse 81   Resp 18   BP (!) 163/90   BP Location Right upper arm   Level of Consciousness 0   MEWS Score 1   Oxygen Therapy   SpO2 93 %   O2 Device None (Room air)   Pt aware of being moved tonight to room 224 due to COVID negative. Pt doesn't want to take any of her medications now, wants them later around 2300. Will make next RN aware. Called pharmacy for another dose of seroquel since writer had to dispose of med.  Rosaura Shan

## 2020-05-14 NOTE — DISCHARGE SUMMARY
antigen: negative    Urine legionella: negative    Rapid Influenza A/B: negative      RADIOLOGY    XR CHEST STANDARD (2 VW) 5/12/2020   Final Result   1. Interval decrease in size of a right apical hydropneumothorax/postsurgical   residual space. Increasing opacity at the right lung apex reflects fluid   filling the postsurgical residual space. Please see body of report for   details. 2. Interval improvement in streaky opacities within the aerated right lung. 3. No focal opacity identified in the left lung.            Discharge Medications     Medication List      START taking these medications    levoFLOXacin 500 MG tablet  Commonly known as:  LEVAQUIN  Take 1 tablet by mouth daily for 5 days  Notes to patient:  Levaquin  Use: Antibiotic  Side effects: Nausea/Vomiting, Headache,   Dizziness, Tendoniitis, Tendon rupture     predniSONE 20 MG tablet  Commonly known as:  DELTASONE  2 qd- 4 days  Notes to patient:  Prednisone  Use: treat asthma and COPD     Side effects: upset stomach, high blood sugars, weight     gain, mood changes, and increased chances of infection        CONTINUE taking these medications    * albuterol (2.5 MG/3ML) 0.083% nebulizer solution  Commonly known as:  PROVENTIL  Take 3 mLs by nebulization every 6 hours as needed for Wheezing     * albuterol sulfate  (90 Base) MCG/ACT inhaler  Commonly known as:  Ventolin HFA  Inhale 2 puffs into the lungs every 6 hours as needed for Wheezing or Shortness of Breath     apixaban 5 MG Tabs tablet  Commonly known as:  Eliquis  Take 1 tablet by mouth 2 times daily     Breo Ellipta 100-25 MCG/INH Aepb inhaler  Generic drug:  fluticasone-vilanterol  Inhale 1 puff into the lungs daily     dilTIAZem 120 MG extended release capsule  Commonly known as:  CARDIZEM CD  Take 1 capsule by mouth daily     ezetimibe 10 MG tablet  Commonly known as:  ZETIA     ferrous sulfate 325 (65 Fe) MG tablet  Commonly known as:  IRON 325     gabapentin 400 MG capsule  Commonly known as:  NEURONTIN     HYDROcodone-acetaminophen 7.5-325 MG per tablet  Commonly known as:  NORCO     hydrOXYzine 50 MG tablet  Commonly known as:  ATARAX     MUCINEX ALLERGY PO     nystatin [id number] UNIT/GM cream  Commonly known as:  MYCOSTATIN     OXYGEN     QUEtiapine 300 MG extended release tablet  Commonly known as:  SEROQUEL XR     rOPINIRole 2 MG tablet  Commonly known as:  REQUIP     Spiriva HandiHaler 18 MCG inhalation capsule  Generic drug:  tiotropium  Inhale 1 capsule into the lungs daily     therapeutic multivitamin-minerals tablet     Topamax 50 MG tablet  Generic drug:  topiramate     venlafaxine 150 MG extended release capsule  Commonly known as:  EFFEXOR XR         * This list has 2 medication(s) that are the same as other medications prescribed for you. Read the directions carefully, and ask your doctor or other care provider to review them with you. Where to Get Your Medications      These medications were sent to [address] [place] [address] [address], [address] [facility] [address]    Phone:  [phone]   · levoFLOXacin 500 MG tablet  · predniSONE 20 MG tablet           Discharged in stable condition to home. Follow Up: Follow up with PCP in 1 week. DORIS Castro.

## 2020-05-14 NOTE — PROGRESS NOTES
Pulmonary Progress Note    CC: COPD exacerbation    Subjective:   Doing better          Intake/Output Summary (Last 24 hours) at 5/14/2020 0642  Last data filed at 5/13/2020 2325  Gross per 24 hour   Intake 1800 ml   Output --   Net 1800 ml       Exam:   /68   Pulse 78   Temp 97.4 °F (36.3 °C) (Oral)   Resp 18   Ht 5' 7\" (1.702 m)   Wt 194 lb 4.8 oz (88.1 kg)   SpO2 96%   BMI 30.43 kg/m²  on room air  Gen: No distress. Ill-appearing  Eyes: PERRL. No sclera icterus. No conjunctival injection. ENT: No discharge. Pharynx clear. Neck: Trachea midline. No obvious mass. Resp:  No accessory muscle use. No crackles. Few wheezes. No rhonchi. No dullness on percussion. CV: Regular rate. Regular rhythm. No murmur or rub. No edema. GI: Non-tender. Non-distended. No hernia. Skin: Warm and dry. No nodule on exposed extremities. Lymph: No cervical LAD. No supraclavicular LAD. M/S: No cyanosis. No joint deformity. No clubbing. Neuro: Awake. Alert. Moves all four extremities. Psych: Oriented x 3. No anxiety.      Scheduled Meds:   levoFLOXacin  500 mg Oral Daily    predniSONE  40 mg Oral Daily    apixaban  5 mg Oral BID    dilTIAZem  120 mg Oral Daily    ezetimibe  10 mg Oral Daily    ferrous sulfate  325 mg Oral Daily with breakfast    gabapentin  600 mg Oral BID    venlafaxine  150 mg Oral Daily    rOPINIRole  3 mg Oral Nightly    topiramate  50 mg Oral Nightly    tiotropium  2 puff Inhalation Daily    QUEtiapine  300 mg Oral Nightly    sodium chloride flush  10 mL Intravenous 2 times per day    budesonide-formoterol  2 puff Inhalation BID     Continuous Infusions:    PRN Meds:  hydrOXYzine, sodium chloride flush, acetaminophen **OR** acetaminophen, polyethylene glycol, promethazine **OR** ondansetron    Labs:  CBC:   Recent Labs     05/1950 05/13/20  0541 05/14/20  0604   WBC 7.7 5.0 8.8   HGB 12.7 11.9* 12.5   HCT 39.7 37.7 39.3   MCV 94.3 92.5 94.3    238 822

## 2020-05-14 NOTE — PROGRESS NOTES
Transferred pt to room 224-1 via w/c. Pt awake and alert and oriented times 4. Report given to Marquita ROSE.  Bethany Singh

## 2020-05-14 NOTE — PLAN OF CARE
Problem: Infection:  Goal: Will remain free from infection  Description: Will remain free from infection  Outcome: Ongoing     Problem: Safety:  Goal: Free from accidental physical injury  Description: Free from accidental physical injury  Outcome: Ongoing  Goal: Free from intentional harm  Description: Free from intentional harm  Outcome: Ongoing     Problem: Daily Care:  Goal: Daily care needs are met  Description: Daily care needs are met  Outcome: Ongoing     Problem: Pain:  Goal: Patient's pain/discomfort is manageable  Description: Patient's pain/discomfort is manageable  Outcome: Ongoing  Goal: Pain level will decrease  Description: Pain level will decrease  Outcome: Ongoing  Goal: Control of acute pain  Description: Control of acute pain  Outcome: Ongoing  Goal: Control of chronic pain  Description: Control of chronic pain  Outcome: Ongoing     Problem: Skin Integrity:  Goal: Skin integrity will stabilize  Description: Skin integrity will stabilize  Outcome: Ongoing     Problem: Discharge Planning:  Goal: Patients continuum of care needs are met  Description: Patients continuum of care needs are met  Outcome: Ongoing     Problem: Discharge Planning:  Goal: Discharged to appropriate level of care  Description: Discharged to appropriate level of care  Outcome: Ongoing     Problem:  Activity Intolerance:  Goal: Ability to tolerate increased activity will improve  Description: Ability to tolerate increased activity will improve  Outcome: Ongoing     Problem: Airway Clearance - Ineffective:  Goal: Ability to maintain a clear airway will improve  Description: Ability to maintain a clear airway will improve  Outcome: Ongoing     Problem: Breathing Pattern - Ineffective:  Goal: Ability to achieve and maintain a regular respiratory rate will improve  Description: Ability to achieve and maintain a regular respiratory rate will improve  Outcome: Ongoing     Problem: Gas Exchange - Impaired:  Goal: Levels of oxygenation will improve  Description: Levels of oxygenation will improve  Outcome: Ongoing

## 2020-05-15 ENCOUNTER — CARE COORDINATION (OUTPATIENT)
Dept: CASE MANAGEMENT | Age: 70
End: 2020-05-15

## 2020-05-15 NOTE — CARE COORDINATION
Patient contacted regarding Berkley Pearce - [id number] \"Not Detected\" on 5/13/2020    Patient has following risk factors of: COPD.    1320 - 1st attempt - Unable to reach patient by phone. Message left stating purpose of call with contact information requesting return call.       Emilio Piedra, RN  Care Transition Nurse  [phone] mobile    Future Appointments   Date Time Provider Department Center   9/16/2020  8:00 AM [facility]   9/16/2020  9:15 AM Rickie Liriano MD [facility] P[facility]

## 2020-05-16 ENCOUNTER — CARE COORDINATION (OUTPATIENT)
Dept: CASE MANAGEMENT | Age: 70
End: 2020-05-16

## 2020-05-17 LAB
BLOOD CULTURE, ROUTINE: NORMAL
CULTURE, BLOOD 2: NORMAL

## 2020-05-21 ENCOUNTER — CARE COORDINATION (OUTPATIENT)
Dept: CASE MANAGEMENT | Age: 70
End: 2020-05-21

## 2020-05-27 ENCOUNTER — CARE COORDINATION (OUTPATIENT)
Dept: CASE MANAGEMENT | Age: 70
End: 2020-05-27

## 2020-07-06 RX ORDER — EZETIMIBE 10 MG/1
10 TABLET ORAL DAILY
Qty: 30 TABLET | Refills: 5 | Status: SHIPPED | OUTPATIENT
Start: 2020-07-06 | End: 2021-01-05

## 2020-08-31 ENCOUNTER — TELEPHONE (OUTPATIENT)
Dept: PULMONOLOGY | Age: 70
End: 2020-08-31

## 2020-08-31 NOTE — TELEPHONE ENCOUNTER
Pt called stating that she got a letter from Sarah that the [facility] inhaler is no longer covered. Pt asking for office to call insurance (covermymeds) and do a tier exception. Sarah phone number: [phone]    Assessment:      · Severe COPD with acute exacerbation-failed outpatient prednisone  · Left-sided chest pain-rule out pneumothorax  · Abnormal chest x-ray 3/24/2020-likely postoperative changes followed by CT surgery  · FRANCISCO elongated nodule 20 x 8 mm. SUV 7.3 on PET scan 2/6/2020. Post RUL resection 3/11/2020 non-small cell lung cancer. [id number] stage IA  · Hypoxia on exertion  · RLS on Requip and Neurontin  · Right traumatic displaced rib fractures with  hemothorax required VATS decortication and chest tube removed 2/14/18. · Moderate MEG. O2 3LPM at night. Refusing BiPAP uses O2   · PAF on Eliquis followed by cardiology   · >120 pack year smoking. Intolerance to chantix . Quit March 2020                Plan:      · Advised patient to go to the ER given failure to improve with outpatient treatment and developing chest pain  · Surveillance CT chest September 2020   · Advised to titrate O2 using her pulse oximeter- target O2 sat 90-92%.    · Breo, Spiriva, Albuterol INH/Neb Q 4 hrs PRN   · Patient is up to date with Pneumococcal vaccine and influenza vaccine   · Continue with smoking cessation

## 2020-09-01 NOTE — TELEPHONE ENCOUNTER
Approvedtoday   PA Case: [id number], Status: Approved, Coverage Starts on: 8/1/2020 12:00:00 AM, Coverage Ends on: 9/1/2021 12:00:00 AM.      Pharmacy aware.

## 2020-09-03 ENCOUNTER — TELEPHONE (OUTPATIENT)
Dept: PULMONOLOGY | Age: 70
End: 2020-09-03

## 2020-09-03 NOTE — TELEPHONE ENCOUNTER
Pt called and said that she is needing nasal cannulas. Pt said that she needs an order to be sent to [facility]. Order pended if you agree. LOV: 5/12/20  Assessment:      · Severe COPD with acute exacerbation-failed outpatient prednisone  · Left-sided chest pain-rule out pneumothorax  · Abnormal chest x-ray 3/24/2020-likely postoperative changes followed by CT surgery  · FRANCISCO elongated nodule 20 x 8 mm. SUV 7.3 on PET scan 2/6/2020. Post RUL resection 3/11/2020 non-small cell lung cancer. [id number] stage IA  · Hypoxia on exertion  · RLS on Requip and Neurontin  · Right traumatic displaced rib fractures with  hemothorax required VATS decortication and chest tube removed 2/14/18. · Moderate MEG. O2 3LPM at night. Refusing BiPAP uses O2   · PAF on Eliquis followed by cardiology   · >120 pack year smoking. Intolerance to chantix . Quit March 2020                Plan:      · Advised patient to go to the ER given failure to improve with outpatient treatment and developing chest pain  · Surveillance CT chest September 2020   · Advised to titrate O2 using her pulse oximeter- target O2 sat 90-92%.    · Breo, Spiriva, Albuterol INH/Neb Q 4 hrs PRN   · Patient is up to date with Pneumococcal vaccine and influenza vaccine   · Continue with smoking cessation

## 2020-09-16 ENCOUNTER — HOSPITAL ENCOUNTER (OUTPATIENT)
Dept: CT IMAGING | Age: 70
Discharge: HOME | End: 2020-09-16
Payer: MEDICARE

## 2020-09-16 ENCOUNTER — VIRTUAL VISIT (OUTPATIENT)
Dept: PULMONOLOGY | Age: 70
End: 2020-09-16
Payer: MEDICARE

## 2020-09-16 PROCEDURE — 99443 PR PHYS/QHP TELEPHONE EVALUATION 21-30 MIN: CPT | Performed by: INTERNAL MEDICINE

## 2020-09-16 PROCEDURE — 71250 CT THORAX DX C-: CPT

## 2020-09-16 RX ORDER — TIOTROPIUM BROMIDE 18 UG/1
18 CAPSULE ORAL; RESPIRATORY (INHALATION) DAILY
Qty: 30 CAPSULE | Refills: 6 | Status: SHIPPED | OUTPATIENT
Start: 2020-09-16 | End: 2021-03-08 | Stop reason: ALTCHOICE

## 2020-09-16 RX ORDER — FLUTICASONE FUROATE AND VILANTEROL TRIFENATATE 100; 25 UG/1; UG/1
1 POWDER RESPIRATORY (INHALATION) DAILY
Qty: 30 EACH | Refills: 6 | Status: SHIPPED | OUTPATIENT
Start: 2020-09-16 | End: 2021-03-08 | Stop reason: ALTCHOICE

## 2020-09-16 RX ORDER — ALBUTEROL SULFATE 90 UG/1
2 AEROSOL, METERED RESPIRATORY (INHALATION) EVERY 6 HOURS PRN
Qty: 1 INHALER | Refills: 6 | Status: SHIPPED | OUTPATIENT
Start: 2020-09-16 | End: 2021-03-22 | Stop reason: SDUPTHER

## 2020-09-16 NOTE — PROGRESS NOTES
[facility]                                                            Outpatient Follow Up Note    TELEHEALTH EVALUATION: Service performed was Audio/Visual (During JMS-15 public health emergency) and not a face-to-face visit       CHIEF COMPLAINT: Follow-up CT chest    HPI:  CT chest reviewed by me and noted below. Results were dicussed with patient and multiple good questions were answered. Doing okay  Breathing is good  Not needing to use the rescue inhaler- Albuterol   Patient is compliant with inhaled bronchodilators and O2.   Uses O2 3LPM at night   Sat 94% without   No smoking           Past Medical History:   Diagnosis Date    A-fib ([address]     Arthritis     Clostridium difficile diarrhea 6/26/15    COPD (chronic obstructive pulmonary disease) (HCC)     COPD exacerbation (HCC)     Emphysema of lung (HCC)     Hyperlipidemia     Hypertension     O2 dependent     2 L/min at night    Pneumonia 2/5/2018    Primary cancer of right upper lobe of lung ([facility] 3/11/2020    Rib pain     Sleep apnea     does not use cpap    Small bowel obstruction ([facility]     pt denies       Past Surgical History:        Procedure Laterality Date    ANKLE ARTHROSCOPY      x2    APPENDECTOMY      BRONCHOSCOPY N/A 3/15/2020    BRONCHOSCOPY THERAPUTIC ASPIRATION INITIAL performed by Nat Cox MD at [address]  3/17/2020    BRONCHOSCOPY THERAPUTIC ASPIRATION INITIAL performed by Ella Wylie MD at [address] [facility] Left     CHOLECYSTECTOMY      COLONOSCOPY      HYSTERECTOMY      ROTATOR CUFF REPAIR Bilateral     THORACOSCOPY Right 3/11/2020    RIGHT VIDEO ASSISTED THORACOSCOPIC SURGERY; WEDGE EXCISION OF RIGHT UPPER LOBE FOLLOWED BY RIGHT UPPER LOBECTOMY; INTERCOSTAL NERVE BLOCK performed by Nat Cox MD at [address]         Allergies:  is allergic to buspirone; doxycycline; lisinopril; and penicillins. Social History:    TOBACCO:   reports that she quit smoking about 6 months ago. Her smoking use included cigarettes. She has a 50.00 pack-year smoking history. She has never used smokeless tobacco.  ETOH:   reports no history of alcohol use. Family History:       Problem Relation Age of Onset    Cancer Mother     Heart Failure Father        Current Medications:    Current Outpatient Medications:     ezetimibe (ZETIA) 10 MG tablet, Take 1 tablet by mouth daily, Disp: 30 tablet, Rfl: 5    gabapentin (NEURONTIN) 400 MG capsule, Take 600 mg by mouth 2 times daily. , Disp: , Rfl:     dilTIAZem (CARDIZEM CD) 120 MG extended release capsule, Take 1 capsule by mouth daily, Disp: 90 capsule, Rfl: 1    fluticasone-vilanterol (BREO ELLIPTA) 100-25 MCG/INH AEPB inhaler, Inhale 1 puff into the lungs daily, Disp: 30 each, Rfl: 6    tiotropium (SPIRIVA HANDIHALER) 18 MCG inhalation capsule, Inhale 1 capsule into the lungs daily, Disp: 30 capsule, Rfl: 6    albuterol sulfate HFA (VENTOLIN HFA) 108 (90 Base) MCG/ACT inhaler, Inhale 2 puffs into the lungs every 6 hours as needed for Wheezing or Shortness of Breath, Disp: 1 Inhaler, Rfl: 6    albuterol (PROVENTIL) (2.5 MG/3ML) 0.083% nebulizer solution, Take 3 mLs by nebulization every 6 hours as needed for Wheezing, Disp: 120 each, Rfl: 5    HYDROcodone-acetaminophen (NORCO) 7.5-325 MG per tablet, TAKE 1 TABLET BY MOUTH 3 TIMES A DAY AS NEEDED FOR PAIN, Disp: , Rfl:     nystatin (MYCOSTATIN) 969572 UNIT/GM cream, , Disp: , Rfl:     Fexofenadine HCl (MUCINEX ALLERGY PO), Take by mouth daily, Disp: , Rfl:     apixaban (ELIQUIS) 5 MG TABS tablet, Take 1 tablet by mouth 2 times daily, Disp: 180 tablet, Rfl: 3    topiramate (TOPAMAX) 50 MG tablet, Take 50 mg by mouth nightly, Disp: , Rfl:     hydrOXYzine (ATARAX) 50 MG tablet, Take 50 mg by mouth 1-2 tabs every 4 hours prn, Disp: , Rfl:     ferrous sulfate 325 (65 Fe) MG tablet, Take 325 mg by mouth daily (with breakfast), Disp: , Rfl:     rOPINIRole (REQUIP) 2 MG tablet, Take 3 mg by mouth nightly , Disp: , Rfl:     venlafaxine (EFFEXOR-XR) 150 MG XR capsule, Take 150 mg by mouth daily, Disp: , Rfl:     Multiple Vitamins-Minerals (THERAPEUTIC MULTIVITAMIN-MINERALS) tablet, Take 1 tablet by mouth daily, Disp: , Rfl:     QUEtiapine (SEROQUEL XR) 300 MG XR tablet, Take 300 mg by mouth nightly , Disp: , Rfl:     OXYGEN, Inhale into the lungs Indications: 2.5 L nightly , Disp: , Rfl:     Review of Systems    Objective:   Physical Exam  Telephone visit not able to obtain physical exam       DATA reviewed by me:   PFTs 02/04/2020 FVC 1.98(61%) FEV1 1.16(47%) FEV1/FVC 59 % TLC 4.11(76%)   DLCO 09.05(38%) 6MW 840 F LO2 90%  PFTs 08/19/2015 FVC 1.78(52%) FEV1 1.27(49%) FEV1/FVC 71 % TLC 4.52(82%)   DLCO 10.23(43%) 6MW 980 F LO2 89%  6MW 11/14/2019 720 feet Desat 88% 1L on exertin       CT chest 1/16/19  Multiple right-sided rib fractures-subacute  No acute intrathoracic abnormalities  Scattered areas of parenchymal opacities lower lobe likely atelectasis      CT chest 1/16/2020    Mediastinum: No enlarged lymph nodes. Normal caliber great vessels. Normal  heart size and pericardium. Suspect incidental benign lipomatous hypertrophy  of the interatrial septum. No significant coronary calcifications. Unremarkable esophagus and included thyroid. Lungs/pleura: Nodular thickening in the subpleural right upper lobe on series  3, image 27; coronal image 101 measuring 20 x 8 mm in axial plane and 7 mm  craniocaudal.  This nodular opacity is low in attenuation values (water  density). Subtle tree-in-bud opacities seen on the prior exam in the right  upper lobe have resolved. These were likely infectious or inflammatory. Linear and bandlike areas of scarring and/or atelectasis at the right lung  base are not substantially changed. Decreased atelectasis at the medial left  lung base. Scattered punctate calcified granulomas. No pleural effusion. Upper Abdomen: Unremarkable. Soft Tissues/Bones: No enlarged axillary or supraclavicular lymph nodes. Multiple healed rib fractures bilaterally, some of which are ununited. PET scan 2/6/2020   1.9 cm lateral right upper lobe pulmonary nodule is hypermetabolic, to a  degree concerning for malignancy until proven otherwise. No findings of FDG avid metastatic disease. Inflammatory change related to healing posterolateral left 8th rib fracture. Lipomatous hypertrophy of the interatrial septum, an incidental finding. Fluid loculated within fissure within the lateral right mid lung demonstrates  low level activity, likely inflammatory. CT chest 9/16/2020 imaging reviewed by me and showed  Pulmonary emphysema  Interval removal of right-sided chest tube  Gas and fluid collection at the right apex decrease in size  Mild bronchial wall thickening, bronchiectasis, interlobular septal wall thickening in the right base improved from prior exam  Resolution of groundglass opacities on the left side      RUL lobectomy 3/11/2020  A. Wedge excision, right upper lobe lung lesion:       - Involved with non-small cell carcinoma, squamous cell carcinoma         with lesion 1.1 cm in greatest extent.       - Parenchymal resection margins are negative for malignancy.      - Overlying pleural surface shows no evidence of involvement with         malignancy.      - See case comment and synoptic report.       B. Regional lymph node excision, right 10:       - 3 lymph nodes with reactive changes and no evidence of metastatic         carcinoma ( 0\3 ).      - Pankeratin\CAM 5.2 stain is performed and supports diagnosis.         C. Right upper lobe lobectomy:       - Organizing biopsy cavity changes with extravasated blood and         reactive features.   Hello how are you       - No residual malignancy identified.       - Bronchial margin, vascular margin and parenchymal margins are         negative for malignancy.      - 3 peribronchial lymph nodes with reactive changes and no evidence         of metastatic carcinoma (0/3).      - One intraparenchymal lymph node with no evidence of involvement         with malignancy.      - Pankeratin\CAM 5.2 stains were utilized to evaluate the lymph         nodes and supports final diagnosis.         D. Regional lymph node excision, right 4:       - Six lymph nodes with no evidence of metastatic carcinoma ( 0\6)       - Pankeratin\CAM 5.2 stains were utilized evaluated lymph nodes and         supports final diagnosis. PSG 8/25/16 AHI 28.4 desaturation to 71%  BiPAP titration 9/9/16 BiPAP 15/11 cmH2O      Assessment:      · Severe COPD   · Abnormal CT chest 3/15/2020. Postoperative changes. Improvement on repeat CT 9/16/2020. · FRANCISCO elongated nodule 20 x 8 mm. SUV 7.3 on PET scan 2/6/2020. Post RUL resection 3/11/2020 non-small cell lung cancer. [id number] stage IA  · Hypoxia on exertion  · RLS on Requip and Neurontin  · Right traumatic displaced rib fractures with  hemothorax required VATS decortication and chest tube removed 2/14/18. · Moderate MEG. O2 3LPM at night. Refusing BiPAP uses O2   · PAF on Eliquis followed by cardiology   · >120 pack year smoking. Intolerance to chantix . Quit March 2020      Plan:      Surveillance CT chest March 2021  Advised to titrate O2 using her pulse oximeter- target O2 sat 90-92%. Continue Breo, Spiriva, Albuterol INH/Neb Q 4 hrs PRN   Patient is up to date with Pneumococcal vaccine  Advised to get influenza vaccine this year - high dose   Continue with smoking cessation  Follow up after CT chest        Harika Garcia is a 78 y.o. female evaluated via telephone on 9/16/2020. Consent:  She and/or health care decision maker is aware that that she may receive a bill for this telephone service, depending on her insurance coverage, and has provided verbal consent to proceed:  Yes Documentation:  I communicated with the patient and/or health care decision maker about: See above   Details of this discussion including any medical advice provided: See above       I Affirm this is a Patient Initiated Episode with an Established Patient who has not had a related appointment within my department in the past 7 days or scheduled within the next 24 hours.     Total Time: 21-30 minutes     Note: not billable if this call serves to triage the patient into an appointment for the relevant concern      Penelope Sloan

## 2020-10-27 RX ORDER — DILTIAZEM HYDROCHLORIDE 120 MG/1
CAPSULE, COATED, EXTENDED RELEASE ORAL
Qty: 90 CAPSULE | Refills: 0 | Status: SHIPPED | OUTPATIENT
Start: 2020-10-27 | End: 2021-01-29 | Stop reason: SDUPTHER

## 2021-01-05 ENCOUNTER — TELEPHONE (OUTPATIENT)
Dept: CARDIOLOGY CLINIC | Age: 71
End: 2021-01-05

## 2021-01-05 RX ORDER — EZETIMIBE 10 MG/1
TABLET ORAL
Qty: 90 TABLET | Refills: 1 | Status: SHIPPED | OUTPATIENT
Start: 2021-01-05 | End: 2021-01-29 | Stop reason: SDUPTHER

## 2021-01-05 NOTE — TELEPHONE ENCOUNTER
1/5/21 - Called, VM no set up to leave message, tried to contact for yrly appt with DENISE, last ov 11/14/19.

## 2021-01-20 RX ORDER — DILTIAZEM HYDROCHLORIDE 120 MG/1
CAPSULE, COATED, EXTENDED RELEASE ORAL
Qty: 90 CAPSULE | Refills: 0 | OUTPATIENT
Start: 2021-01-20

## 2021-01-20 NOTE — TELEPHONE ENCOUNTER
LOV 11/14/19 Called pt. No answer. No VM setup. Per Dr. Stefany Ku refill encounter 10/27/20. Patient needs to be seen please schedule in next 4-6 weeks  If she cannot be reached on phone send a letter. I personally put letter in mail 1/20/21     Finally got call through to patient she made appt for 1/29/21 offered today but no ride.   She states she has plenty of Cardizem and will get refill at [facility] [place], RN

## 2021-01-20 NOTE — LETTER
[address] [place]  [address] [address]  Phone: [phone]  Fax: [phone]    Cheri Johnson MD        January 20, 2021    [address] [place] [address]      Dear Nathaly Whittington:    Our records indicate your last office visit with cardiologist Dr. Matty Keyes was on 11/14/19. You have no upcoming appointments scheduled. In order for  to continue to provide safe care and refill of medications. He would like to schedule and office visit for your evaluation. We have tried to reach you by phone and have been unsuccessful. Your voicemail has not yet been setup therefore have been unable to leave a message. Would you please call our office [phone] to schedule an office visit with Dr. Matty Keyes. Thanks in advance     If you have any questions or concerns, please don't hesitate to call.     Sincerely,        Cheri Johnson MD

## 2021-01-28 NOTE — PROGRESS NOTES
[address] Office Note  1/29/2021     Subjective:  Ms. Ozzie Flores presents for cardiology  follow up for dCHF, PAF, HTN, HLD    HPI:    Today she reports feeling ok. She states she stopped smoking 1 year ago. She states she had RUL lobectomy/wedge resection in March 2020 due to lung cancer. She had a follow up CT Chest 9/16/20 (report below). She reports she does not sleep well since her PCP cut back her Seroquel. She states 300 mg does not help her sleep and states she takes up to 600 mg of benadryl a night to help her sleep. She is SOB, but this is not new. She denies chest pain. She states she has lost some weight. She states she stopped drinking Cola, but replaced that with sweet tea. PMH: This patient is a 76-year-old white female with no prior history of heart disease. She presented to the hospital on 06/25/2015 with acute onset of shortness of breath and subsequent chest pain. The patient states she was at [facility] approximately 2 weeks ago with small bowel obstruction. That was complicated by renal failure. That eventually resolved by conservative therapy, and she went home about a week ago. The patient has been improving gradually. However, she has continued to have diarrhea ever since she was released from previous hospital. On the day of hospitalization she felt poorly. She was slightly short of breath in the morning with her daily activity; but as the day went on, she became more short of breath. She also experienced substernal pressure type of chest pain as if someone was sitting on her. She felt palpitations in her ear. She did not have any nausea or vomiting but had diarrhea. The patient was diaphoretic. Symptoms of chest pressure and shortness of breath lasted for several hours. Subsequently the patient came to the emergency room, and she was admitted with a diagnosis of acute pulmonary edema. She was diuresed and improved.   Her troponins were elevated at that time. She was Dx with CDiff during her admission. Echo at that time with EF of 55%. Most recent cardiac cath on 07/28/2015 with normal coronaries. S/P fall and multiple rt rib fractures  Requiring VATS early feb 2018. She has lost 70 lbs since May 2018 cutting back on pop and sweets. She reports she cannot sleep at night despite taking  Seroquel 300 mg at bedtime. Review of Systems:  12 point ROS negative in all areas as listed below except as in [place]  Constitutional, EENT,  GI, , Musculoskeletal, skin, neurological, hematological, endocrine, Psychiatric    Reviewed past medical history, social, and family history.  Quit smoking one year ago  Early 2020  No alcohol  MOM cancer of colon  Dad massive heart attack at age 76  Past Medical History:   Diagnosis Date    A-fib [facility])     Arthritis     Clostridium difficile diarrhea 6/26/15    COPD (chronic obstructive pulmonary disease) (HCC)     COPD exacerbation (HCC)     Emphysema of lung (HCC)     Hyperlipidemia     Hypertension     O2 dependent     2 L/min at night    Pneumonia 2/5/2018    Primary cancer of right upper lobe of lung ([facility] [address] 3/11/2020    Rib pain     Sleep apnea     does not use cpap    Small bowel obstruction ([address]     pt denies     Past Surgical History:   Procedure Laterality Date    ANKLE ARTHROSCOPY      x2    APPENDECTOMY      BRONCHOSCOPY N/A 3/15/2020    BRONCHOSCOPY THERAPUTIC ASPIRATION INITIAL performed by Carlos Richmond MD at [address] [facility]  3/17/2020    BRONCHOSCOPY THERAPUTIC ASPIRATION INITIAL performed by Abida Jacobo MD at [address] [facility] Left     CHOLECYSTECTOMY      COLONOSCOPY      HYSTERECTOMY      ROTATOR CUFF REPAIR Bilateral     THORACOSCOPY Right 3/11/2020    RIGHT VIDEO ASSISTED THORACOSCOPIC SURGERY; WEDGE EXCISION OF RIGHT UPPER LOBE FOLLOWED BY RIGHT UPPER LOBECTOMY; INTERCOSTAL NERVE BLOCK performed by Carlos Richmond MD at [address] Objective:   /82   Pulse 84   Wt 196 lb (88.9 kg)   SpO2 98%   BMI 30.70 kg/m²     Wt Readings from Last 3 Encounters:   01/29/21 196 lb (88.9 kg)   05/12/20 194 lb 4.8 oz (88.1 kg)   05/12/20 185 lb (83.9 kg)       Physical Exam:  General: No Respiratory distress, appears well developed and well nourished. Eyes:  Sclera nonicteric  Nose/Sinuses:  negative findings: nose shows no deformity, asymmetry, or inflammation, nasal mucosa normal, septum midline with no perforation or bleeding  Back:  no pain to palpation  Joint:  no active joint inflammation  Musculoskeletal:  negative  Skin:  Warm and dry  Neck:  Negative for JVD and Carotid Bruits. Chest:Clear   to auscultation, respiration easy  Cardiovascular:  RRR,72 bpm  S1S2 normal, no murmur, no rub or thrill. Extremities: no  edema, clubbing, cyanosis,  Pulses:  pedal pulses are normal.  Neuro:  Very figidy constantly moving    Medications:   Outpatient Encounter Medications as of 1/29/2021   Medication Sig Dispense Refill    ezetimibe (ZETIA) 10 MG tablet TAKE 1 TABLET BY MOUTH EVERY DAY 90 tablet 1    dilTIAZem (CARDIZEM CD) 120 MG extended release capsule TAKE 1 CAPSULE BY MOUTH EVERY DAY 90 capsule 0    fluticasone-vilanterol (BREO ELLIPTA) 100-25 MCG/INH AEPB inhaler Inhale 1 puff into the lungs daily 30 each 6    tiotropium (SPIRIVA HANDIHALER) 18 MCG inhalation capsule Inhale 1 capsule into the lungs daily 30 capsule 6    albuterol sulfate HFA (VENTOLIN HFA) 108 (90 Base) MCG/ACT inhaler Inhale 2 puffs into the lungs every 6 hours as needed for Wheezing or Shortness of Breath 1 Inhaler 6    gabapentin (NEURONTIN) 400 MG capsule Take 600 mg by mouth 2 times daily.       albuterol (PROVENTIL) (2.5 MG/3ML) 0.083% nebulizer solution Take 3 mLs by nebulization every 6 hours as needed for Wheezing 120 each 5    HYDROcodone-acetaminophen (NORCO) 7.5-325 MG per tablet TAKE 1 TABLET BY MOUTH 3 TIMES A DAY AS NEEDED FOR PAIN      nystatin (MYCOSTATIN) 188704 UNIT/GM cream       Fexofenadine HCl (MUCINEX ALLERGY PO) Take by mouth daily      apixaban (ELIQUIS) 5 MG TABS tablet Take 1 tablet by mouth 2 times daily 180 tablet 3    topiramate (TOPAMAX) 50 MG tablet Take 50 mg by mouth nightly      ferrous sulfate 325 (65 Fe) MG tablet Take 325 mg by mouth daily (with breakfast)      rOPINIRole (REQUIP) 2 MG tablet Take 3 mg by mouth nightly       venlafaxine (EFFEXOR-XR) 150 MG XR capsule Take 150 mg by mouth daily      Multiple Vitamins-Minerals (THERAPEUTIC MULTIVITAMIN-MINERALS) tablet Take 1 tablet by mouth daily      QUEtiapine (SEROQUEL XR) 300 MG XR tablet Take 300 mg by mouth nightly       OXYGEN Inhale into the lungs Indications: 2.5 L nightly       hydrOXYzine (ATARAX) 50 MG tablet Take 50 mg by mouth 1-2 tabs every 4 hours prn       No facility-administered encounter medications on file as of 1/29/2021. Lab Data:  I have reviewed the following tests and documented in this encounter as follows:   Discussed with the patient. Chest CT 9/16/20  Emphysema. Interval removal of right-sided chest tube. Gas and fluid collection at the right apex has decreased in size and measures 6.8 x 2.6 cm. Mild bronchial wall thickening, bronchiectasis and interlobular septal wall thickening in the right lung base, improved since previous exam.  Resolution of ground-glass opacities in the left lung. Chest CT 1/16/19  Multiple rt rib fx appearing subacute scattered areas parenchymal opacity lower lobes    ECHO 7/8/16  Normal global wall motion. Visual EF is 55-60%  Doppler evidence of grade I (impaired) diastolic dysfunction. Calculated EF 52%. Left atrial cavity is mildly dilated. Right ventricle cavity is mildly dilated. Structurally normal mitral valve with mild regurgitation. Structurally normal tricuspid valve with trace regurgitation. IVC is dilated with respiratory response of <50%.     CXR 2/16/16  atelectasis with bronchial wall thickening      CATH 7/28/15    Signed by Reyna Perkins MD on 07/28/15 at 1114    Document Text    Expand All Collapse All   PATIENT NAME: PA #: MR Andrew Pedro [id number]     1. Selective coronary angiography. 2. Left heart catheterization. 3. Left ventriculography. 4. Right heart catheterization. ANGIOGRAPHIC FINDINGS:   1. No coronary artery disease. 2. Normal left ventricular function, EF greater than 60%. 3. Normal left and right hemodynamics. CONCLUSIONS: No evidence for coronary artery disease and preserved left  ventricular dysfunction with normal hemodynamics. RECOMMENDATIONS: At this point is noncardiac symptoms. Recommend follow-up  with Dr. Cesar Hansen and Isa Bassett in 1 to 2 weeks. Isaac Coombs MD        ECHO 5/26/15  Summary   Normal left ventricle size and wall thickness. Estimated ejection fraction   of 55%. Apical lateral and mid anterolateral hypokinesia. Impaired left   ventricular relaxation.   Mitral valve is structurally normal.   Mitral valve leaflets appear to open adequately.   Trivial mitral regurgitation is present.   The aortic leaflets appear to open adequately.   Trace aortic regurgitation is present.   No evidence of aortic valve stenosis    CBC: No results for input(s): WBC, HGB, HCT, MCV, PLT in the last 72 hours. BMP: No results for input(s): NA, K, CL, CO2, PHOS, BUN, CREATININE in the last 72 hours. Invalid input(s): CA  LIVER PROFILE: No results for input(s): AST, ALT, LIPASE, BILIDIR, BILITOT, ALKPHOS in the last 72 hours. Invalid input(s):   AMYLASE,  ALB  LIPID:   No components found for: CHLPL,  CHOL  Lab Results   Component Value Date    TRIG 201 (H) 02/10/2018    TRIG 90 11/02/2015    TRIG 158 (H) 07/28/2015     Lab Results   Component Value Date    HDL 54 06/08/2018    HDL 65 11/02/2015    HDL 45 07/28/2015     Lab Results   Component Value Date    LDLCALC 58 06/08/2018    LDLCALC 97 11/02/2015    LDLCALC 86 07/28/2015 Lab Results   Component Value Date    LABVLDL 33 06/08/2018    LABVLDL 32 07/28/2015     No results found for: CHOLHDLRATIO  PT/INR: No results for input(s): PROTIME, INR in the last 72 hours. A1C:   Lab Results   Component Value Date    LABA1C 5.9 01/13/2018     BNP:  No results for input(s): BNP in the last 72 hours. EKG 3/28/18   shows sinus arrhythmia, RBBB, rate 76. EKG: Sinus tachycardia with Premature supraventricular complexesRight bundle branch blockCannot rule out Inferior infarct , age undeterminedAnterolateral infarct (cited on or before 25-JUN-2015)Abnormal ECGWhen compared with ECG of 25-JUN-2015 18:02, (unconfirmed)Premature ventricular complexes are no longer PresentPremature supraventricular complexes are now PresentConfirmed by Fred Berry MD, [address] ([id number]) on 6/26/2015 6:38:48 AM    Echo: 06/26/2015  Normal left ventricle size and wall thickness. Estimated ejection   fraction  of 55%. Apical lateral and mid anterolateral hypokinesia. Impaired left  ventricular relaxation. Mitral valve is structurally normal.  Mitral valve leaflets appear to open adequately. Trivial mitral regurgitation is present. The aortic leaflets appear to open adequately. Trace aortic regurgitation is present. No evidence of aortic valve stenosis. Assessment:  1. Diastolic CHF  2. Paroxysmal Atrial Fibrillation  3. HTN  4. HLD     Last lipids 6/8/18 I personally reviewed labs results in epic and discussed with patient      Plan:  1. Call our office with the name of the psychiatrist so we can refer you  2. No change in medications for now we refilled her eliquis diltiazem zetia   3. Labs  - Lipids, TSH, CMP fasting requested  4. Follow up in 6 months        QUALITY MEASURES  1. Tobacco Cessation Counseling: Yes  2. Retake of BP if >140/90:   NA  3. Documentation to PCP/referring for new patient:  Sent to PCP at close of office visit  4. CAD patient on anti-platelet: eliquis  5.  CAD patient on STATIN therapy: Yes  6. Patient with CHF and aFib on anticoagulation:  eliquis    This note was scribed in the presence of Rachel Maldonado MD by Jackquline Kanner, RN.       I, Dr. Sara Bo, personally performed the services described in this documentation, as scribed by the above signed scribe in my presence. It is both accurate and complete to my knowledge. I agree with the details independently gathered by the clinical support staff, while the remaining scribed note accurately describes my personal service to the patient.

## 2021-01-29 ENCOUNTER — OFFICE VISIT (OUTPATIENT)
Dept: CARDIOLOGY CLINIC | Age: 71
End: 2021-01-29
Payer: MEDICARE

## 2021-01-29 ENCOUNTER — TELEPHONE (OUTPATIENT)
Dept: CARDIOLOGY CLINIC | Age: 71
End: 2021-01-29

## 2021-01-29 VITALS
SYSTOLIC BLOOD PRESSURE: 120 MMHG | HEART RATE: 84 BPM | WEIGHT: 196 LBS | DIASTOLIC BLOOD PRESSURE: 82 MMHG | BODY MASS INDEX: 30.7 KG/M2 | OXYGEN SATURATION: 98 %

## 2021-01-29 DIAGNOSIS — F31.9 BIPOLAR DISORDER WITH DEPRESSION (HCC): Primary | ICD-10-CM

## 2021-01-29 DIAGNOSIS — I10 ESSENTIAL HYPERTENSION: ICD-10-CM

## 2021-01-29 DIAGNOSIS — I50.32 CHRONIC DIASTOLIC CONGESTIVE HEART FAILURE (HCC): Primary | ICD-10-CM

## 2021-01-29 DIAGNOSIS — G47.00 ACUTE INSOMNIA: ICD-10-CM

## 2021-01-29 DIAGNOSIS — I48.0 PAROXYSMAL ATRIAL FIBRILLATION (HCC): ICD-10-CM

## 2021-01-29 DIAGNOSIS — E78.2 MIXED HYPERLIPIDEMIA: ICD-10-CM

## 2021-01-29 DIAGNOSIS — F31.61 BIPOLAR DISORDER, CURRENT EPISODE MIXED, MILD (HCC): ICD-10-CM

## 2021-01-29 PROCEDURE — 1036F TOBACCO NON-USER: CPT | Performed by: INTERNAL MEDICINE

## 2021-01-29 PROCEDURE — G8484 FLU IMMUNIZE NO ADMIN: HCPCS | Performed by: INTERNAL MEDICINE

## 2021-01-29 PROCEDURE — 1123F ACP DISCUSS/DSCN MKR DOCD: CPT | Performed by: INTERNAL MEDICINE

## 2021-01-29 PROCEDURE — G8400 PT W/DXA NO RESULTS DOC: HCPCS | Performed by: INTERNAL MEDICINE

## 2021-01-29 PROCEDURE — 3017F COLORECTAL CA SCREEN DOC REV: CPT | Performed by: INTERNAL MEDICINE

## 2021-01-29 PROCEDURE — 1090F PRES/ABSN URINE INCON ASSESS: CPT | Performed by: INTERNAL MEDICINE

## 2021-01-29 PROCEDURE — 99214 OFFICE O/P EST MOD 30 MIN: CPT | Performed by: INTERNAL MEDICINE

## 2021-01-29 PROCEDURE — G8417 CALC BMI ABV UP PARAM F/U: HCPCS | Performed by: INTERNAL MEDICINE

## 2021-01-29 PROCEDURE — 4040F PNEUMOC VAC/ADMIN/RCVD: CPT | Performed by: INTERNAL MEDICINE

## 2021-01-29 PROCEDURE — G8427 DOCREV CUR MEDS BY ELIG CLIN: HCPCS | Performed by: INTERNAL MEDICINE

## 2021-01-29 RX ORDER — DILTIAZEM HYDROCHLORIDE 120 MG/1
CAPSULE, COATED, EXTENDED RELEASE ORAL
Qty: 90 CAPSULE | Refills: 3 | Status: SHIPPED | OUTPATIENT
Start: 2021-01-29 | End: 2022-02-07

## 2021-01-29 RX ORDER — EZETIMIBE 10 MG/1
TABLET ORAL
Qty: 90 TABLET | Refills: 3 | Status: SHIPPED | OUTPATIENT
Start: 2021-01-29 | End: 2022-02-07

## 2021-01-29 NOTE — LETTER
[address] Office Note  1/29/2021     Subjective:  Ms. Kyung Villafana presents for cardiology  follow up for dCHF, PAF, HTN, HLD    HPI:    Today she reports feeling ok. She states she stopped smoking 1 year ago. She states she had RUL lobectomy/wedge resection in March 2020 due to lung cancer. She had a follow up CT Chest 9/16/20 (report below). She reports she does not sleep well since her PCP cut back her Seroquel. She states 300 mg does not help her sleep and states she takes up to 600 mg of benadryl a night to help her sleep. She is SOB, but this is not new. She denies chest pain. She states she has lost some weight. She states she stopped drinking Cola, but replaced that with sweet tea. PMH: This patient is a 59-year-old white female with no prior history of heart disease. She presented to the hospital on 06/25/2015 with acute onset of shortness of breath and subsequent chest pain. The patient states she was at [facility] approximately 2 weeks ago with small bowel obstruction. That was complicated by renal failure. That eventually resolved by conservative therapy, and she went home about a week ago. The patient has been improving gradually. However, she has continued to have diarrhea ever since she was released from previous hospital. On the day of hospitalization she felt poorly. She was slightly short of breath in the morning with her daily activity; but as the day went on, she became more short of breath. She also experienced substernal pressure type of chest pain as if someone was sitting on her. She felt palpitations in her ear. She did not have any nausea or vomiting but had diarrhea. The patient was diaphoretic. Symptoms of chest pressure and shortness of breath lasted for several hours. Subsequently the patient came to the emergency room, and she was admitted with a diagnosis of acute pulmonary edema. She was diuresed and improved. Her troponins were elevated at that time. She was Dx with CDiff during her admission. Echo at that time with EF of 55%. Most recent cardiac cath on 07/28/2015 with normal coronaries. S/P fall and multiple rt rib fractures  Requiring VATS early feb 2018. She has lost 70 lbs since May 2018 cutting back on pop and sweets. She reports she cannot sleep at night despite taking  Seroquel 300 mg at bedtime. Review of Systems:  12 point ROS negative in all areas as listed below except as in [place]  Constitutional, EENT,  GI, , Musculoskeletal, skin, neurological, hematological, endocrine, Psychiatric    Reviewed past medical history, social, and family history.  Quit smoking one year ago  Early 2020  No alcohol  MOM cancer of colon Dad massive heart attack at age 76  Past Medical History:   Diagnosis Date    A-fib [facility])     Arthritis     Clostridium difficile diarrhea 6/26/15    COPD (chronic obstructive pulmonary disease) (HCC)     COPD exacerbation (HCC)     Emphysema of lung (HCC)     Hyperlipidemia     Hypertension     O2 dependent     2 L/min at night    Pneumonia 2/5/2018    Primary cancer of right upper lobe of lung ([facility] [address] 3/11/2020    Rib pain     Sleep apnea     does not use cpap    Small bowel obstruction ([address]     pt denies     Past Surgical History:   Procedure Laterality Date    ANKLE ARTHROSCOPY      x2    APPENDECTOMY      BRONCHOSCOPY N/A 3/15/2020    BRONCHOSCOPY THERAPUTIC ASPIRATION INITIAL performed by Ashli Richardson MD at [address] [facility]  3/17/2020    BRONCHOSCOPY THERAPUTIC [place] performed by Luci Bucio MD at [address] [facility] Left     CHOLECYSTECTOMY      COLONOSCOPY      HYSTERECTOMY      ROTATOR CUFF REPAIR Bilateral     THORACOSCOPY Right 3/11/2020    RIGHT VIDEO ASSISTED THORACOSCOPIC SURGERY; WEDGE EXCISION OF RIGHT UPPER LOBE FOLLOWED BY RIGHT UPPER LOBECTOMY; INTERCOSTAL NERVE BLOCK performed by Ashli Richardson MD at [address]         Objective:   /82   Pulse 84   Wt 196 lb (88.9 kg)   SpO2 98%   BMI 30.70 kg/m²     Wt Readings from Last 3 Encounters:   01/29/21 196 lb (88.9 kg)   05/12/20 194 lb 4.8 oz (88.1 kg)   05/12/20 185 lb (83.9 kg)       Physical Exam:  General: No Respiratory distress, appears well developed and well nourished. Eyes:  Sclera nonicteric  Nose/Sinuses:  negative findings: nose shows no deformity, asymmetry, or inflammation, nasal mucosa normal, septum midline with no perforation or bleeding  Back:  no pain to palpation  Joint:  no active joint inflammation  Musculoskeletal:  negative  Skin:  Warm and dry  Neck:  Negative for JVD and Carotid Bruits.  QUEtiapine (SEROQUEL XR) 300 MG XR tablet Take 300 mg by mouth nightly       OXYGEN Inhale into the lungs Indications: 2.5 L nightly       hydrOXYzine (ATARAX) 50 MG tablet Take 50 mg by mouth 1-2 tabs every 4 hours prn       No facility-administered encounter medications on file as of 1/29/2021. Lab Data:  I have reviewed the following tests and documented in this encounter as follows:   Discussed with the patient. Chest CT 9/16/20  Emphysema. Interval removal of right-sided chest tube. Gas and fluid collection at the right apex has decreased in size and measures 6.8 x 2.6 cm. Mild bronchial wall thickening, bronchiectasis and interlobular septal wall thickening in the right lung base, improved since previous exam.  Resolution of ground-glass opacities in the left lung. Chest CT 1/16/19  Multiple rt rib fx appearing subacute scattered areas parenchymal opacity lower lobes    ECHO 7/8/16  Normal global wall motion. Visual EF is 55-60%  Doppler evidence of grade I (impaired) diastolic dysfunction. Calculated EF 52%. Left atrial cavity is mildly dilated. Right ventricle cavity is mildly dilated. Structurally normal mitral valve with mild regurgitation. Structurally normal tricuspid valve with trace regurgitation. IVC is dilated with respiratory response of <50%. CXR 2/16/16  atelectasis with bronchial wall thickening      CATH 7/28/15    Signed by Theresa Brambila MD on 07/28/15 at 1114    Document Text    Expand All Collapse All   PATIENT NAME: PA #: MR Mayes Lias [id number]     1. Selective coronary angiography. 2. Left heart catheterization. 3. Left ventriculography. 4. Right heart catheterization. ANGIOGRAPHIC FINDINGS:   1. No coronary artery disease. 2. Normal left ventricular function, EF greater than 60%. 3. Normal left and right hemodynamics.   CONCLUSIONS: No evidence for coronary artery disease and preserved left ventricular dysfunction with normal hemodynamics. RECOMMENDATIONS: At this point is noncardiac symptoms. Recommend follow-up  with Dr. Matty Keyes and Viviane Rosales in 1 to 2 weeks. Saeid Ngo MD        ECHO 5/26/15  Summary   Normal left ventricle size and wall thickness. Estimated ejection fraction   of 55%. Apical lateral and mid anterolateral hypokinesia. Impaired left   ventricular relaxation.   Mitral valve is structurally normal.   Mitral valve leaflets appear to open adequately.   Trivial mitral regurgitation is present.   The aortic leaflets appear to open adequately.   Trace aortic regurgitation is present.   No evidence of aortic valve stenosis    CBC: No results for input(s): WBC, HGB, HCT, MCV, PLT in the last 72 hours. BMP: No results for input(s): NA, K, CL, CO2, PHOS, BUN, CREATININE in the last 72 hours. Invalid input(s): CA  LIVER PROFILE: No results for input(s): AST, ALT, LIPASE, BILIDIR, BILITOT, ALKPHOS in the last 72 hours. Invalid input(s): AMYLASE,  ALB  LIPID:   No components found for: CHLPL,  CHOL  Lab Results   Component Value Date    TRIG 201 (H) 02/10/2018    TRIG 90 11/02/2015    TRIG 158 (H) 07/28/2015     Lab Results   Component Value Date    HDL 54 06/08/2018    HDL 65 11/02/2015    HDL 45 07/28/2015     Lab Results   Component Value Date    LDLCALC 58 06/08/2018    LDLCALC 97 11/02/2015    LDLCALC 86 07/28/2015     Lab Results   Component Value Date    LABVLDL 33 06/08/2018    LABVLDL 32 07/28/2015     No results found for: CHOLHDLRATIO  PT/INR: No results for input(s): PROTIME, INR in the last 72 hours. A1C:   Lab Results   Component Value Date    LABA1C 5.9 01/13/2018     BNP:  No results for input(s): BNP in the last 72 hours. EKG 3/28/18   shows sinus arrhythmia, RBBB, rate 76. EKG: Sinus tachycardia with Premature supraventricular complexesRight bundle branch blockCannot rule out Inferior infarct , age undeterminedAnterolateral infarct (cited on or before 25-JUN-2015)Abnormal ECGWhen compared with ECG of 25-JUN-2015 18:02, (unconfirmed)Premature ventricular complexes are no longer PresentPremature supraventricular complexes are now PresentConfirmed by Adam Arana MD, [address] ([id number]) on 6/26/2015 6:38:48 AM    Echo: 06/26/2015  Normal left ventricle size and wall thickness. Estimated ejection   fraction  of 55%. Apical lateral and mid anterolateral hypokinesia. Impaired left  ventricular relaxation. Mitral valve is structurally normal.  Mitral valve leaflets appear to open adequately. Trivial mitral regurgitation is present. The aortic leaflets appear to open adequately. Trace aortic regurgitation is present. No evidence of aortic valve stenosis. Assessment:  1. Diastolic CHF  2. Paroxysmal Atrial Fibrillation  3. HTN  4. HLD     Last lipids 6/8/18 I personally reviewed labs results in epic and discussed with patient      Plan:  1. Call our office with the name of the psychiatrist so we can refer you  2. No change in medications for now we refilled her eliquis diltiazem zetia   3. Labs  - Lipids, TSH, CMP fasting requested  4. Follow up in 6 months        QUALITY MEASURES  1. Tobacco Cessation Counseling: Yes  2. Retake of BP if >140/90:   NA  3. Documentation to PCP/referring for new patient:  Sent to PCP at close of office visit  4. CAD patient on anti-platelet: eliquis  5. CAD patient on STATIN therapy:  Yes  6. Patient with CHF and aFib on anticoagulation:  eliquis    This note was scribed in the presence of Amol Pichardo MD by Sigifredo Farooq RN. I, Dr. Nadeem Brizuela, personally performed the services described in this documentation, as scribed by the above signed scribe in my presence. It is both accurate and complete to my knowledge. I agree with the details independently gathered by the clinical support staff, while the remaining scribed note accurately describes my personal service to the patient.

## 2021-01-29 NOTE — TELEPHONE ENCOUNTER
Pt called office to give [facility] name of psych.   [facility] in [place], [place]  Ph# [phone]  Fax# [phone]

## 2021-01-29 NOTE — PATIENT INSTRUCTIONS
Plan:  1. Call our office with the name of the psychiatrist so we can refer you  2. No change in medications for now   3. Labs  - Lipids, TSH, CMP   4.  Follow up in 6 months

## 2021-02-04 ENCOUNTER — HOSPITAL ENCOUNTER (OUTPATIENT)
Age: 71
Discharge: HOME | End: 2021-02-04
Payer: MEDICARE

## 2021-02-04 DIAGNOSIS — I48.0 PAROXYSMAL ATRIAL FIBRILLATION (HCC): ICD-10-CM

## 2021-02-04 DIAGNOSIS — E78.2 MIXED HYPERLIPIDEMIA: ICD-10-CM

## 2021-02-04 DIAGNOSIS — I10 ESSENTIAL HYPERTENSION: ICD-10-CM

## 2021-02-04 DIAGNOSIS — I50.32 CHRONIC DIASTOLIC CONGESTIVE HEART FAILURE (HCC): ICD-10-CM

## 2021-02-04 LAB
A/G RATIO: 1.2 (ref 1.1–2.2)
ALBUMIN SERPL-MCNC: 4.3 G/DL (ref 3.4–5)
ALP BLD-CCNC: 128 U/L (ref 40–129)
ALT SERPL-CCNC: 22 U/L (ref 10–40)
ANION GAP SERPL CALCULATED.3IONS-SCNC: 10 MMOL/L (ref 3–16)
AST SERPL-CCNC: 20 U/L (ref 15–37)
BILIRUB SERPL-MCNC: 0.3 MG/DL (ref 0–1)
BUN BLDV-MCNC: 15 MG/DL (ref 7–20)
CALCIUM SERPL-MCNC: 10.1 MG/DL (ref 8.3–10.6)
CHLORIDE BLD-SCNC: 102 MMOL/L (ref 99–110)
CHOLESTEROL, FASTING: 191 MG/DL (ref 0–199)
CO2: 25 MMOL/L (ref 21–32)
CREAT SERPL-MCNC: 0.6 MG/DL (ref 0.6–1.2)
GFR AFRICAN AMERICAN: >60
GFR NON-AFRICAN AMERICAN: >60
GLOBULIN: 3.5 G/DL
GLUCOSE BLD-MCNC: 96 MG/DL (ref 70–99)
HDLC SERPL-MCNC: 64 MG/DL (ref 40–60)
LDL CHOLESTEROL CALCULATED: 106 MG/DL
POTASSIUM SERPL-SCNC: 4.5 MMOL/L (ref 3.5–5.1)
SODIUM BLD-SCNC: 137 MMOL/L (ref 136–145)
TOTAL PROTEIN: 7.8 G/DL (ref 6.4–8.2)
TRIGLYCERIDE, FASTING: 107 MG/DL (ref 0–150)
TSH REFLEX FT4: 1.99 UIU/ML (ref 0.27–4.2)
VLDLC SERPL CALC-MCNC: 21 MG/DL

## 2021-02-04 PROCEDURE — 80053 COMPREHEN METABOLIC PANEL: CPT

## 2021-02-04 PROCEDURE — 80061 LIPID PANEL: CPT

## 2021-02-04 PROCEDURE — 84443 ASSAY THYROID STIM HORMONE: CPT

## 2021-02-04 PROCEDURE — 36415 COLL VENOUS BLD VENIPUNCTURE: CPT

## 2021-02-05 ENCOUNTER — TELEPHONE (OUTPATIENT)
Dept: CARDIOLOGY CLINIC | Age: 71
End: 2021-02-05

## 2021-02-05 NOTE — TELEPHONE ENCOUNTER
----- Message from Kavon Yeung MD sent at 2/5/2021  1:26 PM EST -----  Thyroid and cholesterol tests are ok please call patient.
Created telephone encounter. Spoke with [facility] relayed message per [facility] regarding labs. Pt verbalized understanding. Pt states [facility] staff was suppose to get her into see a psychiatrist. Has this appt been set up?
Statement Selected

## 2021-03-07 NOTE — TELEPHONE ENCOUNTER
DC Breo and Spiriva  Okay for Trelegy Ellipta Oral inhalation: Dry powder inhaler: One inhalation (fluticasone furoate 100 mcg/umeclidinium 62.5 mcg/vilanterol 25 mcg) once daily (maximum dose: 1 inhalation/day)

## 2021-03-09 RX ORDER — FLUTICASONE FUROATE, UMECLIDINIUM BROMIDE AND VILANTEROL TRIFENATATE 100; 62.5; 25 UG/1; UG/1; UG/1
1 POWDER RESPIRATORY (INHALATION) DAILY
Qty: 28 EACH | Refills: 5 | Status: SHIPPED | OUTPATIENT
Start: 2021-03-09 | End: 2021-09-14

## 2021-03-16 ENCOUNTER — HOSPITAL ENCOUNTER (OUTPATIENT)
Dept: CT IMAGING | Age: 71
Discharge: HOME | End: 2021-03-16
Payer: MEDICARE

## 2021-03-16 DIAGNOSIS — C34.11 MALIGNANT NEOPLASM OF UPPER LOBE OF RIGHT LUNG (HCC): ICD-10-CM

## 2021-03-16 DIAGNOSIS — R93.89 ABNORMAL CT OF THE CHEST: ICD-10-CM

## 2021-03-16 PROCEDURE — 71250 CT THORAX DX C-: CPT

## 2021-03-22 ENCOUNTER — VIRTUAL VISIT (OUTPATIENT)
Dept: PULMONOLOGY | Age: 71
End: 2021-03-22
Payer: MEDICARE

## 2021-03-22 DIAGNOSIS — R09.02 HYPOXIA: ICD-10-CM

## 2021-03-22 DIAGNOSIS — R93.3 ABNORMAL COMPUTED TOMOGRAPHY OF ESOPHAGUS: ICD-10-CM

## 2021-03-22 DIAGNOSIS — R91.1 PULMONARY NODULE: Primary | ICD-10-CM

## 2021-03-22 DIAGNOSIS — J44.9 STAGE 3 SEVERE COPD BY GOLD CLASSIFICATION (HCC): ICD-10-CM

## 2021-03-22 DIAGNOSIS — R93.89 ABNORMAL CT OF THE CHEST: ICD-10-CM

## 2021-03-22 PROCEDURE — 99443 PR PHYS/QHP TELEPHONE EVALUATION 21-30 MIN: CPT | Performed by: INTERNAL MEDICINE

## 2021-03-22 RX ORDER — ALBUTEROL SULFATE 90 UG/1
2 AEROSOL, METERED RESPIRATORY (INHALATION) EVERY 6 HOURS PRN
Qty: 1 INHALER | Refills: 6 | Status: SHIPPED | OUTPATIENT
Start: 2021-03-22 | End: 2021-09-23 | Stop reason: SDUPTHER

## 2021-03-22 ASSESSMENT — SLEEP AND FATIGUE QUESTIONNAIRES
HOW LIKELY ARE YOU TO NOD OFF OR FALL ASLEEP WHILE SITTING AND TALKING TO SOMEONE: 0
HOW LIKELY ARE YOU TO NOD OFF OR FALL ASLEEP IN A CAR, WHILE STOPPED FOR A FEW MINUTES IN TRAFFIC: 0
HOW LIKELY ARE YOU TO NOD OFF OR FALL ASLEEP WHILE LYING DOWN TO REST IN THE AFTERNOON WHEN CIRCUMSTANCES PERMIT: 0
HOW LIKELY ARE YOU TO NOD OFF OR FALL ASLEEP WHILE SITTING QUIETLY AFTER LUNCH WITHOUT ALCOHOL: 0

## 2021-03-22 NOTE — PROGRESS NOTES
[facility]                                                            Outpatient Follow Up Note    TELEHEALTH EVALUATION: Service performed was Audio/Visual (During [id number] public health emergency) and not a face-to-face visit       CHIEF COMPLAINT: Follow-up CT chest     HPI:  CT chest reviewed by me and noted below. Results were dicussed with patient and multiple good questions were answered. Doing good  Cough with clear sputum  Uses Albuterol once a day  Uses O2 3LPM at night   Sat 96% RA   No smoking           Past Medical History:   Diagnosis Date    A-fib ([address]     Arthritis     Clostridium difficile diarrhea 6/26/15    COPD (chronic obstructive pulmonary disease) (HCC)     COPD exacerbation (HCC)     Emphysema of lung (HCC)     Hyperlipidemia     Hypertension     O2 dependent     2 L/min at night    Pneumonia 2/5/2018    Primary cancer of right upper lobe of lung ([facility] 3/11/2020    Rib pain     Sleep apnea     does not use cpap    Small bowel obstruction ([facility]     pt denies       Past Surgical History:        Procedure Laterality Date    ANKLE ARTHROSCOPY      x2    APPENDECTOMY      BRONCHOSCOPY N/A 3/15/2020    BRONCHOSCOPY THERAPUTIC ASPIRATION INITIAL performed by Roberto Nicolas MD at [address] [facility]  3/17/2020    BRONCHOSCOPY THERAPUTIC ASPIRATION INITIAL performed by Peng Joshi MD at [address] [facility] Left     CHOLECYSTECTOMY      COLONOSCOPY      HYSTERECTOMY      ROTATOR CUFF REPAIR Bilateral     THORACOSCOPY Right 3/11/2020    RIGHT VIDEO ASSISTED THORACOSCOPIC SURGERY; WEDGE EXCISION OF RIGHT UPPER LOBE FOLLOWED BY RIGHT UPPER LOBECTOMY; INTERCOSTAL NERVE BLOCK performed by Roberto Nicolas MD at [address]         Allergies:  is allergic to buspirone; doxycycline; lisinopril; and penicillins.   Social History:    TOBACCO:   reports that she quit smoking about 13 months ago. Her smoking use included cigarettes. She has a 50.00 pack-year smoking history. She has never used smokeless tobacco.  ETOH:   reports no history of alcohol use. Family History:       Problem Relation Age of Onset    Cancer Mother     Heart Failure Father        Current Medications:    Current Outpatient Medications:     fluticasone-umeclidin-vilant (TRELEGY ELLIPTA) 100-62.5-25 MCG/INH AEPB, Inhale 1 puff into the lungs daily, Disp: 28 each, Rfl: 5    dilTIAZem (CARDIZEM CD) 120 MG extended release capsule, TAKE 1 CAPSULE BY MOUTH EVERY DAY, Disp: 90 capsule, Rfl: 3    ezetimibe (ZETIA) 10 MG tablet, TAKE 1 TABLET BY MOUTH EVERY DAY, Disp: 90 tablet, Rfl: 3    apixaban (ELIQUIS) 5 MG TABS tablet, Take 1 tablet by mouth 2 times daily, Disp: 180 tablet, Rfl: 3    albuterol sulfate HFA (VENTOLIN HFA) 108 (90 Base) MCG/ACT inhaler, Inhale 2 puffs into the lungs every 6 hours as needed for Wheezing or Shortness of Breath, Disp: 1 Inhaler, Rfl: 6    gabapentin (NEURONTIN) 400 MG capsule, Take 600 mg by mouth 2 times daily. , Disp: , Rfl:     albuterol (PROVENTIL) (2.5 MG/3ML) 0.083% nebulizer solution, Take 3 mLs by nebulization every 6 hours as needed for Wheezing, Disp: 120 each, Rfl: 5    HYDROcodone-acetaminophen (NORCO) 7.5-325 MG per tablet, TAKE 1 TABLET BY MOUTH 3 TIMES A DAY AS NEEDED FOR PAIN, Disp: , Rfl:     Fexofenadine HCl (MUCINEX ALLERGY PO), Take by mouth daily, Disp: , Rfl:     ferrous sulfate 325 (65 Fe) MG tablet, Take 325 mg by mouth daily (with breakfast), Disp: , Rfl:     rOPINIRole (REQUIP) 2 MG tablet, Take 3 mg by mouth nightly , Disp: , Rfl:     venlafaxine (EFFEXOR-XR) 150 MG XR capsule, Take 150 mg by mouth daily, Disp: , Rfl:     Multiple Vitamins-Minerals (THERAPEUTIC MULTIVITAMIN-MINERALS) tablet, Take 1 tablet by mouth daily, Disp: , Rfl:     QUEtiapine (SEROQUEL XR) 300 MG XR tablet, Take 300 mg by mouth nightly , Disp: , Rfl:    OXYGEN, Inhale into the lungs Indications: 2.5 L nightly , Disp: , Rfl:     nystatin (MYCOSTATIN) 647447 UNIT/GM cream, , Disp: , Rfl:     topiramate (TOPAMAX) 50 MG tablet, Take 50 mg by mouth nightly, Disp: , Rfl:     Review of Systems    Objective:   Physical Exam  Telephone visit not able to obtain physical exam       DATA reviewed by me:   PFTs 02/04/2020 FVC 1.98(61%) FEV1 1.16(47%) FEV1/FVC 59 % TLC 4.11(76%)   DLCO 09.05(38%) 6MW 840 F LO2 90%  PFTs 08/19/2015 FVC 1.78(52%) FEV1 1.27(49%) FEV1/FVC 71 % TLC 4.52(82%)   DLCO 10.23(43%) 6MW 980 F LO2 89%  6MW 11/14/2019 720 feet Desat 88% 1L on exertin       CT chest 1/16/19  Multiple right-sided rib fractures-subacute  No acute intrathoracic abnormalities  Scattered areas of parenchymal opacities lower lobe likely atelectasis      CT chest 1/16/2020    Mediastinum: No enlarged lymph nodes. Normal caliber great vessels. Normal  heart size and pericardium. Suspect incidental benign lipomatous hypertrophy  of the interatrial septum. No significant coronary calcifications. Unremarkable esophagus and included thyroid. Lungs/pleura: Nodular thickening in the subpleural right upper lobe on series  3, image 27; coronal image 101 measuring 20 x 8 mm in axial plane and 7 mm  craniocaudal.  This nodular opacity is low in attenuation values (water  density). Subtle tree-in-bud opacities seen on the prior exam in the right  upper lobe have resolved. These were likely infectious or inflammatory. Linear and bandlike areas of scarring and/or atelectasis at the right lung  base are not substantially changed. Decreased atelectasis at the medial left  lung base. Scattered punctate calcified granulomas. No pleural effusion. Upper Abdomen: Unremarkable. Soft Tissues/Bones: No enlarged axillary or supraclavicular lymph nodes. Multiple healed rib fractures bilaterally, some of which are ununited.       PET scan 2/6/2020   1.9 cm lateral right upper lobe pulmonary nodule is hypermetabolic, to a  degree concerning for malignancy until proven otherwise. No findings of FDG avid metastatic disease. Inflammatory change related to healing posterolateral left 8th rib fracture. Lipomatous hypertrophy of the interatrial septum, an incidental finding. Fluid loculated within fissure within the lateral right mid lung demonstrates  low level activity, likely inflammatory. CT chest 9/16/2020   Pulmonary emphysema  Interval removal of right-sided chest tube  Gas and fluid collection at the right apex decrease in size  Mild bronchial wall thickening, bronchiectasis, interlobular septal wall thickening in the right base improved from prior exam  Resolution of groundglass opacities on the left side    CT chest 3/16/2021 imaging reviewed by me and showed  Increased ground-glass opacities in the left upper lobe, likely  postinflammatory-infectious. Punctate noncalcified pulmonary nodule left  lower lobe is unchanged  Persistent small pleural fluid collection in the right lung apex. Pleural  gas internally has decreased  Fluid is seen in the esophagus to above the level of thoracic inlet,  suggesting reflux or esophageal dysmotility. RUL lobectomy 3/11/2020  A. Wedge excision, right upper lobe lung lesion:       - Involved with non-small cell carcinoma, squamous cell carcinoma         with lesion 1.1 cm in greatest extent.       - Parenchymal resection margins are negative for malignancy.      - Overlying pleural surface shows no evidence of involvement with         malignancy.      - See case comment and synoptic report.       B. Regional lymph node excision, right 10:       - 3 lymph nodes with reactive changes and no evidence of metastatic         carcinoma ( 0\3 ).      - Pankeratin\CAM 5.2 stain is performed and supports diagnosis.         C. Right upper lobe lobectomy:       - Organizing biopsy cavity changes with extravasated blood and         reactive features. Hello how are you       - No residual malignancy identified.       - Bronchial margin, vascular margin and parenchymal margins are         negative for malignancy.      - 3 peribronchial lymph nodes with reactive changes and no evidence         of metastatic carcinoma (0/3).      - One intraparenchymal lymph node with no evidence of involvement         with malignancy.      - Pankeratin\CAM 5.2 stains were utilized to evaluate the lymph         nodes and supports final diagnosis.         D. Regional lymph node excision, right 4:       - Six lymph nodes with no evidence of metastatic carcinoma ( 0\6)       - Pankeratin\CAM 5.2 stains were utilized evaluated lymph nodes and         supports final diagnosis. PSG 8/25/16 AHI 28.4 desaturation to 71%  BiPAP titration 9/9/16 BiPAP 15/11 cmH2O      Assessment:      · Severe COPD   · Abnormal CT chest 3/15/2020. Postoperative changes. Improvement on repeat CT 9/16/2020. Not significantly changed on CT 3/16/2021. · FRANCISCO elongated nodule 20 x 8 mm. SUV 7.3 on PET scan 2/6/2020. Post RUL resection 3/11/2020 non-small cell lung cancer. [id number] stage IA  · Abnormal esophagus on CT - fluid is seen in the esophagus   · Hypoxia on exertion  · Right traumatic displaced rib fractures with  hemothorax required VATS decortication and chest tube removed 2/14/18. · Moderate MEG. O2 3LPM at night. Refusing BiPAP uses O2   · PAF on Eliquis followed by cardiology   · >120 pack year smoking. Intolerance to chantix . Quit March 2020      Plan:      Surveillance CT chest September 2021  GI evaluation- patient will call her GI   Advised to titrate O2 using her pulse oximeter- target O2 sat 90-92%.    Continue Trelegy and Albuterol INH/Neb Q 4 hrs PRN   Patient is up to date with Pneumococcal vaccine, influenza vaccine  Interested in Covid vaccine and we will schedule  Continue with smoking cessation  Follow-up after CT chest       Kristan Bourgeois is a 69 y.o. female evaluated via telephone on 3/22/2021. Consent:  She and/or health care decision maker is aware that that she may receive a bill for this telephone service, depending on her insurance coverage, and has provided verbal consent to proceed: Yes       Documentation:  I communicated with the patient and/or health care decision maker about: See above   Details of this discussion including any medical advice provided: See above       I Affirm this is a Patient Initiated Episode with an Established Patient who has not had a related appointment within my department in the past 7 days or scheduled within the next 24 hours.     Total Time: 21-30 minutes     Note: not billable if this call serves to triage the patient into an appointment for the relevant concern      Héctor Mosher

## 2021-04-05 ENCOUNTER — IMMUNIZATION (OUTPATIENT)
Dept: PRIMARY CARE CLINIC | Age: 71
End: 2021-04-05
Payer: MEDICARE

## 2021-04-05 ENCOUNTER — TELEPHONE (OUTPATIENT)
Dept: GASTROENTEROLOGY | Age: 71
End: 2021-04-05

## 2021-04-05 PROCEDURE — 0001A COVID-19, PFIZER VACCINE 30MCG/0.3ML DOSE: CPT | Performed by: FAMILY MEDICINE

## 2021-04-05 PROCEDURE — 91300 COVID-19, PFIZER VACCINE 30MCG/0.3ML DOSE: CPT | Performed by: FAMILY MEDICINE

## 2021-04-05 NOTE — TELEPHONE ENCOUNTER
This pt would like her VV done at 11:30 a.m. Informed pt I can't add to a schedule for [place] Due tomorrow, so I would let my manager know I need to add this appointment.

## 2021-04-06 ENCOUNTER — VIRTUAL VISIT (OUTPATIENT)
Dept: GASTROENTEROLOGY | Age: 71
End: 2021-04-06
Payer: MEDICARE

## 2021-04-06 DIAGNOSIS — K22.0 ESOPHAGEAL ACHALASIA: Primary | ICD-10-CM

## 2021-04-06 PROCEDURE — 99203 OFFICE O/P NEW LOW 30 MIN: CPT | Performed by: INTERNAL MEDICINE

## 2021-04-06 NOTE — PROGRESS NOTES
Last colon 3years ago last September by Dr Bethany Dove retired. Dr Tricia Chris referred states needs throat stretched.

## 2021-04-07 ENCOUNTER — HOSPITAL ENCOUNTER (OUTPATIENT)
Age: 71
Discharge: HOME | End: 2021-04-07
Payer: MEDICARE

## 2021-04-07 ENCOUNTER — HOSPITAL ENCOUNTER (OUTPATIENT)
Dept: GENERAL RADIOLOGY | Age: 71
Discharge: HOME | End: 2021-04-07
Payer: MEDICARE

## 2021-04-07 ENCOUNTER — TELEPHONE (OUTPATIENT)
Dept: GASTROENTEROLOGY | Age: 71
End: 2021-04-07

## 2021-04-07 DIAGNOSIS — K22.0 ESOPHAGEAL ACHALASIA: ICD-10-CM

## 2021-04-07 PROCEDURE — 71045 X-RAY EXAM CHEST 1 VIEW: CPT

## 2021-04-07 NOTE — TELEPHONE ENCOUNTER
Radiology from [place] called and wanted to know if the x-ray order could be changed to a 2 view instead of a 1 view. The pt is currently there. They are going to go ahead and do the 1 view x-ray.

## 2021-04-10 NOTE — PROGRESS NOTES
[address] [facility],  [address] [place] [address] Ave  [place], [address] [place] [address]  Phone: [phone]      LATE ENTRY  ==========    This was a video visit due to [address] pandemic on 4/6/21. Tiarra Coates She is a  [5] White [1] 69 y.o. Emigdio Couch female      Main Problems/Chief Complaint   Difficulty swallowing    HPI      The patient has had surgery for lung CA (right pneumonectomy)  with good recovery. She has difficulty swallowing and has hx of esophageal dilation in the past. Her dysphagia is mainly to solids. The food gets stuck in the retrosternal area. She has had repeated pulmonary infections and other issues. She carries the diagnosis of achalasia. PAST MEDICAL HISTORY     Past Medical History:   Diagnosis Date    [facility]fib [facility]     Arthritis     Clostridium difficile diarrhea 6/26/15    COPD (chronic obstructive pulmonary disease) (HCC)     COPD exacerbation (HCC)     Emphysema of lung (HCC)     Hyperlipidemia     Hypertension     O2 dependent     2 L/min at night    Pneumonia 2/5/2018    Primary cancer of right upper lobe of lung ([address] 3/11/2020    Rib pain     Sleep apnea     does not use cpap    Small bowel obstruction ([address]     pt denies     FAMILY HISTORY     Family History   Problem Relation Age of Onset    Cancer Mother     Heart Failure Father      SOCIAL HISTORY     Social History     Socioeconomic History    Marital status:       Spouse name: Not on file    Number of children: Not on file    Years of education: Not on file    Highest education level: Not on file   Occupational History    Not on file   Social Needs    Financial resource strain: Not on file    Food insecurity     Worry: Not on file     Inability: Not on file    Transportation needs     Medical: Not on file     Non-medical: Not on file   Tobacco Use    Smoking status: Former Smoker     Packs/day: 1.00     Years: 50.00     Pack years: 50.00     Types: Cigarettes     Quit date: into the lungs every 6 hours as needed for Wheezing or Shortness of Breath 1 Inhaler 6    fluticasone-umeclidin-vilant (TRELEGY ELLIPTA) 100-62.5-25 MCG/INH AEPB Inhale 1 puff into the lungs daily 28 each 5    dilTIAZem (CARDIZEM CD) 120 MG extended release capsule TAKE 1 CAPSULE BY MOUTH EVERY DAY 90 capsule 3    ezetimibe (ZETIA) 10 MG tablet TAKE 1 TABLET BY MOUTH EVERY DAY 90 tablet 3    apixaban (ELIQUIS) 5 MG TABS tablet Take 1 tablet by mouth 2 times daily 180 tablet 3    gabapentin (NEURONTIN) 400 MG capsule Take 600 mg by mouth 2 times daily.  albuterol (PROVENTIL) (2.5 MG/3ML) 0.083% nebulizer solution Take 3 mLs by nebulization every 6 hours as needed for Wheezing 120 each 5    HYDROcodone-acetaminophen (NORCO) 7.5-325 MG per tablet TAKE 1 TABLET BY MOUTH 3 TIMES A DAY AS NEEDED FOR PAIN      nystatin (MYCOSTATIN) 251233 UNIT/GM cream       Fexofenadine HCl (MUCINEX ALLERGY PO) Take by mouth daily      topiramate (TOPAMAX) 50 MG tablet Take 50 mg by mouth nightly      ferrous sulfate 325 (65 Fe) MG tablet Take 325 mg by mouth daily (with breakfast)      rOPINIRole (REQUIP) 2 MG tablet Take 3 mg by mouth nightly       venlafaxine (EFFEXOR-XR) 150 MG XR capsule Take 150 mg by mouth daily      Multiple Vitamins-Minerals (THERAPEUTIC MULTIVITAMIN-MINERALS) tablet Take 1 tablet by mouth daily      QUEtiapine (SEROQUEL XR) 300 MG XR tablet Take 300 mg by mouth nightly       OXYGEN Inhale into the lungs Indications: 2.5 L nightly          ALLERGIES     Allergies   Allergen Reactions    Buspirone Other (See Comments)     unsure    Doxycycline      Stomach pain and sickness    Lisinopril Other (See Comments)     Low blood pressure per patient 59/29     Penicillins Nausea Only       REVIEW OF SYSTEMS   No chest pain suspicious for cardiac origin  SOB    PHYSICAL EXAM   VITAL SIGNS: There were no vitals taken for this visit.   Wt Readings from Last 1 Encounters:   01/29/21 196 lb (88.9 kg) FINAL IMPRESSION AND RECOMMENDATIONS     The patient has high risks of an EGD given her situation. I have counseled her about this. This is especially if she has achalasia. She gave me an impression that her esophageal issues could be related to the pulmonary ones. I have ordered an X-ray of the chest to see if there is real risk of fluid collection in the esophagus which will require special precautions for an EGD. The patient agrees to get a preliminary X-ray chest. We discussed the procedure of EGD with dilation. She is quite familiar with the procedure. The total time spent face-to-face during this visit and before and after the visit was 30 minutes with more than 50% of the time spent in coordination of care and counseling the patient about the medical conditions and their management. Yoselyn Llanos 4/10/21 1:29 PM EDT    CC:  TORIN SANTANA PA-C      IMPORTANT: Please note that some portions of this note may have been created using Dragon voice recognition software. Some \"sound-alike\" and totally wrong word substitutions may have taken place due to known inherent limitations of any such software, including this voice recognition software. In spite of efforts to eliminate such errors, some may not have been corrected. So please read the note with this in mind and recognize such mistakes and understand the correct version using the  context. Thanks.

## 2021-04-12 ENCOUNTER — TELEPHONE (OUTPATIENT)
Dept: GASTROENTEROLOGY | Age: 71
End: 2021-04-12

## 2021-04-12 NOTE — TELEPHONE ENCOUNTER
----- Message from Noy Bo MD sent at 4/10/2021  1:39 PM EDT -----  Regarding: Needs to be scheduled for an office appointment  The patient was seen recently. Schedule her in 2-3 weeks.

## 2021-04-13 ENCOUNTER — TELEPHONE (OUTPATIENT)
Dept: PULMONOLOGY | Age: 71
End: 2021-04-13

## 2021-04-13 NOTE — TELEPHONE ENCOUNTER
Patient is calling to see what Dr. Sylvia Nielsen thoughts are on on the [address] [facility] valve. Please advise    LOV: 3/22/21    Assessment:   · Severe COPD   · Abnormal CT chest 3/15/2020. Postoperative changes. Improvement on repeat CT 9/16/2020. Not significantly changed on CT 3/16/2021. · FRANCISCO elongated nodule 20 x 8 mm. SUV 7.3 on PET scan 2/6/2020. Post RUL resection 3/11/2020 non-small cell lung cancer. [id number] stage IA  · Abnormal esophagus on CT - fluid is seen in the esophagus   · Hypoxia on exertion  · Right traumatic displaced rib fractures with  hemothorax required VATS decortication and chest tube removed 2/14/18. · Moderate MEG. O2 3LPM at night. Refusing BiPAP uses O2   · PAF on Eliquis followed by cardiology   · >120 pack year smoking. Intolerance to chantix . Quit March 2020                Plan:   · Surveillance CT chest September 2021  · GI evaluation- patient will call her GI   · Advised to titrate O2 using her pulse oximeter- target O2 sat 90-92%.    · Continue Trelegy and Albuterol INH/Neb Q 4 hrs PRN   · Patient is up to date with Pneumococcal vaccine, influenza vaccine  · Interested in Covid vaccine and we will schedule  · Continue with smoking cessation  · Follow-up after CT chest

## 2021-04-26 ENCOUNTER — IMMUNIZATION (OUTPATIENT)
Dept: PRIMARY CARE CLINIC | Age: 71
End: 2021-04-26
Payer: MEDICARE

## 2021-04-26 PROCEDURE — 91300 COVID-19, PFIZER VACCINE 30MCG/0.3ML DOSE: CPT | Performed by: FAMILY MEDICINE

## 2021-04-26 PROCEDURE — 0002A COVID-19, PFIZER VACCINE 30MCG/0.3ML DOSE: CPT | Performed by: FAMILY MEDICINE

## 2021-04-27 ENCOUNTER — TELEPHONE (OUTPATIENT)
Dept: PULMONOLOGY | Age: 71
End: 2021-04-27

## 2021-04-27 ENCOUNTER — HOSPITAL ENCOUNTER (OUTPATIENT)
Age: 71
Discharge: HOME | End: 2021-04-27
Payer: MEDICARE

## 2021-04-27 ENCOUNTER — OFFICE VISIT (OUTPATIENT)
Dept: GASTROENTEROLOGY | Age: 71
End: 2021-04-27
Payer: MEDICARE

## 2021-04-27 ENCOUNTER — HOSPITAL ENCOUNTER (OUTPATIENT)
Dept: GENERAL RADIOLOGY | Age: 71
Discharge: HOME | End: 2021-04-27
Payer: MEDICARE

## 2021-04-27 VITALS
HEART RATE: 87 BPM | HEIGHT: 67 IN | WEIGHT: 192.8 LBS | SYSTOLIC BLOOD PRESSURE: 148 MMHG | DIASTOLIC BLOOD PRESSURE: 87 MMHG | BODY MASS INDEX: 30.26 KG/M2

## 2021-04-27 DIAGNOSIS — R05.9 COUGH: ICD-10-CM

## 2021-04-27 DIAGNOSIS — K21.00 GASTROESOPHAGEAL REFLUX DISEASE WITH ESOPHAGITIS, UNSPECIFIED WHETHER HEMORRHAGE: Primary | ICD-10-CM

## 2021-04-27 PROCEDURE — U0003 INFECTIOUS AGENT DETECTION BY NUCLEIC ACID (DNA OR RNA); SEVERE ACUTE RESPIRATORY SYNDROME CORONAVIRUS 2 (SARS-COV-2) (CORONAVIRUS DISEASE [COVID-19]), AMPLIFIED PROBE TECHNIQUE, MAKING USE OF HIGH THROUGHPUT TECHNOLOGIES AS DESCRIBED BY CMS-2020-01-R: HCPCS

## 2021-04-27 PROCEDURE — U0005 INFEC AGEN DETEC AMPLI PROBE: HCPCS

## 2021-04-27 PROCEDURE — 99213 OFFICE O/P EST LOW 20 MIN: CPT | Performed by: INTERNAL MEDICINE

## 2021-04-27 PROCEDURE — 71046 X-RAY EXAM CHEST 2 VIEWS: CPT

## 2021-04-27 RX ORDER — ALPRAZOLAM 0.25 MG/1
0.25 TABLET ORAL NIGHTLY PRN
COMMUNITY

## 2021-04-27 NOTE — PROGRESS NOTES
Not on file     Non-medical: Not on file   Tobacco Use    Smoking status: Former Smoker     Packs/day: 1.00     Years: 50.00     Pack years: 50.00     Types: Cigarettes     Quit date: 2020     Years since quittin.1    Smokeless tobacco: Never Used   Substance and Sexual Activity    Alcohol use: No     Alcohol/week: 0.0 standard drinks    Drug use: No    Sexual activity: Not Currently   Lifestyle    Physical activity     Days per week: Not on file     Minutes per session: Not on file    Stress: Not on file   Relationships    Social connections     Talks on phone: Not on file     Gets together: Not on file     Attends Muslim service: Not on file     Active member of club or organization: Not on file     Attends meetings of clubs or organizations: Not on file     Relationship status: Not on file    Intimate partner violence     Fear of current or ex partner: Not on file     Emotionally abused: Not on file     Physically abused: Not on file     Forced sexual activity: Not on file   Other Topics Concern    Not on file   Social History Narrative    Not on file     SURGICAL HISTORY     Past Surgical History:   Procedure Laterality Date    ANKLE ARTHROSCOPY      x2    APPENDECTOMY      BRONCHOSCOPY N/A 3/15/2020    BRONCHOSCOPY THERAPUTIC ASPIRATION INITIAL performed by Danny Weber MD at [address]  3/17/2020    [address] [place] [address] performed by Jaye Louis MD at [facility] [address]  Bilateral     THORACOSCOPY Right 3/11/2020    RIGHT VIDEO ASSISTED THORACOSCOPIC SURGERY; WEDGE EXCISION OF RIGHT UPPER LOBE FOLLOWED BY RIGHT UPPER LOBECTOMY; INTERCOSTAL NERVE BLOCK performed by Danny Weber MD at [address] [place] [address]   (This list may include medications prescribed during this encounter as epic can not insert only the list prior to this encounter.)  Current Outpatient Rx   Medication Sig Dispense Refill    ALPRAZolam (XANAX) 0.25 MG tablet Take 0.25 mg by mouth nightly as needed for Sleep.  albuterol sulfate HFA (VENTOLIN HFA) 108 (90 Base) MCG/ACT inhaler Inhale 2 puffs into the lungs every 6 hours as needed for Wheezing or Shortness of Breath 1 Inhaler 6    fluticasone-umeclidin-vilant (TRELEGY ELLIPTA) 100-62.5-25 MCG/INH AEPB Inhale 1 puff into the lungs daily 28 each 5    dilTIAZem (CARDIZEM CD) 120 MG extended release capsule TAKE 1 CAPSULE BY MOUTH EVERY DAY 90 capsule 3    ezetimibe (ZETIA) 10 MG tablet TAKE 1 TABLET BY MOUTH EVERY DAY 90 tablet 3    apixaban (ELIQUIS) 5 MG TABS tablet Take 1 tablet by mouth 2 times daily 180 tablet 3    gabapentin (NEURONTIN) 400 MG capsule Take 600 mg by mouth 2 times daily.       albuterol (PROVENTIL) (2.5 MG/3ML) 0.083% nebulizer solution Take 3 mLs by nebulization every 6 hours as needed for Wheezing 120 each 5    HYDROcodone-acetaminophen (NORCO) 7.5-325 MG per tablet TAKE 1 TABLET BY MOUTH 3 TIMES A DAY AS NEEDED FOR PAIN      nystatin (MYCOSTATIN) 109256 UNIT/GM cream       Fexofenadine HCl (MUCINEX ALLERGY PO) Take by mouth daily      ferrous sulfate 325 (65 Fe) MG tablet Take 325 mg by mouth daily (with breakfast)      rOPINIRole (REQUIP) 2 MG tablet Take 3 mg by mouth nightly       venlafaxine (EFFEXOR-XR) 150 MG XR capsule Take 150 mg by mouth daily      Multiple Vitamins-Minerals (THERAPEUTIC MULTIVITAMIN-MINERALS) tablet Take 1 tablet by mouth daily      QUEtiapine (SEROQUEL XR) 300 MG XR tablet Take 300 mg by mouth nightly       OXYGEN Inhale into the lungs Indications: 2.5 L nightly       topiramate (TOPAMAX) 50 MG tablet Take 50 mg by mouth nightly         ALLERGIES     Allergies   Allergen Reactions    Buspirone Other (See Comments)     unsure    Doxycycline      Stomach pain and sickness    Lisinopril Other (See Comments)     Low blood pressure per patient 59/29     Penicillins Nausea Only       REVIEW OF SYSTEMS   No chest pain suspicious for cardiac origin  SOB    PHYSICAL EXAM   VITAL SIGNS: BP (!) 148/87 (Site: Right Wrist)   Pulse 87   Ht 5' 7\" (1.702 m)   Wt 192 lb 12.8 oz (87.5 kg)   BMI 30.20 kg/m²   Wt Readings from Last 1 Encounters:   04/27/21 192 lb 12.8 oz (87.5 kg)     Constitutional: Well developed, Well nourished, No acute distress, Non-toxic appearance. Heart: WNL  Lungs: Clear to A&P, but reduced expansion. Abd: soft, non-tender, no masses are felt. Neurologic: Alert & oriented x 3. Psych: Good mood and memory. Pt is able to make appropriate decisions. FINAL IMPRESSION AND RECOMMENDATIONS     The patient has still not recovered from her respiratory setback. But when she does get better, she should have an EGD. A screening colonoscopy is also worth considering. EGD and colon after Dr. Ryan Pierre clears her. May be in Sept.  Acute attack of resp failure. The total time spent face-to-face during this visit and before and after the visit was 20 minutes with more than 50% of the time spent in coordination of care related to planning for an EGD and colonoscopy how she should wait longer and get better to get clearance by Dr. David Alberto before proceeding any of these endoscopies and counseling the patient about the problems with GERD, respiratory problems related to GERD and their management. Phyllis Samuels 4/28/21 11:45 PM EDT    CC:  TORIN SANTANA PA-C      IMPORTANT: Please note that some portions of this note may have been created using Dragon voice recognition software. Some \"sound-alike\" and totally wrong word substitutions may have taken place due to known inherent limitations of any such software, including this voice recognition software. In spite of efforts to eliminate such errors, some may not have been corrected.  So please read the note with this in mind and recognize such mistakes and understand the correct version using the  context. Thanks.

## 2021-04-27 NOTE — TELEPHONE ENCOUNTER
Pt called stating that PCP put her on Doxy 4/13/21 but pt is allergic to doxy. Pt asking for Dr. Marialuisa Pittman to call something in. Please advise. Do you have the following symptoms? Shortness of Breath  yes  Wheezing  Yes, a little  Cough  yes  Cough Characteristics:  Productive    yes  Sputum Color    Clear/cloudy  Hemoptysis   no  Consistency of sputum   thick     Fever:    no    Temp:n/a  Chills/Sweats:  no  What other symptoms are you having?:  fatigue    How long have you had these symptoms? 2 weeks     Pharmacy: [facility]          Review medications and allergies: Allergies? Doxy, PCN, Lisinopril, Buspar        Currently on Antibiotics? (Drug/Dose/Frequency and how long on?) none        Systemic Steroids? (Drug/Dose/Frequency and how long on?) none      Last sick call taken on n/a. Meds prescribed were n/a.    VV 3/22/21:    Assessment:   · Severe COPD   · Abnormal CT chest 3/15/2020. Postoperative changes. Improvement on repeat CT 9/16/2020. Not significantly changed on CT 3/16/2021. · FRANCISCO elongated nodule 20 x 8 mm. SUV 7.3 on PET scan 2/6/2020. Post RUL resection 3/11/2020 non-small cell lung cancer. [id number] stage IA  · Abnormal esophagus on CT - fluid is seen in the esophagus   · Hypoxia on exertion  · Right traumatic displaced rib fractures with  hemothorax required VATS decortication and chest tube removed 2/14/18. · Moderate MEG. O2 3LPM at night. Refusing BiPAP uses O2   · PAF on Eliquis followed by cardiology   · >120 pack year smoking. Intolerance to chantix . Quit March 2020                Plan:   · Surveillance CT chest September 2021  · GI evaluation- patient will call her GI   · Advised to titrate O2 using her pulse oximeter- target O2 sat 90-92%.    · Continue Trelegy and Albuterol INH/Neb Q 4 hrs PRN   · Patient is up to date with Pneumococcal vaccine, influenza vaccine  · Interested in Covid vaccine and we will schedule  · Continue with smoking cessation  · Follow-up after CT chest

## 2021-04-28 LAB — SARS-COV-2, PCR: NOT DETECTED

## 2021-06-10 ENCOUNTER — TELEPHONE (OUTPATIENT)
Dept: PULMONOLOGY | Age: 71
End: 2021-06-10

## 2021-06-10 DIAGNOSIS — J44.9 STAGE 3 SEVERE COPD BY GOLD CLASSIFICATION (HCC): Primary | ICD-10-CM

## 2021-06-10 NOTE — TELEPHONE ENCOUNTER
Pt called stating that she is going to be traveling to [facility] June 22-27 and she is needing to rent a concentrator. Pt said that she found a place called [facility] [employer] the phone number is [phone] - extension [phone] for Latonia Aguilera and the fax number is [phone]     LOV: 3/22/21  Assessment:   · Severe COPD   · Abnormal CT chest 3/15/2020. Postoperative changes. Improvement on repeat CT 9/16/2020. Not significantly changed on CT 3/16/2021. · FRANCISCO elongated nodule 20 x 8 mm. SUV 7.3 on PET scan 2/6/2020. Post RUL resection 3/11/2020 non-small cell lung cancer. [id number] stage IA  · Abnormal esophagus on CT - fluid is seen in the esophagus   · Hypoxia on exertion  · Right traumatic displaced rib fractures with  hemothorax required VATS decortication and chest tube removed 2/14/18. · Moderate MEG. O2 3LPM at night. Refusing BiPAP uses O2   · PAF on Eliquis followed by cardiology   · >120 pack year smoking. Intolerance to chantix . Quit March 2020                Plan:   · Surveillance CT chest September 2021  · GI evaluation- patient will call her GI   · Advised to titrate O2 using her pulse oximeter- target O2 sat 90-92%.    · Continue Trelegy and Albuterol INH/Neb Q 4 hrs PRN   · Patient is up to date with Pneumococcal vaccine, influenza vaccine  · Interested in Covid vaccine and we will schedule  · Continue with smoking cessation  · Follow-up after CT chest

## 2021-06-11 NOTE — TELEPHONE ENCOUNTER
LM asking Kaushik De La Garza to return the call to get specifications on what is needed on the order. LM on pt VM to update her on status.

## 2021-09-14 RX ORDER — FLUTICASONE FUROATE, UMECLIDINIUM BROMIDE AND VILANTEROL TRIFENATATE 100; 62.5; 25 UG/1; UG/1; UG/1
POWDER RESPIRATORY (INHALATION)
Qty: 60 EACH | Refills: 5 | Status: SHIPPED | OUTPATIENT
Start: 2021-09-14 | End: 2022-03-23 | Stop reason: SDUPTHER

## 2021-09-23 ENCOUNTER — HOSPITAL ENCOUNTER (OUTPATIENT)
Dept: CT IMAGING | Age: 71
Discharge: HOME | End: 2021-09-23
Payer: MEDICARE

## 2021-09-23 ENCOUNTER — OFFICE VISIT (OUTPATIENT)
Dept: PULMONOLOGY | Age: 71
End: 2021-09-23
Payer: MEDICARE

## 2021-09-23 VITALS
BODY MASS INDEX: 31.02 KG/M2 | SYSTOLIC BLOOD PRESSURE: 130 MMHG | HEIGHT: 66 IN | WEIGHT: 193 LBS | TEMPERATURE: 97.4 F | RESPIRATION RATE: 18 BRPM | DIASTOLIC BLOOD PRESSURE: 73 MMHG | HEART RATE: 83 BPM | OXYGEN SATURATION: 93 %

## 2021-09-23 DIAGNOSIS — R91.1 PULMONARY NODULE: ICD-10-CM

## 2021-09-23 DIAGNOSIS — R93.89 ABNORMAL CT OF THE CHEST: ICD-10-CM

## 2021-09-23 DIAGNOSIS — J44.9 COPD, SEVERE (HCC): Primary | ICD-10-CM

## 2021-09-23 DIAGNOSIS — R09.02 HYPOXIA: ICD-10-CM

## 2021-09-23 DIAGNOSIS — G47.33 MODERATE OBSTRUCTIVE SLEEP APNEA: ICD-10-CM

## 2021-09-23 PROCEDURE — 1123F ACP DISCUSS/DSCN MKR DOCD: CPT | Performed by: INTERNAL MEDICINE

## 2021-09-23 PROCEDURE — 1090F PRES/ABSN URINE INCON ASSESS: CPT | Performed by: INTERNAL MEDICINE

## 2021-09-23 PROCEDURE — 4040F PNEUMOC VAC/ADMIN/RCVD: CPT | Performed by: INTERNAL MEDICINE

## 2021-09-23 PROCEDURE — G8400 PT W/DXA NO RESULTS DOC: HCPCS | Performed by: INTERNAL MEDICINE

## 2021-09-23 PROCEDURE — 3023F SPIROM DOC REV: CPT | Performed by: INTERNAL MEDICINE

## 2021-09-23 PROCEDURE — 99214 OFFICE O/P EST MOD 30 MIN: CPT | Performed by: INTERNAL MEDICINE

## 2021-09-23 PROCEDURE — 3017F COLORECTAL CA SCREEN DOC REV: CPT | Performed by: INTERNAL MEDICINE

## 2021-09-23 PROCEDURE — 71250 CT THORAX DX C-: CPT

## 2021-09-23 PROCEDURE — 4004F PT TOBACCO SCREEN RCVD TLK: CPT | Performed by: INTERNAL MEDICINE

## 2021-09-23 PROCEDURE — G8427 DOCREV CUR MEDS BY ELIG CLIN: HCPCS | Performed by: INTERNAL MEDICINE

## 2021-09-23 PROCEDURE — G8417 CALC BMI ABV UP PARAM F/U: HCPCS | Performed by: INTERNAL MEDICINE

## 2021-09-23 PROCEDURE — G8926 SPIRO NO PERF OR DOC: HCPCS | Performed by: INTERNAL MEDICINE

## 2021-09-23 RX ORDER — ALBUTEROL SULFATE 2.5 MG/3ML
2.5 SOLUTION RESPIRATORY (INHALATION) EVERY 6 HOURS PRN
Qty: 120 EACH | Refills: 5 | Status: SHIPPED | OUTPATIENT
Start: 2021-09-23

## 2021-09-23 RX ORDER — ALBUTEROL SULFATE 90 UG/1
2 AEROSOL, METERED RESPIRATORY (INHALATION) EVERY 6 HOURS PRN
Qty: 18 G | Refills: 5 | Status: SHIPPED | OUTPATIENT
Start: 2021-09-23 | End: 2022-03-23 | Stop reason: SDUPTHER

## 2021-09-23 ASSESSMENT — SLEEP AND FATIGUE QUESTIONNAIRES
HOW LIKELY ARE YOU TO NOD OFF OR FALL ASLEEP WHILE SITTING INACTIVE IN A PUBLIC PLACE: 0
HOW LIKELY ARE YOU TO NOD OFF OR FALL ASLEEP WHILE LYING DOWN TO REST IN THE AFTERNOON WHEN CIRCUMSTANCES PERMIT: 3
HOW LIKELY ARE YOU TO NOD OFF OR FALL ASLEEP WHILE SITTING AND READING: 0
HOW LIKELY ARE YOU TO NOD OFF OR FALL ASLEEP WHILE SITTING AND TALKING TO SOMEONE: 0
ESS TOTAL SCORE: 5
NECK CIRCUMFERENCE (INCHES): 14.5
HOW LIKELY ARE YOU TO NOD OFF OR FALL ASLEEP WHILE SITTING QUIETLY AFTER LUNCH WITHOUT ALCOHOL: 0
HOW LIKELY ARE YOU TO NOD OFF OR FALL ASLEEP WHEN YOU ARE A PASSENGER IN A CAR FOR AN HOUR WITHOUT A BREAK: 0
HOW LIKELY ARE YOU TO NOD OFF OR FALL ASLEEP WHILE WATCHING TV: 2
HOW LIKELY ARE YOU TO NOD OFF OR FALL ASLEEP IN A CAR, WHILE STOPPED FOR A FEW MINUTES IN TRAFFIC: 0

## 2021-09-23 NOTE — PATIENT INSTRUCTIONS
awakening time, Even on weekends! You will feel better keeping a regular sleep cycle, even if you are retired or not working. Get into your favorite sleep position. If not asleep in 30 minutes, get up and do something boring until you feel sleepy. Remember not to expose yourself to bright lights such as TV, phone or tablet screens. Only use your bed for sleeping. Do not use your bed as an office, workroom or recreation room. Use comfortable bedding. Uncomfortable bedding can prevent good sleep. Ensure your bedroom is quiet and comfortable. A cooler room along with enough blankets to stay warm is recommended. If your room is too noisy, try a white noise machine. If too bright, try black out shades or an eye mask. Dont take worries to bed. Leave worries about work, school etc. behind you when you go to bed. Some people find it helpful to assign a worry period in the evening or late afternoon to write down your worries and get them out of your system.

## 2021-09-23 NOTE — PROGRESS NOTES
[facility]                                                            Outpatient Follow Up Note        CHIEF COMPLAINT: Follow-up CT chest     HPI:  CT chest reviewed by me and noted below. Results were dicussed with patient and multiple good questions were answered. Some cough with clear sputum  No hemoptysis   Uses Albuterol 0-2 times/day   Uses O2 3LPM at night   Smoking 1/2 ppd           Past Medical History:   Diagnosis Date    A-fib ([address]     Arthritis     Clostridium difficile diarrhea 6/26/15    COPD (chronic obstructive pulmonary disease) (HCC)     COPD exacerbation (HCC)     Emphysema of lung (HCC)     Hyperlipidemia     Hypertension     O2 dependent     2 L/min at night    Pneumonia 2/5/2018    Primary cancer of right upper lobe of lung ([facility] 3/11/2020    Rib pain     Sleep apnea     does not use cpap    Small bowel obstruction ([facility]     pt denies       Past Surgical History:        Procedure Laterality Date    ANKLE ARTHROSCOPY      x2    APPENDECTOMY      BRONCHOSCOPY N/A 3/15/2020    BRONCHOSCOPY THERAPUTIC ASPIRATION INITIAL performed by Michael Alexander MD at [address]  3/17/2020    BRONCHOSCOPY THERAPUTIC ASPIRATION INITIAL performed by Karuna Saleem MD at [address] [facility] Left     CHOLECYSTECTOMY      COLONOSCOPY      HYSTERECTOMY      ROTATOR CUFF REPAIR Bilateral     THORACOSCOPY Right 3/11/2020    RIGHT VIDEO ASSISTED THORACOSCOPIC SURGERY; WEDGE EXCISION OF RIGHT UPPER LOBE FOLLOWED BY RIGHT UPPER LOBECTOMY; INTERCOSTAL NERVE BLOCK performed by Michael Alexander MD at [address]         Allergies:  is allergic to buspirone, doxycycline, lisinopril, and penicillins. Social History:    TOBACCO:   reports that she has been smoking cigarettes. She has a 50.00 pack-year smoking history.  She has never used smokeless tobacco.  ETOH: reports no history of alcohol use. Family History:       Problem Relation Age of Onset    Cancer Mother     Heart Failure Father        Current Medications:    Current Outpatient Medications:     TRELEGY ELLIPTA 100-62.5-25 MCG/INH AEPB, INHALE 1 PUFF BY MOUTH EVERY DAY AS DIRECTED, Disp: 60 each, Rfl: 5    albuterol sulfate HFA (VENTOLIN HFA) 108 (90 Base) MCG/ACT inhaler, Inhale 2 puffs into the lungs every 6 hours as needed for Wheezing or Shortness of Breath, Disp: 1 Inhaler, Rfl: 6    albuterol (PROVENTIL) (2.5 MG/3ML) 0.083% nebulizer solution, Take 3 mLs by nebulization every 6 hours as needed for Wheezing, Disp: 120 each, Rfl: 5    Fexofenadine HCl (MUCINEX ALLERGY PO), Take by mouth daily, Disp: , Rfl:     OXYGEN, Inhale into the lungs Indications: 2.5 L nightly , Disp: , Rfl:     ALPRAZolam (XANAX) 0.25 MG tablet, Take 0.25 mg by mouth nightly as needed for Sleep., Disp: , Rfl:     dilTIAZem (CARDIZEM CD) 120 MG extended release capsule, TAKE 1 CAPSULE BY MOUTH EVERY DAY, Disp: 90 capsule, Rfl: 3    ezetimibe (ZETIA) 10 MG tablet, TAKE 1 TABLET BY MOUTH EVERY DAY, Disp: 90 tablet, Rfl: 3    apixaban (ELIQUIS) 5 MG TABS tablet, Take 1 tablet by mouth 2 times daily, Disp: 180 tablet, Rfl: 3    gabapentin (NEURONTIN) 400 MG capsule, Take 600 mg by mouth 2 times daily. , Disp: , Rfl:     HYDROcodone-acetaminophen (NORCO) 7.5-325 MG per tablet, TAKE 1 TABLET BY MOUTH 3 TIMES A DAY AS NEEDED FOR PAIN, Disp: , Rfl:     nystatin (MYCOSTATIN) 473061 UNIT/GM cream, , Disp: , Rfl:     topiramate (TOPAMAX) 50 MG tablet, Take 50 mg by mouth nightly, Disp: , Rfl:     ferrous sulfate 325 (65 Fe) MG tablet, Take 325 mg by mouth daily (with breakfast), Disp: , Rfl:     rOPINIRole (REQUIP) 2 MG tablet, Take 3 mg by mouth nightly , Disp: , Rfl:     venlafaxine (EFFEXOR-XR) 150 MG XR capsule, Take 150 mg by mouth daily, Disp: , Rfl:     Multiple Vitamins-Minerals (THERAPEUTIC MULTIVITAMIN-MINERALS) tablet, Take 1 tablet by mouth daily, Disp: , Rfl:     QUEtiapine (SEROQUEL XR) 300 MG XR tablet, Take 300 mg by mouth nightly , Disp: , Rfl:     Review of Systems    Objective:   Physical Exam  /73   Pulse 83   Temp 97.4 °F (36.3 °C)   Resp 18   Ht 5' 6\" (1.676 m)   Wt 193 lb (87.5 kg)   SpO2 93% Comment: with RA  BMI 31.15 kg/m²   Gen: No distress. Eyes: PERRL. No sclera icterus. No conjunctival injection. ENT: No discharge. Pharynx clear. Neck: Trachea midline. No obvious mass. Resp: No accessory muscle use. No crackles. No wheezes. No rhonchi. No dullness on percussion. Good air entry. CV: Regular rate. Regular rhythm. No murmur or rub. No edema. GI: Non-tender. Non-distended. No hernia. Skin: Warm and dry. No nodule on exposed extremities. Lymph: No cervical LAD. No supraclavicular LAD. M/S: No cyanosis. No joint deformity. No clubbing. Neuro: Awake. Alert. Moves all four extremities. Psych: Oriented x 3. No anxiety. DATA reviewed by me:   PFTs 02/04/2020 FVC 1.98(61%) FEV1 1.16(47%) FEV1/FVC 59 % TLC 4.11(76%)   DLCO 09.05(38%) 6MW 840 F LO2 90%  PFTs 08/19/2015 FVC 1.78(52%) FEV1 1.27(49%) FEV1/FVC 71 % TLC 4.52(82%)   DLCO 10.23(43%) 6MW 980 F LO2 89%  6MW 11/14/2019 720 feet Desat 88% 1L on exertin       CT chest 1/16/19  Multiple right-sided rib fractures-subacute  No acute intrathoracic abnormalities  Scattered areas of parenchymal opacities lower lobe likely atelectasis      CT chest 1/16/2020    Mediastinum: No enlarged lymph nodes. Normal caliber great vessels. Normal  heart size and pericardium. Suspect incidental benign lipomatous hypertrophy  of the interatrial septum. No significant coronary calcifications. Unremarkable esophagus and included thyroid.   Lungs/pleura: Nodular thickening in the subpleural right upper lobe on series  3, image 27; coronal image 101 measuring 20 x 8 mm in axial plane and 7 mm  craniocaudal.  This nodular opacity is low in attenuation values (water  density). Subtle tree-in-bud opacities seen on the prior exam in the right  upper lobe have resolved. These were likely infectious or inflammatory. Linear and bandlike areas of scarring and/or atelectasis at the right lung  base are not substantially changed. Decreased atelectasis at the medial left  lung base. Scattered punctate calcified granulomas. No pleural effusion. Upper Abdomen: Unremarkable. Soft Tissues/Bones: No enlarged axillary or supraclavicular lymph nodes. Multiple healed rib fractures bilaterally, some of which are ununited. PET scan 2/6/2020   1.9 cm lateral right upper lobe pulmonary nodule is hypermetabolic, to a  degree concerning for malignancy until proven otherwise. No findings of FDG avid metastatic disease. Inflammatory change related to healing posterolateral left 8th rib fracture. Lipomatous hypertrophy of the interatrial septum, an incidental finding. Fluid loculated within fissure within the lateral right mid lung demonstrates  low level activity, likely inflammatory. CT chest 9/16/2020   Pulmonary emphysema  Interval removal of right-sided chest tube  Gas and fluid collection at the right apex decrease in size  Mild bronchial wall thickening, bronchiectasis, interlobular septal wall thickening in the right base improved from prior exam  Resolution of groundglass opacities on the left side    CT chest 3/16/2021 imaging reviewed by me and showed  Increased ground-glass opacities in the left upper lobe, likely  postinflammatory-infectious. Punctate noncalcified pulmonary nodule left  lower lobe is unchanged  Persistent small pleural fluid collection in the right lung apex. Pleural  gas internally has decreased  Fluid is seen in the esophagus to above the level of thoracic inlet,  suggesting reflux or esophageal dysmotility. CT chest 9/23/21  images reviewed by me and showed:   Unchanged tiny 4 mm left lower lobe pulmonary nodule.   No further follow-up  recommended. Resolution of the ground-glass opacities in the left upper lobe. Resolution of the previous air and fluid in the pleural space at the right  apex. Stable postsurgical changes elsewhere. No new concerning findings on this examination. No evidence of acute  cardiopulmonary process. RUL lobectomy 3/11/2020  A. Wedge excision, right upper lobe lung lesion:       - Involved with non-small cell carcinoma, squamous cell carcinoma         with lesion 1.1 cm in greatest extent.       - Parenchymal resection margins are negative for malignancy.      - Overlying pleural surface shows no evidence of involvement with         malignancy.      - See case comment and synoptic report.       B. Regional lymph node excision, right 10:       - 3 lymph nodes with reactive changes and no evidence of metastatic         carcinoma ( 0\3 ).      - Pankeratin\CAM 5.2 stain is performed and supports diagnosis.         C. Right upper lobe lobectomy:       - Organizing biopsy cavity changes with extravasated blood and         reactive features. Hello how are you       - No residual malignancy identified.       - Bronchial margin, vascular margin and parenchymal margins are         negative for malignancy.      - 3 peribronchial lymph nodes with reactive changes and no evidence         of metastatic carcinoma (0/3).      - One intraparenchymal lymph node with no evidence of involvement         with malignancy.      - Pankeratin\CAM 5.2 stains were utilized to evaluate the lymph         nodes and supports final diagnosis.         D. Regional lymph node excision, right 4:       - Six lymph nodes with no evidence of metastatic carcinoma ( 0\6)       - Pankeratin\CAM 5.2 stains were utilized evaluated lymph nodes and         supports final diagnosis.         PSG 8/25/16 AHI 28.4 desaturation to 71%  BiPAP titration 9/9/16 BiPAP 15/11 cmH2O      Assessment:      · Severe COPD   · Abnormal CT chest

## 2021-10-15 DIAGNOSIS — K21.9 GASTROESOPHAGEAL REFLUX DISEASE WITHOUT ESOPHAGITIS: ICD-10-CM

## 2021-10-15 DIAGNOSIS — Z12.11 COLON CANCER SCREENING: Primary | ICD-10-CM

## 2021-10-19 ENCOUNTER — HOSPITAL ENCOUNTER (OUTPATIENT)
Age: 71
Discharge: HOME | End: 2021-10-19
Payer: MEDICARE

## 2021-10-19 DIAGNOSIS — Z12.11 COLON CANCER SCREENING: ICD-10-CM

## 2021-10-19 DIAGNOSIS — K21.9 GASTROESOPHAGEAL REFLUX DISEASE WITHOUT ESOPHAGITIS: ICD-10-CM

## 2021-10-19 LAB — SARS-COV-2: NOT DETECTED

## 2021-10-19 PROCEDURE — U0005 INFEC AGEN DETEC AMPLI PROBE: HCPCS

## 2021-10-19 PROCEDURE — U0003 INFECTIOUS AGENT DETECTION BY NUCLEIC ACID (DNA OR RNA); SEVERE ACUTE RESPIRATORY SYNDROME CORONAVIRUS 2 (SARS-COV-2) (CORONAVIRUS DISEASE [COVID-19]), AMPLIFIED PROBE TECHNIQUE, MAKING USE OF HIGH THROUGHPUT TECHNOLOGIES AS DESCRIBED BY CMS-2020-01-R: HCPCS

## 2021-10-19 NOTE — PROGRESS NOTES
[facility] Preoperative Screening for Elective Surgery/Invasive Procedures While COVID-19 present in the community     Have you had any of the following symptoms? o Fever, chills  o Cough  o Shortness of breath  o Muscle aches/pain  o Diarrhea  o Abdominal pain, nausea, vomiting  o Loss or decrease in taste and / or smell   Risk of Exposure  o Have you recently been hospitalized for COVID-19 or flu-like illness, if so when?  o Recently diagnosed with COVID-19, if so when?  o Recently tested for COVID-19, if so when?  o Have you been in close contact with a person or family member who currently has or recently had COVID-19? If yes, when and in what context?  o Do you live with anybody who in the last 14 days has had fever, chills, shortness of breath, muscle aches, flu-like illness?  o Do you have any close contacts or family members who are currently in the hospital for COVID-19 or flu-like illness? If yes, assess recent close contact with this person. Indicate if the patient has a positive screen by answering yes to one or more of the above questions. Patients who test positive or screen positive prior to surgery or on the day of surgery should be evaluated in conjunction with the surgeon/proceduralist/anesthesiologist to determine the urgency of the procedure.

## 2021-10-20 ENCOUNTER — ANESTHESIA EVENT (OUTPATIENT)
Dept: ENDOSCOPY | Age: 71
End: 2021-10-20
Payer: MEDICARE

## 2021-10-22 ENCOUNTER — HOSPITAL ENCOUNTER (OUTPATIENT)
Age: 71
Setting detail: OUTPATIENT SURGERY
Discharge: HOME | End: 2021-10-22
Attending: INTERNAL MEDICINE | Admitting: INTERNAL MEDICINE
Payer: MEDICARE

## 2021-10-22 ENCOUNTER — ANESTHESIA (OUTPATIENT)
Dept: ENDOSCOPY | Age: 71
End: 2021-10-22
Payer: MEDICARE

## 2021-10-22 VITALS
HEIGHT: 66 IN | SYSTOLIC BLOOD PRESSURE: 124 MMHG | HEART RATE: 84 BPM | RESPIRATION RATE: 14 BRPM | DIASTOLIC BLOOD PRESSURE: 82 MMHG | OXYGEN SATURATION: 98 % | WEIGHT: 195 LBS | TEMPERATURE: 97.4 F | BODY MASS INDEX: 31.34 KG/M2

## 2021-10-22 VITALS
SYSTOLIC BLOOD PRESSURE: 147 MMHG | RESPIRATION RATE: 20 BRPM | DIASTOLIC BLOOD PRESSURE: 77 MMHG | OXYGEN SATURATION: 94 %

## 2021-10-22 PROCEDURE — 3609012400 HC EGD TRANSORAL BIOPSY SINGLE/MULTIPLE: Performed by: INTERNAL MEDICINE

## 2021-10-22 PROCEDURE — 3609010300 HC COLONOSCOPY W/BIOPSY SINGLE/MULTIPLE: Performed by: INTERNAL MEDICINE

## 2021-10-22 PROCEDURE — 7100000010 HC PHASE II RECOVERY - FIRST 15 MIN: Performed by: INTERNAL MEDICINE

## 2021-10-22 PROCEDURE — 2709999900 HC NON-CHARGEABLE SUPPLY: Performed by: INTERNAL MEDICINE

## 2021-10-22 PROCEDURE — 3700000001 HC ADD 15 MINUTES (ANESTHESIA): Performed by: INTERNAL MEDICINE

## 2021-10-22 PROCEDURE — 88305 TISSUE EXAM BY PATHOLOGIST: CPT

## 2021-10-22 PROCEDURE — 88342 IMHCHEM/IMCYTCHM 1ST ANTB: CPT

## 2021-10-22 PROCEDURE — 88341 IMHCHEM/IMCYTCHM EA ADD ANTB: CPT

## 2021-10-22 PROCEDURE — 2580000003 HC RX 258: Performed by: ANESTHESIOLOGY

## 2021-10-22 PROCEDURE — 6360000002 HC RX W HCPCS: Performed by: NURSE ANESTHETIST, CERTIFIED REGISTERED

## 2021-10-22 PROCEDURE — 7100000011 HC PHASE II RECOVERY - ADDTL 15 MIN: Performed by: INTERNAL MEDICINE

## 2021-10-22 PROCEDURE — 3700000000 HC ANESTHESIA ATTENDED CARE: Performed by: INTERNAL MEDICINE

## 2021-10-22 RX ORDER — LIDOCAINE HYDROCHLORIDE 10 MG/ML
1 INJECTION, SOLUTION EPIDURAL; INFILTRATION; INTRACAUDAL; PERINEURAL
Status: DISCONTINUED | OUTPATIENT
Start: 2021-10-22 | End: 2021-10-22 | Stop reason: HOSPADM

## 2021-10-22 RX ORDER — PROPOFOL 10 MG/ML
INJECTION, EMULSION INTRAVENOUS PRN
Status: DISCONTINUED | OUTPATIENT
Start: 2021-10-22 | End: 2021-10-22 | Stop reason: SDUPTHER

## 2021-10-22 RX ORDER — SODIUM CHLORIDE 0.9 % (FLUSH) 0.9 %
10 SYRINGE (ML) INJECTION PRN
Status: DISCONTINUED | OUTPATIENT
Start: 2021-10-22 | End: 2021-10-22 | Stop reason: HOSPADM

## 2021-10-22 RX ORDER — SODIUM CHLORIDE, SODIUM LACTATE, POTASSIUM CHLORIDE, CALCIUM CHLORIDE 600; 310; 30; 20 MG/100ML; MG/100ML; MG/100ML; MG/100ML
INJECTION, SOLUTION INTRAVENOUS CONTINUOUS
Status: DISCONTINUED | OUTPATIENT
Start: 2021-10-22 | End: 2021-10-22 | Stop reason: DRUGHIGH

## 2021-10-22 RX ORDER — SODIUM CHLORIDE, SODIUM LACTATE, POTASSIUM CHLORIDE, CALCIUM CHLORIDE 600; 310; 30; 20 MG/100ML; MG/100ML; MG/100ML; MG/100ML
INJECTION, SOLUTION INTRAVENOUS CONTINUOUS
Status: DISCONTINUED | OUTPATIENT
Start: 2021-10-22 | End: 2021-10-22 | Stop reason: HOSPADM

## 2021-10-22 RX ORDER — SODIUM CHLORIDE 0.9 % (FLUSH) 0.9 %
10 SYRINGE (ML) INJECTION EVERY 12 HOURS SCHEDULED
Status: DISCONTINUED | OUTPATIENT
Start: 2021-10-22 | End: 2021-10-22 | Stop reason: HOSPADM

## 2021-10-22 RX ORDER — SODIUM CHLORIDE 9 MG/ML
25 INJECTION, SOLUTION INTRAVENOUS PRN
Status: DISCONTINUED | OUTPATIENT
Start: 2021-10-22 | End: 2021-10-22

## 2021-10-22 RX ADMIN — PROPOFOL 650 MG: 10 INJECTION, EMULSION INTRAVENOUS at 08:57

## 2021-10-22 RX ADMIN — SODIUM CHLORIDE, POTASSIUM CHLORIDE, SODIUM LACTATE AND CALCIUM CHLORIDE: 600; 310; 30; 20 INJECTION, SOLUTION INTRAVENOUS at 08:53

## 2021-10-22 ASSESSMENT — PAIN SCALES - GENERAL
PAINLEVEL_OUTOF10: 0
PAINLEVEL_OUTOF10: 0

## 2021-10-22 NOTE — PROGRESS NOTES
Instructions given to henry. She verbalizes understanding of instructions. pt escorted to vehicle via w/c in good condition.

## 2021-10-22 NOTE — ANESTHESIA PRE PROCEDURE
Department of Anesthesiology  Preprocedure Note       Name:  Lynne Bonilla   Age:  66 y.o.  :  1950                                          MRN:  [id number]         Date:  10/22/2021      Surgeon: Lonna Frankel):  Rosie Weathers MD    Procedure: Procedure(s):  EGD W/ANES. (9:00)  COLON W/ANES. Medications prior to admission:   Prior to Admission medications    Medication Sig Start Date End Date Taking? Authorizing Provider   albuterol sulfate HFA (VENTOLIN HFA) 108 (90 Base) MCG/ACT inhaler Inhale 2 puffs into the lungs every 6 hours as needed for Wheezing or Shortness of Breath 21  Yes Marty Wallace MD   albuterol (PROVENTIL) (2.5 MG/3ML) 0.083% nebulizer solution Take 3 mLs by nebulization every 6 hours as needed for Wheezing 21  Yes Marty Wallace MD   TRELEGY ELLIPTA 100-62.5-25 MCG/INH AEPB INHALE 1 PUFF BY MOUTH EVERY DAY AS DIRECTED 21  Yes Marty Wallace MD   ALPRAZolam (XANAX) 0.25 MG tablet Take 0.25 mg by mouth nightly as needed for Sleep. Yes Historical Provider, MD   dilTIAZem (CARDIZEM CD) 120 MG extended release capsule TAKE 1 CAPSULE BY MOUTH EVERY DAY 21  Yes Steph Fernandez MD   ezetimibe (ZETIA) 10 MG tablet TAKE 1 TABLET BY MOUTH EVERY DAY 21  Yes Steph Fernandez MD   gabapentin (NEURONTIN) 400 MG capsule Take 600 mg by mouth 2 times daily.    Yes Historical Provider, MD   HYDROcodone-acetaminophen (NORCO) 7.5-325 MG per tablet TAKE 1 TABLET BY MOUTH 3 TIMES A DAY AS NEEDED FOR PAIN 20  Yes Historical Provider, MD   nystatin (MYCOSTATIN) 542171 UNIT/GM cream  20  Yes Historical Provider, MD   Fexofenadine HCl (MUCINEX ALLERGY PO) Take by mouth daily   Yes Historical Provider, MD   ferrous sulfate 325 (65 Fe) MG tablet Take 325 mg by mouth daily (with breakfast)   Yes Historical Provider, MD   rOPINIRole (REQUIP) 2 MG tablet Take 3 mg by mouth nightly    Yes Historical Provider, MD   venlafaxine (EFFEXOR-XR) 150 MG XR capsule Take 150 mg by mouth daily   Yes Historical Provider, MD   Multiple Vitamins-Minerals (THERAPEUTIC MULTIVITAMIN-MINERALS) tablet Take 1 tablet by mouth daily   Yes Historical Provider, MD   QUEtiapine (SEROQUEL XR) 300 MG XR tablet Take 500 mg by mouth nightly    Yes Historical Provider, MD   OXYGEN Inhale into the lungs Indications: 2.5 L nightly    Yes Historical Provider, MD   apixaban (ELIQUIS) 5 MG TABS tablet Take 1 tablet by mouth 2 times daily 1/29/21   Nikkie Huddleston MD       Current medications:    Current Facility-Administered Medications   Medication Dose Route Frequency Provider Last Rate Last Admin    lactated ringers infusion   IntraVENous Continuous Aide Johnson MD        sodium chloride flush 0.9 % injection 10 mL  10 mL IntraVENous 2 times per day Aide Johnson MD        sodium chloride flush 0.9 % injection 10 mL  10 mL IntraVENous PRN Aide Johnson MD        lidocaine PF 1 % injection 1 mL  1 mL IntraDERmal Once PRN Aide Johnson MD           Allergies:     Allergies   Allergen Reactions    Buspirone Other (See Comments)     unsure    Doxycycline      Stomach pain and sickness    Lisinopril Other (See Comments)     Low blood pressure per patient 59/29     Penicillins Nausea Only       Problem List:    Patient Active Problem List   Diagnosis Code    CHF (congestive heart failure) ([facility]) I50.9    COPD (chronic obstructive pulmonary disease) ([facility]) J44.9    Hyperlipidemia E78.5    Essential hypertension I10    Diarrhea R19.7    Diabetes mellitus type 2 in obese ([facility]) E11.69, E66.9    Pulmonary edema J81.1    Sepsis ([facility] A41.9    C. difficile colitis A04.72    NSTEMI (non-ST elevated myocardial infarction) [facility] I21.4    COPD exacerbation ([facility]) J44.1    Disorder of electrolytes E87.8    Unstable angina pectoris ([facility] I20.0    MEG (obstructive sleep apnea) G47.33    Obesity, Class III, BMI 40-49.9 (morbid obesity) ([facility]) E66.01    Acute respiratory failure with hypoxemia ([facility]) J96.01    Pneumonia of right lower lobe due to infectious organism J18.9    Acute hypercapnic respiratory failure (HCC) J96.02    Hemothorax on right J94.2    Right rib fracture S22.31XA    Atelectasis J98.11    Simple chronic bronchitis (HCC) J41.0    Multiple closed fractures of ribs of right side S22.41XA    Obesity (BMI 35.0-39.9 without comorbidity) E66.9    S/P chest tube placement (HCC) Z93.8    Paroxysmal atrial fibrillation (HCC) I48.0    Tobacco abuse Z72.0    Primary cancer of right upper lobe of lung (HCC) C34.11    Nodule of right lung R91.1    Non-small cell lung cancer (HCC) C34.90    Acute hypoxemic respiratory failure (HCC) J96.01    Pulmonary congestion R09.89    Left-sided chest pain R07.9       Past Medical History:        Diagnosis Date    A-fib [facility]     Arthritis     Clostridium difficile diarrhea 6/26/15    COPD [facility]) [facility])     COPD exacerbation (HCC)     Emphysema of lung (HCC)     Hyperlipidemia     Hypertension     O2 dependent     2 L/min at night    Pneumonia 2/5/2018    Primary cancer of right upper lobe of lung ([facility] 3/11/2020    Rib pain     Sleep apnea     does not use cpap    Small bowel obstruction ([facility]     pt denies       Past Surgical History:        Procedure Laterality Date    ANKLE ARTHROSCOPY      x2    APPENDECTOMY      BRONCHOSCOPY N/A 3/15/2020    BRONCHOSCOPY THERAPUTIC ASPIRATION INITIAL performed by Arlen Hart MD [facility]  3/17/2020    BRONCHOSCOPY THERAPUTIC ASPIRATION INITIAL performed by Ruperto Nguyen MD at [address] [facility] Left     CHOLECYSTECTOMY      COLONOSCOPY      HYSTERECTOMY      ROTATOR CUFF REPAIR Bilateral     THORACOSCOPY Right 3/11/2020    RIGHT VIDEO ASSISTED THORACOSCOPIC SURGERY; WEDGE EXCISION OF RIGHT UPPER LOBE FOLLOWED BY RIGHT UPPER LOBECTOMY; INTERCOSTAL NERVE BLOCK performed by Arlen Hart MD at [address] Social History:    Social History     Tobacco Use    Smoking status: Current Every Day Smoker     Packs/day: 0.50     Years: 50.00     Pack years: 25.00     Types: Cigarettes    Smokeless tobacco: Never Used   Substance Use Topics    Alcohol use: No     Alcohol/week: 0.0 standard drinks                                Ready to quit: Not Answered  Counseling given: Not Answered      Vital Signs (Current):   Vitals:    10/19/21 0942 10/22/21 0814   BP:  (!) 159/93   Pulse:  87   Resp:  20   Temp:  97 °F (36.1 °C)   TempSrc:  Temporal   SpO2:  96%   Weight: 195 lb (88.5 kg)    Height: 5' 6\" (1.676 m)                                               BP Readings from Last 3 Encounters:   10/22/21 (!) 159/93   09/23/21 130/73   04/27/21 (!) 148/87       NPO Status: Time of last liquid consumption: 2000                        Time of last solid consumption: 1730                        Date of last liquid consumption: 10/21/21                        Date of last solid food consumption: 10/21/21    BMI:   Wt Readings from Last 3 Encounters:   10/19/21 195 lb (88.5 kg)   09/23/21 193 lb (87.5 kg)   04/27/21 192 lb 12.8 oz (87.5 kg)     Body mass index is 31.47 kg/m².     CBC:   Lab Results   Component Value Date    WBC 8.8 05/14/2020    RBC 4.17 05/14/2020    HGB 12.5 05/14/2020    HCT 39.3 05/14/2020    MCV 94.3 05/14/2020    RDW 14.0 05/14/2020     05/14/2020       CMP:   Lab Results   Component Value Date     02/04/2021    K 4.5 02/04/2021    K 4.0 05/14/2020     02/04/2021    CO2 25 02/04/2021    BUN 15 02/04/2021    CREATININE 0.6 02/04/2021    GFRAA >60 02/04/2021    AGRATIO 1.2 02/04/2021    LABGLOM >60 02/04/2021    GLUCOSE 96 02/04/2021    PROT 7.8 02/04/2021    CALCIUM 10.1 02/04/2021    BILITOT 0.3 02/04/2021    ALKPHOS 128 02/04/2021    AST 20 02/04/2021    ALT 22 02/04/2021       POC Tests: No results for input(s): POCGLU, POCNA, POCK, POCCL, POCBUN, POCHEMO, POCHCT in the last 72

## 2021-10-22 NOTE — OP NOTE
Operative Note      Patient: Lety Joaquin  YOB: 1950  MRN: [id number]    Date of Procedure: 10/22/2021    Pre-Op Diagnosis: GERD and dysphagia, COLON CANCER SCREENING     Post-Op Diagnosis:   Gastritis. Gastric polyps. HH and esophagitis. Two small, sessile polyps were removed - one from the cecum and the other, from transverse colon. Hemorrhoids. Procedure(s):  EGD BIOPSY  COLONOSCOPY WITH BIOPSY    Surgeon(s):  Vania Zelaya MD    Assistant:   * No surgical staff found *    Anesthesia: Monitor Anesthesia Care    Estimated Blood Loss (mL): <36GI    Complications: None    Specimens:   ID Type Source Tests Collected by Time Destination   A :  Tissue Biopsy SURGICAL PATHOLOGY Vania Zelaya MD 10/22/2021 0902    B :  Tissue Biopsy SURGICAL PATHOLOGY Vania Zelaya MD 10/22/2021 4585    C :  Tissue Biopsy SURGICAL PATHOLOGY Vania Zelaya MD 10/22/2021 8732    D :  Tissue Biopsy SURGICAL PATHOLOGY Vania Zelaya MD 10/22/2021 0919    E :  Tissue Biopsy SURGICAL PATHOLOGY Vania Zelaya MD 10/22/2021 9463        Implants:  * No implants in log *      Drains: * No LDAs found *    Procedure Details  The patient was placed in the left lateral decubitus position. A bite block was placed. The patient was monitored with ECG tracing, pulse oximetry, blood pressure monitoring, and direct observation. An upper endoscope was inserted through the bite into the mouth and advanced under direct vision to into the esophagus and gradually to the duodenum. A careful inspection was made during the procedure including a retroflexed view of the proximal stomach including views of the incisura and cardia. The findings and interventions are described below. Findings:   A small polyp was noted in larynx on a quick exam.   Grade M esophagitis involving the lower one third of the esophagus. There was small sliding hiatal hernia from 39 cm to 41 cm from incisors. There was irregular Z-line.  The streaks of esophagitis had mucosa suspicious for ultra-short Win's. Biopsies were taken. Stomach: On retroverted view Hill type I small sliding hiatal hernia was found. Frothy gastric content, edwige in antrum. Diffuse moderate non-erosive gastritis - Bxed  Multiple fundic gland type-looking <1cm polyps (<20) were noted in the body of the stomach. One unusual-looking polyp of about 5mm was Bxed and appeared vascular. Duodenum: Normal mucosa    Specimens: See above. Complications/ adverse events:  None. Estimated Blood loss:  <10 ml    Recommendations:  Continue acid suppressive therapy and anti-reflux measures and life-style changes. Avoid irritants to the stomach such as NSAIDs and aspirin. Await path for final recommendation. But the patient should see an ENT specialist.    Colonoscopy Procedure Note    Procedure Details  The patient was placed in the left lateral decubitus position. The patient was monitored with ECG tracing, pulse oximetry, blood pressure monitoring, and direct observations. rectal examination was performed. The colonoscope was inserted through the anal canal into the rectum and advanced further to the cecum, which was identified by the ileocecal valve and appendiceal orifice. The colon was examined during insertion. The right colon was examined twice. The quality of the colonic preparation was fair. A careful inspection was made as the colonoscope was inserted and circumferentially when it was withdrawn. A retroflexed view of the rectum was obtained. Findings and interventions are described below. Findings:   Two small, sessile polyps were removed - one from the cecum and the other, from transverse colon. Early diverticulosis involving the sigmoid colon along with muscle hypertrophy  Small interno-external hemorrhoids with eversion of the rectal mucosa in one small area. Specimens: Please see above.     Complications and adverse events: None  None; patient tolerated the procedure well.    Estimated Blood loss:  < 15 ml    Withdrawal Time:  12 minutes    Recommendations:  High fiber diet. Increase water intake. A repeat colonoscopy recommendation follow the path. Disposition:   PACU - hemodynamically stable. Discharge from PACU when criteria are met.         Electronically signed by Jayda Torres MD on 10/22/2021 at 9:35 AM

## 2021-10-22 NOTE — H&P
Complete Pertinent H & P  ===================    C/C: Dysphagia. Colon cancer screening. HPI: Pt has had dysphagia and has not had a screening colonoscopy in last ten years. Because she had respiratory setback, the procedures were delayed. She is feeling better and is now cleared for the procedures under MAC. No chest pain or SOB. Past Medical History:   Diagnosis Date    A-fib [facility])     Arthritis     Clostridium difficile diarrhea 6/26/15    COPD (chronic obstructive pulmonary disease) (HCC)     COPD exacerbation (HCC)     Emphysema of lung (HCC)     Hyperlipidemia     Hypertension     O2 dependent     2 L/min at night    Pneumonia 2/5/2018    Primary cancer of right upper lobe of lung ([facility] [address] 3/11/2020    Rib pain     Sleep apnea     does not use cpap    Small bowel obstruction (HCC)     pt denies     Family History   Problem Relation Age of Onset    Cancer Mother     Heart Failure Father          Physical Exam:  A & O X3  Lungs: Clear to auscultation  Heart: WNL  Abd: soft, non-tender. BS:+. Plan:   EGD and Colonoscopy with possible Bx or polypectomy. Recently, during a visit the patient was explained why the above procedures are needed and what is involved with the above procedures in simple words along with its need, risks, benefits and alternatives. The prognosis is explained. The risks explained to the patient included, but were not limited to, bleeding, perforation, infection, suppression of breathing, heart attack, stroke, need for longer hospitalization and/or surgery and remotely even death. Increased chances of complications due to her respiratory issues and other problems are explained. The patient voiced the understanding of the above and was given opportunity to ask questions. No question was left unanswered. The informed consent for the above procedures is obtained - please see my last virtual/office visit note.

## 2021-10-23 NOTE — ANESTHESIA POSTPROCEDURE EVALUATION
Department of Anesthesiology  Postprocedure Note    Patient: Sherrell Del Valle  MRN: [id number]  YOB: 1950  Date of evaluation: 10/23/2021  Time:  4:08 PM     Procedure Summary     Date: 10/22/21 Room / Location: [address] [place] [address] 01 / [facility]    Anesthesia Start: 7804 Anesthesia Stop: 1917    Procedures:       EGD BIOPSY (N/A )      COLONOSCOPY WITH BIOPSY (N/A ) Diagnosis: (SCREENING, GERD)    Surgeons: Christin Juares MD Responsible Provider: Darrick Samuels MD    Anesthesia Type: MAC ASA Status: 3          Anesthesia Type: MAC    Harman Phase I: Harman Score: 10    Harman Phase II: Harman Score: 10    Last vitals: Reviewed and per EMR flowsheets.        Anesthesia Post Evaluation    Comments: Postoperative Anesthesia Note    Name:    Sherrell Del Valle  MRN:      [id number]    Patient Vitals in the past 12 hrs:     LABS:    CBC  Lab Results       Component                Value               Date/Time                  WBC                      8.8                 05/14/2020 06:04 AM        HGB                      12.5                05/14/2020 06:04 AM        HCT                      39.3                05/14/2020 06:04 AM        PLT                      299                 05/14/2020 06:04 AM   RENAL  Lab Results       Component                Value               Date/Time                  NA                       137                 02/04/2021 09:46 AM        K                        4.5                 02/04/2021 09:46 AM        K                        4.0                 05/14/2020 06:04 AM        CL                       102                 02/04/2021 09:46 AM        CO2                      25                  02/04/2021 09:46 AM        BUN                      15                  02/04/2021 09:46 AM        CREATININE               0.6                 02/04/2021 09:46 AM        GLUCOSE                  96                  02/04/2021 09:46 AM   COAGS  Lab Results       Component Value               Date/Time                  PROTIME                  12.1                05/14/2020 06:04 AM        INR                      1.04                05/14/2020 06:04 AM        APTT                     32.6                05/14/2020 06:04 AM     Intake & Output:  No intake/output data recorded. Nausea & Vomiting:  No    Level of Consciousness:  Awake    Pain Assessment:  Adequate analgesia    Anesthesia Complications:  No apparent anesthetic complications    SUMMARY      Vital signs stable  OK to discharge from Stage I post anesthesia care.   Care transferred from Anesthesiology department on discharge from perioperative area

## 2021-11-22 ENCOUNTER — TELEPHONE (OUTPATIENT)
Dept: PULMONOLOGY | Age: 71
End: 2021-11-22

## 2021-11-22 RX ORDER — PREDNISONE 10 MG/1
TABLET ORAL
Qty: 30 TABLET | Refills: 0 | Status: SHIPPED | OUTPATIENT
Start: 2021-11-22 | End: 2021-12-04

## 2021-11-22 NOTE — TELEPHONE ENCOUNTER
Medication pending. Patient informed on Dr. Jesus Alberto Aldana response with a verbal understanding.

## 2021-11-22 NOTE — TELEPHONE ENCOUNTER
Do you have the following symptoms? Shortness of Breath  Yes  Wheezing  No  Cough  Yes  Cough Characteristics:  Productive    No dry cough  Sputum Color    No  Hemoptysis   no  Consistency of sputum   na     Fever:    No    Temp:na  Chills/Sweats:  No  What other symptoms are you having?:  SOB overnight, no energy    How long have you had these symptoms? 2 days     Pharmacy: [facility]    Have you been vaccinated for covid? Yes          Review medications and allergies: Allergies? Allergies   Allergen Reactions    Buspirone Other (See Comments)     unsure    Doxycycline      Stomach pain and sickness    Lisinopril Other (See Comments)     Low blood pressure per patient 59/29     Penicillins Nausea Only             Currently on Antibiotics? (Drug/Dose/Frequency and how long on?) No        Systemic Steroids? (Drug/Dose/Frequency and how long on?) No      Last sick call taken on 4/27/21. Meds prescribed were none, by . Last OV 9/23/21 with Dr. Isabel Agustin   (pull in last visit note assessment/plan)   Assessment:   · Severe COPD   · Abnormal CT chest 3/15/2020. Postoperative changes. Improvement on repeat CT 9/16/2020. Not significantly changed on CT 3/16/2021. · FRANCISCO elongated nodule 20 x 8 mm. SUV 7.3 on PET scan 2/6/2020. Post RUL resection 3/11/2020 non-small cell lung cancer. [id number] stage IA  · Abnormal esophagus on CT - fluid is seen in the esophagus   · Hypoxia on exertion  · Right traumatic displaced rib fractures with  hemothorax required VATS decortication and chest tube removed 2/14/18. · Moderate MEG. O2 3LPM at night. Refusing BiPAP uses O2   · PAF on Eliquis followed by cardiology   · >120 pack year smoking. Intolerance to chantix . Quit March 2020                Plan:   · Surveillance CT chest March 2022  · Advised to titrate O2 using her pulse oximeter- target O2 sat 90-92%.    · Continue Trelegy and Albuterol INH/Neb Q 4 hrs PRN   · Patient is up to date with Covid, pneumococcal vaccine, influenza vaccine  · Smoking cessation counseling.    · Follow-up after CT chest

## 2022-01-06 ENCOUNTER — HOSPITAL ENCOUNTER (EMERGENCY)
Age: 72
Discharge: HOME | End: 2022-01-06
Attending: EMERGENCY MEDICINE
Payer: MEDICARE

## 2022-01-06 ENCOUNTER — APPOINTMENT (OUTPATIENT)
Dept: GENERAL RADIOLOGY | Age: 72
End: 2022-01-06
Payer: MEDICARE

## 2022-01-06 VITALS
RESPIRATION RATE: 27 BRPM | BODY MASS INDEX: 31.47 KG/M2 | WEIGHT: 195 LBS | TEMPERATURE: 97.4 F | SYSTOLIC BLOOD PRESSURE: 151 MMHG | OXYGEN SATURATION: 95 % | DIASTOLIC BLOOD PRESSURE: 117 MMHG | HEART RATE: 86 BPM

## 2022-01-06 DIAGNOSIS — J44.1 COPD EXACERBATION (HCC): Primary | ICD-10-CM

## 2022-01-06 LAB
A/G RATIO: 1.1 (ref 1.1–2.2)
ALBUMIN SERPL-MCNC: 3.5 G/DL (ref 3.4–5)
ALP BLD-CCNC: 99 U/L (ref 40–129)
ALT SERPL-CCNC: 21 U/L (ref 10–40)
ANION GAP SERPL CALCULATED.3IONS-SCNC: 6 MMOL/L (ref 3–16)
AST SERPL-CCNC: 13 U/L (ref 15–37)
BASE EXCESS VENOUS: 10.5 MMOL/L (ref -3–3)
BASOPHILS ABSOLUTE: 0.1 K/UL (ref 0–0.2)
BASOPHILS RELATIVE PERCENT: 0.5 %
BILIRUB SERPL-MCNC: 0.3 MG/DL (ref 0–1)
BUN BLDV-MCNC: 11 MG/DL (ref 7–20)
CALCIUM SERPL-MCNC: 8.9 MG/DL (ref 8.3–10.6)
CARBOXYHEMOGLOBIN: 2.5 % (ref 0–1.5)
CHLORIDE BLD-SCNC: 94 MMOL/L (ref 99–110)
CO2: 35 MMOL/L (ref 21–32)
CREAT SERPL-MCNC: <0.5 MG/DL (ref 0.6–1.2)
EKG ATRIAL RATE: 84 BPM
EKG DIAGNOSIS: NORMAL
EKG P AXIS: -12 DEGREES
EKG P-R INTERVAL: 144 MS
EKG Q-T INTERVAL: 400 MS
EKG QRS DURATION: 136 MS
EKG QTC CALCULATION (BAZETT): 472 MS
EKG R AXIS: 74 DEGREES
EKG T AXIS: 51 DEGREES
EKG VENTRICULAR RATE: 84 BPM
EOSINOPHILS ABSOLUTE: 0.2 K/UL (ref 0–0.6)
EOSINOPHILS RELATIVE PERCENT: 1.6 %
GFR AFRICAN AMERICAN: >60
GFR NON-AFRICAN AMERICAN: >60
GLUCOSE BLD-MCNC: 106 MG/DL (ref 70–99)
HCO3 VENOUS: 38.1 MMOL/L (ref 23–29)
HCT VFR BLD CALC: 33.8 % (ref 36–48)
HEMOGLOBIN: 11 G/DL (ref 12–16)
LACTIC ACID, SEPSIS: 0.7 MMOL/L (ref 0.4–1.9)
LYMPHOCYTES ABSOLUTE: 2.1 K/UL (ref 1–5.1)
LYMPHOCYTES RELATIVE PERCENT: 20 %
MCH RBC QN AUTO: 30.5 PG (ref 26–34)
MCHC RBC AUTO-ENTMCNC: 32.4 G/DL (ref 31–36)
MCV RBC AUTO: 94.1 FL (ref 80–100)
METHEMOGLOBIN VENOUS: 0.3 %
MONOCYTES ABSOLUTE: 0.8 K/UL (ref 0–1.3)
MONOCYTES RELATIVE PERCENT: 7.8 %
NEUTROPHILS ABSOLUTE: 7.2 K/UL (ref 1.7–7.7)
NEUTROPHILS RELATIVE PERCENT: 70.1 %
O2 CONTENT, VEN: 13 VOL %
O2 SAT, VEN: 74 %
O2 THERAPY: ABNORMAL
PCO2, VEN: 66.9 MMHG (ref 40–50)
PDW BLD-RTO: 14.1 % (ref 12.4–15.4)
PH VENOUS: 7.37 (ref 7.35–7.45)
PLATELET # BLD: 271 K/UL (ref 135–450)
PMV BLD AUTO: 7.1 FL (ref 5–10.5)
PO2, VEN: 41.7 MMHG (ref 25–40)
POTASSIUM REFLEX MAGNESIUM: 3.8 MMOL/L (ref 3.5–5.1)
PRO-BNP: 251 PG/ML (ref 0–124)
PROCALCITONIN: 0.02 NG/ML (ref 0–0.15)
RAPID INFLUENZA  B AGN: NEGATIVE
RAPID INFLUENZA A AGN: NEGATIVE
RBC # BLD: 3.59 M/UL (ref 4–5.2)
SARS-COV-2, NAAT: NOT DETECTED
SODIUM BLD-SCNC: 135 MMOL/L (ref 136–145)
TCO2 CALC VENOUS: 40 MMOL/L
TOTAL PROTEIN: 6.7 G/DL (ref 6.4–8.2)
TROPONIN: <0.01 NG/ML
WBC # BLD: 10.3 K/UL (ref 4–11)

## 2022-01-06 PROCEDURE — 99284 EMERGENCY DEPT VISIT MOD MDM: CPT

## 2022-01-06 PROCEDURE — 83605 ASSAY OF LACTIC ACID: CPT

## 2022-01-06 PROCEDURE — 87635 SARS-COV-2 COVID-19 AMP PRB: CPT

## 2022-01-06 PROCEDURE — 82803 BLOOD GASES ANY COMBINATION: CPT

## 2022-01-06 PROCEDURE — 71045 X-RAY EXAM CHEST 1 VIEW: CPT

## 2022-01-06 PROCEDURE — 87804 INFLUENZA ASSAY W/OPTIC: CPT

## 2022-01-06 PROCEDURE — 93005 ELECTROCARDIOGRAM TRACING: CPT | Performed by: PHYSICIAN ASSISTANT

## 2022-01-06 PROCEDURE — 84484 ASSAY OF TROPONIN QUANT: CPT

## 2022-01-06 PROCEDURE — 80053 COMPREHEN METABOLIC PANEL: CPT

## 2022-01-06 PROCEDURE — 85025 COMPLETE CBC W/AUTO DIFF WBC: CPT

## 2022-01-06 PROCEDURE — 6360000002 HC RX W HCPCS: Performed by: PHYSICIAN ASSISTANT

## 2022-01-06 PROCEDURE — 84145 PROCALCITONIN (PCT): CPT

## 2022-01-06 PROCEDURE — 6370000000 HC RX 637 (ALT 250 FOR IP): Performed by: PHYSICIAN ASSISTANT

## 2022-01-06 PROCEDURE — 96374 THER/PROPH/DIAG INJ IV PUSH: CPT

## 2022-01-06 PROCEDURE — 83880 ASSAY OF NATRIURETIC PEPTIDE: CPT

## 2022-01-06 PROCEDURE — 93010 ELECTROCARDIOGRAM REPORT: CPT | Performed by: INTERNAL MEDICINE

## 2022-01-06 PROCEDURE — 87040 BLOOD CULTURE FOR BACTERIA: CPT

## 2022-01-06 RX ORDER — DEXAMETHASONE SODIUM PHOSPHATE 10 MG/ML
10 INJECTION, SOLUTION INTRAMUSCULAR; INTRAVENOUS ONCE
Status: COMPLETED | OUTPATIENT
Start: 2022-01-06 | End: 2022-01-06

## 2022-01-06 RX ORDER — ALBUTEROL SULFATE 2.5 MG/3ML
2.5 SOLUTION RESPIRATORY (INHALATION) ONCE
Status: COMPLETED | OUTPATIENT
Start: 2022-01-06 | End: 2022-01-06

## 2022-01-06 RX ORDER — PREDNISONE 10 MG/1
60 TABLET ORAL DAILY
Qty: 30 TABLET | Refills: 0 | Status: SHIPPED | OUTPATIENT
Start: 2022-01-06 | End: 2022-01-11

## 2022-01-06 RX ORDER — AZITHROMYCIN 250 MG/1
500 TABLET, FILM COATED ORAL ONCE
Status: COMPLETED | OUTPATIENT
Start: 2022-01-06 | End: 2022-01-06

## 2022-01-06 RX ORDER — AZITHROMYCIN 250 MG/1
250 TABLET, FILM COATED ORAL SEE ADMIN INSTRUCTIONS
Qty: 6 TABLET | Refills: 0 | Status: SHIPPED | OUTPATIENT
Start: 2022-01-06 | End: 2022-01-11

## 2022-01-06 RX ORDER — IPRATROPIUM BROMIDE AND ALBUTEROL SULFATE 2.5; .5 MG/3ML; MG/3ML
1 SOLUTION RESPIRATORY (INHALATION) ONCE
Status: COMPLETED | OUTPATIENT
Start: 2022-01-06 | End: 2022-01-06

## 2022-01-06 RX ADMIN — IPRATROPIUM BROMIDE AND ALBUTEROL SULFATE 1 AMPULE: .5; 2.5 SOLUTION RESPIRATORY (INHALATION) at 13:29

## 2022-01-06 RX ADMIN — AZITHROMYCIN 500 MG: 250 TABLET, FILM COATED ORAL at 14:48

## 2022-01-06 RX ADMIN — ALBUTEROL SULFATE 2.5 MG: 2.5 SOLUTION RESPIRATORY (INHALATION) at 13:29

## 2022-01-06 RX ADMIN — DEXAMETHASONE SODIUM PHOSPHATE 10 MG: 10 INJECTION, SOLUTION INTRAMUSCULAR; INTRAVENOUS at 13:30

## 2022-01-06 ASSESSMENT — PAIN SCALES - GENERAL: PAINLEVEL_OUTOF10: 6

## 2022-01-06 ASSESSMENT — PAIN DESCRIPTION - LOCATION: LOCATION: BACK

## 2022-01-06 ASSESSMENT — PAIN DESCRIPTION - PAIN TYPE: TYPE: ACUTE PAIN

## 2022-01-06 ASSESSMENT — ENCOUNTER SYMPTOMS
CHEST TIGHTNESS: 1
COUGH: 1
GASTROINTESTINAL NEGATIVE: 1
SHORTNESS OF BREATH: 1

## 2022-01-06 NOTE — ED PROVIDER NOTES
[address] ED  EMERGENCY DEPARTMENT ENCOUNTER        Pt Name: Hugh Arriaga  MRN: [id number]  [place] 1950  Date of evaluation: 1/6/2022  Provider: Jaun Anton PA-C  PCP: Sasha Chadwick PA-C  Note Started: 12:02 PM EST        I have seen and evaluated this patient with my supervising physician David Messer MD.    [address]       Chief Complaint   Patient presents with    Shortness of Breath     sick since 19th    Cough       HISTORY OF PRESENT ILLNESS   (Location, Timing/Onset, Context/Setting, Quality, Duration, Modifying Factors, Severity, Associated Signs and Symptoms)  Note limiting factors. Chief Complaint: SOB; cough     Hugh Arriaga is a 66 y.o. female with a past medical history of cancer of the right lobe of the lung, A. fib COPD hypertension hyperlipidemia sleep apnea brought in today by private vehicle with complaints of shortness of breath and productive cough. Onset of symptoms over the past several weeks. Duration of symptoms have been persistent since onset. Context includes shortness of breath and productive cough. She has chest pain from coughing. Denies any fevers chills nausea vomiting diarrhea. She has tried her inhaler and has had to use her inhaler more frequently. In addition she has had to increase her oxygen requirement at home. She typically uses about 2 L and now is using about 4 L. This is 8 new change for her within the past several weeks. No aggravating symptoms. No alleviating symptoms. Otherwise denies any other complaints. She denies any known exposure to COVID-19. Nursing Notes were all reviewed and agreed with or any disagreements were addressed in the HPI. REVIEW OF SYSTEMS    (2-9 systems for level 4, 10 or more for level 5)     Review of Systems   Constitutional: Negative. HENT: Negative. Respiratory: Positive for cough, chest tightness and shortness of breath. Cardiovascular: Negative. Gastrointestinal: Negative. Genitourinary: Negative. Musculoskeletal: Negative. Skin: Negative. Neurological: Negative. Positives and Pertinent negatives as per HPI. Except as noted above in the ROS, all other systems were reviewed and negative.        PAST MEDICAL HISTORY     Past Medical History:   Diagnosis Date    A-fib [facility])     Arthritis     Clostridium difficile diarrhea 6/26/15    COPD (chronic obstructive pulmonary disease) (HCC)     COPD exacerbation (HCC)     Emphysema of lung (HCC)     Hyperlipidemia     Hypertension     O2 dependent     2 L/min at night    Pneumonia 2/5/2018    Primary cancer of right upper lobe of lung ([facility] 3/11/2020    Rib pain     Sleep apnea     does not use cpap    Small bowel obstruction ([facility] [address] [facility]     pt denies         SURGICAL HISTORY     Past Surgical History:   Procedure Laterality Date    ANKLE ARTHROSCOPY      x2    APPENDECTOMY      BRONCHOSCOPY N/A 3/15/2020    BRONCHOSCOPY THERAPUTIC ASPIRATION INITIAL performed by Shefali Carlson MD at [address] [place] [address]  3/17/2020    BRONCHOSCOPY THERAPUTIC ASPIRATION INITIAL performed by Aida Kuhn MD at [address] [facility] Left     CHOLECYSTECTOMY      COLONOSCOPY      COLONOSCOPY N/A 10/22/2021    COLONOSCOPY WITH BIOPSY performed by Jesse Cedeño MD at [facility] Bilateral     THORACOSCOPY Right 3/11/2020    RIGHT VIDEO ASSISTED THORACOSCOPIC SURGERY; WEDGE EXCISION OF RIGHT UPPER LOBE FOLLOWED BY RIGHT UPPER LOBECTOMY; INTERCOSTAL NERVE BLOCK performed by Shefali Carlson MD at [address] [place] [address] 10/22/2021    EGD BIOPSY performed by Jesse Cedeño MD at [address]       Previous Medications    ALBUTEROL (PROVENTIL) (2.5 MG/3ML) 0.083% NEBULIZER SOLUTION    Take 3 mLs by nebulization every 6 hours as needed for Wheezing ALBUTEROL SULFATE HFA (VENTOLIN HFA) 108 (90 BASE) MCG/ACT INHALER    Inhale 2 puffs into the lungs every 6 hours as needed for Wheezing or Shortness of Breath    ALPRAZOLAM (XANAX) 0.25 MG TABLET    Take 0.25 mg by mouth nightly as needed for Sleep. APIXABAN (ELIQUIS) 5 MG TABS TABLET    Take 1 tablet by mouth 2 times daily    DILTIAZEM (CARDIZEM CD) 120 MG EXTENDED RELEASE CAPSULE    TAKE 1 CAPSULE BY MOUTH EVERY DAY    EZETIMIBE (ZETIA) 10 MG TABLET    TAKE 1 TABLET BY MOUTH EVERY DAY    FERROUS SULFATE 325 (65 FE) MG TABLET    Take 325 mg by mouth daily (with breakfast)    FEXOFENADINE HCL (MUCINEX ALLERGY PO)    Take by mouth daily    GABAPENTIN (NEURONTIN) 400 MG CAPSULE    Take 600 mg by mouth 2 times daily. HYDROCODONE-ACETAMINOPHEN (NORCO) 7.5-325 MG PER TABLET    TAKE 1 TABLET BY MOUTH 3 TIMES A DAY AS NEEDED FOR PAIN    MULTIPLE VITAMINS-MINERALS (THERAPEUTIC MULTIVITAMIN-MINERALS) TABLET    Take 1 tablet by mouth daily    NYSTATIN (MYCOSTATIN) 757241 UNIT/GM CREAM        OXYGEN    Inhale into the lungs Indications: 2.5 L nightly     QUETIAPINE (SEROQUEL XR) 300 MG XR TABLET    Take 500 mg by mouth nightly     ROPINIROLE (REQUIP) 2 MG TABLET    Take 3 mg by mouth nightly     TRELEGY ELLIPTA 100-62.5-25 MCG/INH AEPB    INHALE 1 PUFF BY MOUTH EVERY DAY AS DIRECTED    VENLAFAXINE (EFFEXOR-XR) 150 MG XR CAPSULE    Take 150 mg by mouth daily         ALLERGIES     Buspirone, Doxycycline, Lisinopril, and Penicillins    FAMILYHISTORY       Family History   Problem Relation Age of Onset    Cancer Mother     Heart Failure Father           SOCIAL HISTORY       Social History     Tobacco Use    Smoking status: Current Every Day Smoker     Packs/day: 0.50     Years: 50.00     Pack years: 25.00     Types: Cigarettes    Smokeless tobacco: Never Used   Vaping Use    Vaping Use: Former    Substances: Always   Substance Use Topics    Alcohol use: No     Alcohol/week: 0.0 standard drinks    Drug use:  No SCREENINGS             PHYSICAL EXAM    (up to 7 for level 4, 8 or more for level 5)     ED Triage Vitals   BP Temp Temp Source Pulse Resp SpO2 Height Weight   01/06/22 1149 01/06/22 1148 01/06/22 1148 01/06/22 1149 01/06/22 1148 01/06/22 1149 -- 01/06/22 1148   (!) 150/78 97.4 °F (36.3 °C) Temporal 87 24 93 %  195 lb (88.5 kg)       Physical Exam  Vitals and nursing note reviewed. Constitutional:       General: She is awake. She is not in acute distress. Appearance: Normal appearance. She is well-developed and overweight. She is not ill-appearing, toxic-appearing or diaphoretic. Interventions: Nasal cannula in place. Comments: 4 L NC   HENT:      Head: Normocephalic and atraumatic. Nose: Nose normal.   Eyes:      General:         Right eye: No discharge. Left eye: No discharge. Cardiovascular:      Rate and Rhythm: Normal rate and regular rhythm. Pulses:           Radial pulses are 2+ on the right side and 2+ on the left side. Heart sounds: Normal heart sounds. No murmur heard. No gallop. Pulmonary:      Effort: Pulmonary effort is normal. No respiratory distress. Breath sounds: Decreased breath sounds and wheezing present. No rhonchi or rales. Chest:      Chest wall: No tenderness. Musculoskeletal:         General: No deformity. Normal range of motion. Cervical back: Normal range of motion and neck supple. Right lower leg: No edema. Left lower leg: No edema. Skin:     General: Skin is warm and dry. Neurological:      General: No focal deficit present. Mental Status: She is alert and oriented to person, place, and time. GCS: GCS eye subscore is 4. GCS verbal subscore is 5. GCS motor subscore is 6. Cranial Nerves: Cranial nerves are intact. Psychiatric:         Behavior: Behavior normal. Behavior is cooperative.          DIAGNOSTIC RESULTS   LABS:    Labs Reviewed   CBC WITH AUTO DIFFERENTIAL - Abnormal; Notable for the following components:       Result Value    RBC 3.59 (*)     Hemoglobin 11.0 (*)     Hematocrit 33.8 (*)     All other components within normal limits    Narrative:     Performed at:  [facility] [place],  [employer]   Phone [phone]   COMPREHENSIVE METABOLIC PANEL W/ REFLEX TO MG FOR LOW K - Abnormal; Notable for the following components:    Sodium 135 (*)     Chloride 94 (*)     CO2 35 (*)     Glucose 106 (*)     CREATININE <0.5 (*)     AST 13 (*)     All other components within normal limits    Narrative:     Performed at:  [place] [address],  [employer]   Phone [phone]   BRAIN NATRIURETIC PEPTIDE - Abnormal; Notable for the following components:    Pro- (*)     All other components within normal limits    Narrative:     Performed at:  [facility] [place],  [employer]   Phone [phone]   BLOOD GAS, VENOUS - Abnormal; Notable for the following components:    pCO2, Isak 66.9 (*)     pO2, Isak 41.7 (*)     HCO3, Venous 38.1 (*)     Base Excess, Isak 10.5 (*)     Carboxyhemoglobin 2.5 (*)     All other components within normal limits    Narrative:     Performed at:  [facility] [place],  [employer]   Phone [phone], RAPID    Narrative:     Performed at:  [facility],  [employer]   Phone [phone]   RAPID INFLUENZA A/B ANTIGENS    Narrative:     Performed at:  [facility],  [employer]   Phone [phone]   CULTURE, BLOOD 1   CULTURE, BLOOD 2   TROPONIN    Narrative:     Performed at:  [facility],  [employer]   Phone [phone]   PROCALCITONIN    Narrative:     Performed at:  [facility],  [place], [facility]   Phone [phone]   LACTATE, SEPSIS    Narrative:     1300  Performed at:  [facility],  [place], [facility]   Phone [phone]       When ordered only abnormal lab results are displayed. All other labs were within normal range or not returned as of this dictation. EKG: When ordered, EKG's are interpreted by the Emergency Department Physician in the absence of a cardiologist.  Please see their note for interpretation of EKG. RADIOLOGY:   Non-plain film images such as CT, Ultrasound and MRI are read by the radiologist. Plain radiographic images are visualized and preliminarily interpreted by the ED Provider with the below findings:        Interpretation per the Radiologist below, if available at the time of this note:    XR CHEST PORTABLE   Final Result   Chronic pleural thickening versus pleural fluid that appears stable from   prior study. Reticular and ground-glass opacities in the lower lung zones   are also relatively stable. Pattern may represent chronic interstitial   change. Pulmonary edema or recurrent pneumonitis may also be considered. No results found.         PROCEDURES   Unless otherwise noted below, none     Procedures    CRITICAL CARE TIME   N/A    CONSULTS:  None      EMERGENCY DEPARTMENT COURSE and DIFFERENTIAL DIAGNOSIS/MDM:   Vitals:    Vitals:    01/06/22 1148 01/06/22 1149   BP:  (!) 150/78   Pulse:  87   Resp: 24    Temp: 97.4 °F (36.3 °C)    TempSrc: Temporal    SpO2:  93%   Weight: 195 lb (88.5 kg)        Patient was given the following medications:  Medications   azithromycin (ZITHROMAX) tablet 500 mg (has no administration in time range)   ipratropium-albuterol (DUONEB) nebulizer solution 1 ampule (1 ampule Inhalation Given 1/6/22 1329)   albuterol (PROVENTIL) nebulizer solution 2.5 mg (2.5 mg Nebulization Given 1/6/22 1329) dexamethasone (PF) (DECADRON) injection 10 mg (10 mg IntraVENous Given 1/6/22 1330)           Patient brought in today by private vehicle with complaints of several week history of increased shortness of breath and productive cough. She also reports increased oxygen requirement at home. She typically uses 2 L and has been requiring 4 L. On exam she appears ill, afebrile breathing on her 4 L nasal cannula satting at 93%. No respiratory distress. Old labs and records reviewed. Patient seen by myself as well as my attending. Chest x-ray shows chronic pleural thickening versus pleural fluid that appears stable from prior study. Reticular and groundglass opacities in the lower lung zones are also relatively stable. Pattern may represent chronic interstitial change. Pulmonary edema or recurrent pneumonitis may also be considered. CBC shows no leukocytosis. Hemoglobin 11. Troponin less than 0.01. BNP of 251. VBG unremarkable. Procalcitonin is negative. Lactic acid 0.7. Covid is negative influenza is negative. Patient given azithromycin. I did have a discussion with the patient and via shared decision making at this time she does not want to be in the hospital and she would like to try and go home. Her work-up today is overall reassuring. She has had a slight increase in her oxygen at home. She was given breathing treatments and IV steroids here in the ED and reports some improvement of symptoms. She would like to go home with close follow-up to pulmonology. Will discharge home with steroids and azithromycin. She does have albuterol inhaler as well as nebulizer treatments at home. She was told to continue with these treatments in addition to the prednisone and azithromycin. Patient told return immediately to the ED if she developed any new or worsening symptoms including but not limited to increased shortness of breath difficulty breathing chest pain or any new or changing symptoms.  She verbalized understanding. I did feel comfortable sending this patient home with close follow-up instructions and strict return precautions. Patient discharged in stable condition.     FINAL IMPRESSION      1. COPD exacerbation ([address]          DISPOSITION/PLAN   DISPOSITION Discharge - Pending Orders Complete 01/06/2022 02:03:49 PM      PATIENT REFERRED TO:  Rachelle Luna MD  [address] [place] [address] [facility]  [phone]    Schedule an appointment as soon as possible for a visit in 1 day  For a recheck in  days    [facility] ([facility] ED  [address] [address]  [phone]  Schedule an appointment as soon as possible for a visit   As needed, If symptoms worsen      DISCHARGE MEDICATIONS:  New Prescriptions    AZITHROMYCIN (ZITHROMAX) 250 MG TABLET    Take 1 tablet by mouth See Admin Instructions for 5 days 500mg on day 1 followed by 250mg on days 2 - 5    PREDNISONE (DELTASONE) 10 MG TABLET    Take 6 tablets by mouth daily for 5 doses       DISCONTINUED MEDICATIONS:  Discontinued Medications    No medications on file              (Please note that portions of this note were completed with a voice recognition program.  Efforts were made to edit the dictations but occasionally words are mis-transcribed.)    Beryl Plummer PA-C (electronically signed)            Beryl Plummer PA-C  01/06/22 1842

## 2022-01-06 NOTE — ED PROVIDER NOTES
I independently performed a history and physical on Chava Chavez. All diagnostic, treatment, and disposition decisions were made by myself in conjunction with the advanced practice provider. I have participated in the medical decision making and directed the treatment plan and disposition of the patient. For further details of [facility] emergency department encounter, please see the advanced practice provider's documentation. CHIEF COMPLAINT  Chief Complaint   Patient presents with    Shortness of Breath     sick since 19th    Cough       Briefly, Chava Chavez is a 66 y.o. female  who presents to the ED complaining of shortness of breath, cough and increased wheezing    FOCUSED PHYSICAL EXAMINATION  BP (!) 150/78   Pulse 87   Temp 97.4 °F (36.3 °C) (Temporal)   Resp 24   Wt 195 lb (88.5 kg)   SpO2 93%   BMI 31.47 kg/m²      Focused physical examination:  General appearance:  Cooperative. No acute distress. Skin:  Warm. Dry. Eye:  Extraocular movements intact. Ears, nose, mouth and throat:  Oral mucosa moist,  Neck:  Trachea midline. Heart:  Regular rate and rhythm  Perfusion:  intact  Respiratory: Prolongation of the expiratory phase with expiratory wheezes heard throughout all lung fields but speaking in full sentences. No accessory muscle use  Abdominal:   Non distended. Nontender  Neurological:  Alert and oriented x 3. Moves all extremities spontaneously  Musculoskeletal:   Normal ROM, no deformities          Psychiatric:  Normal mood      EKG: Sinus rhythm with PACs rate of 84 bpm with right bundle branch block. No ST elevation. Similar to prior. MDM: Patient with history of COPD on home oxygen here with increased shortness of breath and wheezing. Wheezing on exam but speaking in full sentences. No worsening hypoxia. Was given bronchodilators and steroids and is feeling much improved.   Plan to discharge home as her work-up was otherwise unremarkable    During the patient's ED course, the patient was given:  Medications   ipratropium-albuterol (DUONEB) nebulizer solution 1 ampule (1 ampule Inhalation Given 1/6/22 1329)   albuterol (PROVENTIL) nebulizer solution 2.5 mg (2.5 mg Nebulization Given 1/6/22 1329)   dexamethasone (PF) (DECADRON) injection 10 mg (10 mg IntraVENous Given 1/6/22 1330)        CLINICAL IMPRESSION  1. COPD exacerbation ([facility] [address]        DISPOSITION  Home      This chart was created using Dragon dictation software. Efforts were made by me to ensure accuracy, however some errors may be present due to limitations of this technology.             Dariel Traylor MD  01/06/22 7237

## 2022-01-10 LAB
BLOOD CULTURE, ROUTINE: NORMAL
CULTURE, BLOOD 2: NORMAL

## 2022-02-08 RX ORDER — DILTIAZEM HYDROCHLORIDE 120 MG/1
CAPSULE, COATED, EXTENDED RELEASE ORAL
Qty: 90 CAPSULE | Refills: 1 | Status: SHIPPED | OUTPATIENT
Start: 2022-02-08 | End: 2022-02-23 | Stop reason: SDUPTHER

## 2022-02-08 RX ORDER — EZETIMIBE 10 MG/1
TABLET ORAL
Qty: 90 TABLET | Refills: 1 | Status: SHIPPED | OUTPATIENT
Start: 2022-02-08

## 2022-02-23 ENCOUNTER — OFFICE VISIT (OUTPATIENT)
Dept: CARDIOLOGY CLINIC | Age: 72
End: 2022-02-23
Payer: MEDICARE

## 2022-02-23 VITALS
HEART RATE: 74 BPM | OXYGEN SATURATION: 92 % | SYSTOLIC BLOOD PRESSURE: 130 MMHG | HEIGHT: 67 IN | WEIGHT: 184 LBS | DIASTOLIC BLOOD PRESSURE: 70 MMHG | BODY MASS INDEX: 28.88 KG/M2

## 2022-02-23 DIAGNOSIS — I21.4 NSTEMI (NON-ST ELEVATED MYOCARDIAL INFARCTION) (HCC): ICD-10-CM

## 2022-02-23 DIAGNOSIS — I50.32 CHRONIC DIASTOLIC CONGESTIVE HEART FAILURE (HCC): Primary | ICD-10-CM

## 2022-02-23 DIAGNOSIS — E78.2 MIXED HYPERLIPIDEMIA: ICD-10-CM

## 2022-02-23 DIAGNOSIS — R06.02 SOB (SHORTNESS OF BREATH): ICD-10-CM

## 2022-02-23 DIAGNOSIS — I10 ESSENTIAL HYPERTENSION: ICD-10-CM

## 2022-02-23 DIAGNOSIS — I48.0 PAROXYSMAL ATRIAL FIBRILLATION (HCC): ICD-10-CM

## 2022-02-23 PROCEDURE — G8400 PT W/DXA NO RESULTS DOC: HCPCS | Performed by: INTERNAL MEDICINE

## 2022-02-23 PROCEDURE — 1090F PRES/ABSN URINE INCON ASSESS: CPT | Performed by: INTERNAL MEDICINE

## 2022-02-23 PROCEDURE — 4040F PNEUMOC VAC/ADMIN/RCVD: CPT | Performed by: INTERNAL MEDICINE

## 2022-02-23 PROCEDURE — G8417 CALC BMI ABV UP PARAM F/U: HCPCS | Performed by: INTERNAL MEDICINE

## 2022-02-23 PROCEDURE — 99214 OFFICE O/P EST MOD 30 MIN: CPT | Performed by: INTERNAL MEDICINE

## 2022-02-23 PROCEDURE — 4004F PT TOBACCO SCREEN RCVD TLK: CPT | Performed by: INTERNAL MEDICINE

## 2022-02-23 PROCEDURE — 3017F COLORECTAL CA SCREEN DOC REV: CPT | Performed by: INTERNAL MEDICINE

## 2022-02-23 PROCEDURE — 1123F ACP DISCUSS/DSCN MKR DOCD: CPT | Performed by: INTERNAL MEDICINE

## 2022-02-23 PROCEDURE — G8484 FLU IMMUNIZE NO ADMIN: HCPCS | Performed by: INTERNAL MEDICINE

## 2022-02-23 PROCEDURE — G8427 DOCREV CUR MEDS BY ELIG CLIN: HCPCS | Performed by: INTERNAL MEDICINE

## 2022-02-23 RX ORDER — VENLAFAXINE HYDROCHLORIDE 75 MG/1
CAPSULE, EXTENDED RELEASE ORAL
COMMUNITY

## 2022-02-23 RX ORDER — DILTIAZEM HYDROCHLORIDE 120 MG/1
CAPSULE, COATED, EXTENDED RELEASE ORAL
Qty: 90 CAPSULE | Refills: 3 | Status: SHIPPED | OUTPATIENT
Start: 2022-02-23

## 2022-02-23 NOTE — PATIENT INSTRUCTIONS
Plan:  1. Current medications reviewed. No changes at this time. Refills given as warranted. 2. No cardiac testing at this time.   3. Labs from 1/2022 reviewed with patient    Follow up with me in 6 months

## 2022-02-23 NOTE — PROGRESS NOTES
[address] Office Note  2/23/2022     Subjective:  Ms. Doug Cochran presents for cardiology follow up for dCHF, PAF, HTN, HLD  C/o SOB and edema left leg    HPI:    Presented to ED 1/6/22 for SOB. She was given antibiotics, steroids, breathing treatments and discharged. Today, she states her new caregiver Xi Baugh is present with her today. She feels like she is declining daily. She is requiring more oxygen and is having more SOB. She reports edema in left leg. She takes water pill when her leg swells. She is going March 1st to meet with doctor about polyp removal on larynx. She reports she smokes only in the car. So she is smoking 6 cigarettes a week. Patient denies current chest pain, palpitations, dizziness or syncope. Patient denies current edema, chest pain, sob, palpitations, dizziness or syncope. Patient is vaccinated against Covid. Pfizer 2/2    PMH:   RUL lobectomy/wedge resection in March 2020 due to lung cancer. This patient is a 69-year-old white female with no prior history of heart disease. She presented to the hospital on 06/25/2015 with acute onset of shortness of breath and subsequent chest pain. The patient states she was at [facility] approximately 2 weeks ago with small bowel obstruction. That was complicated by renal failure. That eventually resolved by conservative therapy, and she went home about a week ago. The patient has been improving gradually. However, she has continued to have diarrhea ever since she was released from previous hospital. On the day of hospitalization she felt poorly. She was slightly short of breath in the morning with her daily activity; but as the day went on, she became more short of breath. She also experienced substernal pressure type of chest pain as if someone was sitting on her. She felt palpitations in her ear. She did not have any nausea or vomiting but had diarrhea. The patient was diaphoretic.  Symptoms of chest pressure and performed by Myriam Laboy MD at [facility] Bilateral     THORACOSCOPY Right 3/11/2020    RIGHT VIDEO ASSISTED THORACOSCOPIC SURGERY; WEDGE EXCISION OF RIGHT UPPER LOBE FOLLOWED BY RIGHT UPPER LOBECTOMY; INTERCOSTAL NERVE BLOCK performed by Alyssia Aguilar MD at [address] [place] [address] N/A 10/22/2021    EGD BIOPSY performed by Myriam Laboy MD at [facility]       Objective:   /70   Pulse 74   Ht 5' 7\" (1.702 m)   Wt 184 lb (83.5 kg)   SpO2 92%   BMI 28.82 kg/m²     Wt Readings from Last 3 Encounters:   02/23/22 184 lb (83.5 kg)   01/06/22 195 lb (88.5 kg)   10/19/21 195 lb (88.5 kg)     Repeat bp 130/70    Physical Exam:  General: No Respiratory distress, appears well developed and well nourished. Eyes:  Sclera nonicteric  Nose/Sinuses:  negative findings: nose shows no deformity, asymmetry, or inflammation, nasal mucosa normal, septum midline with no perforation or bleeding  Back:  no pain to palpation  Joint:  no active joint inflammation  Musculoskeletal:  negative  Skin:  Warm and dry  Neck:  Negative for JVD and Carotid Bruits. Chest:Clear   to auscultation, respiration easy  Cardiovascular:  RRR,72 bpm  S1S2 normal, no murmur, no rub or thrill.   Extremities: no  edema, clubbing, cyanosis,  Pulses:  pedal pulses are normal.  Neuro:  Very figidy constantly moving    Medications:   Outpatient Encounter Medications as of 2/23/2022   Medication Sig Dispense Refill    venlafaxine (EFFEXOR XR) 75 MG extended release capsule venlafaxine ER 75 mg capsule,extended release 24 hr      ezetimibe (ZETIA) 10 MG tablet TAKE 1 TABLET BY MOUTH EVERY DAY 90 tablet 1    dilTIAZem (CARDIZEM CD) 120 MG extended release capsule TAKE 1 CAPSULE BY MOUTH EVERY DAY 90 capsule 1    albuterol sulfate HFA (VENTOLIN HFA) 108 (90 Base) MCG/ACT inhaler Inhale 2 puffs into the lungs every 6 hours as needed for Wheezing or Shortness of Breath 18 g 5    albuterol (PROVENTIL) (2.5 MG/3ML) 0.083% nebulizer solution Take 3 mLs by nebulization every 6 hours as needed for Wheezing 120 each 5    TRELEGY ELLIPTA 100-62.5-25 MCG/INH AEPB INHALE 1 PUFF BY MOUTH EVERY DAY AS DIRECTED 60 each 5    ALPRAZolam (XANAX) 0.25 MG tablet Take 0.25 mg by mouth nightly as needed for Sleep.  apixaban (ELIQUIS) 5 MG TABS tablet Take 1 tablet by mouth 2 times daily 180 tablet 3    gabapentin (NEURONTIN) 400 MG capsule Take 800 mg by mouth 4 times daily.  HYDROcodone-acetaminophen (NORCO) 7.5-325 MG per tablet TAKE 1 TABLET BY MOUTH 3 TIMES A DAY AS NEEDED FOR PAIN      nystatin (MYCOSTATIN) 706462 UNIT/GM cream       Fexofenadine HCl (MUCINEX ALLERGY PO) Take by mouth daily      ferrous sulfate 325 (65 Fe) MG tablet Take 325 mg by mouth daily (with breakfast)      rOPINIRole (REQUIP) 2 MG tablet Take 3 mg by mouth nightly       venlafaxine (EFFEXOR-XR) 150 MG XR capsule Take 150 mg by mouth daily      Multiple Vitamins-Minerals (THERAPEUTIC MULTIVITAMIN-MINERALS) tablet Take 1 tablet by mouth daily      QUEtiapine (SEROQUEL XR) 300 MG XR tablet Take 500 mg by mouth nightly       OXYGEN Inhale into the lungs Indications: 2.5 L nightly        No facility-administered encounter medications on file as of 2/23/2022. Lab Data:  I have reviewed the following tests and documented in this encounter as follows:   Discussed with the patient. Colonoscopy Biopsy 10/22/21  FINAL DIAGNOSIS:     A. Stomach, biopsies:   - Mild chronic gastritis. - Negative for dysplasia or malignancy. B.  Gastric polyp, biopsy:   - Hyperplastic polyp.   - Negative for dysplasia or malignancy. C.  Esophagus, biopsies:   - Benign squamous and gastric mucosa with chronic inflammation and   reactive change. - Negative for eosinophilic esophagitis, intestinal metaplasia,   dysplasia, or malignancy.      D.  Cecal polyp, excision:   - Fragments of tubular adenoma. - Negative for high-grade dysplasia. E.  Transverse colon polyp, excision:   - Fragments of tubular adenoma. - Negative for high-grade dysplasia. Chest CT 9/16/20  Emphysema. Interval removal of right-sided chest tube. Gas and fluid collection at the right apex has decreased in size and measures 6.8 x 2.6 cm. Mild bronchial wall thickening, bronchiectasis and interlobular septal wall thickening in the right lung base, improved since previous exam.  Resolution of ground-glass opacities in the left lung. Chest CT 1/16/19  Multiple rt rib fx appearing subacute scattered areas parenchymal opacity lower lobes    ECHO 7/8/16  Normal global wall motion. Visual EF is 55-60%  Doppler evidence of grade I (impaired) diastolic dysfunction. Calculated EF 52%. Left atrial cavity is mildly dilated. Right ventricle cavity is mildly dilated. Structurally normal mitral valve with mild regurgitation. Structurally normal tricuspid valve with trace regurgitation. IVC is dilated with respiratory response of <50%. CXR 2/16/16  atelectasis with bronchial wall thickening      CATH 7/28/15    Signed by Ying Harrington MD on 07/28/15 at 1114    Document Text    Expand All Collapse All   PATIENT NAME: PA #: MR Jeni Frank [id number]     1. Selective coronary angiography. 2. Left heart catheterization. 3. Left ventriculography. 4. Right heart catheterization. ANGIOGRAPHIC FINDINGS:   1. No coronary artery disease. 2. Normal left ventricular function, EF greater than 60%. 3. Normal left and right hemodynamics. CONCLUSIONS: No evidence for coronary artery disease and preserved left  ventricular dysfunction with normal hemodynamics. RECOMMENDATIONS: At this point is noncardiac symptoms. Recommend follow-up  with Dr. Jimena Mireles and Larry Jackson in 1 to 2 weeks. Isidro Mao MD        ECHO 5/26/15  Summary   Normal left ventricle size and wall thickness.  Estimated ejection fraction   of 55%. Apical lateral and mid anterolateral hypokinesia. Impaired left   ventricular relaxation.   Mitral valve is structurally normal.   Mitral valve leaflets appear to open adequately.   Trivial mitral regurgitation is present.   The aortic leaflets appear to open adequately.   Trace aortic regurgitation is present.   No evidence of aortic valve stenosis    CBC: No results for input(s): WBC, HGB, HCT, MCV, PLT in the last 72 hours. BMP: No results for input(s): NA, K, CL, CO2, PHOS, BUN, CREATININE, CA in the last 72 hours. LIVER PROFILE: No results for input(s): AST, ALT, LIPASE, BILIDIR, BILITOT, ALKPHOS in the last 72 hours. Invalid input(s): AMYLASE,  ALB  LIPID:   No components found for: CHLPL,  CHOL  Lab Results   Component Value Date    TRIG 201 (H) 02/10/2018    TRIG 90 11/02/2015    TRIG 158 (H) 07/28/2015     Lab Results   Component Value Date    HDL 64 (H) 02/04/2021    HDL 54 06/08/2018    HDL 65 11/02/2015     Lab Results   Component Value Date    LDLCALC 106 (H) 02/04/2021    LDLCALC 58 06/08/2018    LDLCALC 97 11/02/2015     Lab Results   Component Value Date    LABVLDL 21 02/04/2021    LABVLDL 33 06/08/2018    LABVLDL 32 07/28/2015     No results found for: CHOLHDLRATIO  PT/INR: No results for input(s): PROTIME, INR in the last 72 hours. A1C:   Lab Results   Component Value Date    LABA1C 5.9 01/13/2018     BNP:  No results for input(s): BNP in the last 72 hours. EKG 3/28/18   shows sinus arrhythmia, RBBB, rate 76.     EKG: Sinus tachycardia with Premature supraventricular complexesRight bundle branch blockCannot rule out Inferior infarct , age undeterminedAnterolateral infarct (cited on or before 25-JUN-2015)Abnormal ECGWhen compared with ECG of 25-JUN-2015 18:02, (unconfirmed)Premature ventricular complexes are no longer PresentPremature supraventricular complexes are now PresentConfirmed by Chen Gomes MD, [address] ([id number]) on 6/26/2015 6:38:48 AM    Echo: 06/26/2015  Normal left ventricle size and wall thickness. Estimated ejection   fraction  of 55%. Apical lateral and mid anterolateral hypokinesia. Impaired left  ventricular relaxation. Mitral valve is structurally normal.  Mitral valve leaflets appear to open adequately. Trivial mitral regurgitation is present. The aortic leaflets appear to open adequately. Trace aortic regurgitation is present. No evidence of aortic valve stenosis. Assessment:  Encounter Diagnoses   Name Primary?  Chronic diastolic congestive heart failure (HCC) Yes    Paroxysmal atrial fibrillation (HCC)     Mixed hyperlipidemia     Essential hypertension     NSTEMI (non-ST elevated myocardial infarction) (HCC)     SOB (shortness of breath)        1. Diastolic CHF managed by diuretics  2. Paroxysmal Atrial Fibrillation rate controlled anticoagulated  3. HTN BP reasonably well controlled with diltiazem  4. HLD on zetia     Last lipids 6/8/18 I personally reviewed labs results in epic and discussed with patient      Plan:  1. Current medications reviewed. No changes at this time. Refills given as warranted. 2. No cardiac testing at this time. 3. Labs from 1/2022 reviewed with patient    Follow up with me in 6 months    This note is scribed in the presence of Dr. Michael Lamas MD by Cielo King RN.    I, Dr. Paula Melvin, personally performed the services described in this documentation, as scribed by the above signed scribe in my presence. It is both accurate and complete to my knowledge. I agree with the details independently gathered by the clinical support staff, while the remaining scribed note accurately describes my personal service to the patient. QUALITY MEASURES  1. Tobacco Cessation Counseling: Yes  2. Retake of BP if >140/90:   NA  3. Documentation to PCP/referring for new patient:  Sent to PCP at close of office visit  4. CAD patient on anti-platelet: eliquis  5. CAD patient on STATIN therapy:  Yes  6. Patient with CHF and aFib on anticoagulation:  eliquis

## 2022-02-27 ENCOUNTER — HOSPITAL ENCOUNTER (EMERGENCY)
Age: 72
Discharge: HOME | End: 2022-02-27
Payer: MEDICARE

## 2022-02-27 ENCOUNTER — APPOINTMENT (OUTPATIENT)
Dept: CT IMAGING | Age: 72
End: 2022-02-27
Payer: MEDICARE

## 2022-02-27 VITALS
BODY MASS INDEX: 29.98 KG/M2 | HEART RATE: 80 BPM | SYSTOLIC BLOOD PRESSURE: 125 MMHG | TEMPERATURE: 98.2 F | HEIGHT: 67 IN | RESPIRATION RATE: 18 BRPM | OXYGEN SATURATION: 99 % | DIASTOLIC BLOOD PRESSURE: 78 MMHG | WEIGHT: 191 LBS

## 2022-02-27 DIAGNOSIS — S32.009A CLOSED FRACTURE OF TRANSVERSE PROCESS OF LUMBAR VERTEBRA, INITIAL ENCOUNTER (HCC): Primary | ICD-10-CM

## 2022-02-27 PROCEDURE — 6370000000 HC RX 637 (ALT 250 FOR IP): Performed by: PHYSICIAN ASSISTANT

## 2022-02-27 PROCEDURE — 99284 EMERGENCY DEPT VISIT MOD MDM: CPT

## 2022-02-27 PROCEDURE — 72131 CT LUMBAR SPINE W/O DYE: CPT

## 2022-02-27 RX ORDER — LIDOCAINE 50 MG/G
1 PATCH TOPICAL DAILY
Qty: 10 PATCH | Refills: 0 | Status: SHIPPED | OUTPATIENT
Start: 2022-02-27 | End: 2022-03-09

## 2022-02-27 RX ORDER — OXYCODONE HYDROCHLORIDE AND ACETAMINOPHEN 5; 325 MG/1; MG/1
2 TABLET ORAL ONCE
Status: COMPLETED | OUTPATIENT
Start: 2022-02-27 | End: 2022-02-27

## 2022-02-27 RX ORDER — LIDOCAINE 4 G/G
1 PATCH TOPICAL ONCE
Status: DISCONTINUED | OUTPATIENT
Start: 2022-02-27 | End: 2022-02-27 | Stop reason: HOSPADM

## 2022-02-27 RX ADMIN — OXYCODONE HYDROCHLORIDE AND ACETAMINOPHEN 2 TABLET: 5; 325 TABLET ORAL at 14:22

## 2022-02-27 ASSESSMENT — PAIN SCALES - GENERAL
PAINLEVEL_OUTOF10: 8
PAINLEVEL_OUTOF10: 7

## 2022-02-27 NOTE — ED PROVIDER NOTES
[address] ED  EMERGENCY DEPARTMENT ENCOUNTER        Pt Name: Varsha Hunt  MRN: [id number]  [place] 1950  Date of evaluation: 2/27/2022  Provider: MILTON Haji  PCP: Kim Hanson PA-C    This patient was not seen and evaluated by the attending physician No att. providers found. I have evaluated this patient. My supervising physician was available for consultation. CHIEF COMPLAINT       Chief Complaint   Patient presents with    Fall     3 weeks ago today; right buttock, right leg and right arm and shoulder; buttock is the worst       HISTORY OF PRESENT ILLNESS   (Location/Symptom, Timing/Onset, Context/Setting, Quality, Duration, Modifying Factors, Severity)  Note limiting factors. Varsha Hunt is a 66 y.o. female with PMHX CHF, DM, PAF, COPD, HTN, HLD, CAD, on eliquis, in pain mgmt, who presents via private vehicle from her home for evaluation of low back pain after a fall. Patient notes approximately 3 weeks ago she slipped and fell on the ice, landing on her right sided low back. She denies hitting her head no loss of consciousness. She notes that ever since the fall she has had persistent pain to this area that radiates into the buttock. She denies any distal numbness tingling or weakness loss of sphincter control or saddle anesthesia. She has been taking Tylenol and her Percocets without significant improvement in her pain she notes it is just getting worse more sore. She decided to come in for evaluation today because she was at [employer] prayed over her and she decided that she needed to be evaluated. Nursing Notes were all reviewed and agreed with or any disagreements were addressed  in the HPI. Pt was seen during the Janusz Putty 19 pandemic. Appropriate PPE worn by ME during patient encounters.  Pt seen during a time with constrained hospital bed capacity and other potential inpatient and outpatient resources were constrained due to the viral pandemic. REVIEW OF SYSTEMS    (2-9 systems for level 4, 10 or more for level 5)     Review of Systems    Positives and Pertinent negatives as per HPI. Except as noted abovein the ROS, all other systems were reviewed and negative.        PAST MEDICAL HISTORY     Past Medical History:   Diagnosis Date    A-fib [facility])     Arthritis     Clostridium difficile diarrhea 6/26/15    COPD (chronic obstructive pulmonary disease) (HCC)     COPD exacerbation (HCC)     Emphysema of lung (HCC)     Hyperlipidemia     Hypertension     O2 dependent     2 L/min at night    Pneumonia 2/5/2018    Primary cancer of right upper lobe of lung [facility] [address] 3/11/2020    Rib pain     Sleep apnea     does not use cpap    Small bowel obstruction ([facility] [address]     pt denies         SURGICAL HISTORY     Past Surgical History:   Procedure Laterality Date    ANKLE ARTHROSCOPY      x2    APPENDECTOMY      BRONCHOSCOPY N/A 3/15/2020    BRONCHOSCOPY THERAPUTIC ASPIRATION INITIAL performed by Jamil Pearson MD at [id number] [place] [address]  3/17/2020    BRONCHOSCOPY THERAPUTIC ASPIRATION INITIAL performed by Narciso Zarco MD at [facility] Left     CHOLECYSTECTOMY      COLONOSCOPY      COLONOSCOPY N/A 10/22/2021    COLONOSCOPY WITH BIOPSY performed by Gisele Gramajo MD at [facility] Bilateral     THORACOSCOPY Right 3/11/2020    RIGHT VIDEO ASSISTED THORACOSCOPIC SURGERY; WEDGE EXCISION OF RIGHT UPPER LOBE FOLLOWED BY RIGHT UPPER LOBECTOMY; INTERCOSTAL NERVE BLOCK performed by Jamil Pearson MD at [address] [place] [address] 10/22/2021    EGD BIOPSY performed by Gisele Gramajo MD at [address]       Previous Medications    ALBUTEROL (PROVENTIL) (2.5 MG/3ML) 0.083% NEBULIZER SOLUTION    Take 3 mLs by nebulization every 6 hours as needed for Wheezing    ALBUTEROL SULFATE HFA (VENTOLIN HFA) 108 (90 BASE) MCG/ACT INHALER    Inhale 2 puffs into the lungs every 6 hours as needed for Wheezing or Shortness of Breath    ALPRAZOLAM (XANAX) 0.25 MG TABLET    Take 0.25 mg by mouth nightly as needed for Sleep. APIXABAN (ELIQUIS) 5 MG TABS TABLET    Take 1 tablet by mouth 2 times daily    DILTIAZEM (CARDIZEM CD) 120 MG EXTENDED RELEASE CAPSULE    Take one capsule by mouth daily    EZETIMIBE (ZETIA) 10 MG TABLET    TAKE 1 TABLET BY MOUTH EVERY DAY    FERROUS SULFATE 325 (65 FE) MG TABLET    Take 325 mg by mouth daily (with breakfast)    FEXOFENADINE HCL (MUCINEX ALLERGY PO)    Take by mouth daily    GABAPENTIN (NEURONTIN) 400 MG CAPSULE    Take 800 mg by mouth 4 times daily. HYDROCODONE-ACETAMINOPHEN (NORCO) 7.5-325 MG PER TABLET    TAKE 1 TABLET BY MOUTH 3 TIMES A DAY AS NEEDED FOR PAIN    MULTIPLE VITAMINS-MINERALS (THERAPEUTIC MULTIVITAMIN-MINERALS) TABLET    Take 1 tablet by mouth daily    NYSTATIN (MYCOSTATIN) 154222 UNIT/GM CREAM        OXYGEN    Inhale into the lungs Indications: 2.5 L nightly     QUETIAPINE (SEROQUEL XR) 300 MG XR TABLET    Take 500 mg by mouth nightly     ROPINIROLE (REQUIP) 2 MG TABLET    Take 3 mg by mouth nightly     TRELEGY ELLIPTA 100-62.5-25 MCG/INH AEPB    INHALE 1 PUFF BY MOUTH EVERY DAY AS DIRECTED    VENLAFAXINE (EFFEXOR XR) 75 MG EXTENDED RELEASE CAPSULE    venlafaxine ER 75 mg capsule,extended release 24 hr    VENLAFAXINE (EFFEXOR-XR) 150 MG XR CAPSULE    Take 150 mg by mouth daily         ALLERGIES     Buspirone, Codeine, Doxycycline, Lisinopril, and Penicillins    FAMILYHISTORY       Family History   Problem Relation Age of Onset    Cancer Mother     Heart Failure Father           SOCIAL HISTORY       Social History     Socioeconomic History    Marital status:       Spouse name: None    Number of children: None    Years of education: None    Highest education level: None   Occupational History    None   Tobacco Use    Smoking status: Current Every Day Smoker     Packs/day: 0.50     Years: 50.00     Pack years: 25.00     Types: Cigarettes    Smokeless tobacco: Never Used   Vaping Use    Vaping Use: Former    Substances: Always   Substance and Sexual Activity    Alcohol use: No     Alcohol/week: 0.0 standard drinks    Drug use: No    Sexual activity: Not Currently   Other Topics Concern    None   Social History Narrative    None     Social Determinants of Health     Financial Resource Strain:     Difficulty of Paying Living Expenses: Not on file   Food Insecurity:     Worried About Running Out of Food in the Last Year: Not on file    Lluvia of Food in the Last Year: Not on file   Transportation Needs:     Lack of Transportation (Medical): Not on file    Lack of Transportation (Non-Medical):  Not on file   Physical Activity:     Days of Exercise per Week: Not on file    Minutes of Exercise per Session: Not on file   Stress:     Feeling of Stress : Not on file   Social Connections:     Frequency of Communication with Friends and Family: Not on file    Frequency of Social Gatherings with Friends and Family: Not on file    Attends [employer] Services: Not on file    Active Member of [address] [place] [address] or Organizations: Not on file    Attends Club or Organization Meetings: Not on file    Marital Status: Not on file   Intimate Partner Violence:     Fear of Current or Ex-Partner: Not on file    Emotionally Abused: Not on file    Physically Abused: Not on file    Sexually Abused: Not on file   Housing Stability:     Unable to Pay for Housing in the Last Year: Not on file    Number of [place] in the Last Year: Not on file    Unstable Housing in the Last Year: Not on file       SCREENINGS             PHYSICAL EXAM    (up to 7 for level 4, 8 or more for level 5)     ED Triage Vitals   BP Temp Temp src Pulse Resp SpO2 Height Weight   02/27/22 1252 02/27/22 1248 -- 02/27/22 1248 02/27/22 1248 02/27/22 1248 02/27/22 1248 02/27/22 1248   138/78 98.2 °F Neurological:      General: No focal deficit present. Mental Status: She is alert, oriented to person, place, and time and easily aroused. Sensory: No sensory deficit. Motor: No weakness. Gait: Gait normal.   Psychiatric:         Behavior: Behavior normal. Behavior is cooperative. DIAGNOSTIC RESULTS   LABS:    Labs Reviewed - No data to display    All other labs were within normal range or not returned as of this dictation. EKG: All EKG's are interpreted by the Emergency Department Physician who either signs orCo-signs this chart in the absence of a cardiologist.  Please see their note for interpretation of EKG. RADIOLOGY:   Non-plain film images such as CT, Ultrasound and MRI are read by the radiologist. Plain radiographic images are visualized andpreliminarily interpreted by the  ED Provider with the below findings:        Interpretation perthe Radiologist below, if available at the time of this note:    No orders to display     No results found. PROCEDURES   Unless otherwise noted below, none     Procedures    CRITICAL CARE TIME   N/A    CONSULTS:  None      EMERGENCY DEPARTMENT COURSE and DIFFERENTIALDIAGNOSIS/MDM:   Vitals:    Vitals:    02/27/22 1248 02/27/22 1252   BP:  138/78   Pulse: 80    Resp: 16    Temp: 98.2 °F (36.8 °C)    SpO2: 96%    Weight: 191 lb (86.6 kg)    Height: 5' 7\" (1.702 m)        Patient was given thefollowing medications:  Medications - No data to display    PDMP Monitoring:    Last PDMP [place] as Reviewed [facility] ED):  Review User Review Instant Review Result            Urine Drug Screenings (1 yr)    No resulted procedures found. Medication Contract and Consent for Opioid Use Documents Filed      No documents found                MDM:   Patient seen and evaluated. Old records reviewed. Diagnostic testing reviewed and results discussed. I have independently evaluated this patient based upon my scope of practice.  Supervising physician was in the department for consultation as needed. Pleasant 67 yo female presents for fall. Her pain is primarily to the right low back. No sign of other trauma no concern for head injury. She is with intact strength and neuro function. CT lumbar spine reveals L2 L3 transverse process fractures on the right side. With this stable fracture and interval improvement of her symptoms in the ER I believe she is a reasonable candidate for dc home with outpatient referral to ortho/spine and close return precautions. Pt is in agreement with the current plan and all questions were addressed. Discharge Time out:  CC Reviewed Yes   Test Results Yes     Vitals:    02/27/22 1252   BP: 138/78   Pulse:    Resp:    Temp:    SpO2:               FINAL IMPRESSION      1. Closed fracture of transverse process of lumbar vertebra, initial encounter ([facility] [address]          DISPOSITION/PLAN   DISPOSITION        PATIENT REFERREDTO:  No follow-up provider specified.     DISCHARGE MEDICATIONS:  New Prescriptions    No medications on file       DISCONTINUED MEDICATIONS:  Discontinued Medications    No medications on file              (Please note that portions ofthis note were completed with a voice recognition program.  Efforts were made to edit the dictations but occasionally words are mis-transcribed.)    MILTON Barrow (electronically signed)        Jesus Alberto Ramires, [id number]  03/05/22 1158

## 2022-02-28 NOTE — PROGRESS NOTES
[facility]    [address],  [address] [facility]  [place], [facility]  Phone: [phone]   PHAM:[phone]    CHIEF COMPLAINT     Chief Complaint   Patient presents with    New Patient     polyp on larynx found from EGD, former Dr. Jakub Brown patient       HPI     Thank you TORIN SANTANA PA-C for asking me to see Valerie Rae in consultation. Valerie Rae is a 66 y.o. female  [5] White (non-) [1] with medical history of A. fib on Eliquis, arthritis, C. difficile infection, COPD with O2 dependence, hyperlipidemia, hypertension, tobacco use, lung cancer s/p right upper lobectomy 3/2020, appendectomy, cholecystectomy seen in follow up. Patient previously followed with GI Dr. Jakub Brown. She is s/p EGD for GERD and Colonoscopy with Dr. Jakub Brown 10/22/2021 with findings of a small polyp on the larynx,  hyperplastic gastric polyps and two tubular adenomatous colon polyps. Reports back pain for which she has an appointment tomorrow for the spine surgeon and pain management on Friday. Denies dysphagia, heartburn, abdominal pain, nausea, vomiting, fever, chills, unintentional weight loss, constipation, diarrhea, hematochezia or melenic stools. Last Encounter Reviewed: 10/22/2021  Pertinent PMH, FH, SH is reviewed below. Last EGD 10/22/2021 Dr. Jakub Brown: A small polyp was noted in larynx on a quick exam. Grade M esophagitis involving the lower one third of the esophagus. A small sliding hiatal hernia from 39 cm to 41 cm from incisors. There was irregular Z-line. Biopsied (path -benign squamous and gastric mucosa with chronic inflammation and reactive change. Negative for eosinophilic esophagitis, intestinal metaplasia, dysplasia, or malignancy). Diffuse moderate non-erosive gastritis, biopsied (path -mild chronic gastritis, negative for h.pylori or dysplasia). Multiple fundic gland type-looking <1cm polyps (<20) were noted in the body of the stomach.  One unusual-looking polyp of about 5mm was biopsied (path -hyperplastic polyp) and appeared vascular. Last Colonoscopy 10/22/2021 Dr. Elizabeth Cavazos: Two small, sessile polyps were removed -  one from the cecum (path -fragments of tubular adenoma) and the other, from transverse colon (path -fragments of tubular adenoma). Early diverticulosis involving the sigmoid colon along with muscle hypertrophy. Small interno-external hemorrhoids with eversion of the rectal mucosa in one small area. Repeat in 5 years. Review of available records reveals:   Wt Readings from Last 50 Encounters:   03/01/22 200 lb 6.4 oz (90.9 kg)   02/27/22 191 lb (86.6 kg)   02/23/22 184 lb (83.5 kg)   01/06/22 195 lb (88.5 kg)   10/19/21 195 lb (88.5 kg)   09/23/21 193 lb (87.5 kg)   04/27/21 192 lb 12.8 oz (87.5 kg)   01/29/21 196 lb (88.9 kg)   05/12/20 194 lb 4.8 oz (88.1 kg)   05/12/20 185 lb (83.9 kg)   04/13/20 189 lb (85.7 kg)   03/24/20 199 lb (90.3 kg)   03/20/20 208 lb 4.8 oz (94.5 kg)   03/04/20 199 lb (90.3 kg)   02/17/20 200 lb (90.7 kg)   02/12/20 199 lb (90.3 kg)   02/06/20 195 lb (88.5 kg)   01/22/20 195 lb 12.8 oz (88.8 kg)   11/14/19 203 lb (92.1 kg)   11/14/19 202 lb (91.6 kg)   08/20/19 196 lb 6.4 oz (89.1 kg)   05/07/19 197 lb (89.4 kg)   02/14/19 200 lb (90.7 kg)   11/28/18 214 lb (97.1 kg)   09/14/18 222 lb (100.7 kg)   08/27/18 223 lb 8 oz (101.4 kg)   07/20/18 229 lb (103.9 kg)   05/23/18 235 lb (106.6 kg)   04/05/18 248 lb (112.5 kg)   03/28/18 248 lb (112.5 kg)   03/07/18 250 lb (113.4 kg)   02/27/18 257 lb (116.6 kg)   02/22/18 257 lb (116.6 kg)   02/16/18 249 lb 9.6 oz (113.2 kg)   02/07/18 269 lb 11.2 oz (122.3 kg)   11/07/17 260 lb (117.9 kg)   01/27/17 255 lb 9.6 oz (115.9 kg)   08/22/16 258 lb (117 kg)   08/04/16 263 lb (119.3 kg)   03/28/16 250 lb (113.4 kg)   03/16/16 250 lb (113.4 kg)   01/28/16 250 lb (113.4 kg)   09/10/15 234 lb (106.1 kg)   08/26/15 232 lb (105.2 kg)   08/13/15 230 lb (104.3 kg)   07/22/15 226 lb (102.5 kg)       No components found for: HGBA1C  BP Readings from Last 3 Encounters:   03/01/22 (!) 158/71   02/27/22 125/78   02/23/22 130/70     Health Maintenance   Topic Date Due    Hepatitis C screen  Never done    Diabetic foot exam  Never done    Depression Screen  Never done    Diabetic microalbuminuria test  Never done    Diabetic retinal exam  Never done    DTaP/Tdap/Td vaccine (1 - Tdap) Never done    Breast cancer screen  Never done    DEXA (modify frequency per FRAX score)  Never done    Shingles Vaccine (2 of 3) 08/01/2014    A1C test (Diabetic or Prediabetic)  01/13/2019    Annual Wellness Visit (AWV)  Never done    COVID-19 Vaccine (3 - Booster for Pfizer series) 09/26/2021    Lipid screen  02/04/2022    Low dose CT lung screening  09/23/2022    Potassium monitoring  01/06/2023    Creatinine monitoring  01/06/2023    Colorectal Cancer Screen  10/22/2026    Flu vaccine  Completed    Pneumococcal 65+ years Vaccine  Completed    Hepatitis A vaccine  Aged Out    Hib vaccine  Aged Out    Meningococcal (ACWY) vaccine  Aged Out       No components found for: [employer]     PAST MEDICAL HISTORY     Past Medical History:   Diagnosis Date    A-fib ([address]     Arthritis     Clostridium difficile diarrhea 6/26/15    COPD (chronic obstructive pulmonary disease) ([address]     COPD exacerbation (HCC)     Emphysema of lung ([address]     Hyperlipidemia     Hypertension     O2 dependent     2 L/min at night    Pneumonia 2/5/2018    Primary cancer of right upper lobe of lung ([address] 3/11/2020    Rib pain     Sleep apnea     does not use cpap    Small bowel obstruction ([address]     pt denies     FAMILY HISTORY     Family History   Problem Relation Age of Onset    Cancer Mother     Heart Failure Father      SOCIAL HISTORY     Social History     Socioeconomic History    Marital status:       Spouse name: Not on file    Number of children: Not on file    Years of education: Not on file    Highest education level: Not on file Occupational History    Not on file   Tobacco Use    Smoking status: Current Every Day Smoker     Packs/day: 0.50     Years: 50.00     Pack years: 25.00     Types: Cigarettes    Smokeless tobacco: Never Used   Vaping Use    Vaping Use: Former    Substances: Always   Substance and Sexual Activity    Alcohol use: No     Alcohol/week: 0.0 standard drinks    Drug use: No    Sexual activity: Not Currently   Other Topics Concern    Not on file   Social History Narrative    Not on file     Social Determinants of Health     Financial Resource Strain:     Difficulty of Paying Living Expenses: Not on file   Food Insecurity:     Worried About Running Out of Food in the Last Year: Not on file    Lluvia of Food in the Last Year: Not on file   Transportation Needs:     Lack of Transportation (Medical): Not on file    Lack of Transportation (Non-Medical):  Not on file   Physical Activity:     Days of Exercise per Week: Not on file    Minutes of Exercise per Session: Not on file   Stress:     Feeling of Stress : Not on file   Social Connections:     Frequency of Communication with Friends and Family: Not on file    Frequency of Social Gatherings with Friends and Family: Not on file    Attends Confucianism Services: Not on file    Active Member of [address] [place] [address] [employer] or Organizations: Not on file    Attends Club or Organization Meetings: Not on file    Marital Status: Not on file   Intimate Partner Violence:     Fear of Current or Ex-Partner: Not on file    Emotionally Abused: Not on file    Physically Abused: Not on file    Sexually Abused: Not on file   Housing Stability:     Unable to Pay for Housing in the Last Year: Not on file    Number of [place] in the Last Year: Not on file    Unstable Housing in the Last Year: Not on file     SURGICAL HISTORY     Past Surgical History:   Procedure Laterality Date    ANKLE ARTHROSCOPY      x2    APPENDECTOMY      BRONCHOSCOPY N/A 3/15/2020    BRONCHOSCOPY THERAPUTIC BY MOUTH EVERY DAY AS DIRECTED 60 each 5    ALPRAZolam (XANAX) 0.25 MG tablet Take 0.25 mg by mouth nightly as needed for Sleep.  gabapentin (NEURONTIN) 400 MG capsule Take 800 mg by mouth 4 times daily.        HYDROcodone-acetaminophen (NORCO) 7.5-325 MG per tablet TAKE 1 TABLET BY MOUTH 3 TIMES A DAY AS NEEDED FOR PAIN      nystatin (MYCOSTATIN) 545175 UNIT/GM cream       Fexofenadine HCl (MUCINEX ALLERGY PO) Take by mouth daily      ferrous sulfate 325 (65 Fe) MG tablet Take 325 mg by mouth daily (with breakfast)      rOPINIRole (REQUIP) 2 MG tablet Take 3 mg by mouth nightly       venlafaxine (EFFEXOR-XR) 150 MG XR capsule Take 150 mg by mouth daily      Multiple Vitamins-Minerals (THERAPEUTIC MULTIVITAMIN-MINERALS) tablet Take 1 tablet by mouth daily      QUEtiapine (SEROQUEL XR) 300 MG XR tablet Take 500 mg by mouth nightly       OXYGEN Inhale into the lungs Indications: 2.5 L nightly        ALLERGIES     Allergies   Allergen Reactions    Buspirone Other (See Comments)     unsure    Codeine     Doxycycline      Stomach pain and sickness    Lisinopril Other (See Comments)     Low blood pressure per patient 59/29     Penicillins Nausea Only     IMMUNIZATIONS     Immunization History   Administered Date(s) Administered    COVID-19, Pfizer Purple top, DILUTE for use, 12+ yrs, 30mcg/0.3mL dose 04/05/2021, 04/26/2021    Influenza Vaccine, unspecified formulation 11/05/2013, 11/07/2014, 09/10/2015, 11/01/2016, 10/24/2017    Influenza Virus Vaccine 10/25/2014, 08/01/2015, 09/29/2017, 10/24/2017    Influenza Whole 10/25/2014    Influenza, High Dose (Fluzone 65 yrs and older) 09/10/2015, 10/17/2017, 10/17/2018, 10/02/2019    Influenza, Quadv, IM, (6 mo and older Fluzone, Flulaval, Fluarix and 3 yrs and older Afluria) 10/24/2017    Influenza, Quadv, IM, PF (6 mo and older Fluzone, Flulaval, Fluarix, and 3 yrs and older Afluria) 10/24/2017    Influenza, Quadv, adjuvanted, 65 yrs +, IM, PF (Fluad) 10/09/2020    Influenza, Trivalent, Recombinant, Injectable vaccine, PF 09/29/2017    Pneumococcal Conjugate 13-valent (Gearl Rued) 10/24/2017, 10/09/2020    Pneumococcal Conjugate Vaccine 10/25/2014    Pneumococcal Polysaccharide ([id number]) 11/01/2016, 06/26/2019    Zoster Live (Zostavax) 06/06/2014     REVIEW OF SYSTEMS   See HPI for further details and pertinent postiives. Negative for the following:  Constitutional: Negative for weight change. Negative for appetite change and fatigue. HENT: Negative for nosebleeds, sore throat, mouth sores, and voice change. Respiratory: Negative for cough, choking and chest tightness. Cardiovascular: Negative for chest pain   Gastrointestinal: See HPI  Musculoskeletal: Negative for arthralgias. Skin: Negative for pallor. Neurological: Negative for weakness and light-headedness. Hematological: Negative for adenopathy. Does not bruise/bleed easily. Psychiatric/Behavioral: Negative for suicidal ideas. PHYSICAL EXAM   VITAL SIGNS: BP (!) 158/71 (Site: Left Wrist, Position: Sitting)   Pulse 79   Ht 5' 7\" (1.702 m)   Wt 200 lb 6.4 oz (90.9 kg)   BMI 31.39 kg/m²   Wt Readings from Last 3 Encounters:   03/01/22 200 lb 6.4 oz (90.9 kg)   02/27/22 191 lb (86.6 kg)   02/23/22 184 lb (83.5 kg)     Constitutional: Well developed, Well nourished, No acute distress, on supplemental oxygen via nasal cannula, Non-toxic appearance. HENT: Normocephalic, Atraumatic, Bilateral external ears normal, Nose normal.   Eyes: Conjunctiva normal, No discharge. Neck: Normal range of motion, No tenderness  Cardiovascular: Normal heart rate, Normal rhythm, No murmurs, No rubs  Thorax & Lungs: Normal breath sounds, No respiratory distress, No wheezing  Abdomen: normal bowel sounds, soft, non tender, non distended, no hernias  Rectal:  Deferred. Skin: Warm, Dry, No erythema, No rash  Lower Extremities: Intact distal pulses, No edema  Neurologic: Alert & oriented x 3. RADIOLOGY/PROCEDURES   CT LUMBAR SPINE WO CONTRAST    Result Date: 2/27/2022  Fracture of right L2 and L3 transverse processes. Multilevel degenerative disc disease. Grade 1 anterolisthesis of L4 on L5. FINAL IMPRESSION   [facility] was seen today for new patient. Diagnoses and all orders for this visit:    Gastroesophageal reflux disease without esophagitis  -     pantoprazole (PROTONIX) 20 MG tablet; Take 1 tablet by mouth daily  -     GERD diet: avoid carbonated/acid beverages, fried and fatty foods. peppermint, chocolate, alcohol, coffee, citrus fruits and juices, tomoato products; avoid lying down for 2 to 3 hours after eating, avoid tight fitting clothing. Weight loss frequent helps heartburn/reflux especially with the presence of a hiatal hernia. Laryngeal polyp  -     Cathryn Cain DO, [facility]      Orders Placed This Encounter   Procedures   [phone], Cornelius Lujan DO, Otolaryngology, [facility]     Referral Priority:   Routine     Referral Type:   Eval and Treat     Referral Reason:   Specialty Services Required     Referred to Provider:   Elsie Kim DO     Requested Specialty:   Otolaryngology     Number of Visits Requested:   1       ORDERED FUTURE/PENDING TESTS     Lab Frequency Next Occurrence   CT CHEST WO CONTRAST Once 03/23/2022       FOLLOWUP   No follow-ups on file.           Fidelia Oconnor MD 2/28/22 2:03 PM EST    CC:  TORIN SANTANA PA-C

## 2022-03-01 ENCOUNTER — INITIAL CONSULT (OUTPATIENT)
Dept: GASTROENTEROLOGY | Age: 72
End: 2022-03-01
Payer: MEDICARE

## 2022-03-01 VITALS
HEIGHT: 67 IN | BODY MASS INDEX: 31.45 KG/M2 | HEART RATE: 79 BPM | WEIGHT: 200.4 LBS | SYSTOLIC BLOOD PRESSURE: 158 MMHG | DIASTOLIC BLOOD PRESSURE: 71 MMHG

## 2022-03-01 DIAGNOSIS — K21.9 GASTROESOPHAGEAL REFLUX DISEASE WITHOUT ESOPHAGITIS: Primary | ICD-10-CM

## 2022-03-01 DIAGNOSIS — J38.1 LARYNGEAL POLYP: ICD-10-CM

## 2022-03-01 PROCEDURE — G8417 CALC BMI ABV UP PARAM F/U: HCPCS | Performed by: INTERNAL MEDICINE

## 2022-03-01 PROCEDURE — 1090F PRES/ABSN URINE INCON ASSESS: CPT | Performed by: INTERNAL MEDICINE

## 2022-03-01 PROCEDURE — G8484 FLU IMMUNIZE NO ADMIN: HCPCS | Performed by: INTERNAL MEDICINE

## 2022-03-01 PROCEDURE — 4040F PNEUMOC VAC/ADMIN/RCVD: CPT | Performed by: INTERNAL MEDICINE

## 2022-03-01 PROCEDURE — 4004F PT TOBACCO SCREEN RCVD TLK: CPT | Performed by: INTERNAL MEDICINE

## 2022-03-01 PROCEDURE — 99213 OFFICE O/P EST LOW 20 MIN: CPT | Performed by: INTERNAL MEDICINE

## 2022-03-01 PROCEDURE — G8427 DOCREV CUR MEDS BY ELIG CLIN: HCPCS | Performed by: INTERNAL MEDICINE

## 2022-03-01 PROCEDURE — 3017F COLORECTAL CA SCREEN DOC REV: CPT | Performed by: INTERNAL MEDICINE

## 2022-03-01 PROCEDURE — 1123F ACP DISCUSS/DSCN MKR DOCD: CPT | Performed by: INTERNAL MEDICINE

## 2022-03-01 PROCEDURE — G8400 PT W/DXA NO RESULTS DOC: HCPCS | Performed by: INTERNAL MEDICINE

## 2022-03-01 RX ORDER — PANTOPRAZOLE SODIUM 20 MG/1
20 TABLET, DELAYED RELEASE ORAL DAILY
Qty: 30 TABLET | Refills: 3 | Status: SHIPPED | OUTPATIENT
Start: 2022-03-01 | End: 2022-09-26 | Stop reason: SDUPTHER

## 2022-03-02 ENCOUNTER — OFFICE VISIT (OUTPATIENT)
Dept: ORTHOPEDIC SURGERY | Age: 72
End: 2022-03-02
Payer: MEDICARE

## 2022-03-02 VITALS — WEIGHT: 200 LBS | BODY MASS INDEX: 31.39 KG/M2 | HEIGHT: 67 IN

## 2022-03-02 DIAGNOSIS — S32.009A CLOSED FRACTURE OF TRANSVERSE PROCESS OF LUMBAR VERTEBRA, INITIAL ENCOUNTER (HCC): Primary | ICD-10-CM

## 2022-03-02 PROCEDURE — 1090F PRES/ABSN URINE INCON ASSESS: CPT | Performed by: PHYSICIAN ASSISTANT

## 2022-03-02 PROCEDURE — 99214 OFFICE O/P EST MOD 30 MIN: CPT | Performed by: PHYSICIAN ASSISTANT

## 2022-03-02 PROCEDURE — G8427 DOCREV CUR MEDS BY ELIG CLIN: HCPCS | Performed by: PHYSICIAN ASSISTANT

## 2022-03-02 PROCEDURE — G8484 FLU IMMUNIZE NO ADMIN: HCPCS | Performed by: PHYSICIAN ASSISTANT

## 2022-03-02 PROCEDURE — G8417 CALC BMI ABV UP PARAM F/U: HCPCS | Performed by: PHYSICIAN ASSISTANT

## 2022-03-02 NOTE — PROGRESS NOTES
New Patient: LUMBAR SPINE    Referring Provider:  MILTON Adkins    CHIEF COMPLAINT:    Chief Complaint   Patient presents with    Back Pain     Patient states that she fell on ice on 2/13/22. Patient has low back pain. L2 and L3 transverse process fracture. HISTORY OF PRESENT ILLNESS:     Ms. Vick Romero is a pleasant 66 y.o. female here for consultation regarding her low back pain. She states her pain began falling on ice about 3 weeks ago. Her pain has steadily persisted since then. She notes she slipped and landed flat on her back. She rates her back pain 8/10 and leg pain 6/10. She describes the pain as aching. Pain is worse with standing and walking and improved some with sitting or lying down. The pain is localized to her lumbar spine on the right. She denies lower extremity radicular pain, numbness or weakness. She does note new numbness in her right arm from her elbow to her hand. Her back pain is substantially more bothersome than her arm numbness. She notes chronic right shoulder weakness due to rotator cuff issues. She receives cervical epidural injections twice a year through pain management. She denies new neck or upper extremity pain. She denies saddle numbness or bowel or bladder dysfunction. The pain moderately interferes with her sleep.      Current/Past Treatment:   · Physical Therapy: No  · Chiropractic:  No   · Injection:  Periodic epidural injections through pain management   · Medications:  Lidoderm patch, Gabapentin, Norco    Past Medical History:   Past Medical History:   Diagnosis Date    [facility]fib ([facility]     Arthritis     Clostridium difficile diarrhea 6/26/15    COPD (chronic obstructive pulmonary disease) (HCC)     COPD exacerbation (HCC)     Emphysema of lung ([facility]     Hyperlipidemia     Hypertension     O2 dependent     2 L/min at night    Pneumonia 2/5/2018    Primary cancer of right upper lobe of lung ([facility] [address] 3/11/2020    Rib pain     Sleep apnea     does not use cpap    Small bowel obstruction ([facility] [address]     pt denies        Past Surgical History:     Past Surgical History:   Procedure Laterality Date    ANKLE ARTHROSCOPY      x2    APPENDECTOMY      BRONCHOSCOPY N/A 3/15/2020    BRONCHOSCOPY THERAPUTIC ASPIRATION INITIAL performed by Dinh Vallejo MD at [id number] [place] [address]  3/17/2020    BRONCHOSCOPY THERAPUTIC ASPIRATION INITIAL performed by Mohini Guerra MD at [address] [facility] Left     CHOLECYSTECTOMY      COLONOSCOPY      COLONOSCOPY N/A 10/22/2021    COLONOSCOPY WITH BIOPSY performed by Sandra Lopez MD at [facility] Bilateral     THORACOSCOPY Right 3/11/2020    RIGHT VIDEO ASSISTED THORACOSCOPIC SURGERY; WEDGE EXCISION OF RIGHT UPPER LOBE FOLLOWED BY RIGHT UPPER LOBECTOMY; INTERCOSTAL NERVE BLOCK performed by Dinh Vallejo MD at [facility] N/A 10/22/2021    EGD BIOPSY performed by Sandra Lopez MD at [facility]       Current Medications:     Current Outpatient Medications:     pantoprazole (PROTONIX) 20 MG tablet, Take 1 tablet by mouth daily, Disp: 30 tablet, Rfl: 3    lidocaine (LIDODERM) 5 %, Place 1 patch onto the skin daily for 10 days 12 hours on, 12 hours off., Disp: 10 patch, Rfl: 0    venlafaxine (EFFEXOR XR) 75 MG extended release capsule, venlafaxine ER 75 mg capsule,extended release 24 hr, Disp: , Rfl:     dilTIAZem (CARDIZEM CD) 120 MG extended release capsule, Take one capsule by mouth daily, Disp: 90 capsule, Rfl: 3    apixaban (ELIQUIS) 5 MG TABS tablet, Take 1 tablet by mouth 2 times daily, Disp: 180 tablet, Rfl: 3    ezetimibe (ZETIA) 10 MG tablet, TAKE 1 TABLET BY MOUTH EVERY DAY, Disp: 90 tablet, Rfl: 1    albuterol sulfate HFA (VENTOLIN HFA) 108 (90 Base) MCG/ACT inhaler, Inhale 2 puffs into the lungs every 6 hours as needed for Wheezing or Shortness of Breath, Disp: 18 g, Rfl: 5    albuterol (PROVENTIL) (2.5 MG/3ML) 0.083% nebulizer solution, Take 3 mLs by nebulization every 6 hours as needed for Wheezing, Disp: 120 each, Rfl: 5    TRELEGY ELLIPTA 100-62.5-25 MCG/INH AEPB, INHALE 1 PUFF BY MOUTH EVERY DAY AS DIRECTED, Disp: 60 each, Rfl: 5    ALPRAZolam (XANAX) 0.25 MG tablet, Take 0.25 mg by mouth nightly as needed for Sleep., Disp: , Rfl:     gabapentin (NEURONTIN) 400 MG capsule, Take 800 mg by mouth 4 times daily. , Disp: , Rfl:     HYDROcodone-acetaminophen (NORCO) 7.5-325 MG per tablet, TAKE 1 TABLET BY MOUTH 3 TIMES A DAY AS NEEDED FOR PAIN, Disp: , Rfl:     nystatin (MYCOSTATIN) 045128 UNIT/GM cream, , Disp: , Rfl:     Fexofenadine HCl (MUCINEX ALLERGY PO), Take by mouth daily, Disp: , Rfl:     ferrous sulfate 325 (65 Fe) MG tablet, Take 325 mg by mouth daily (with breakfast), Disp: , Rfl:     rOPINIRole (REQUIP) 2 MG tablet, Take 3 mg by mouth nightly , Disp: , Rfl:     venlafaxine (EFFEXOR-XR) 150 MG XR capsule, Take 150 mg by mouth daily, Disp: , Rfl:     Multiple Vitamins-Minerals (THERAPEUTIC MULTIVITAMIN-MINERALS) tablet, Take 1 tablet by mouth daily, Disp: , Rfl:     QUEtiapine (SEROQUEL XR) 300 MG XR tablet, Take 500 mg by mouth nightly , Disp: , Rfl:     OXYGEN, Inhale into the lungs Indications: 2.5 L nightly , Disp: , Rfl:     Allergies:  Buspirone, Codeine, Doxycycline, Lisinopril, and Penicillins    Social History:    reports that she has been smoking cigarettes. She has a 25.00 pack-year smoking history. She has never used smokeless tobacco. She reports that she does not drink alcohol and does not use drugs.     Family History:   Family History   Problem Relation Age of Onset    Cancer Mother     Heart Failure Father        REVIEW OF SYSTEMS: Full ROS noted & scanned   CONSTITUTIONAL: Denies unexplained weight loss, fevers, chills or fatigue  NEUROLOGICAL: Denies unsteady gait or progressive weakness  MUSCULOSKELETAL: Denies joint swelling or redness  PSYCHOLOGICAL: Denies anxiety, depression   SKIN: Denies skin changes, delayed healing, rash, itching   HEMATOLOGIC: Denies easy bleeding or bruising  ENDOCRINE: Denies excessive thirst, urination, heat/cold  RESPIRATORY: Denies current dyspnea, cough  GI: Denies nausea, vomiting, diarrhea   : Denies bowel or bladder issues      PHYSICAL EXAM:    Vitals: Height 5' 7\" (1.702 m), weight 200 lb (90.7 kg), not currently breastfeeding. GENERAL EXAM:  · General Apparence: Patient is adequately groomed with no evidence of malnutrition. · Orientation: The patient is oriented to time, place and person. · Mood & Affect:The patient's mood and affect are appropriate. · Vascular: Examination reveals no swelling tenderness in upper or lower extremities. Good capillary refill. · Lymphatic: The lymphatic examination bilaterally reveals all areas to be without enlargement or induration  · Sensation: Sensation is intact without deficit  · Coordination/Balance: Good coordination. CERVICAL EXAMINATION:  · Inspection: Local inspection shows no step-off or bruising. Cervical alignment is normal.     · Palpation: No evidence of tenderness at the midline, and trapezius. Paraspinal tenderness is not present. There is no step-off or paraspinal spasm. · Range of Motion: Cervical flexion, extension, and rotation are mildly reduced without pain. · Strength: 5/5 bilateral upper extremities, with exception of right deltoid and triceps 4/5. · Special Tests:    ·   Spurling's & Jenkins's negative bilaterally. ·  Cubital and Carpal tunnel Tinel's negative bilaterally. · Skin:There are no rashes, ulcerations or lesions in right & left upper extremities. · Reflexes: Bilaterally triceps, biceps and brachioradialis are 2+. Clonus absent bilaterally at the feet.    · Additional Examinations:       · RIGHT UPPER EXTREMITY:  Inspection/examination of the right upper extremity does not show any tenderness, deformity or injury. Range of motion is unremarkable. There is no gross instability. There are no rashes, ulcerations or lesions. Strength and tone are normal.  · LEFT UPPER EXTREMITY: Inspection/examination of the left upper extremity does not show any tenderness, deformity or injury. Range of motion is unremarkable. There is no gross instability. There are no rashes, ulcerations or lesions. Strength and tone are normal.    LUMBAR/SACRAL EXAMINATION:  · Inspection: Local inspection shows no step-off or bruising. Lumbar alignment is normal.  Sagittal and Coronal balance is neutral.      · Palpation:  Mild tenderness with palpation to right lumbar paraspinal area. No tenderness with palpation to lumbar spine midline. No tenderness bilaterally at the paraspinal or trochanters. There is no step-off. · Range of Motion: Lumbar flexion, extension and rotation are mildly limited due to pain. · Strength:   Strength testing is 5/5 in all muscle groups tested. · Special Tests:   Straight leg raise and crossed SLR negative. Leg length and pelvis level. · Skin: There are no rashes, ulcerations or lesions. · Reflexes: Reflexes are symmetrically 2+ at the patellar and ankle tendons. Clonus absent bilaterally at the feet. · Gait & station: Slow but stable, patient ambulates without assistance    · Additional Examinations:   · RIGHT LOWER EXTREMITY: Inspection/examination of the right lower extremity does not show any tenderness, deformity or injury. Range of motion is unremarkable. There is no gross instability. There are no rashes, ulcerations or lesions. Strength and tone are normal.  · LEFT LOWER EXTREMITY:  Inspection/examination of the left lower extremity does not show any tenderness, deformity or injury. Range of motion is unremarkable. There is no gross instability. There are no rashes, ulcerations or lesions. Strength and tone are normal.    Diagnostic Testing:    I reviewed CT lumbar spine from 2/27/22.  They show right L2 and L3 transverse process fractures. Grade 1 spondylolisthesis L4-5. AP and lateral xray images of her lumbar spine were obtained in the office today and independently reviewed. They show scoliosis, multilevel DDD, and stable alignment of her lumbar transverse process fractures. Impression:   Right L2, L3 transverse process fractures    Plan:    We discussed treatment options including observation, physical therapy, epidural injections and additional imaging. She wishes to proceed with observation and activity modification. She may consider a lumbar corset brace for pain control if needed. We discussed obtaining cervical xrays during today's visit but patient declined. She will call for a right upper extremity EMG or new MRI of her cervical spine if her right upper extremity numbness does not improve or worsens. She will return PRN for her lumbar spine. Patient examined and note dictated by Hilton Marie PA-C. Patient also seen and examined by Dr. Elisa Enriquez.

## 2022-03-09 ENCOUNTER — OFFICE VISIT (OUTPATIENT)
Dept: ENT CLINIC | Age: 72
End: 2022-03-09
Payer: MEDICARE

## 2022-03-09 VITALS
DIASTOLIC BLOOD PRESSURE: 61 MMHG | BODY MASS INDEX: 31.23 KG/M2 | HEART RATE: 81 BPM | TEMPERATURE: 97.4 F | HEIGHT: 67 IN | WEIGHT: 199 LBS | SYSTOLIC BLOOD PRESSURE: 121 MMHG

## 2022-03-09 DIAGNOSIS — R49.0 DYSPHONIA: Primary | ICD-10-CM

## 2022-03-09 DIAGNOSIS — J38.1 REINKE'S EDEMA OF VOCAL FOLDS: ICD-10-CM

## 2022-03-09 DIAGNOSIS — K21.9 GASTROESOPHAGEAL REFLUX DISEASE, UNSPECIFIED WHETHER ESOPHAGITIS PRESENT: ICD-10-CM

## 2022-03-09 PROCEDURE — G8400 PT W/DXA NO RESULTS DOC: HCPCS | Performed by: OTOLARYNGOLOGY

## 2022-03-09 PROCEDURE — G8417 CALC BMI ABV UP PARAM F/U: HCPCS | Performed by: OTOLARYNGOLOGY

## 2022-03-09 PROCEDURE — 1090F PRES/ABSN URINE INCON ASSESS: CPT | Performed by: OTOLARYNGOLOGY

## 2022-03-09 PROCEDURE — 3017F COLORECTAL CA SCREEN DOC REV: CPT | Performed by: OTOLARYNGOLOGY

## 2022-03-09 PROCEDURE — 4040F PNEUMOC VAC/ADMIN/RCVD: CPT | Performed by: OTOLARYNGOLOGY

## 2022-03-09 PROCEDURE — G8484 FLU IMMUNIZE NO ADMIN: HCPCS | Performed by: OTOLARYNGOLOGY

## 2022-03-09 PROCEDURE — 99204 OFFICE O/P NEW MOD 45 MIN: CPT | Performed by: OTOLARYNGOLOGY

## 2022-03-09 PROCEDURE — 31575 DIAGNOSTIC LARYNGOSCOPY: CPT | Performed by: OTOLARYNGOLOGY

## 2022-03-09 PROCEDURE — 1123F ACP DISCUSS/DSCN MKR DOCD: CPT | Performed by: OTOLARYNGOLOGY

## 2022-03-09 PROCEDURE — G8427 DOCREV CUR MEDS BY ELIG CLIN: HCPCS | Performed by: OTOLARYNGOLOGY

## 2022-03-09 PROCEDURE — 4004F PT TOBACCO SCREEN RCVD TLK: CPT | Performed by: OTOLARYNGOLOGY

## 2022-03-09 ASSESSMENT — ENCOUNTER SYMPTOMS
VOICE CHANGE: 1
COUGH: 0
EYE ITCHING: 0
TROUBLE SWALLOWING: 0
SORE THROAT: 0
APNEA: 0
SHORTNESS OF BREATH: 0
SINUS PRESSURE: 0
FACIAL SWELLING: 0

## 2022-03-09 NOTE — PROGRESS NOTES
[facility], [address], [address], [facility] Aniyah Boss  P: [phone]       Patient     Lolita Garcia  1950    ChiefComplaint     Chief Complaint   Patient presents with   [facility]     Had an EGD completed and they found a polyp. Voice is hoarse. History of Present Illness     Charis Steward is a 59-year-old female here today for evaluation of laryngeal polyp. Had EGD done and noted to have polyp and referred here for evaluation. Voice has been hoarse for (years). Significant history of smoking, currently with COPD with oxygen dependence. Denies dysphagia, odynophagia, hemoptysis, referred otalgia. Currently smoking 2 cigarettes/day.     Past Medical History     Past Medical History:   Diagnosis Date    A-fib [facility])     Arthritis     Clostridium difficile diarrhea 6/26/15    COPD (chronic obstructive pulmonary disease) (HCC)     COPD exacerbation (HCC)     Emphysema of lung (HCC)     Hyperlipidemia     Hypertension     O2 dependent     2 L/min at night    Pneumonia 2/5/2018    Primary cancer of right upper lobe of lung ([address] 3/11/2020    Rib pain     Sleep apnea     does not use cpap    Small bowel obstruction ([address]     pt denies       Past Surgical History     Past Surgical History:   Procedure Laterality Date    ANKLE ARTHROSCOPY      x2    APPENDECTOMY      BRONCHOSCOPY N/A 3/15/2020    BRONCHOSCOPY THERAPUTIC ASPIRATION INITIAL performed by Tanika Bro MD at [address] [facility]  3/17/2020    BRONCHOSCOPY THERAPUTIC ASPIRATION INITIAL performed by Antwan Bunn MD at [address] [facility] Left     CHOLECYSTECTOMY      COLONOSCOPY      COLONOSCOPY N/A 10/22/2021    COLONOSCOPY WITH BIOPSY performed by Shantal Kebede MD at [facility] Bilateral     THORACOSCOPY Right 3/11/2020    RIGHT VIDEO ASSISTED THORACOSCOPIC SURGERY; WEDGE EXCISION OF RIGHT UPPER LOBE FOLLOWED BY RIGHT UPPER LOBECTOMY; INTERCOSTAL NERVE BLOCK performed by Rand Boswell MD at [place] [address] ENDOSCOPY N/A 10/22/2021    EGD BIOPSY performed by Rui Fang MD at [facility]       Family History     Family History   Problem Relation Age of Onset    Cancer Mother     Heart Failure Father        Social History     Social History     Tobacco Use    Smoking status: Current Every Day Smoker     Packs/day: 0.50     Years: 50.00     Pack years: 25.00     Types: Cigarettes    Smokeless tobacco: Never Used    Tobacco comment: Smokes like two cigarttes a day, uses vape. Vaping Use    Vaping Use: Every day    Substances: Nicotine   Substance Use Topics    Alcohol use: No     Alcohol/week: 0.0 standard drinks    Drug use: No        Allergies     Allergies   Allergen Reactions    Buspirone Other (See Comments)     unsure    Codeine     Doxycycline      Stomach pain and sickness    Lisinopril Other (See Comments)     Low blood pressure per patient 59/29     Penicillins Nausea Only       Medications     Current Outpatient Medications   Medication Sig Dispense Refill    pantoprazole (PROTONIX) 20 MG tablet Take 1 tablet by mouth daily 30 tablet 3    lidocaine (LIDODERM) 5 % Place 1 patch onto the skin daily for 10 days 12 hours on, 12 hours off.  10 patch 0    venlafaxine (EFFEXOR XR) 75 MG extended release capsule venlafaxine ER 75 mg capsule,extended release 24 hr      dilTIAZem (CARDIZEM CD) 120 MG extended release capsule Take one capsule by mouth daily 90 capsule 3    apixaban (ELIQUIS) 5 MG TABS tablet Take 1 tablet by mouth 2 times daily 180 tablet 3    ezetimibe (ZETIA) 10 MG tablet TAKE 1 TABLET BY MOUTH EVERY DAY 90 tablet 1    albuterol sulfate HFA (VENTOLIN HFA) 108 (90 Base) MCG/ACT inhaler Inhale 2 puffs into the lungs every 6 hours as needed for Wheezing or Shortness of Breath 18 g 5    albuterol (PROVENTIL) (2.5 MG/3ML) 0.083% nebulizer solution Take 3 mLs by nebulization every 6 hours as needed for Wheezing 120 each 5    TRELEGY ELLIPTA 100-62.5-25 MCG/INH AEPB INHALE 1 PUFF BY MOUTH EVERY DAY AS DIRECTED 60 each 5    ALPRAZolam (XANAX) 0.25 MG tablet Take 0.25 mg by mouth nightly as needed for Sleep.  gabapentin (NEURONTIN) 400 MG capsule Take 800 mg by mouth 4 times daily.  HYDROcodone-acetaminophen (NORCO) 7.5-325 MG per tablet TAKE 1 TABLET BY MOUTH 3 TIMES A DAY AS NEEDED FOR PAIN      nystatin (MYCOSTATIN) 341555 UNIT/GM cream       Fexofenadine HCl (MUCINEX ALLERGY PO) Take by mouth daily      ferrous sulfate 325 (65 Fe) MG tablet Take 325 mg by mouth daily (with breakfast)      rOPINIRole (REQUIP) 2 MG tablet Take 3 mg by mouth nightly       venlafaxine (EFFEXOR-XR) 150 MG XR capsule Take 150 mg by mouth daily      Multiple Vitamins-Minerals (THERAPEUTIC MULTIVITAMIN-MINERALS) tablet Take 1 tablet by mouth daily      QUEtiapine (SEROQUEL XR) 300 MG XR tablet Take 500 mg by mouth nightly       OXYGEN Inhale into the lungs Indications: 2.5 L nightly        No current facility-administered medications for this visit. Review of Systems     Review of Systems   Constitutional: Negative for appetite change, chills, fatigue, fever and unexpected weight change. HENT: Positive for voice change. Negative for congestion, ear discharge, ear pain, facial swelling, hearing loss, nosebleeds, postnasal drip, sinus pressure, sneezing, sore throat, tinnitus and trouble swallowing. Eyes: Negative for itching. Respiratory: Negative for apnea, cough and shortness of breath. Endocrine: Negative for cold intolerance and heat intolerance. Musculoskeletal: Negative for myalgias and neck pain. Skin: Negative for rash. Allergic/Immunologic: Negative for environmental allergies. Neurological: Negative for dizziness and headaches. Psychiatric/Behavioral: Negative for confusion, decreased concentration and sleep disturbance. PhysicalExam     Vitals:    03/09/22 0800   BP: 121/61   Site: Left Upper Arm   Position: Sitting   Cuff Size: Large Adult   Pulse: 81   Temp: 97.4 °F (36.3 °C)   TempSrc: Infrared   Weight: 199 lb (90.3 kg)   Height: 5' 7\" (1.702 m)       Physical Exam  Constitutional:       General: She is not in acute distress. Appearance: She is well-developed. Comments: Rough, deep vocal quality   HENT:      Head: Normocephalic and atraumatic. Right Ear: Tympanic membrane, ear canal and external ear normal. No drainage. No middle ear effusion. Tympanic membrane is not bulging. Tympanic membrane has normal mobility. Left Ear: Tympanic membrane, ear canal and external ear normal. No drainage. No middle ear effusion. Tympanic membrane is not bulging. Tympanic membrane has normal mobility. Nose: No mucosal edema or rhinorrhea. Mouth/Throat:      Lips: Pink. Mouth: Mucous membranes are moist.      Tongue: No lesions. Palate: No mass. Pharynx: Uvula midline. Eyes:      Pupils: Pupils are equal, round, and reactive to light. Neck:      Thyroid: No thyroid mass or thyromegaly. Trachea: Trachea and phonation normal.   Cardiovascular:      Pulses: Normal pulses. Pulmonary:      Effort: Pulmonary effort is normal. No accessory muscle usage or respiratory distress. Breath sounds: No stridor. Musculoskeletal:      Cervical back: Full passive range of motion without pain. Lymphadenopathy:      Head:      Right side of head: No submental or submandibular adenopathy. Left side of head: No submental or submandibular adenopathy. Cervical: No cervical adenopathy. Right cervical: No superficial, deep or posterior cervical adenopathy. Left cervical: No superficial, deep or posterior cervical adenopathy. Skin:     General: Skin is warm and dry. Neurological:      Mental Status: She is alert and oriented to person, place, and time. Cranial Nerves:  No cranial nerve deficit. Coordination: Coordination normal.      Gait: Gait normal.   Psychiatric:         Thought Content: Thought content normal.           Procedure     Flexible Laryngoscopy    Pre op: dysphonia  Post op: same, collin's edema  Procedure : Flexible Laryngoscopy  Surgeon: Flor Navarro DO  Anesthesia: Afrin with 4% lidocaine  Indication: Laryngeal mirror examination was not tolerated due to gag reflex  Description:  The scope was passed along the floor of the left naris to the level of the larynx. The base of tongue, vallecula, epiglottis, aryepiglottic folds, arytenoids, false vocal folds, true vocal folds, or pyriform sinuses were all evaluated. True vocal folds exhibited symmetric motion bilaterally without evidence of paralysis or paresis. The scope was removed. The patient tolerated the procedure without difficulty. There were no complications. Pertinent findings: Bilateral collin's edema to true vocal cords, no mass or lesion      Assessment and Plan     1. Dysphonia  -Secondary to collin's edema    2. Collin's edema of vocal folds  -Bilateral Collin's edema no evidence of leukoplakia or mass  -Current active smoker, no indication for surgical intervention    3. Gastroesophageal reflux disease, unspecified whether esophagitis present  -Evidence of ongoing acid reflux on scope examination  -Follows with GI      Discussed returning with change in swallow, throat pain, ear pain, hemoptysis    Flor Navarro DO  3/9/22      Portions of this note were dictated using Dragon.  There may be linguistic errors secondary to the use of this program.

## 2022-03-21 ENCOUNTER — TELEPHONE (OUTPATIENT)
Dept: PULMONOLOGY | Age: 72
End: 2022-03-21

## 2022-03-21 NOTE — TELEPHONE ENCOUNTER
Kodak Pedro MA     RW    3/21/22 10:54 AM  Note  Pt called saying she was recently taken to [facility] by squad (last week) and while there, she had at least 1, if not 2 chest CT scans. She wants to know if Dr. Adriana Alvarado can use those results instead of her having another chest CT that's scheduled at [place] on 3/23/22 (Wednesday). I told her we'll have to get the records from [facility] first, and then he can determine if he can use those results and if so, we'll cx the CT for Wed. Pt would like to change her apt on Wed to VV since she recently broke her back & getting around is physically difficult. Results below. Narrative  Performed by [facility]  EXAM: CTA CHEST PE ALERT   INDICATION: 66years old Female with elevated d-dimer is and chest pain. Rule   out PE.   COMPARISON: CT chest dated 3/14/2022. TECHNIQUE: Helical axial CT images of the chest were obtained after the   administration of according to PE protocol. MIP (maximum intensity projection)   images were performed. Dose reduction techniques were achieved by using automated exposure control   and/or adjustment of mA and/or kV according to patient size and/or use of   iterative reconstruction technique. Limitations: The lungs are suboptimally evaluated due to breathing motion   artifact. FINDINGS:   PULMONARY ARTERIES: There is adequate opacification of the pulmonary   vasculature. Filling defect is seen in the subsegmental branches of the lower   lobes. -Series 3 image 81-83 and series 3 image 93 minutes. The main pulmonary   artery measures 3.8 cm in comparison to the ascending aorta measuring 3.2 cm. This finding is suggestive of pulmonary arterial hypertension. There is no evidence of right ventricular strain. MEDIASTINUM: There is rightward displacement of the mediastinum secondary to   the right upper lobectomy and volume loss. .   The heart is mildly enlarged.    Coronary artery calcifications are noted. The proximal esophagus is moderately dilated. PLEURAL CAVITY: No pneumothorax. No pleural effusion. LUNGS: Redemonstration of postoperative changes related to right upper   lobectomy. There is a background of extensive bilateral interlobular septal thickening   most prominent at the left upper lobe and the lower lobes bilaterally. Subsegmental bandlike opacity in the right lower lobe has appearance of scar. There is a stable calcified granuloma in the right lower lobe. Scarring and fat   necrosis is also seen in the right upper hemithorax. There is superimposed, multifocal and patchy groundglass opacities. CHEST WALL/AXILLA: No axillary lymphadenopathy   VISUALIZED UPPER ABDOMEN: Cholecystectomy clips are present. Solomon Pickles BONES: There is scoliosis. Multilevel degenerative changes at the thoracic   spine. Old bilateral rib fractures. IMPRESSION:   1. Evidence of filling defect in the subsegmental pulmonary arteries of the   lower lobes suggestive of tiny emboli. No pulmonary infarction. No evidence of   right ventricular strain. 2. Evidence of pulmonary arterial hypertension. 3. Redemonstration of groundglass opacities in the background of interlobular   septal thickening suggestive of interstitial lung disease. Differentials   include edema/pneumonia. Follow-up to document resolution. 4. Remainder of the chronic findings as described above. The findings were called to the nurse taking care of the patient Jose Webb in the   Med/Surg unit    Procedure Note    Provider, Sanchez Miller - 03/15/2022   Formatting of this note might be different from the original.   EXAM: CTA CHEST PE ALERT   INDICATION: 66years old Female with elevated d-dimer is and chest pain. Rule   out PE.   COMPARISON: CT chest dated 3/14/2022. TECHNIQUE: Helical axial CT images of the chest were obtained after the   administration of according to PE protocol.  MIP (maximum intensity projection)   images were performed. Dose reduction techniques were achieved by using automated exposure control    and/or adjustment of mA and/or kV according to patient size and/or use of   iterative reconstruction technique. Limitations: The lungs are suboptimally evaluated due to breathing motion   artifact. FINDINGS:   PULMONARY ARTERIES: There is adequate opacification of the pulmonary   vasculature. Filling defect is seen in the subsegmental branches of the lower   lobes. -Series 3 image 81-83 and series 3 image 93 minutes. The main pulmonary   artery measures 3.8 cm in comparison to the ascending aorta measuring 3.2 cm. This finding is suggestive of pulmonary arterial hypertension. There is no evidence of right ventricular strain. MEDIASTINUM: There is rightward displacement of the mediastinum secondary to   the right upper lobectomy and volume loss. .   The heart is mildly enlarged. Coronary artery calcifications are noted. The proximal esophagus is moderately dilated. PLEURAL CAVITY: No pneumothorax. No pleural effusion. LUNGS: Redemonstration of postoperative changes related to right upper   lobectomy. There is a background of extensive bilateral interlobular septal thickening   most prominent at the left upper lobe and the lower lobes bilaterally. Subsegmental bandlike opacity in the right lower lobe has appearance of scar. There is a stable calcified granuloma in the right lower lobe. Scarring and fat   necrosis is also seen in the right upper hemithorax. There is superimposed, multifocal and patchy groundglass opacities. CHEST WALL/AXILLA: No axillary lymphadenopathy   VISUALIZED UPPER ABDOMEN: Cholecystectomy clips are present. Waqas Awkward BONES: There is scoliosis. Multilevel degenerative changes at the thoracic   spine. Old bilateral rib fractures. IMPRESSION:   1. Evidence of filling defect in the subsegmental pulmonary arteries of the   lower lobes suggestive of tiny emboli.  No pulmonary infarction. No evidence of   right ventricular strain. 2. Evidence of pulmonary arterial hypertension. 3. Redemonstration of groundglass opacities in the background of interlobular   septal thickening suggestive of interstitial lung disease. Differentials   include edema/pneumonia. Follow-up to document resolution. 4. Remainder of the chronic findings as described above.

## 2022-03-21 NOTE — TELEPHONE ENCOUNTER
Pt called saying she was recently taken to [facility] by squad (last week) and while there, she had at least 1, if not 2 chest CT scans. She wants to know if Dr. Sravanthi Blackman can use those results instead of her having another chest CT that's scheduled at [facility] on 3/23/22 (Wednesday). I told her we'll have to get the records from [facility] first, and then he can determine if he can use those results and if so, we'll cx the CT for Wed. Pt would like to change her apt on Wed to VV since she recently broke her back & getting around is physically difficult. Last seen: 9/23/21  Assessment:   · Severe COPD   · Abnormal CT chest 3/15/2020. Postoperative changes. Improvement on repeat CT 9/16/2020. Not significantly changed on CT 3/16/2021. · FRANCISCO elongated nodule 20 x 8 mm. SUV 7.3 on PET scan 2/6/2020. Post RUL resection 3/11/2020 non-small cell lung cancer. [id number] stage IA  · Abnormal esophagus on CT - fluid is seen in the esophagus   · Hypoxia on exertion  · Right traumatic displaced rib fractures with  hemothorax required VATS decortication and chest tube removed 2/14/18. · Moderate MEG. O2 3LPM at night. Refusing BiPAP uses O2   · PAF on Eliquis followed by cardiology   · >120 pack year smoking. Intolerance to chantix . Quit March 2020                Plan:   · Surveillance CT chest March 2022  · Advised to titrate O2 using her pulse oximeter- target O2 sat 90-92%. · Continue Trelegy and Albuterol INH/Neb Q 4 hrs PRN   · Patient is up to date with Covid, pneumococcal vaccine, influenza vaccine  · Smoking cessation counseling.    · Follow-up after CT chest

## 2022-03-22 NOTE — TELEPHONE ENCOUNTER
CT @ [facility] scheduled 3/23 has been cancelled. OV with Eleazar Phoenix was changed to VV. I called [facility] and requested records from her recent hospital admission be faxed to our office today.

## 2022-03-23 ENCOUNTER — TELEPHONE (OUTPATIENT)
Dept: PULMONOLOGY | Age: 72
End: 2022-03-23

## 2022-03-23 ENCOUNTER — TELEMEDICINE (OUTPATIENT)
Dept: PULMONOLOGY | Age: 72
End: 2022-03-23
Payer: MEDICARE

## 2022-03-23 DIAGNOSIS — J44.9 COPD, SEVERE (HCC): Primary | ICD-10-CM

## 2022-03-23 DIAGNOSIS — R93.89 ABNORMAL CT OF THE CHEST: ICD-10-CM

## 2022-03-23 DIAGNOSIS — G47.33 MODERATE OBSTRUCTIVE SLEEP APNEA: ICD-10-CM

## 2022-03-23 PROCEDURE — 4040F PNEUMOC VAC/ADMIN/RCVD: CPT | Performed by: INTERNAL MEDICINE

## 2022-03-23 PROCEDURE — 3017F COLORECTAL CA SCREEN DOC REV: CPT | Performed by: INTERNAL MEDICINE

## 2022-03-23 PROCEDURE — G8400 PT W/DXA NO RESULTS DOC: HCPCS | Performed by: INTERNAL MEDICINE

## 2022-03-23 PROCEDURE — 4004F PT TOBACCO SCREEN RCVD TLK: CPT | Performed by: INTERNAL MEDICINE

## 2022-03-23 PROCEDURE — G8484 FLU IMMUNIZE NO ADMIN: HCPCS | Performed by: INTERNAL MEDICINE

## 2022-03-23 PROCEDURE — 1090F PRES/ABSN URINE INCON ASSESS: CPT | Performed by: INTERNAL MEDICINE

## 2022-03-23 PROCEDURE — G8417 CALC BMI ABV UP PARAM F/U: HCPCS | Performed by: INTERNAL MEDICINE

## 2022-03-23 PROCEDURE — 1123F ACP DISCUSS/DSCN MKR DOCD: CPT | Performed by: INTERNAL MEDICINE

## 2022-03-23 PROCEDURE — 99214 OFFICE O/P EST MOD 30 MIN: CPT | Performed by: INTERNAL MEDICINE

## 2022-03-23 PROCEDURE — 3023F SPIROM DOC REV: CPT | Performed by: INTERNAL MEDICINE

## 2022-03-23 PROCEDURE — G8427 DOCREV CUR MEDS BY ELIG CLIN: HCPCS | Performed by: INTERNAL MEDICINE

## 2022-03-23 RX ORDER — FLUTICASONE FUROATE, UMECLIDINIUM BROMIDE AND VILANTEROL TRIFENATATE 100; 62.5; 25 UG/1; UG/1; UG/1
POWDER RESPIRATORY (INHALATION)
Qty: 60 EACH | Refills: 5 | Status: SHIPPED | OUTPATIENT
Start: 2022-03-23

## 2022-03-23 RX ORDER — ALBUTEROL SULFATE 90 UG/1
2 AEROSOL, METERED RESPIRATORY (INHALATION) EVERY 6 HOURS PRN
Qty: 18 G | Refills: 5 | Status: SHIPPED | OUTPATIENT
Start: 2022-03-23 | End: 2022-10-19 | Stop reason: SDUPTHER

## 2022-03-23 NOTE — PROGRESS NOTES
[facility]                                                            Outpatient Follow Up Note  TELEHEALTH EVALUATION: Service performed was Audio/Visual (During [facility] public health emergency) and not a face-to-face visit         CHIEF COMPLAINT: Follow up hospitalization       HPI:  Patient was admitted [facility] for COPD AE and CHF.    Had CT showed GGO, LL PE   She feels okay now  No cough or sputum  No hemoptysis   Uses Albuterol 3 times/week   On 3L now with Sat 94%   Uses O2 3LPM at night   No smoking- quit 2 weeks ago           Past Medical History:   Diagnosis Date    A-fib ([address]     Arthritis     Clostridium difficile diarrhea 6/26/15    COPD (chronic obstructive pulmonary disease) (HCC)     COPD exacerbation (HCC)     Emphysema of lung (HCC)     Hyperlipidemia     Hypertension     O2 dependent     2 L/min at night    Pneumonia 2/5/2018    Primary cancer of right upper lobe of lung ([address] 3/11/2020    Rib pain     Sleep apnea     does not use cpap    Small bowel obstruction ([address]     pt denies       Past Surgical History:        Procedure Laterality Date    ANKLE ARTHROSCOPY      x2    APPENDECTOMY      BRONCHOSCOPY N/A 3/15/2020    BRONCHOSCOPY THERAPUTIC ASPIRATION INITIAL performed by Ramiro Stern MD at [address]  3/17/2020    BRONCHOSCOPY THERAPUTIC ASPIRATION INITIAL performed by Miriam Virgen MD at [address] [facility] Left     CHOLECYSTECTOMY      COLONOSCOPY      COLONOSCOPY N/A 10/22/2021    COLONOSCOPY WITH BIOPSY performed by Phyllis Samuels MD at [facility] Bilateral     THORACOSCOPY Right 3/11/2020    RIGHT VIDEO ASSISTED THORACOSCOPIC SURGERY; WEDGE EXCISION OF RIGHT UPPER LOBE FOLLOWED BY RIGHT UPPER LOBECTOMY; INTERCOSTAL NERVE BLOCK performed by Ramiro Stern MD at [address] [place] [address] TONSILLECTOMY      UPPER GASTROINTESTINAL ENDOSCOPY N/A 10/22/2021    EGD BIOPSY performed by Luna Thornton MD at [place] [id number].:  is allergic to buspirone, codeine, doxycycline, lisinopril, and penicillins. Social History:    TOBACCO:   reports that she has been smoking cigarettes. She has a 25.00 pack-year smoking history. She has never used smokeless tobacco.  ETOH:   reports no history of alcohol use. Family History:       Problem Relation Age of Onset   Lee Cancer Mother     Heart Failure Father        Current Medications:    Current Outpatient Medications:     pantoprazole (PROTONIX) 20 MG tablet, Take 1 tablet by mouth daily, Disp: 30 tablet, Rfl: 3    venlafaxine (EFFEXOR XR) 75 MG extended release capsule, venlafaxine ER 75 mg capsule,extended release 24 hr, Disp: , Rfl:     dilTIAZem (CARDIZEM CD) 120 MG extended release capsule, Take one capsule by mouth daily, Disp: 90 capsule, Rfl: 3    apixaban (ELIQUIS) 5 MG TABS tablet, Take 1 tablet by mouth 2 times daily, Disp: 180 tablet, Rfl: 3    ezetimibe (ZETIA) 10 MG tablet, TAKE 1 TABLET BY MOUTH EVERY DAY, Disp: 90 tablet, Rfl: 1    albuterol sulfate HFA (VENTOLIN HFA) 108 (90 Base) MCG/ACT inhaler, Inhale 2 puffs into the lungs every 6 hours as needed for Wheezing or Shortness of Breath, Disp: 18 g, Rfl: 5    albuterol (PROVENTIL) (2.5 MG/3ML) 0.083% nebulizer solution, Take 3 mLs by nebulization every 6 hours as needed for Wheezing, Disp: 120 each, Rfl: 5    TRELEGY ELLIPTA 100-62.5-25 MCG/INH AEPB, INHALE 1 PUFF BY MOUTH EVERY DAY AS DIRECTED, Disp: 60 each, Rfl: 5    ALPRAZolam (XANAX) 0.25 MG tablet, Take 0.25 mg by mouth nightly as needed for Sleep., Disp: , Rfl:     gabapentin (NEURONTIN) 400 MG capsule, Take 800 mg by mouth 4 times daily.  , Disp: , Rfl:     HYDROcodone-acetaminophen (NORCO) 7.5-325 MG per tablet, TAKE 1 TABLET BY MOUTH 3 TIMES A DAY AS NEEDED FOR PAIN, Disp: , Rfl:     nystatin (MYCOSTATIN) 567530 UNIT/GM cream, , Disp: , Rfl:     Fexofenadine HCl (MUCINEX ALLERGY PO), Take by mouth daily, Disp: , Rfl:     ferrous sulfate 325 (65 Fe) MG tablet, Take 325 mg by mouth daily (with breakfast), Disp: , Rfl:     rOPINIRole (REQUIP) 2 MG tablet, Take 3 mg by mouth nightly , Disp: , Rfl:     venlafaxine (EFFEXOR-XR) 150 MG XR capsule, Take 150 mg by mouth daily, Disp: , Rfl:     Multiple Vitamins-Minerals (THERAPEUTIC MULTIVITAMIN-MINERALS) tablet, Take 1 tablet by mouth daily, Disp: , Rfl:     QUEtiapine (SEROQUEL XR) 300 MG XR tablet, Take 500 mg by mouth nightly , Disp: , Rfl:     OXYGEN, Inhale into the lungs Indications: 2.5 L nightly , Disp: , Rfl:     Review of Systems    Objective:   Physical Exam  not currently breastfeeding.' on RA  O2 Sat:  HR:  BP:  RR:  Temperature:  Neck size: Not able to obtain   Mallampati class IV. Constitutional:  No acute distress. Appears well developed and nourished. Eyes: No sclera icterus. No visible discharge. HENT: Normal appearing nose. External Ears normal.   Neck: No visualized mass. Overton Feil is midline   Resp: No accessory muscle use. Respiratory effort normal. No visualized signs of difficulty breathing or respiratory distress. Cardiovascular: No LE edema per patient's report   Musculoskeletal: No signs of ataxia. Normal range of motion of the neck. Skin: No significant exanthematous lesions or discoloration noted on facial skin    Neuro: Awake. Alert. Able to follow commands. No facial asymmetry. No gaze palsy. Psych: No agitation. Normal affect. No hallucinations. Normal judgement and insight.               DATA reviewed by me:   PFTs 02/04/2020 FVC 1.98(61%) FEV1 1.16(47%) FEV1/FVC 59 % TLC 4.11(76%)   DLCO 09.05(38%) 6MW 840 F LO2 90%  PFTs 08/19/2015 FVC 1.78(52%) FEV1 1.27(49%) FEV1/FVC 71 % TLC 4.52(82%)   DLCO 10.23(43%) 6MW 980 F LO2 89%  6MW 11/14/2019 720 feet Desat 88% 1L on exertin       CT chest 1/16/19  Multiple right-sided rib fractures-subacute  No acute intrathoracic abnormalities  Scattered areas of parenchymal opacities lower lobe likely atelectasis      CT chest 1/16/2020    Mediastinum: No enlarged lymph nodes. Normal caliber great vessels. Normal  heart size and pericardium. Suspect incidental benign lipomatous hypertrophy  of the interatrial septum. No significant coronary calcifications. Unremarkable esophagus and included thyroid. Lungs/pleura: Nodular thickening in the subpleural right upper lobe on series  3, image 27; coronal image 101 measuring 20 x 8 mm in axial plane and 7 mm  craniocaudal.  This nodular opacity is low in attenuation values (water  density). Subtle tree-in-bud opacities seen on the prior exam in the right  upper lobe have resolved. These were likely infectious or inflammatory. Linear and bandlike areas of scarring and/or atelectasis at the right lung  base are not substantially changed. Decreased atelectasis at the medial left  lung base. Scattered punctate calcified granulomas. No pleural effusion. Upper Abdomen: Unremarkable. Soft Tissues/Bones: No enlarged axillary or supraclavicular lymph nodes. Multiple healed rib fractures bilaterally, some of which are ununited. PET scan 2/6/2020   1.9 cm lateral right upper lobe pulmonary nodule is hypermetabolic, to a  degree concerning for malignancy until proven otherwise. No findings of FDG avid metastatic disease. Inflammatory change related to healing posterolateral left 8th rib fracture. Lipomatous hypertrophy of the interatrial septum, an incidental finding. Fluid loculated within fissure within the lateral right mid lung demonstrates  low level activity, likely inflammatory.     CT chest 9/16/2020   Pulmonary emphysema  Interval removal of right-sided chest tube  Gas and fluid collection at the right apex decrease in size  Mild bronchial wall thickening, bronchiectasis, interlobular septal wall thickening in the right base improved from prior exam  Resolution of groundglass opacities on the left side    CT chest 3/16/2021 imaging reviewed by me and showed  Increased ground-glass opacities in the left upper lobe, likely  postinflammatory-infectious. Punctate noncalcified pulmonary nodule left  lower lobe is unchanged  Persistent small pleural fluid collection in the right lung apex. Pleural  gas internally has decreased  Fluid is seen in the esophagus to above the level of thoracic inlet,  suggesting reflux or esophageal dysmotility. CT chest 9/23/21  images reviewed by me and showed:   Unchanged tiny 4 mm left lower lobe pulmonary nodule. No further follow-up  recommended. Resolution of the ground-glass opacities in the left upper lobe. Resolution of the previous air and fluid in the pleural space at the right  apex. Stable postsurgical changes elsewhere. No new concerning findings on this examination. No evidence of acute  cardiopulmonary process. CT chest 3/14/2022 from outside hospital  Lower lobe PEs  Groundglass opacities with interlobular septal thickening       RUL lobectomy 3/11/2020  A. Wedge excision, right upper lobe lung lesion:       - Involved with non-small cell carcinoma, squamous cell carcinoma         with lesion 1.1 cm in greatest extent.       - Parenchymal resection margins are negative for malignancy.      - Overlying pleural surface shows no evidence of involvement with         malignancy.      - See case comment and synoptic report.       B. Regional lymph node excision, right 10:       - 3 lymph nodes with reactive changes and no evidence of metastatic         carcinoma ( 0\3 ).      - Pankeratin\CAM 5.2 stain is performed and supports diagnosis.         C. Right upper lobe lobectomy:       - Organizing biopsy cavity changes with extravasated blood and         reactive features.   Hello how are you       - No residual malignancy identified.       - Bronchial margin, vascular margin and parenchymal margins are         negative for malignancy.      - 3 peribronchial lymph nodes with reactive changes and no evidence         of metastatic carcinoma (0/3).      - One intraparenchymal lymph node with no evidence of involvement         with malignancy.      - Pankeratin\CAM 5.2 stains were utilized to evaluate the lymph         nodes and supports final diagnosis.         D. Regional lymph node excision, right 4:       - Six lymph nodes with no evidence of metastatic carcinoma ( 0\6)       - Pankeratin\CAM 5.2 stains were utilized evaluated lymph nodes and         supports final diagnosis. PSG 8/25/16 AHI 28.4 desaturation to 71%  BiPAP titration 9/9/16 BiPAP 15/11 cmH2O      Assessment:      · Severe COPD   · Abnormal CT chest 3/14/2022 with small lower lobe PEs, groundglass opacities, interlobular septal thickening. Admitted treated for CHF and COPD exacerbation. · Abnormal CT chest 3/15/2020. Postoperative changes. Improvement on repeat CT 9/16/2020. Not significantly changed on CT 3/16/2021. · FRANCISCO elongated nodule 20 x 8 mm. SUV 7.3 on PET scan 2/6/2020. Post RUL resection 3/11/2020 non-small cell lung cancer. [id number] stage IA  · Abnormal esophagus on CT - fluid is seen in the esophagus   · Hypoxia on exertion  · Right traumatic displaced rib fractures with  hemothorax required VATS decortication and chest tube removed 2/14/18  · Moderate MEG. O2 3LPM at night. Refusing BiPAP uses O2   · PAF on Eliquis followed by cardiology   · >120 pack year smoking. Intolerance to chantix . Quit March 2020      Plan:      6MW  Continue Trelegy and Albuterol INH/Neb Q 4 hrs PRN   O2 2-4 L. Advised to titrate O2 using her pulse oximeter- target O2 sat 90-92%. Continue Eliquis   CT chest in 3 months   Patient is up to date with Covid, pneumococcal vaccine, influenza vaccine  Advised to continue with smoking cessation.    Follow up after CT              Mat Grover is a 66 y.o. female being evaluated by a Virtual Visit (video visit) encounter to address concerns as mentioned above. A caregiver was present when appropriate. Due to this being a TeleHealth encounter (During IEK[facility]-28 public health emergency), evaluation of the following organ systems was limited: Vitals/Constitutional/EENT/Resp/CV/GI//MS/Neuro/Skin/Heme-Lymph-Imm. Pursuant to the emergency declaration under the [address] [place] [address] and the [employer] Act, this Virtual Visit was conducted with patient's (and/or legal guardian's) consent, to reduce the patient's risk of exposure to COVID-19 and provide necessary medical care. The patient (and/or legal guardian) has also been advised to contact this office for worsening conditions or problems, and seek emergency medical treatment and/or call 911 if deemed necessary. Services were provided through a video synchronous discussion virtually to substitute for in-person clinic visit. Patient was located in her home, provider was located in his office. --Raysa Guerrero MD on 3/23/2022 at 11:28 AM    An electronic signature was used to authenticate this note.

## 2022-03-23 NOTE — TELEPHONE ENCOUNTER
limited to the following:    Your Provider(s) may not able to provide medical treatment for your particular condition and you may be required to seek alternative healthcare or emergency care services.  The electronic systems or other security protocols or safeguards used in the Service could fail, causing a breach of privacy of your medical or other information.  Given regulatory requirements in certain jurisdictions, your Provider(s) diagnosis and/or treatment options, especially pertaining to certain prescriptions, may be limited. Acceptance   1. You understand that Services will be provided via Telehealth. This process involves the use of HIPAA compliant and secure, real-time audio-visual interfacing with a qualified and appropriately trained provider located at [facility]. 2. You understand that, under no circumstances, will this session be recorded. 3. You understand that the Provider(s) at [facility] and other clinical participants will be party to the information obtained during the Telehealth session in accordance with best medical practices. 4. You understand that the information obtained during the Telehealth session will be used to help determine the most appropriate treatment options. 5. You understand that You have the right to revoke this consent at any point in time. 6. You understand that Telehealth is voluntary, and that continued treatment is not dependent upon consent. 7. You understand that, in the event of non-consent to Telehealth services and/or technical difficulties, you will obtain services as typically provided in the absence of Telehealth technology. 8. You understand that this consent will be kept in Your medical record. 9. No potential benefits from the use of Telehealth or specific results can be guaranteed. Your condition may not be cured or improved and, in some cases, may get worse.    10. There are limitations in the terms described in the Terms of Service and this Telehealth Consent. The patient was read the following statement and has consented to the visit as of 3/23/22. The patient has been scheduled for their first telehealth visit on 03/23/2022 with Dr Almita Ely.

## 2022-03-23 NOTE — TELEPHONE ENCOUNTER
Pt needs to schedule 6MW per VV 3/23/22, but pt states that she is having so much back pain that she can't walk. Pt requests office to call her back at a later time. Will continue to f/u with pt to schedule 6MW.

## 2022-03-28 NOTE — TELEPHONE ENCOUNTER
Katey Jones called back stating that disc was mailed out 3/23/22. Will make sure disc is rec'd in office.

## 2022-03-28 NOTE — TELEPHONE ENCOUNTER
Spoke with Telma at [facility] medical records to check status of disc being mailed. Kosta Cortez is going to check if request was rec'd and will call office back. If she doesn't have it, I faxed the request directly to radiology, will need Medical records fax number to send request to.

## 2022-03-30 NOTE — TELEPHONE ENCOUNTER
Held 6MW time for first available 4/14. Patient called with message left for patient to call back to office to make sure pt will be able to come.

## 2022-04-12 ENCOUNTER — TELEPHONE (OUTPATIENT)
Dept: PULMONOLOGY | Age: 72
End: 2022-04-12

## 2022-04-12 NOTE — TELEPHONE ENCOUNTER
Patient cancelled appointment on 04/14/2022 for 6mwt. Reason: Pt is not feeling well,     Patient did not reschedule appointment. Pt stated she will call back to schedule at a later date. Appointment rescheduled for . n/a    Last OV 03/23/2022      Assessment:   · Severe COPD   · Abnormal CT chest 3/14/2022 with small lower lobe PEs, groundglass opacities, interlobular septal thickening. Admitted treated for CHF and COPD exacerbation. · Abnormal CT chest 3/15/2020. Postoperative changes. Improvement on repeat CT 9/16/2020. Not significantly changed on CT 3/16/2021. · FRANCISCO elongated nodule 20 x 8 mm. SUV 7.3 on PET scan 2/6/2020. Post RUL resection 3/11/2020 non-small cell lung cancer. [id number] stage IA  · Abnormal esophagus on CT - fluid is seen in the esophagus   · Hypoxia on exertion  · Right traumatic displaced rib fractures with  hemothorax required VATS decortication and chest tube removed 2/14/18  · Moderate MEG. O2 3LPM at night. Refusing BiPAP uses O2   · PAF on Eliquis followed by cardiology   · >120 pack year smoking. Intolerance to chantix . Quit March 2020                Plan:   · 6MW  · Continue Trelegy and Albuterol INH/Neb Q 4 hrs PRN   · O2 2-4 L. Advised to titrate O2 using her pulse oximeter- target O2 sat 90-92%. · Continue Eliquis   · CT chest in 3 months   · Patient is up to date with Covid, pneumococcal vaccine, influenza vaccine  · Advised to continue with smoking cessation.    · Follow up after CT

## 2022-04-22 NOTE — TELEPHONE ENCOUNTER
Spoke with pt offering to r/s 6MW, but pt states that she has a broken back and is not getting around well. Pt states that she will call back once she is able to get off her walker to r/s appt.

## 2022-06-01 ENCOUNTER — TELEPHONE (OUTPATIENT)
Dept: CARDIOLOGY CLINIC | Age: 72
End: 2022-06-01

## 2022-07-11 ENCOUNTER — OFFICE VISIT (OUTPATIENT)
Dept: PULMONOLOGY | Age: 72
End: 2022-07-11
Payer: MEDICARE

## 2022-07-11 ENCOUNTER — TELEPHONE (OUTPATIENT)
Dept: PULMONOLOGY | Age: 72
End: 2022-07-11

## 2022-07-11 ENCOUNTER — HOSPITAL ENCOUNTER (OUTPATIENT)
Dept: CT IMAGING | Age: 72
Discharge: HOME | End: 2022-07-11
Payer: MEDICARE

## 2022-07-11 VITALS
OXYGEN SATURATION: 90 % | BODY MASS INDEX: 32.12 KG/M2 | DIASTOLIC BLOOD PRESSURE: 80 MMHG | SYSTOLIC BLOOD PRESSURE: 145 MMHG | HEART RATE: 76 BPM | HEIGHT: 66 IN

## 2022-07-11 DIAGNOSIS — J44.9 COPD, SEVERE (HCC): Primary | ICD-10-CM

## 2022-07-11 DIAGNOSIS — R93.89 ABNORMAL CT OF THE CHEST: ICD-10-CM

## 2022-07-11 DIAGNOSIS — R91.1 PULMONARY NODULE: ICD-10-CM

## 2022-07-11 DIAGNOSIS — R91.8 PULMONARY NODULES: Primary | ICD-10-CM

## 2022-07-11 PROCEDURE — 1090F PRES/ABSN URINE INCON ASSESS: CPT | Performed by: INTERNAL MEDICINE

## 2022-07-11 PROCEDURE — G8400 PT W/DXA NO RESULTS DOC: HCPCS | Performed by: INTERNAL MEDICINE

## 2022-07-11 PROCEDURE — 3023F SPIROM DOC REV: CPT | Performed by: INTERNAL MEDICINE

## 2022-07-11 PROCEDURE — 3017F COLORECTAL CA SCREEN DOC REV: CPT | Performed by: INTERNAL MEDICINE

## 2022-07-11 PROCEDURE — 1036F TOBACCO NON-USER: CPT | Performed by: INTERNAL MEDICINE

## 2022-07-11 PROCEDURE — G8417 CALC BMI ABV UP PARAM F/U: HCPCS | Performed by: INTERNAL MEDICINE

## 2022-07-11 PROCEDURE — 1123F ACP DISCUSS/DSCN MKR DOCD: CPT | Performed by: INTERNAL MEDICINE

## 2022-07-11 PROCEDURE — G8427 DOCREV CUR MEDS BY ELIG CLIN: HCPCS | Performed by: INTERNAL MEDICINE

## 2022-07-11 PROCEDURE — 71250 CT THORAX DX C-: CPT

## 2022-07-11 PROCEDURE — 99214 OFFICE O/P EST MOD 30 MIN: CPT | Performed by: INTERNAL MEDICINE

## 2022-07-11 NOTE — PROGRESS NOTES
[facility]                                                            Outpatient Follow Up Note      CHIEF COMPLAINT: Follow up CT chest       HPI:  CT chest reviewed by me and noted below. Results were dicussed with patient and multiple good questions were answered.    Doing okay   No cough or sputum  No hemoptysis   Uses Albuterol once a day   Uses O2 3.5 LPM at night   Uses O2 during the day   Has not had her PFTs   No smoking       Past Medical History:   Diagnosis Date    [facility]fib ([facility]     Arthritis     Clostridium difficile diarrhea 6/26/15    COPD (chronic obstructive pulmonary disease) (HCC)     COPD exacerbation (HCC)     Emphysema of lung ([facility]     Hyperlipidemia     Hypertension     O2 dependent     2 L/min at night    Pneumonia 2/5/2018    Primary cancer of right upper lobe of lung ([facility] 3/11/2020    Rib pain     Sleep apnea     does not use cpap    Small bowel obstruction ([facility]     pt denies       Past Surgical History:        Procedure Laterality Date    ANKLE ARTHROSCOPY      x2    APPENDECTOMY      BRONCHOSCOPY N/A 3/15/2020    BRONCHOSCOPY THERAPUTIC ASPIRATION INITIAL performed by Madison Cabrera MD at [address] [facility]  3/17/2020    BRONCHOSCOPY THERAPUTIC ASPIRATION INITIAL performed by Mary Zamarripa MD at [facility] Left     CHOLECYSTECTOMY      COLONOSCOPY      COLONOSCOPY N/A 10/22/2021    COLONOSCOPY WITH BIOPSY performed by Carolyn Quinones MD at [facility] Bilateral     THORACOSCOPY Right 3/11/2020    RIGHT VIDEO ASSISTED THORACOSCOPIC SURGERY; WEDGE EXCISION OF RIGHT UPPER LOBE FOLLOWED BY RIGHT UPPER LOBECTOMY; INTERCOSTAL NERVE BLOCK performed by Madison Cabrera MD at [address] [place] [address] 10/22/2021    EGD BIOPSY performed by Carolyn Quinones MD at [address] [place] [address] ENDOSCOPY       Allergies:  is allergic to buspirone, codeine, doxycycline, lisinopril, and penicillins. Social History:    TOBACCO:   reports that she has been smoking cigarettes. She has a 25.00 pack-year smoking history. She has never used smokeless tobacco.  ETOH:   reports no history of alcohol use. Family History:       Problem Relation Age of Onset    Cancer Mother     Heart Failure Father        Current Medications:    Current Outpatient Medications:     albuterol sulfate HFA (VENTOLIN HFA) 108 (90 Base) MCG/ACT inhaler, Inhale 2 puffs into the lungs every 6 hours as needed for Wheezing or Shortness of Breath, Disp: 18 g, Rfl: 5    dilTIAZem (CARDIZEM CD) 120 MG extended release capsule, Take one capsule by mouth daily, Disp: 90 capsule, Rfl: 3    apixaban (ELIQUIS) 5 MG TABS tablet, Take 1 tablet by mouth 2 times daily, Disp: 180 tablet, Rfl: 3    ezetimibe (ZETIA) 10 MG tablet, TAKE 1 TABLET BY MOUTH EVERY DAY, Disp: 90 tablet, Rfl: 1    albuterol (PROVENTIL) (2.5 MG/3ML) 0.083% nebulizer solution, Take 3 mLs by nebulization every 6 hours as needed for Wheezing, Disp: 120 each, Rfl: 5    ALPRAZolam (XANAX) 0.25 MG tablet, Take 0.25 mg by mouth nightly as needed for Sleep., Disp: , Rfl:     ferrous sulfate 325 (65 Fe) MG tablet, Take 325 mg by mouth daily (with breakfast), Disp: , Rfl:     fluticasone-umeclidin-vilant (TRELEGY ELLIPTA) 100-62.5-25 MCG/INH AEPB, INHALE 1 PUFF BY MOUTH EVERY DAY AS DIRECTED, Disp: 60 each, Rfl: 5    pantoprazole (PROTONIX) 20 MG tablet, Take 1 tablet by mouth daily, Disp: 30 tablet, Rfl: 3    venlafaxine (EFFEXOR XR) 75 MG extended release capsule, venlafaxine ER 75 mg capsule,extended release 24 hr, Disp: , Rfl:     gabapentin (NEURONTIN) 400 MG capsule, Take 800 mg by mouth 4 times daily.  , Disp: , Rfl:     HYDROcodone-acetaminophen (NORCO) 7.5-325 MG per tablet, TAKE 1 TABLET BY MOUTH 3 TIMES A DAY AS NEEDED FOR PAIN, Disp: , Rfl:     nystatin (MYCOSTATIN) 270703 UNIT/GM cream, , Disp: , Rfl:     Fexofenadine HCl (MUCINEX ALLERGY PO), Take by mouth daily, Disp: , Rfl:     rOPINIRole (REQUIP) 2 MG tablet, Take 3 mg by mouth nightly , Disp: , Rfl:     venlafaxine (EFFEXOR-XR) 150 MG XR capsule, Take 150 mg by mouth daily, Disp: , Rfl:     Multiple Vitamins-Minerals (THERAPEUTIC MULTIVITAMIN-MINERALS) tablet, Take 1 tablet by mouth daily, Disp: , Rfl:     QUEtiapine (SEROQUEL XR) 300 MG XR tablet, Take 500 mg by mouth nightly , Disp: , Rfl:     OXYGEN, Inhale into the lungs Indications: 2.5 L nightly , Disp: , Rfl:     Review of Systems    Objective:   Physical Exam  BP (!) 145/80 (Site: Right Upper Arm, Position: Sitting, Cuff Size: Large Adult)   Pulse 76   Ht 5' 6\" (1.676 m)   SpO2 90% Comment: 1 liter  BMI 32.12 kg/m²   Gen: No distress. Eyes: PERRL. No sclera icterus. No conjunctival injection. ENT: No discharge. Pharynx clear. Neck: Trachea midline. No obvious mass. Resp: No accessory muscle use. No crackles. Minimal wheezes. No rhonchi. No dullness on percussion. Good air entry. CV: Regular rate. Regular rhythm. No murmur or rub. No edema. GI: Non-tender. Non-distended. No hernia. Skin: Warm and dry. No nodule on exposed extremities. Lymph: No cervical LAD. No supraclavicular LAD. M/S: No cyanosis. No joint deformity. No clubbing. Neuro: Awake. Alert. Moves all four extremities. Psych: Oriented x 3. No anxiety.              DATA reviewed by me:   PFTs 02/04/2020 FVC 1.98(61%) FEV1 1.16(47%) FEV1/FVC 59 % TLC 4.11(76%)   DLCO 09.05(38%) 6MW 840 F LO2 90%  PFTs 08/19/2015 FVC 1.78(52%) FEV1 1.27(49%) FEV1/FVC 71 % TLC 4.52(82%)   DLCO 10.23(43%) 6MW 980 F LO2 89%  6MW 11/14/2019 720 feet Desat 88% 1L on exertin       CT chest 1/16/19  Multiple right-sided rib fractures-subacute  No acute intrathoracic abnormalities  Scattered areas of parenchymal opacities lower lobe likely atelectasis      CT chest 1/16/2020 Mediastinum: No enlarged lymph nodes. Normal caliber great vessels. Normal  heart size and pericardium. Suspect incidental benign lipomatous hypertrophy  of the interatrial septum. No significant coronary calcifications. Unremarkable esophagus and included thyroid. Lungs/pleura: Nodular thickening in the subpleural right upper lobe on series  3, image 27; coronal image 101 measuring 20 x 8 mm in axial plane and 7 mm  craniocaudal.  This nodular opacity is low in attenuation values (water  density). Subtle tree-in-bud opacities seen on the prior exam in the right  upper lobe have resolved. These were likely infectious or inflammatory. Linear and bandlike areas of scarring and/or atelectasis at the right lung  base are not substantially changed. Decreased atelectasis at the medial left  lung base. Scattered punctate calcified granulomas. No pleural effusion. Upper Abdomen: Unremarkable. Soft Tissues/Bones: No enlarged axillary or supraclavicular lymph nodes. Multiple healed rib fractures bilaterally, some of which are ununited. PET scan 2/6/2020   1.9 cm lateral right upper lobe pulmonary nodule is hypermetabolic, to a  degree concerning for malignancy until proven otherwise. No findings of FDG avid metastatic disease. Inflammatory change related to healing posterolateral left 8th rib fracture. Lipomatous hypertrophy of the interatrial septum, an incidental finding. Fluid loculated within fissure within the lateral right mid lung demonstrates  low level activity, likely inflammatory.     CT chest 9/16/2020   Pulmonary emphysema  Interval removal of right-sided chest tube  Gas and fluid collection at the right apex decrease in size  Mild bronchial wall thickening, bronchiectasis, interlobular septal wall thickening in the right base improved from prior exam  Resolution of groundglass opacities on the left side    CT chest 3/16/2021 imaging reviewed by me and showed  Increased ground-glass opacities in the left upper lobe, likely  postinflammatory-infectious. Punctate noncalcified pulmonary nodule left  lower lobe is unchanged  Persistent small pleural fluid collection in the right lung apex. Pleural  gas internally has decreased  Fluid is seen in the esophagus to above the level of thoracic inlet,  suggesting reflux or esophageal dysmotility. CT chest 9/23/21  images reviewed by me and showed:   Unchanged tiny 4 mm left lower lobe pulmonary nodule. No further follow-up  recommended. Resolution of the ground-glass opacities in the left upper lobe. Resolution of the previous air and fluid in the pleural space at the right  apex. Stable postsurgical changes elsewhere. No new concerning findings on this examination. No evidence of acute  cardiopulmonary process. CT chest 3/14/2022 from outside hospital  Lower lobe PEs  Groundglass opacities with interlobular septal thickening     CT chest 7/11/2022 imaging reviewed by me and showed  Stable left lower lobe nodule  No new suspicious nodules    RUL lobectomy 3/11/2020  A. Wedge excision, right upper lobe lung lesion:       - Involved with non-small cell carcinoma, squamous cell carcinoma         with lesion 1.1 cm in greatest extent.       - Parenchymal resection margins are negative for malignancy.      - Overlying pleural surface shows no evidence of involvement with         malignancy.      - See case comment and synoptic report.       B. Regional lymph node excision, right 10:       - 3 lymph nodes with reactive changes and no evidence of metastatic         carcinoma ( 0\3 ).      - Pankeratin\CAM 5.2 stain is performed and supports diagnosis.         C. Right upper lobe lobectomy:       - Organizing biopsy cavity changes with extravasated blood and         reactive features.   Hello how are you       - No residual malignancy identified.       - Bronchial margin, vascular margin and parenchymal margins are         negative for malignancy.      - 3 peribronchial lymph nodes with reactive changes and no evidence         of metastatic carcinoma (0/3).      - One intraparenchymal lymph node with no evidence of involvement         with malignancy.      - Pankeratin\CAM 5.2 stains were utilized to evaluate the lymph         nodes and supports final diagnosis.         D. Regional lymph node excision, right 4:       - Six lymph nodes with no evidence of metastatic carcinoma ( 0\6)       - Pankeratin\CAM 5.2 stains were utilized evaluated lymph nodes and         supports final diagnosis. PSG 8/25/16 AHI 28.4 desaturation to 71%  BiPAP titration 9/9/16 BiPAP 15/11 cmH2O      Assessment:      · Severe COPD   · Abnormal CT chest 3/14/2022 with small lower lobe PEs, groundglass opacities, interlobular septal thickening. Admitted treated for CHF and COPD exacerbation. · Abnormal CT chest 3/15/2020. Postoperative changes. Improvement on repeat CT 9/16/2020. Not significantly changed on CT 3/16/2021. · FRANCISCO elongated nodule 20 x 8 mm. SUV 7.3 on PET scan 2/6/2020. Post RUL resection 3/11/2020 non-small cell lung cancer. [id number] stage IA  · Left lower lobe nodule-stable on repeat CT chest  · Abnormal esophagus on CT - fluid is seen in the esophagus   · Hypoxia on exertion  · Right traumatic displaced rib fractures with  hemothorax required VATS decortication and chest tube removed 2/14/18  · Moderate MEG. O2 3LPM at night. Refusing BiPAP uses O2   · PAF on Eliquis followed by cardiology   · >120 pack year smoking. Intolerance to chantix . Quit March 2020      Plan:      6MW   Continue Trelegy  Continue Albuterol INH/Neb Q 4 hrs PRN   O2 2-4 L. Advised to titrate O2 using her pulse oximeter- target O2 sat 90-92%. Continue Eliquis   CT chest 12 months  Patient is up to date with Covid, pneumococcal vaccine, influenza vaccine  Advised to continue with smoking cessation.    Follow up in 6 months or sooner if needed

## 2022-09-08 ENCOUNTER — TELEPHONE (OUTPATIENT)
Dept: PULMONOLOGY | Age: 72
End: 2022-09-08

## 2022-09-08 NOTE — TELEPHONE ENCOUNTER
Received call from pt who is requesting orders to start bipap again. Scheduled pt for an appointment to document need for bipap. Called Christiana Marcus and verified that pt will not need to have sleep study repeated, just in need of new orders and notes.

## 2022-09-19 ENCOUNTER — TELEPHONE (OUTPATIENT)
Dept: PULMONOLOGY | Age: 72
End: 2022-09-19

## 2022-09-19 ENCOUNTER — SCHEDULED TELEPHONE ENCOUNTER (OUTPATIENT)
Dept: PULMONOLOGY | Age: 72
End: 2022-09-19
Payer: MEDICARE

## 2022-09-19 DIAGNOSIS — J40 TRACHEOBRONCHITIS: Primary | ICD-10-CM

## 2022-09-19 DIAGNOSIS — J44.1 COPD EXACERBATION (HCC): ICD-10-CM

## 2022-09-19 PROCEDURE — 99442 PR PHYS/QHP TELEPHONE EVALUATION 11-20 MIN: CPT | Performed by: INTERNAL MEDICINE

## 2022-09-19 RX ORDER — PREDNISONE 10 MG/1
20 TABLET ORAL DAILY
Qty: 10 TABLET | Refills: 0 | Status: SHIPPED | OUTPATIENT
Start: 2022-09-19 | End: 2022-09-24

## 2022-09-19 RX ORDER — AZITHROMYCIN 250 MG/1
250 TABLET, FILM COATED ORAL SEE ADMIN INSTRUCTIONS
Qty: 6 TABLET | Refills: 0 | Status: SHIPPED | OUTPATIENT
Start: 2022-09-19 | End: 2022-09-24

## 2022-09-19 RX ORDER — DOXYCYCLINE HYCLATE 100 MG/1
100 CAPSULE ORAL 2 TIMES DAILY
Qty: 10 CAPSULE | Refills: 0 | Status: CANCELLED
Start: 2022-09-19 | End: 2022-09-24

## 2022-09-19 NOTE — TELEPHONE ENCOUNTER
Do you have the following symptoms? Shortness of Breath  yes  Wheezing  yes  Cough  yes  Cough Characteristics:  Productive    yes  Sputum Color    Clear  Hemoptysis   no, but taste bloody  Consistency of sputum   thick     Fever:    no    Temp:na  Chills/Sweats:  cold sweats when not breathing well, states this normal for her   What other symptoms are you having?:  fatigue     How long have you had these symptoms? 1 month    Pharmacy: [facility]    Have you been vaccinated for covid? Yes  Have you received a booster vaccine? Yes          Review medications and allergies: Allergies? Allergies   Allergen Reactions    Buspirone Other (See Comments)     unsure    Codeine     Doxycycline      Stomach pain and sickness    Lisinopril Other (See Comments)     Low blood pressure per patient 59/29     Penicillins Nausea Only             Currently on Antibiotics? (Drug/Dose/Frequency and how long on?) no        Systemic Steroids? (Drug/Dose/Frequency and how long on?) no      Last sick call taken on 11/22/21. Meds prescribed were pred taper, by Dr. Kat Lepe. Last OV 7/11/22 with Dr. Kat Lepe     Assessment:   Severe COPD   Abnormal CT chest 3/14/2022 with small lower lobe PEs, groundglass opacities, interlobular septal thickening. Admitted treated for CHF and COPD exacerbation. Abnormal CT chest 3/15/2020. Postoperative changes. Improvement on repeat CT 9/16/2020. Not significantly changed on CT 3/16/2021. FRANCISCO elongated nodule 20 x 8 mm. SUV 7.3 on PET scan 2/6/2020. Post RUL resection 3/11/2020 non-small cell lung cancer. [id number] stage IA  Left lower lobe nodule-stable on repeat CT chest  Abnormal esophagus on CT - fluid is seen in the esophagus   Hypoxia on exertion  Right traumatic displaced rib fractures with  hemothorax required VATS decortication and chest tube removed 2/14/18  Moderate MEG. O2 3LPM at night.  Refusing BiPAP uses O2   PAF on Eliquis followed by cardiology   >120 pack year smoking. Intolerance to chantix . Quit March 2020                Plan:   6MW   Continue Trelegy  Continue Albuterol INH/Neb Q 4 hrs PRN   O2 2-4 L. Advised to titrate O2 using her pulse oximeter- target O2 sat 90-92%. Continue Eliquis   CT chest 12 months  Patient is up to date with Covid, pneumococcal vaccine, influenza vaccine  Advised to continue with smoking cessation.    Follow up in 6 months or sooner if needed

## 2022-09-19 NOTE — PROGRESS NOTES
[facility]                                                            Outpatient Follow Up Note  TELEHEALTH EVALUATION: Service performed was Audio (During EPWLY-80 public health emergency) and not a face-to-face visit       CHIEF COMPLAINT: Follow up Illness       HPI:  Was admitted 8/26-8/29 had heart cath and was told to have PNA sent home on 2 days of Abx   Cough with yellow sputum 5-6 times a day   No hemoptysis   + SOB   Uses Neb 1-2 times/day   Uses O2 3.5 LPM at night   Uses O2 during the day   Has not had her PFTs   No smoking   Sat 96% on 3.5L    Past Medical History:   Diagnosis Date    A-fib ([address]     Arthritis     Clostridium difficile diarrhea 6/26/15    COPD (chronic obstructive pulmonary disease) (HCC)     COPD exacerbation (HCC)     Emphysema of lung (HCC)     Hyperlipidemia     Hypertension     O2 dependent     2 L/min at night    Pneumonia 2/5/2018    Primary cancer of right upper lobe of lung ([address] 3/11/2020    Rib pain     Sleep apnea     does not use cpap    Small bowel obstruction ([address]     pt denies       Past Surgical History:        Procedure Laterality Date    ANKLE ARTHROSCOPY      x2    APPENDECTOMY      BRONCHOSCOPY N/A 3/15/2020    BRONCHOSCOPY THERAPUTIC ASPIRATION INITIAL performed by Mo Bartlett MD at [address] [facility]  3/17/2020    BRONCHOSCOPY THERAPUTIC ASPIRATION INITIAL performed by Shamar Hamlin MD at [address] [place] [address] Left     CHOLECYSTECTOMY      COLONOSCOPY      COLONOSCOPY N/A 10/22/2021    COLONOSCOPY WITH BIOPSY performed by Kristina Suarez MD at . [facility] (CERVIX STATUS UNKNOWN)      ROTATOR CUFF REPAIR Bilateral     THORACOSCOPY Right 3/11/2020    RIGHT VIDEO ASSISTED THORACOSCOPIC SURGERY; WEDGE EXCISION OF RIGHT UPPER LOBE FOLLOWED BY RIGHT UPPER LOBECTOMY; INTERCOSTAL NERVE BLOCK performed by Mo Bartlett MD at [address] TONSILLECTOMY      UPPER GASTROINTESTINAL ENDOSCOPY N/A 10/22/2021    EGD BIOPSY performed by Kari Gorman MD at [address] [facility] [address]:  is allergic to buspirone, codeine, doxycycline, lisinopril, and penicillins. Social History:    TOBACCO:   reports that she quit smoking about 4 months ago. Her smoking use included cigarettes. She has a 25.00 pack-year smoking history. She has never used smokeless tobacco.  ETOH:   reports no history of alcohol use. Family History:       Problem Relation Age of Onset    Cancer Mother     Heart Failure Father        Current Medications:    Current Outpatient Medications:     fluticasone-umeclidin-vilant (TRELEGY ELLIPTA) 100-62.5-25 MCG/INH AEPB, INHALE 1 PUFF BY MOUTH EVERY DAY AS DIRECTED, Disp: 60 each, Rfl: 5    albuterol sulfate HFA (VENTOLIN HFA) 108 (90 Base) MCG/ACT inhaler, Inhale 2 puffs into the lungs every 6 hours as needed for Wheezing or Shortness of Breath, Disp: 18 g, Rfl: 5    venlafaxine (EFFEXOR XR) 75 MG extended release capsule, venlafaxine ER 75 mg capsule,extended release 24 hr, Disp: , Rfl:     dilTIAZem (CARDIZEM CD) 120 MG extended release capsule, Take one capsule by mouth daily, Disp: 90 capsule, Rfl: 3    apixaban (ELIQUIS) 5 MG TABS tablet, Take 1 tablet by mouth 2 times daily, Disp: 180 tablet, Rfl: 3    ezetimibe (ZETIA) 10 MG tablet, TAKE 1 TABLET BY MOUTH EVERY DAY, Disp: 90 tablet, Rfl: 1    albuterol (PROVENTIL) (2.5 MG/3ML) 0.083% nebulizer solution, Take 3 mLs by nebulization every 6 hours as needed for Wheezing, Disp: 120 each, Rfl: 5    ALPRAZolam (XANAX) 0.25 MG tablet, Take 0.25 mg by mouth nightly as needed for Sleep., Disp: , Rfl:     gabapentin (NEURONTIN) 400 MG capsule, Take 800 mg by mouth 4 times daily.  , Disp: , Rfl:     HYDROcodone-acetaminophen (NORCO) 7.5-325 MG per tablet, TAKE 1 TABLET BY MOUTH 3 TIMES A DAY AS NEEDED FOR PAIN, Disp: , Rfl:     nystatin (MYCOSTATIN) 395655 UNIT/GM cream, , Disp: , Rfl: Fexofenadine HCl (MUCINEX ALLERGY PO), Take by mouth daily, Disp: , Rfl:     ferrous sulfate 325 (65 Fe) MG tablet, Take 325 mg by mouth daily (with breakfast), Disp: , Rfl:     rOPINIRole (REQUIP) 2 MG tablet, Take 3 mg by mouth nightly , Disp: , Rfl:     venlafaxine (EFFEXOR-XR) 150 MG XR capsule, Take 150 mg by mouth daily, Disp: , Rfl:     Multiple Vitamins-Minerals (THERAPEUTIC MULTIVITAMIN-MINERALS) tablet, Take 1 tablet by mouth daily, Disp: , Rfl:     QUEtiapine (SEROQUEL XR) 300 MG XR tablet, Take 500 mg by mouth nightly , Disp: , Rfl:     OXYGEN, Inhale into the lungs Indications: 2.5 L nightly , Disp: , Rfl:     pantoprazole (PROTONIX) 20 MG tablet, Take 1 tablet by mouth daily (Patient not taking: Reported on 7/11/2022), Disp: 30 tablet, Rfl: 3    Review of Systems    Objective:   Physical Exam  Telephone visit not able to obtain physical exam               DATA reviewed by me:   PFTs 02/04/2020 FVC 1.98(61%) FEV1 1.16(47%) FEV1/FVC 59 % TLC 4.11(76%)   DLCO 09.05(38%) 6MW 840 F LO2 90%  PFTs 08/19/2015 FVC 1.78(52%) FEV1 1.27(49%) FEV1/FVC 71 % TLC 4.52(82%)   DLCO 10.23(43%) 6MW 980 F LO2 89%  6MW 11/14/2019 720 feet Desat 88% 1L on exertin       CT chest 1/16/19  Multiple right-sided rib fractures-subacute  No acute intrathoracic abnormalities  Scattered areas of parenchymal opacities lower lobe likely atelectasis      CT chest 1/16/2020    Mediastinum: No enlarged lymph nodes. Normal caliber great vessels. Normal  heart size and pericardium. Suspect incidental benign lipomatous hypertrophy  of the interatrial septum. No significant coronary calcifications. Unremarkable esophagus and included thyroid. Lungs/pleura: Nodular thickening in the subpleural right upper lobe on series  3, image 27; coronal image 101 measuring 20 x 8 mm in axial plane and 7 mm  craniocaudal.  This nodular opacity is low in attenuation values (water  density).   Subtle tree-in-bud opacities seen on the prior exam in the right  upper lobe have resolved. These were likely infectious or inflammatory. Linear and bandlike areas of scarring and/or atelectasis at the right lung  base are not substantially changed. Decreased atelectasis at the medial left  lung base. Scattered punctate calcified granulomas. No pleural effusion. Upper Abdomen: Unremarkable. Soft Tissues/Bones: No enlarged axillary or supraclavicular lymph nodes. Multiple healed rib fractures bilaterally, some of which are ununited. PET scan 2/6/2020   1.9 cm lateral right upper lobe pulmonary nodule is hypermetabolic, to a  degree concerning for malignancy until proven otherwise. No findings of FDG avid metastatic disease. Inflammatory change related to healing posterolateral left 8th rib fracture. Lipomatous hypertrophy of the interatrial septum, an incidental finding. Fluid loculated within fissure within the lateral right mid lung demonstrates  low level activity, likely inflammatory. CT chest 9/16/2020   Pulmonary emphysema  Interval removal of right-sided chest tube  Gas and fluid collection at the right apex decrease in size  Mild bronchial wall thickening, bronchiectasis, interlobular septal wall thickening in the right base improved from prior exam  Resolution of groundglass opacities on the left side    CT chest 3/16/2021 imaging reviewed by me and showed  Increased ground-glass opacities in the left upper lobe, likely  postinflammatory-infectious. Punctate noncalcified pulmonary nodule left  lower lobe is unchanged  Persistent small pleural fluid collection in the right lung apex. Pleural  gas internally has decreased  Fluid is seen in the esophagus to above the level of thoracic inlet,  suggesting reflux or esophageal dysmotility. CT chest 9/23/21  images reviewed by me and showed:   Unchanged tiny 4 mm left lower lobe pulmonary nodule. No further follow-up  recommended.   Resolution of the ground-glass opacities in the left upper lobe.  Resolution of the previous air and fluid in the pleural space at the right  apex. Stable postsurgical changes elsewhere. No new concerning findings on this examination. No evidence of acute  cardiopulmonary process. CT chest 3/14/2022 from outside hospital  Lower lobe PEs  Groundglass opacities with interlobular septal thickening     CT chest 7/11/2022 imaging reviewed by me and showed  Stable left lower lobe nodule  No new suspicious nodules    RUL lobectomy 3/11/2020  A. Wedge excision, right upper lobe lung lesion:       - Involved with non-small cell carcinoma, squamous cell carcinoma         with lesion 1.1 cm in greatest extent. - Parenchymal resection margins are negative for malignancy. - Overlying pleural surface shows no evidence of involvement with         malignancy. - See case comment and synoptic report. B. Regional lymph node excision, right 10:       - 3 lymph nodes with reactive changes and no evidence of metastatic         carcinoma ( 0\3 ). - Pankeratin\CAM 5.2 stain is performed and supports diagnosis. C. Right upper lobe lobectomy:       - Organizing biopsy cavity changes with extravasated blood and         reactive features. Hello how are you       - No residual malignancy identified.       - Bronchial margin, vascular margin and parenchymal margins are         negative for malignancy. - 3 peribronchial lymph nodes with reactive changes and no evidence         of metastatic carcinoma (0/3). - One intraparenchymal lymph node with no evidence of involvement         with malignancy. - Pankeratin\CAM 5.2 stains were utilized to evaluate the lymph         nodes and supports final diagnosis. D. Regional lymph node excision, right 4:       - Six lymph nodes with no evidence of metastatic carcinoma ( 0\6)       - Pankeratin\CAM 5.2 stains were utilized evaluated lymph nodes and         supports final diagnosis.         PSG Established Patient who has not had a related appointment within my department in the past 7 days or scheduled within the next 24 hours. Total Time: 11-20 minutes     Note: not billable if this call serves to triage the patient into an appointment for the relevant concern      Malena Foy MD       Zully Mann is a 66 y.o. female evaluated via telephone on 9/19/2022. Consent:  She and/or health care decision maker is aware that that she may receive a bill for this telephone service, depending on her insurance coverage, and has provided verbal consent to proceed: Yes       Documentation:  I communicated with the patient and/or health care decision maker about: See above   Details of this discussion including any medical advice provided: See above       I Affirm this is a Patient Initiated Episode with an Established Patient who has not had a related appointment within my department in the past 7 days or scheduled within the next 24 hours.     Total Time: 11-20 minutes     Note: not billable if this call serves to triage the patient into an appointment for the relevant concern      Malena Foy MD

## 2022-09-23 NOTE — PROGRESS NOTES
[address] Office Note  9/26/2022     Subjective:  Ms. Sarina Daniels presents for cardiology follow up for dCHF, PAF, HTN, HLD  her new caregiver is Brandt Fragoso  She comes in today with her daughter  C/o congested and trouble breathing    HPI:    Today, she reports she is extremely congested and she is very short of breath. She had a telephone visit with Dr. Isabel Agustin last week and he put her on prednisone BID for 5 days and a z-pack but she isn't any better. She has yellow when she gets something up. She breaks out into a cold sweat for no reason. She is on 4L oxygen. She was in [facility] Aug 25-28 ,22 because she couldn't breathe and her roommate called 911. The did a heart cath because her cardiac enzymes were elevated. She was having chest pain also. They told her she had pneumonia and that her heart was fine. CXR 8/26/22 showed questionable pneumonia left lower lobe. ECHO 8/27/22 EF 50-55%, Grade I DD, Apical hypokinesis, RV enlarged. LHC 8/28/22 Normal hemodynamics, no obstructive disease. She uses her inhalers daily and she uses the nebulizer twice a day. She no longer smokes. She now see [employer] also along with [employer] as her pcp. [employer] will be back in 3 weeks. Patient denies current edema, chest pain, palpitations, dizziness or syncope. Patient is taking all cardiac medications as prescribed and tolerates them well. Patient is vaccinated against Covid. Pfizer 2/2    PMH:   Presented to ED 1/6/22 for SOB. She was given antibiotics, steroids, breathing treatments and discharged. RUL lobectomy/wedge resection in March 2020 due to lung cancer. This patient is a 41-year-old white female with no prior history of heart disease. She presented to the hospital on 06/25/2015 with acute onset of shortness of breath and subsequent chest pain. The patient states she was at [facility] approximately 2 weeks ago with small bowel obstruction.  That was Hospitalist Progress Note      PCP: Jay Jay Velasco MD    Date of Admission: 1/4/2022    Chief Complaint: Knee pain    Rose Mckeon is a 78 y.o. male who presented to the ED via EMS to be evaluated for BLE pain and inability to ambulate s/p mechanical fall just prior to arrival.  Patient states that he was outside walking his dog on a muddy slope at which time the dog jolted forward, causing him to lurch forward and fall bended knee. He reports that he experienced excruciating R>L knee pain, after which she was unable to stand or bear weight. Patient denies LOC or other skeletal trauma. Chronic medical conditions include: Obesity, prediabetes, and HTN.    Upon arrival to the ED, EKG was obtained revealing NSR with remote inferior infarct. Plain films/CT scan revealed complete rupture of bilateral distal quadriceps tendon with associated muscle strain and surrounding soft tissue edema/blood product. Larger hematoma present on right side. Consultation was placed to orthopedics per ED attending, and patient was placed in bilateral immobilizer overnight.   Hospitalist team consulted to perform preoperative clearance and manage patient medically overnight, pending a.m. orthopedic intervention.        Subjective: NAD, discussed plan of care, POD #2, placed on oxygen overnight, cxr atlectasis vs pna, patient denies cp/sob, encouraged IS      Medications:  Reviewed    Infusion Medications    dextrose      sodium chloride 25 mL (01/06/22 0250)    sodium chloride       Scheduled Medications    cefTRIAXone (ROCEPHIN) IV  1,000 mg IntraVENous Q24H    azithromycin  500 mg Oral Daily    metoprolol tartrate  25 mg Oral BID    lisinopril  10 mg Oral Nightly    insulin lispro  0-12 Units SubCUTAneous TID     insulin lispro  0-6 Units SubCUTAneous Nightly    sodium chloride flush  5-40 mL IntraVENous 2 times per day    enoxaparin  40 mg SubCUTAneous Daily    vitamin C  250 mg Oral Daily    gabapentin  300 mg Oral TID    therapeutic multivitamin-minerals  1 tablet Oral Daily    cyclobenzaprine  5 mg Oral BID     PRN Meds: glucose, dextrose, glucagon (rDNA), dextrose, sodium chloride flush, sodium chloride, oxyCODONE **OR** oxyCODONE, morphine **OR** morphine, bisacodyl, magnesium hydroxide, labetalol, acetaminophen, sodium chloride, potassium chloride **OR** potassium alternative oral replacement **OR** potassium chloride, magnesium sulfate, promethazine **OR** ondansetron, senna, acetaminophen **OR** acetaminophen      Intake/Output Summary (Last 24 hours) at 1/7/2022 1415  Last data filed at 1/7/2022 0542  Gross per 24 hour   Intake 480 ml   Output --   Net 480 ml       Physical Exam Performed:    /85   Pulse (!) 14   Temp 99 °F (37.2 °C) (Oral)   Resp 16   Ht 5' 10\" (1.778 m)   Wt 274 lb 6.4 oz (124.5 kg)   SpO2 95%   BMI 39.37 kg/m²     General appearance: No apparent distress, appears stated age and cooperative. HEENT: Pupils equal, round, and reactive to light. Conjunctivae/corneas clear. Neck: Supple, with full range of motion. No jugular venous distention. Trachea midline. Respiratory:  Normal respiratory effort. Decreased bases  Cardiovascular: Regular rate and rhythm with normal S1/S2 without murmurs, rubs or gallops. Abdomen: Soft, non-tender, non-distended with normal bowel sounds. Musculoskeletal: BLE pain, tenderness, limited ROM, bilat knee immobilizers  Skin: Skin color, texture, turgor normal.  No rashes or lesions. Neurologic:  Neurovascularly intact without any focal sensory/motor deficits.  Cranial nerves: II-XII intact, grossly non-focal.  Psychiatric: Alert and oriented, thought content appropriate, normal insight  Capillary Refill: Brisk,3 seconds, normal   Peripheral Pulses: +2 palpable, equal bilaterally       Labs:   Recent Labs     01/05/22  0806 01/06/22  0145 01/07/22  0627   WBC 5.7 8.4 9.5   HGB 12.8* 11.8* 10.8*   HCT 40.1* 37.3* 33.7*   PLT complicated by renal failure. That eventually resolved by conservative therapy, and she went home about a week ago. The patient has been improving gradually. However, she has continued to have diarrhea ever since she was released from previous hospital. On the day of hospitalization she felt poorly. She was slightly short of breath in the morning with her daily activity; but as the day went on, she became more short of breath. She also experienced substernal pressure type of chest pain as if someone was sitting on her. She felt palpitations in her ear. She did not have any nausea or vomiting but had diarrhea. The patient was diaphoretic. Symptoms of chest pressure and shortness of breath lasted for several hours. Subsequently the patient came to the emergency room, and she was admitted with a diagnosis of acute pulmonary edema. She was diuresed and improved. Her troponins were elevated at that time. She was Dx with CDiff during her admission. Echo at that time with EF of 55%. Most recent cardiac cath on 07/28/2015 with normal coronaries. S/P fall and multiple rt rib fractures  Requiring VATS early feb 2018. She has lost 70 lbs since May 2018 cutting back on pop and sweets. She reports she cannot sleep at night despite taking  Seroquel 300 mg at bedtime. Review of Systems:  12 point ROS negative in all areas as listed below except as in [place]  Constitutional, EENT,  GI, , Musculoskeletal, skin, neurological, hematological, endocrine, Psychiatric    Reviewed past medical history, social, and family history.  Quit smoking one year ago  Early 2020  No alcohol  MOM cancer of colon  Dad massive heart attack at age 76  Past Medical History:   Diagnosis Date    A-fib [facility])     Arthritis     Clostridium difficile diarrhea 6/26/15    COPD (chronic obstructive pulmonary disease) (HCC)     COPD exacerbation (HCC)     Emphysema of lung (HCC)     Hyperlipidemia     Hypertension     O2 dependent     2 L/min at night 157 152 136     Recent Labs     01/05/22  0806 01/06/22  0145 01/07/22  0627    133* 138   K 3.7 4.0 3.8    98* 103   CO2 23 25 26   BUN 12 13 14   CREATININE 0.9 1.0 0.9   CALCIUM 8.3 8.0* 8.2*     Recent Labs     01/04/22  1755 01/05/22  0806   AST 26 26   ALT 16 14   BILITOT 0.7 1.1*   ALKPHOS 68 64     Recent Labs     01/04/22  1822   INR 1.08     Recent Labs     01/04/22  1755 01/05/22  0806 01/06/22  0145 01/06/22  0752 01/06/22  1326   CKTOTAL 467*   < > 698* 673* 786*   TROPONINI <0.01  --   --   --   --     < > = values in this interval not displayed. Urinalysis:      Lab Results   Component Value Date    NITRU Negative 01/05/2022    WBCUA 3-5 06/28/2017    BACTERIA 1+ 06/28/2017    RBCUA  06/28/2017    BLOODU Negative 01/05/2022    SPECGRAV 1.015 01/05/2022    GLUCOSEU Negative 01/05/2022       Radiology:  XR CHEST PORTABLE   Final Result   Right lower lobe airspace disease, either atelectasis or pneumonia         CT KNEE LEFT WO CONTRAST   Final Result   1. Complete rupture of the bilateral distal quadriceps tendons with   associated muscular strains and surrounding soft tissue edema and blood   products. Larger hematoma is present on the right at the level of the distal   quadriceps rupture which measures approximately 3.0 x 5.1 x 8.8 cm.   2. No acute fracture identified. 3. 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 RECOMMENDATIONS:   Unavailable         CT KNEE RIGHT WO CONTRAST   Final Result   1. Complete rupture of the bilateral distal quadriceps tendons with   associated muscular strains and surrounding soft tissue edema and blood   products. Larger hematoma is present on the right at the level of the distal   quadriceps rupture which measures approximately 3.0 x 5.1 x 8.8 cm.   2. No acute fracture identified. 3. 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       RECOMMENDATIONS:   Unavailable         XR KNEE LEFT (1-2 VIEWS) Pneumonia 2/5/2018    Primary cancer of right upper lobe of lung ([facility] [address] 3/11/2020    Rib pain     Sleep apnea     does not use cpap    Small bowel obstruction ([address]     pt denies     Past Surgical History:   Procedure Laterality Date    ANKLE ARTHROSCOPY      x2    APPENDECTOMY      BRONCHOSCOPY N/A 3/15/2020    BRONCHOSCOPY THERAPUTIC ASPIRATION INITIAL performed by Julian Guzman MD at [address] [facility]  3/17/2020    BRONCHOSCOPY THERAPUTIC ASPIRATION INITIAL performed by Carlyn Neville MD at [address] [place] [address] Left     CHOLECYSTECTOMY      COLONOSCOPY      COLONOSCOPY N/A 10/22/2021    COLONOSCOPY WITH BIOPSY [facility] (CERVIX STATUS UNKNOWN)      ROTATOR CUFF REPAIR Bilateral     THORACOSCOPY Right 3/11/2020    RIGHT VIDEO ASSISTED THORACOSCOPIC SURGERY; WEDGE EXCISION OF RIGHT UPPER LOBE FOLLOWED BY RIGHT UPPER LOBECTOMY; INTERCOSTAL NERVE BLOCK performed by Julian Guzman MD at [address] [place] [address] 10/22/2021    [facility]       Objective:   /71   Pulse 82   Ht 5' 6\" (1.676 m)   Wt 209 lb (94.8 kg)   SpO2 96%   BMI 33.73 kg/m²     Wt Readings from Last 3 Encounters:   09/26/22 209 lb (94.8 kg)   03/09/22 199 lb (90.3 kg)   03/02/22 200 lb (90.7 kg)     Repeat bp 130/70    Physical Exam:  General: No Respiratory distress, appears well developed and well nourished. Eyes:  Sclera nonicteric  Nose/Sinuses:  negative findings: nose shows no deformity, asymmetry, or inflammation, nasal mucosa normal, septum midline with no perforation or bleeding  Back:  no pain to palpation  Joint:  no active joint inflammation  Musculoskeletal:  negative  Skin:  Warm and dry  Neck:  Negative for JVD and Carotid Bruits.    Chest: crackles right base toauscultation, respiration easy  Cardiovascular:  RRR   S1S2 normal, no murmur, Final Result      1. No evidence of fracture. 2.  Suprapatellar region is not well seen on the lateral view and therefore   abnormalities in this area cannot be excluded. Also soft tissue injury or   tendon tear cannot be excluded by this exam if suspected clinically. MRI   examination would be recommended. XR KNEE RIGHT (1-2 VIEWS)   Final Result      1. There appears to be significant edema, fluid or hematoma in the   suprapatellar region and obscuring tissue planes. The quadriceps tendon is   not well visualized and therefore tear of the tendon cannot be excluded if   suspected clinically. Alternatively this could represent edema or hematoma   due to muscular injury in the distal anterior thigh or a large joint effusion   though no significant joint effusion is suggested on the lateral view   anterior to the knee joint. Correlate clinically. 2.  No evidence of fracture or joint malalignment. Assessment/Plan:    Active Hospital Problems    Diagnosis     Pre-diabetes [R73.03]     Quadriceps tendon rupture, left, initial encounter [S76.112A]     Quadriceps tendon rupture, right, initial encounter [S76.111A]     HTN (hypertension) [I10]     Rupture of distal quadriceps tendon [S76.119A]      BLE distal quadricep tendon rupture  -Tylenol, Flexeril, Neurontin and Lidoderm patch   -OxyIR/ IV Dilaudid scheduled sparingly to treat moderate/severe pain, respectively  -Preoperative EKG and laboratory studies ordered and reviewed; patient with NO absolute contraindication to proceed with recommended procedural intervention  -POD #2, post op low grade temp, no leukocytosis, tylenol, monitor  -Consultation placed to PT/OT for postoperative rehabilitation  -CXR Right lower lobe airspace disease, either atelectasis or pneumonia.  LA 2.1, PCT 0.45, likely atelectasis, however will start rocephin/azithro, encourage IS, mobility limited d/t injury     HTN  -Patient admitted to no rub or thrill. Extremities: no edema, clubbing, cyanosis,  Pulses:  pedal pulses are normal.  Neuro:  Very figidy constantly moving    Medications:   Outpatient Encounter Medications as of 9/26/2022   Medication Sig Dispense Refill    fluticasone-umeclidin-vilant (TRELEGY ELLIPTA) 100-62.5-25 MCG/INH AEPB INHALE 1 PUFF BY MOUTH EVERY DAY AS DIRECTED 60 each 5    albuterol sulfate HFA (VENTOLIN HFA) 108 (90 Base) MCG/ACT inhaler Inhale 2 puffs into the lungs every 6 hours as needed for Wheezing or Shortness of Breath 18 g 5    venlafaxine (EFFEXOR XR) 75 MG extended release capsule venlafaxine ER 75 mg capsule,extended release 24 hr      dilTIAZem (CARDIZEM CD) 120 MG extended release capsule Take one capsule by mouth daily 90 capsule 3    apixaban (ELIQUIS) 5 MG TABS tablet Take 1 tablet by mouth 2 times daily 180 tablet 3    ezetimibe (ZETIA) 10 MG tablet TAKE 1 TABLET BY MOUTH EVERY DAY 90 tablet 1    albuterol (PROVENTIL) (2.5 MG/3ML) 0.083% nebulizer solution Take 3 mLs by nebulization every 6 hours as needed for Wheezing 120 each 5    ALPRAZolam (XANAX) 0.25 MG tablet Take 0.25 mg by mouth nightly as needed for Sleep. Pt taking . 5mg      gabapentin (NEURONTIN) 400 MG capsule Take 800 mg by mouth 4 times daily.  Bid      HYDROcodone-acetaminophen (NORCO) 7.5-325 MG per tablet TAKE 1 TABLET BY MOUTH 3 TIMES A DAY AS NEEDED FOR PAIN      nystatin (MYCOSTATIN) 697352 UNIT/GM cream       Fexofenadine HCl (MUCINEX ALLERGY PO) Take by mouth daily      ferrous sulfate 325 (65 Fe) MG tablet Take 325 mg by mouth daily (with breakfast)      rOPINIRole (REQUIP) 2 MG tablet Take 3 mg by mouth nightly Pt takes 4 mg      venlafaxine (EFFEXOR-XR) 150 MG XR capsule Take 150 mg by mouth daily      Multiple Vitamins-Minerals (THERAPEUTIC MULTIVITAMIN-MINERALS) tablet Take 1 tablet by mouth daily      QUEtiapine (SEROQUEL XR) 300 MG XR tablet Take 500 mg by mouth nightly Takes 4mg q night      OXYGEN Inhale 4 L into the lungs Indications: 2.5 L nightly      ARIPiprazole (ABILIFY) 2 MG tablet TAKE ONE TABLET BY MOUTH nightly AT bedtime      NARCAN 4 MG/0.1ML LIQD nasal spray SPRAY one SPRAY in one nostril as needed (opioid reversal). MAY REPEAT in 2-3 minutes in other nostril if needed. omeprazole (PRILOSEC) 40 MG delayed release capsule TAKE ONE CAPSULE BY MOUTH EVERY DAY      pravastatin (PRAVACHOL) 10 MG tablet TAKE ONE TABLET BY MOUTH EVERY EVENING      torsemide (DEMADEX) 20 MG tablet TAKE 1 TABLET BY MOUTH EVERY DAY      [DISCONTINUED] isosorbide mononitrate (IMDUR) 30 MG extended release tablet TAKE 1 TABLET BY MOUTH EVERY DAY      [] predniSONE (DELTASONE) 10 MG tablet Take 2 tablets by mouth daily for 5 days 10 tablet 0    [] azithromycin (ZITHROMAX) 250 MG tablet Take 1 tablet by mouth See Admin Instructions for 5 days 500mg on day 1 followed by 250mg on days 2 - 5 6 tablet 0    pantoprazole (PROTONIX) 20 MG tablet Take 1 tablet by mouth daily (Patient not taking: Reported on 2022) 30 tablet 3     No facility-administered encounter medications on file as of 2022. Lab Data:  I have reviewed the following tests and documented in this encounter as follows:   Discussed with the patient. LHC 22 [facility]   Normal hemodynamics, non obstructive disease. ECHO 22 [facility]  EF 50-55%, Grade I DD, Apical hypokenesis, RV enlarged. CXR 22 [facility]  showed questionable PNA left lower lobe. Colonoscopy Biopsy 10/22/21  FINAL DIAGNOSIS:     A. Stomach, biopsies:   - Mild chronic gastritis. - Negative for dysplasia or malignancy. B.  Gastric polyp, biopsy:   - Hyperplastic polyp.   - Negative for dysplasia or malignancy. C.  Esophagus, biopsies:   - Benign squamous and gastric mucosa with chronic inflammation and   reactive change. - Negative for eosinophilic esophagitis, intestinal metaplasia,   dysplasia, or malignancy.      D.  Cecal polyp, telemetry floor for continuous monitoring during stay  -EKG obtained in ED reviewed personally and found to be without evidence of LAD or acute ischemia  -Continue home medication dosage of lisinopril  -Metoprolol added     Prediabetes with BMI 38.02  -A1c 5.9  -PRN Humalog medium dose SSI scheduled before meals and at bedtime based on POC glucose  -Carbohydrate restriction placed on diet    DVT Prophylaxis: lovenox post op  Diet: ADULT DIET;  Regular; 4 carb choices (60 gm/meal)  Code Status: Full Code    PT/OT Eval Status: Consulted-prefer home, rehab consult    Dispo - pending course, dc arrangements    Dayami Goldstein, APRN - CNP excision:   - Fragments of tubular adenoma. - Negative for high-grade dysplasia. E.  Transverse colon polyp, excision:   - Fragments of tubular adenoma. - Negative for high-grade dysplasia. Chest CT 9/16/20  Emphysema. Interval removal of right-sided chest tube. Gas and fluid collection at the right apex has decreased in size and measures 6.8 x 2.6 cm. Mild bronchial wall thickening, bronchiectasis and interlobular septal wall thickening in the right lung base, improved since previous exam.  Resolution of ground-glass opacities in the left lung. Chest CT 1/16/19  Multiple rt rib fx appearing subacute scattered areas parenchymal opacity lower lobes    ECHO 7/8/16  Normal global wall motion. Visual EF is 55-60%  Doppler evidence of grade I (impaired) diastolic dysfunction. Calculated EF 52%. Left atrial cavity is mildly dilated. Right ventricle cavity is mildly dilated. Structurally normal mitral valve with mild regurgitation. Structurally normal tricuspid valve with trace regurgitation. IVC is dilated with respiratory response of <50%. CXR 2/16/16  atelectasis with bronchial wall thickening      CATH 7/28/15    Signed by Jadyen Chang MD on 07/28/15 at 1114    Document Text    Expand All Collapse All   PATIENT NAME: PA #: MR Madison Zhao [id number]     1. Selective coronary angiography. 2. Left heart catheterization. 3. Left ventriculography. 4. Right heart catheterization. ANGIOGRAPHIC FINDINGS:   1. No coronary artery disease. 2. Normal left ventricular function, EF greater than 60%. 3. Normal left and right hemodynamics. CONCLUSIONS: No evidence for coronary artery disease and preserved left  ventricular dysfunction with normal hemodynamics. RECOMMENDATIONS: At this point is noncardiac symptoms. Recommend follow-up  with Dr. Margy Soulier and Pauline Bautista in 1 to 2 weeks.   Hernan Cee MD        ECHO 5/26/15  Summary   Normal left ventricle size and wall thickness. Estimated ejection fraction   of 55%. Apical lateral and mid anterolateral hypokinesia. Impaired left   ventricular relaxation. Mitral valve is structurally normal.   Mitral valve leaflets appear to open adequately. Trivial mitral regurgitation is present. The aortic leaflets appear to open adequately. Trace aortic regurgitation is present. No evidence of aortic valve stenosis    CBC: No results for input(s): WBC, HGB, HCT, MCV, PLT in the last 72 hours. BMP: No results for input(s): NA, K, CL, CO2, PHOS, BUN, CREATININE, CA in the last 72 hours. LIVER PROFILE: No results for input(s): AST, ALT, LIPASE, BILIDIR, BILITOT, ALKPHOS in the last 72 hours. Invalid input(s): AMYLASE,  ALB  LIPID:   No components found for: CHLPL,  CHOL  Lab Results   Component Value Date    TRIG 201 (H) 02/10/2018    TRIG 90 11/02/2015    TRIG 158 (H) 07/28/2015     Lab Results   Component Value Date    HDL 64 (H) 02/04/2021    HDL 54 06/08/2018    HDL 65 11/02/2015     Lab Results   Component Value Date    LDLCALC 106 (H) 02/04/2021    LDLCALC 58 06/08/2018    LDLCALC 97 11/02/2015     Lab Results   Component Value Date    LABVLDL 21 02/04/2021    LABVLDL 33 06/08/2018    LABVLDL 32 07/28/2015     No results found for: CHOLHDLRATIO  PT/INR: No results for input(s): PROTIME, INR in the last 72 hours. A1C:   Lab Results   Component Value Date    LABA1C 5.9 01/13/2018     BNP:  No results for input(s): BNP in the last 72 hours. EKG 3/28/18   shows sinus arrhythmia, RBBB, rate 76.     EKG: Sinus tachycardia with Premature supraventricular complexesRight bundle branch blockCannot rule out Inferior infarct , age undeterminedAnterolateral infarct (cited on or before 25-JUN-2015)Abnormal ECGWhen compared with ECG of 25-JUN-2015 18:02, (unconfirmed)Premature ventricular complexes are no longer PresentPremature supraventricular complexes are now PresentConfirmed by Katherine Dooley MD, [address] ([id number]) on 6/26/2015 6:38:48 AM    Echo: 06/26/2015  Normal left ventricle size and wall thickness. Estimated ejection   fraction  of 55%. Apical lateral and mid anterolateral hypokinesia. Impaired left  ventricular relaxation. Mitral valve is structurally normal.  Mitral valve leaflets appear to open adequately. Trivial mitral regurgitation is present. The aortic leaflets appear to open adequately. Trace aortic regurgitation is present. No evidence of aortic valve stenosis. Assessment:  Encounter Diagnoses   Name Primary? Chronic diastolic congestive heart failure (HCC)     Paroxysmal atrial fibrillation (HCC)     Essential hypertension     Chronic obstructive pulmonary disease with acute exacerbation (HCC) Yes    MEG (obstructive sleep apnea)     Mixed hyperlipidemia     SOB (shortness of breath)      Very shortness of breath and feeling tight in her chest  bringing up green sputum  No fever chills  but has cold sweats will give her a week course of levaquin  Patient says she cannot travel back to Her pulmonologist in the condition she is in. No wheezing heard    1. Diastolic CHF managed by diuretics  2. Paroxysmal Atrial Fibrillation rate controlled anticoagulated  3. HTN BP reasonably well controlled with diltiazem  4. HLD on zetia and pravastatin     Last lipids 6/8/18 I personally reviewed labs results in epic and discussed with patient      Plan:  Current labs reviewed with patient  Stop taking Isosorbide since you do not have any blockages  Start taking Levoquinn 500 mg daily for 7 days which is an antibiotic which will hopefully help since the z-pack and steroids didn't help  Boil water in a pot on the stove, take it off of the stove, place a towel over your head and the steam will help bring up your congestions   -do not put any vicks or other chemicals in the water   -be careful not to burn yourself with the pot or steam  5. Make sure you see the traveling doctors in 3 weeks when they come to your complex.   6. Make sure you keep your appointment with Dr. Love Ann    Follow up with me in 6 months    This note is scribed in the presence of Dr. Daniela Alvarado MD by Michelle Santoro RN.    I, Dr. Oxana Foreman, personally performed the services described in this documentation, as scribed by the above signed scribe in my presence. It is both accurate and complete to my knowledge. I agree with the details independently gathered by the clinical support staff, while the remaining scribed note accurately describes my personal service to the patient. QUALITY MEASURES  1. Tobacco Cessation Counseling: Yes  2. Retake of BP if >140/90:   NA  3. Documentation to PCP/referring for new patient:  Sent to PCP at close of office visit  4. CAD patient on anti-platelet: eliquis  5. CAD patient on STATIN therapy:  Yes  6.  Patient with CHF and aFib on anticoagulation:  eliquis

## 2022-09-26 ENCOUNTER — OFFICE VISIT (OUTPATIENT)
Dept: CARDIOLOGY CLINIC | Age: 72
End: 2022-09-26
Payer: MEDICARE

## 2022-09-26 VITALS
WEIGHT: 209 LBS | BODY MASS INDEX: 33.59 KG/M2 | HEIGHT: 66 IN | HEART RATE: 82 BPM | SYSTOLIC BLOOD PRESSURE: 137 MMHG | OXYGEN SATURATION: 96 % | DIASTOLIC BLOOD PRESSURE: 71 MMHG

## 2022-09-26 DIAGNOSIS — I50.32 CHRONIC DIASTOLIC CONGESTIVE HEART FAILURE (HCC): ICD-10-CM

## 2022-09-26 DIAGNOSIS — I48.0 PAROXYSMAL ATRIAL FIBRILLATION (HCC): ICD-10-CM

## 2022-09-26 DIAGNOSIS — J44.1 CHRONIC OBSTRUCTIVE PULMONARY DISEASE WITH ACUTE EXACERBATION (HCC): Primary | ICD-10-CM

## 2022-09-26 DIAGNOSIS — R06.02 SOB (SHORTNESS OF BREATH): ICD-10-CM

## 2022-09-26 DIAGNOSIS — G47.33 OSA (OBSTRUCTIVE SLEEP APNEA): ICD-10-CM

## 2022-09-26 DIAGNOSIS — E78.2 MIXED HYPERLIPIDEMIA: ICD-10-CM

## 2022-09-26 DIAGNOSIS — I10 ESSENTIAL HYPERTENSION: ICD-10-CM

## 2022-09-26 PROCEDURE — 99214 OFFICE O/P EST MOD 30 MIN: CPT | Performed by: INTERNAL MEDICINE

## 2022-09-26 PROCEDURE — 3017F COLORECTAL CA SCREEN DOC REV: CPT | Performed by: INTERNAL MEDICINE

## 2022-09-26 PROCEDURE — G8427 DOCREV CUR MEDS BY ELIG CLIN: HCPCS | Performed by: INTERNAL MEDICINE

## 2022-09-26 PROCEDURE — G8417 CALC BMI ABV UP PARAM F/U: HCPCS | Performed by: INTERNAL MEDICINE

## 2022-09-26 PROCEDURE — 1090F PRES/ABSN URINE INCON ASSESS: CPT | Performed by: INTERNAL MEDICINE

## 2022-09-26 PROCEDURE — G8400 PT W/DXA NO RESULTS DOC: HCPCS | Performed by: INTERNAL MEDICINE

## 2022-09-26 PROCEDURE — 1123F ACP DISCUSS/DSCN MKR DOCD: CPT | Performed by: INTERNAL MEDICINE

## 2022-09-26 PROCEDURE — 3023F SPIROM DOC REV: CPT | Performed by: INTERNAL MEDICINE

## 2022-09-26 PROCEDURE — 1036F TOBACCO NON-USER: CPT | Performed by: INTERNAL MEDICINE

## 2022-09-26 RX ORDER — NALOXONE HYDROCHLORIDE 4 MG/.1ML
SPRAY NASAL
COMMUNITY
Start: 2022-09-01

## 2022-09-26 RX ORDER — TORSEMIDE 20 MG/1
TABLET ORAL
COMMUNITY
Start: 2022-08-29

## 2022-09-26 RX ORDER — ISOSORBIDE MONONITRATE 30 MG/1
TABLET, EXTENDED RELEASE ORAL
COMMUNITY
Start: 2022-08-29 | End: 2022-09-26

## 2022-09-26 RX ORDER — PRAVASTATIN SODIUM 10 MG
TABLET ORAL
COMMUNITY
Start: 2022-08-30

## 2022-09-26 RX ORDER — ARIPIPRAZOLE 2 MG/1
TABLET ORAL
COMMUNITY
Start: 2022-07-29

## 2022-09-26 RX ORDER — LEVOFLOXACIN 500 MG/1
500 TABLET, FILM COATED ORAL DAILY
Qty: 7 TABLET | Refills: 0 | Status: SHIPPED | OUTPATIENT
Start: 2022-09-26 | End: 2022-10-03

## 2022-09-26 RX ORDER — OMEPRAZOLE 40 MG/1
CAPSULE, DELAYED RELEASE ORAL
COMMUNITY
Start: 2022-09-17

## 2022-09-26 NOTE — PATIENT INSTRUCTIONS
Plan:  Current labs reviewed with patient  Stop taking Isosorbide since you do not have any blockages  Start taking Levoquinn 500 mg daily for 7 days which is an antibiotic which will hopefully help since the z-pack and steroids didn't help  Boil water in a pot on the stove, take it off of the stove, place a towel over your head and the steam will help bring up your congestions   -do not put any vicks or other chemicals in the water   -be careful not to burn yourself with the pot or steam  5. Make sure you see the traveling doctors in 3 weeks when they come to your complex.   6. Make sure you keep your appointment with Dr. Kira Friedman    Follow up with me in 6 months

## 2022-10-17 DIAGNOSIS — J44.9 COPD, SEVERE (HCC): ICD-10-CM

## 2022-10-17 NOTE — TELEPHONE ENCOUNTER
Pt called in asking for Advair or Symbicort to be sent to the pharmacy. She is running very low on inhaler she has. Please advise.    Pharmacy changed to [facility] [place] [facility]

## 2022-10-19 RX ORDER — ALBUTEROL SULFATE 90 UG/1
2 AEROSOL, METERED RESPIRATORY (INHALATION) EVERY 6 HOURS PRN
Qty: 18 G | Refills: 5 | Status: SHIPPED | OUTPATIENT
Start: 2022-10-19

## 2022-10-19 NOTE — TELEPHONE ENCOUNTER
Pt CB to check status of meds. Pt is only taking Trelegy not Advair or symbicort. She does not currently need refills on trelegy but does need albuterol sent in. Albuterol pending     LOV: 9/19/22    Assessment:   Severe COPD with AE   Tracheobronchitis   Abnormal CT chest 3/14/2022 with small lower lobe PEs, groundglass opacities, interlobular septal thickening. Admitted treated for CHF and COPD exacerbation. Abnormal CT chest 3/15/2020. Postoperative changes. Improvement on repeat CT 9/16/2020. Not significantly changed on CT 3/16/2021. FRANCISCO elongated nodule 20 x 8 mm. SUV 7.3 on PET scan 2/6/2020. Post RUL resection 3/11/2020 non-small cell lung cancer. [id number] stage IA  Left lower lobe nodule-stable on repeat CT chest  Abnormal esophagus on CT - fluid is seen in the esophagus   Hypoxia on exertion  Right traumatic displaced rib fractures with  hemothorax required VATS decortication and chest tube removed 2/14/18  Moderate MEG. O2 3LPM at night. Refusing BiPAP uses O2   PAF on Eliquis followed by cardiology   >120 pack year smoking. Intolerance to chantix . Quit March 2020                Plan:   Prednisone 20 po daily for 5 days  Z-Gregory  Advised to schedule her 6MW   Continue Trelegy and Albuterol INH/Neb Q 4 hrs PRN   O2 2-4 L. Advised to titrate O2 using her pulse oximeter- target O2 sat 90-92%. Continue Eliquis   CT chest July 2023   Patient is up to date with Covid, pneumococcal vaccine, influenza vaccine  Advised to continue with smoking cessation.    Follow up in 3 months or sooner if needed

## 2023-01-10 ENCOUNTER — TELEMEDICINE (OUTPATIENT)
Dept: PULMONOLOGY | Age: 73
End: 2023-01-10
Payer: MEDICARE

## 2023-01-10 ENCOUNTER — TELEPHONE (OUTPATIENT)
Dept: PULMONOLOGY | Age: 73
End: 2023-01-10

## 2023-01-10 DIAGNOSIS — R93.89 ABNORMAL CT OF THE CHEST: ICD-10-CM

## 2023-01-10 DIAGNOSIS — G47.33 OSA (OBSTRUCTIVE SLEEP APNEA): ICD-10-CM

## 2023-01-10 DIAGNOSIS — J44.9 STAGE 3 SEVERE COPD BY GOLD CLASSIFICATION (HCC): Primary | ICD-10-CM

## 2023-01-10 PROCEDURE — 3017F COLORECTAL CA SCREEN DOC REV: CPT | Performed by: INTERNAL MEDICINE

## 2023-01-10 PROCEDURE — 1036F TOBACCO NON-USER: CPT | Performed by: INTERNAL MEDICINE

## 2023-01-10 PROCEDURE — G8400 PT W/DXA NO RESULTS DOC: HCPCS | Performed by: INTERNAL MEDICINE

## 2023-01-10 PROCEDURE — 1123F ACP DISCUSS/DSCN MKR DOCD: CPT | Performed by: INTERNAL MEDICINE

## 2023-01-10 PROCEDURE — G8417 CALC BMI ABV UP PARAM F/U: HCPCS | Performed by: INTERNAL MEDICINE

## 2023-01-10 PROCEDURE — 1090F PRES/ABSN URINE INCON ASSESS: CPT | Performed by: INTERNAL MEDICINE

## 2023-01-10 PROCEDURE — 3023F SPIROM DOC REV: CPT | Performed by: INTERNAL MEDICINE

## 2023-01-10 PROCEDURE — G8484 FLU IMMUNIZE NO ADMIN: HCPCS | Performed by: INTERNAL MEDICINE

## 2023-01-10 PROCEDURE — G8427 DOCREV CUR MEDS BY ELIG CLIN: HCPCS | Performed by: INTERNAL MEDICINE

## 2023-01-10 PROCEDURE — 99214 OFFICE O/P EST MOD 30 MIN: CPT | Performed by: INTERNAL MEDICINE

## 2023-01-10 NOTE — TELEPHONE ENCOUNTER
Called patient teddy for 3 mo f/u. Patient states received Bipap from [address] chest completed at [facility]. Spke with xray they are mailing disc of Ct chest; called MR they are faxing report. Called Ayanna left vm requesting CR and Bipap settings.

## 2023-01-10 NOTE — PROGRESS NOTES
[facility]                                                            Outpatient Follow Up Note  TELEHEALTH EVALUATION: Service performed was Audio/Visual (During XLJVM-68 public health emergency) and not a face-to-face visit         CHIEF COMPLAINT: Follow up hospitalization      HPI:  Admitted last week for COPD exacerbation sent home on BiPAP. Feels pressure is weak. She is enthusiastic about using it.   Doing better  No cough or sputum  No hemoptysis   Uses Neb 2 times/day   Uses O2 3.5 LPM at night   Uses O2 during the day - sat on 3.5L 93%   No smoking     Past Medical History:   Diagnosis Date    A-fib ([address]     Arthritis     Clostridium difficile diarrhea 6/26/15    COPD (chronic obstructive pulmonary disease) (HCC)     COPD exacerbation (HCC)     Emphysema of lung (HCC)     Hyperlipidemia     Hypertension     O2 dependent     2 L/min at night    Pneumonia 2/5/2018    Primary cancer of right upper lobe of lung ([facility] 3/11/2020    Rib pain     Sleep apnea     does not use cpap    Small bowel obstruction ([facility]     pt denies       Past Surgical History:        Procedure Laterality Date    ANKLE ARTHROSCOPY      x2    APPENDECTOMY      BRONCHOSCOPY N/A 3/15/2020    BRONCHOSCOPY THERAPUTIC ASPIRATION INITIAL performed by Ramon Sherman MD at [address] [facility]  3/17/2020    BRONCHOSCOPY THERAPUTIC ASPIRATION INITIAL performed by Leopoldo Mews, MD at [facility] Left     CHOLECYSTECTOMY      COLONOSCOPY      COLONOSCOPY N/A 10/22/2021    COLONOSCOPY WITH BIOPSY performed by Buck Medley MD at [facility] (CERVIX STATUS UNKNOWN)      ROTATOR CUFF REPAIR Bilateral     THORACOSCOPY Right 3/11/2020    RIGHT VIDEO ASSISTED THORACOSCOPIC SURGERY; WEDGE EXCISION OF RIGHT UPPER LOBE FOLLOWED BY RIGHT UPPER LOBECTOMY; INTERCOSTAL NERVE BLOCK performed by Ramon Sherman MD at [address] TONSILLECTOMY      UPPER GASTROINTESTINAL ENDOSCOPY N/A 10/22/2021    EGD BIOPSY performed by Adebayo Kumar MD at [place] [id number].:  is allergic to buspirone, codeine, doxycycline, lisinopril, and penicillins. Social History:    TOBACCO:   reports that she quit smoking about 8 months ago. Her smoking use included cigarettes. She has a 25.00 pack-year smoking history. She has never used smokeless tobacco.  ETOH:   reports no history of alcohol use. Family History:       Problem Relation Age of Onset    Cancer Mother     Heart Failure Father        Current Medications:    Current Outpatient Medications:     albuterol sulfate HFA (VENTOLIN HFA) 108 (90 Base) MCG/ACT inhaler, Inhale 2 puffs into the lungs every 6 hours as needed for Wheezing or Shortness of Breath, Disp: 18 g, Rfl: 5    ARIPiprazole (ABILIFY) 2 MG tablet, TAKE ONE TABLET BY MOUTH nightly AT bedtime, Disp: , Rfl:     NARCAN 4 MG/0.1ML LIQD nasal spray, SPRAY one SPRAY in one nostril as needed (opioid reversal). MAY REPEAT in 2-3 minutes in other nostril if needed. , Disp: , Rfl:     omeprazole (PRILOSEC) 40 MG delayed release capsule, TAKE ONE CAPSULE BY MOUTH EVERY DAY, Disp: , Rfl:     pravastatin (PRAVACHOL) 10 MG tablet, TAKE ONE TABLET BY MOUTH EVERY EVENING, Disp: , Rfl:     torsemide (DEMADEX) 20 MG tablet, TAKE 1 TABLET BY MOUTH EVERY DAY, Disp: , Rfl:     fluticasone-umeclidin-vilant (TRELEGY ELLIPTA) 100-62.5-25 MCG/INH AEPB, INHALE 1 PUFF BY MOUTH EVERY DAY AS DIRECTED, Disp: 60 each, Rfl: 5    venlafaxine (EFFEXOR XR) 75 MG extended release capsule, venlafaxine ER 75 mg capsule,extended release 24 hr, Disp: , Rfl:     dilTIAZem (CARDIZEM CD) 120 MG extended release capsule, Take one capsule by mouth daily, Disp: 90 capsule, Rfl: 3    apixaban (ELIQUIS) 5 MG TABS tablet, Take 1 tablet by mouth 2 times daily, Disp: 180 tablet, Rfl: 3    ezetimibe (ZETIA) 10 MG tablet, TAKE 1 TABLET BY MOUTH EVERY DAY, Disp: 90 tablet, Rfl: 1    albuterol (PROVENTIL) (2.5 MG/3ML) 0.083% nebulizer solution, Take 3 mLs by nebulization every 6 hours as needed for Wheezing, Disp: 120 each, Rfl: 5    ALPRAZolam (XANAX) 0.25 MG tablet, Take 0.25 mg by mouth nightly as needed for Sleep. Pt taking .5mg, Disp: , Rfl:     gabapentin (NEURONTIN) 400 MG capsule, Take 800 mg by mouth 4 times daily. Bid, Disp: , Rfl:     HYDROcodone-acetaminophen (NORCO) 7.5-325 MG per tablet, TAKE 1 TABLET BY MOUTH 3 TIMES A DAY AS NEEDED FOR PAIN, Disp: , Rfl:     nystatin (MYCOSTATIN) 672513 UNIT/GM cream, , Disp: , Rfl:     Fexofenadine HCl (MUCINEX ALLERGY PO), Take by mouth daily, Disp: , Rfl:     ferrous sulfate 325 (65 Fe) MG tablet, Take 325 mg by mouth daily (with breakfast), Disp: , Rfl:     rOPINIRole (REQUIP) 2 MG tablet, Take 3 mg by mouth nightly Pt takes 4 mg, Disp: , Rfl:     venlafaxine (EFFEXOR-XR) 150 MG XR capsule, Take 150 mg by mouth daily, Disp: , Rfl:     Multiple Vitamins-Minerals (THERAPEUTIC MULTIVITAMIN-MINERALS) tablet, Take 1 tablet by mouth daily, Disp: , Rfl:     QUEtiapine (SEROQUEL XR) 300 MG XR tablet, Take 500 mg by mouth nightly Takes 4mg q night, Disp: , Rfl:     OXYGEN, Inhale 4 L into the lungs Indications: 2.5 L nightly, Disp: , Rfl:     Review of Systems    Objective:   Physical Exam  not currently breastfeeding.' on RA  O2 Sat:  HR:  BP:  RR:  Temperature:  Neck size: Not able to obtain   Mallampati class IV. Constitutional:  No acute distress. Appears well developed and nourished. Eyes: No sclera icterus. No visible discharge. HENT: Normal appearing nose. External Ears normal.   Neck: No visualized mass. Ramond Inocencio is midline   Resp: No accessory muscle use. Respiratory effort normal. No visualized signs of difficulty breathing or respiratory distress. Cardiovascular: No LE edema per patient's report   Musculoskeletal: No signs of ataxia. Normal range of motion of the neck.   Skin: No significant exanthematous lesions or discoloration noted on facial skin    Neuro: Awake. Alert. Able to follow commands. No facial asymmetry. No gaze palsy. Psych: No agitation. Normal affect. No hallucinations. Normal judgement and insight. DATA reviewed by me:   PFTs 02/04/2020 FVC 1.98(61%) FEV1 1.16(47%) FEV1/FVC 59 % TLC 4.11(76%)   DLCO 09.05(38%) 6MW 840 F LO2 90%  PFTs 08/19/2015 FVC 1.78(52%) FEV1 1.27(49%) FEV1/FVC 71 % TLC 4.52(82%)   DLCO 10.23(43%) 6MW 980 F LO2 89%  6MW 11/14/2019 720 feet Desat 88% 1L on exertin       CT chest 1/16/19  Multiple right-sided rib fractures-subacute  No acute intrathoracic abnormalities  Scattered areas of parenchymal opacities lower lobe likely atelectasis      CT chest 1/16/2020    Mediastinum: No enlarged lymph nodes. Normal caliber great vessels. Normal  heart size and pericardium. Suspect incidental benign lipomatous hypertrophy  of the interatrial septum. No significant coronary calcifications. Unremarkable esophagus and included thyroid. Lungs/pleura: Nodular thickening in the subpleural right upper lobe on series  3, image 27; coronal image 101 measuring 20 x 8 mm in axial plane and 7 mm  craniocaudal.  This nodular opacity is low in attenuation values (water  density). Subtle tree-in-bud opacities seen on the prior exam in the right  upper lobe have resolved. These were likely infectious or inflammatory. Linear and bandlike areas of scarring and/or atelectasis at the right lung  base are not substantially changed. Decreased atelectasis at the medial left  lung base. Scattered punctate calcified granulomas. No pleural effusion. Upper Abdomen: Unremarkable. Soft Tissues/Bones: No enlarged axillary or supraclavicular lymph nodes. Multiple healed rib fractures bilaterally, some of which are ununited. PET scan 2/6/2020   1.9 cm lateral right upper lobe pulmonary nodule is hypermetabolic, to a  degree concerning for malignancy until proven otherwise.   No findings of FDG avid metastatic disease. Inflammatory change related to healing posterolateral left 8th rib fracture. Lipomatous hypertrophy of the interatrial septum, an incidental finding. Fluid loculated within fissure within the lateral right mid lung demonstrates  low level activity, likely inflammatory. CT chest 9/16/2020   Pulmonary emphysema  Interval removal of right-sided chest tube  Gas and fluid collection at the right apex decrease in size  Mild bronchial wall thickening, bronchiectasis, interlobular septal wall thickening in the right base improved from prior exam  Resolution of groundglass opacities on the left side    CT chest 3/16/2021   Increased ground-glass opacities in the left upper lobe, likely  postinflammatory-infectious. Punctate noncalcified pulmonary nodule left  lower lobe is unchanged  Persistent small pleural fluid collection in the right lung apex. Pleural  gas internally has decreased  Fluid is seen in the esophagus to above the level of thoracic inlet,  suggesting reflux or esophageal dysmotility. CT chest 9/23/21   Unchanged tiny 4 mm left lower lobe pulmonary nodule. No further follow-up  recommended. Resolution of the ground-glass opacities in the left upper lobe. Resolution of the previous air and fluid in the pleural space at the right  apex. Stable postsurgical changes elsewhere. No new concerning findings on this examination. No evidence of acute  cardiopulmonary process. CT chest 3/14/2022 from outside hospital  Lower lobe PEs  Groundglass opacities with interlobular septal thickening     CT chest 7/11/2022  Stable left lower lobe nodule  No new suspicious nodules    RUL lobectomy 3/11/2020  A. Wedge excision, right upper lobe lung lesion:       - Involved with non-small cell carcinoma, squamous cell carcinoma         with lesion 1.1 cm in greatest extent. - Parenchymal resection margins are negative for malignancy.        - Overlying pleural surface shows no evidence of involvement with         malignancy. - See case comment and synoptic report. B. Regional lymph node excision, right 10:       - 3 lymph nodes with reactive changes and no evidence of metastatic         carcinoma ( 0\3 ). - Pankeratin\CAM 5.2 stain is performed and supports diagnosis. C. Right upper lobe lobectomy:       - Organizing biopsy cavity changes with extravasated blood and         reactive features. Hello how are you       - No residual malignancy identified.       - Bronchial margin, vascular margin and parenchymal margins are         negative for malignancy. - 3 peribronchial lymph nodes with reactive changes and no evidence         of metastatic carcinoma (0/3). - One intraparenchymal lymph node with no evidence of involvement         with malignancy. - Pankeratin\CAM 5.2 stains were utilized to evaluate the lymph         nodes and supports final diagnosis. D. Regional lymph node excision, right 4:       - Six lymph nodes with no evidence of metastatic carcinoma ( 0\6)       - Pankeratin\CAM 5.2 stains were utilized evaluated lymph nodes and         supports final diagnosis. PSG 8/25/16 AHI 28.4 desaturation to 71%  BiPAP titration 9/9/16 BiPAP 15/11 cmH2O      Assessment:      Severe COPD   Abnormal CT chest 3/14/2022 with small lower lobe PEs, groundglass opacities, interlobular septal thickening. Admitted treated for CHF and COPD exacerbation. Abnormal CT chest 3/15/2020. Postoperative changes. Improvement on repeat CT 9/16/2020. Not significantly changed on CT 3/16/2021. FRANCISCO elongated nodule 20 x 8 mm. SUV 7.3 on PET scan 2/6/2020. Post RUL resection 3/11/2020 non-small cell lung cancer.   [id number] stage IA  Left lower lobe nodule-stable on repeat CT chest  Abnormal esophagus on CT - fluid is seen in the esophagus   Hypoxia on exertion  Right traumatic displaced rib fractures with  hemothorax required VATS decortication and chest tube removed 2/14/18  Moderate MEG. O2 3LPM at night. Refusing BiPAP uses O2   PAF on Eliquis followed by cardiology   >120 pack year smoking. Intolerance to chantix . Quit March 2020      Plan:      Continue BiPAP - will need to increase pressure as it feels weak. Please obtain settings   Advised to schedule her 6MW   Obtain records for CT chest from last week - next CT chest will depending on result   Continue Trelegy and Albuterol INH/Neb Q 4 hrs PRN   Continue O2 2-4 L. Advised to titrate O2 using her pulse oximeter- target O2 sat 90-92%. Continue Eliquis   Patient is up to date with Covid, pneumococcal vaccine, influenza vaccine  Advised to continue with smoking cessation. Follow up in 3 months or sooner if needed               Luciana Montenegro is a 67 y.o. female being evaluated by a Virtual Visit (video visit) encounter to address concerns as mentioned above. A caregiver was present when appropriate. Due to this being a TeleHealth encounter (During [facility] public health emergency), evaluation of the following organ systems was limited: Vitals/Constitutional/EENT/Resp/CV/GI//MS/Neuro/Skin/Heme-Lymph-Imm. Pursuant to the emergency declaration under the [address] [place] [address], [address] [place] [address] authority and the [employer] and [employer] Act, this Virtual Visit was conducted with patient's (and/or legal guardian's) consent, to reduce the patient's risk of exposure to COVID-19 and provide necessary medical care. The patient (and/or legal guardian) has also been advised to contact this office for worsening conditions or problems, and seek emergency medical treatment and/or call 911 if deemed necessary. Services were provided through a video synchronous discussion virtually to substitute for in-person clinic visit. Patient was located in her home, provider was located in his office.     --Ilan Tapia MD on 1/10/2023 at 11:26 AM    An electronic signature was used to authenticate this note.

## 2023-01-11 NOTE — TELEPHONE ENCOUNTER
MD Ene Lopez MA  Repeat CT in 3 months     Ct chest teddy 4/19/23 with same day f/u. Need to f/u with [address] [place] [address] Street [address] they are to faxed info or call office back.

## 2023-01-11 NOTE — TELEPHONE ENCOUNTER
CTA chest from [facility] scanned for review. Ct disc is being mailed. Waiting on [employer] to send info regarding Bipap.

## 2023-01-16 NOTE — TELEPHONE ENCOUNTER
AVAP AE passive tidal vol 425, EPAP max 14, EPAP min 4, breathrate auto, trigger type auto trax, risetime 2, max pressure 30, PS max 26, ps min 6, AVAPS speed 4.0, humidification off.

## 2023-02-05 ENCOUNTER — HOSPITAL ENCOUNTER (INPATIENT)
Age: 73
LOS: 10 days | Discharge: HOME | DRG: 205 | End: 2023-02-15
Attending: EMERGENCY MEDICINE | Admitting: INTERNAL MEDICINE
Payer: MEDICARE

## 2023-02-05 ENCOUNTER — APPOINTMENT (OUTPATIENT)
Dept: GENERAL RADIOLOGY | Age: 73
DRG: 205 | End: 2023-02-05
Payer: MEDICARE

## 2023-02-05 DIAGNOSIS — J96.21 ACUTE ON CHRONIC RESPIRATORY FAILURE WITH HYPOXIA (HCC): ICD-10-CM

## 2023-02-05 DIAGNOSIS — G62.9 NEUROPATHY: ICD-10-CM

## 2023-02-05 DIAGNOSIS — U07.1 COVID-19 VIRUS INFECTION: Primary | ICD-10-CM

## 2023-02-05 LAB
A/G RATIO: 1.2 (ref 1.1–2.2)
ALBUMIN SERPL-MCNC: 4.3 G/DL (ref 3.4–5)
ALP BLD-CCNC: 92 U/L (ref 40–129)
ALT SERPL-CCNC: 24 U/L (ref 10–40)
ANION GAP SERPL CALCULATED.3IONS-SCNC: 13 MMOL/L (ref 3–16)
APTT: 27.2 SEC (ref 23–34.3)
AST SERPL-CCNC: 41 U/L (ref 15–37)
BASE EXCESS VENOUS: 11.1 MMOL/L (ref -3–3)
BASOPHILS ABSOLUTE: 0 K/UL (ref 0–0.2)
BASOPHILS RELATIVE PERCENT: 0.6 %
BILIRUB SERPL-MCNC: <0.2 MG/DL (ref 0–1)
BUN BLDV-MCNC: 10 MG/DL (ref 7–20)
CALCIUM SERPL-MCNC: 9.8 MG/DL (ref 8.3–10.6)
CARBOXYHEMOGLOBIN: 2.8 % (ref 0–1.5)
CHLORIDE BLD-SCNC: 93 MMOL/L (ref 99–110)
CO2: 32 MMOL/L (ref 21–32)
CREAT SERPL-MCNC: 0.6 MG/DL (ref 0.6–1.2)
EOSINOPHILS ABSOLUTE: 0.1 K/UL (ref 0–0.6)
EOSINOPHILS RELATIVE PERCENT: 1.7 %
GFR SERPL CREATININE-BSD FRML MDRD: >60 ML/MIN/{1.73_M2}
GLUCOSE BLD-MCNC: 97 MG/DL (ref 70–99)
HCO3 VENOUS: 34.5 MMOL/L (ref 23–29)
HCT VFR BLD CALC: 34.8 % (ref 36–48)
HEMOGLOBIN: 11.5 G/DL (ref 12–16)
INR BLD: 0.98 (ref 0.87–1.14)
LACTIC ACID: 1 MMOL/L (ref 0.4–2)
LYMPHOCYTES ABSOLUTE: 1.2 K/UL (ref 1–5.1)
LYMPHOCYTES RELATIVE PERCENT: 22.1 %
MAGNESIUM: 1.6 MG/DL (ref 1.8–2.4)
MCH RBC QN AUTO: 30.2 PG (ref 26–34)
MCHC RBC AUTO-ENTMCNC: 33 G/DL (ref 31–36)
MCV RBC AUTO: 91.4 FL (ref 80–100)
METHEMOGLOBIN VENOUS: 0.3 %
MONOCYTES ABSOLUTE: 0.8 K/UL (ref 0–1.3)
MONOCYTES RELATIVE PERCENT: 15.5 %
NEUTROPHILS ABSOLUTE: 3.2 K/UL (ref 1.7–7.7)
NEUTROPHILS RELATIVE PERCENT: 60.1 %
O2 SAT, VEN: 89 %
O2 THERAPY: ABNORMAL
PCO2, VEN: 40.6 MMHG (ref 40–50)
PDW BLD-RTO: 13.7 % (ref 12.4–15.4)
PH VENOUS: 7.55 (ref 7.35–7.45)
PLATELET # BLD: 319 K/UL (ref 135–450)
PMV BLD AUTO: 8.2 FL (ref 5–10.5)
PO2, VEN: 48.3 MMHG (ref 25–40)
POTASSIUM REFLEX MAGNESIUM: 4.2 MMOL/L (ref 3.5–5.1)
PRO-BNP: 201 PG/ML (ref 0–124)
PROTHROMBIN TIME: 12.9 SEC (ref 11.7–14.5)
RAPID INFLUENZA  B AGN: NEGATIVE
RAPID INFLUENZA A AGN: NEGATIVE
RBC # BLD: 3.81 M/UL (ref 4–5.2)
SARS-COV-2, NAAT: DETECTED
SODIUM BLD-SCNC: 138 MMOL/L (ref 136–145)
TCO2 CALC VENOUS: 36 MMOL/L
TOTAL PROTEIN: 7.9 G/DL (ref 6.4–8.2)
TROPONIN: <0.01 NG/ML
WBC # BLD: 5.3 K/UL (ref 4–11)

## 2023-02-05 PROCEDURE — 71045 X-RAY EXAM CHEST 1 VIEW: CPT

## 2023-02-05 PROCEDURE — 80053 COMPREHEN METABOLIC PANEL: CPT

## 2023-02-05 PROCEDURE — 85025 COMPLETE CBC W/AUTO DIFF WBC: CPT

## 2023-02-05 PROCEDURE — 6360000002 HC RX W HCPCS: Performed by: EMERGENCY MEDICINE

## 2023-02-05 PROCEDURE — 2580000003 HC RX 258: Performed by: EMERGENCY MEDICINE

## 2023-02-05 PROCEDURE — 6370000000 HC RX 637 (ALT 250 FOR IP): Performed by: EMERGENCY MEDICINE

## 2023-02-05 PROCEDURE — 36415 COLL VENOUS BLD VENIPUNCTURE: CPT

## 2023-02-05 PROCEDURE — 99285 EMERGENCY DEPT VISIT HI MDM: CPT

## 2023-02-05 PROCEDURE — 87635 SARS-COV-2 COVID-19 AMP PRB: CPT

## 2023-02-05 PROCEDURE — 83880 ASSAY OF NATRIURETIC PEPTIDE: CPT

## 2023-02-05 PROCEDURE — 85610 PROTHROMBIN TIME: CPT

## 2023-02-05 PROCEDURE — 96374 THER/PROPH/DIAG INJ IV PUSH: CPT

## 2023-02-05 PROCEDURE — 82803 BLOOD GASES ANY COMBINATION: CPT

## 2023-02-05 PROCEDURE — 84484 ASSAY OF TROPONIN QUANT: CPT

## 2023-02-05 PROCEDURE — 93005 ELECTROCARDIOGRAM TRACING: CPT | Performed by: EMERGENCY MEDICINE

## 2023-02-05 PROCEDURE — 83735 ASSAY OF MAGNESIUM: CPT

## 2023-02-05 PROCEDURE — 83605 ASSAY OF LACTIC ACID: CPT

## 2023-02-05 PROCEDURE — 2060000000 HC ICU INTERMEDIATE R&B

## 2023-02-05 PROCEDURE — 87040 BLOOD CULTURE FOR BACTERIA: CPT

## 2023-02-05 PROCEDURE — 87804 INFLUENZA ASSAY W/OPTIC: CPT

## 2023-02-05 PROCEDURE — 85730 THROMBOPLASTIN TIME PARTIAL: CPT

## 2023-02-05 RX ORDER — ACETAMINOPHEN 325 MG/1
325 TABLET ORAL ONCE
Status: COMPLETED | OUTPATIENT
Start: 2023-02-05 | End: 2023-02-05

## 2023-02-05 RX ORDER — 0.9 % SODIUM CHLORIDE 0.9 %
1000 INTRAVENOUS SOLUTION INTRAVENOUS ONCE
Status: COMPLETED | OUTPATIENT
Start: 2023-02-05 | End: 2023-02-06

## 2023-02-05 RX ORDER — METHYLPREDNISOLONE SODIUM SUCCINATE 125 MG/2ML
125 INJECTION, POWDER, LYOPHILIZED, FOR SOLUTION INTRAMUSCULAR; INTRAVENOUS ONCE
Status: COMPLETED | OUTPATIENT
Start: 2023-02-05 | End: 2023-02-05

## 2023-02-05 RX ADMIN — METHYLPREDNISOLONE SODIUM SUCCINATE 125 MG: 125 INJECTION, POWDER, FOR SOLUTION INTRAMUSCULAR; INTRAVENOUS at 22:59

## 2023-02-05 RX ADMIN — ACETAMINOPHEN 325 MG: 325 TABLET ORAL at 22:30

## 2023-02-05 RX ADMIN — SODIUM CHLORIDE 1000 ML: 9 INJECTION, SOLUTION INTRAVENOUS at 22:31

## 2023-02-05 RX ADMIN — ACETAMINOPHEN 325 MG: 325 TABLET ORAL at 23:30

## 2023-02-05 ASSESSMENT — PAIN - FUNCTIONAL ASSESSMENT: PAIN_FUNCTIONAL_ASSESSMENT: 0-10

## 2023-02-05 ASSESSMENT — PAIN DESCRIPTION - LOCATION: LOCATION: CHEST

## 2023-02-05 ASSESSMENT — PAIN SCALES - GENERAL: PAINLEVEL_OUTOF10: 6

## 2023-02-05 ASSESSMENT — PAIN DESCRIPTION - DESCRIPTORS: DESCRIPTORS: ACHING

## 2023-02-06 LAB
A/G RATIO: 1.2 (ref 1.1–2.2)
ALBUMIN SERPL-MCNC: 3.7 G/DL (ref 3.4–5)
ALP BLD-CCNC: 90 U/L (ref 40–129)
ALT SERPL-CCNC: 19 U/L (ref 10–40)
ANION GAP SERPL CALCULATED.3IONS-SCNC: 13 MMOL/L (ref 3–16)
AST SERPL-CCNC: 19 U/L (ref 15–37)
BASOPHILS ABSOLUTE: 0 K/UL (ref 0–0.2)
BASOPHILS RELATIVE PERCENT: 0.5 %
BILIRUB SERPL-MCNC: <0.2 MG/DL (ref 0–1)
BUN BLDV-MCNC: 10 MG/DL (ref 7–20)
C-REACTIVE PROTEIN: <3 MG/L (ref 0–5.1)
CALCIUM SERPL-MCNC: 8 MG/DL (ref 8.3–10.6)
CHLORIDE BLD-SCNC: 95 MMOL/L (ref 99–110)
CO2: 26 MMOL/L (ref 21–32)
CREAT SERPL-MCNC: 0.6 MG/DL (ref 0.6–1.2)
EKG ATRIAL RATE: 93 BPM
EKG DIAGNOSIS: NORMAL
EKG P AXIS: 3 DEGREES
EKG P-R INTERVAL: 162 MS
EKG Q-T INTERVAL: 414 MS
EKG QRS DURATION: 148 MS
EKG QTC CALCULATION (BAZETT): 514 MS
EKG R AXIS: 108 DEGREES
EKG T AXIS: 50 DEGREES
EKG VENTRICULAR RATE: 93 BPM
EOSINOPHILS ABSOLUTE: 0 K/UL (ref 0–0.6)
EOSINOPHILS RELATIVE PERCENT: 0.6 %
GFR SERPL CREATININE-BSD FRML MDRD: >60 ML/MIN/{1.73_M2}
GLUCOSE BLD-MCNC: 196 MG/DL (ref 70–99)
HCT VFR BLD CALC: 34.7 % (ref 36–48)
HEMOGLOBIN: 11.3 G/DL (ref 12–16)
LYMPHOCYTES ABSOLUTE: 0.7 K/UL (ref 1–5.1)
LYMPHOCYTES RELATIVE PERCENT: 13 %
MCH RBC QN AUTO: 31.1 PG (ref 26–34)
MCHC RBC AUTO-ENTMCNC: 32.6 G/DL (ref 31–36)
MCV RBC AUTO: 95.6 FL (ref 80–100)
MONOCYTES ABSOLUTE: 0.1 K/UL (ref 0–1.3)
MONOCYTES RELATIVE PERCENT: 1.4 %
NEUTROPHILS ABSOLUTE: 4.4 K/UL (ref 1.7–7.7)
NEUTROPHILS RELATIVE PERCENT: 84.5 %
PDW BLD-RTO: 14 % (ref 12.4–15.4)
PLATELET # BLD: 267 K/UL (ref 135–450)
PMV BLD AUTO: 8.5 FL (ref 5–10.5)
POTASSIUM REFLEX MAGNESIUM: 3.6 MMOL/L (ref 3.5–5.1)
PROCALCITONIN: 0.04 NG/ML (ref 0–0.15)
RBC # BLD: 3.63 M/UL (ref 4–5.2)
SODIUM BLD-SCNC: 134 MMOL/L (ref 136–145)
TOTAL PROTEIN: 6.9 G/DL (ref 6.4–8.2)
WBC # BLD: 5.2 K/UL (ref 4–11)

## 2023-02-06 PROCEDURE — 85025 COMPLETE CBC W/AUTO DIFF WBC: CPT

## 2023-02-06 PROCEDURE — 94640 AIRWAY INHALATION TREATMENT: CPT

## 2023-02-06 PROCEDURE — 36415 COLL VENOUS BLD VENIPUNCTURE: CPT

## 2023-02-06 PROCEDURE — 94761 N-INVAS EAR/PLS OXIMETRY MLT: CPT

## 2023-02-06 PROCEDURE — 2060000000 HC ICU INTERMEDIATE R&B

## 2023-02-06 PROCEDURE — 80053 COMPREHEN METABOLIC PANEL: CPT

## 2023-02-06 PROCEDURE — 86140 C-REACTIVE PROTEIN: CPT

## 2023-02-06 PROCEDURE — 84145 PROCALCITONIN (PCT): CPT

## 2023-02-06 PROCEDURE — 6370000000 HC RX 637 (ALT 250 FOR IP): Performed by: INTERNAL MEDICINE

## 2023-02-06 PROCEDURE — XW033H5 INTRODUCTION OF TOCILIZUMAB INTO PERIPHERAL VEIN, PERCUTANEOUS APPROACH, NEW TECHNOLOGY GROUP 5: ICD-10-PCS | Performed by: INTERNAL MEDICINE

## 2023-02-06 PROCEDURE — 2700000000 HC OXYGEN THERAPY PER DAY

## 2023-02-06 PROCEDURE — 94660 CPAP INITIATION&MGMT: CPT

## 2023-02-06 PROCEDURE — 99223 1ST HOSP IP/OBS HIGH 75: CPT | Performed by: INTERNAL MEDICINE

## 2023-02-06 PROCEDURE — 93010 ELECTROCARDIOGRAM REPORT: CPT | Performed by: INTERNAL MEDICINE

## 2023-02-06 PROCEDURE — 6360000002 HC RX W HCPCS: Performed by: INTERNAL MEDICINE

## 2023-02-06 PROCEDURE — 2580000003 HC RX 258: Performed by: INTERNAL MEDICINE

## 2023-02-06 RX ORDER — PREDNISONE 20 MG/1
40 TABLET ORAL DAILY
Status: DISCONTINUED | OUTPATIENT
Start: 2023-02-08 | End: 2023-02-09

## 2023-02-06 RX ORDER — ALBUTEROL SULFATE 90 UG/1
2 AEROSOL, METERED RESPIRATORY (INHALATION) EVERY 6 HOURS PRN
Status: DISCONTINUED | OUTPATIENT
Start: 2023-02-06 | End: 2023-02-06

## 2023-02-06 RX ORDER — QUETIAPINE FUMARATE 200 MG/1
400 TABLET, FILM COATED ORAL NIGHTLY
Status: DISCONTINUED | OUTPATIENT
Start: 2023-02-06 | End: 2023-02-15 | Stop reason: HOSPADM

## 2023-02-06 RX ORDER — ARIPIPRAZOLE 2 MG/1
2 TABLET ORAL NIGHTLY
Status: DISCONTINUED | OUTPATIENT
Start: 2023-02-06 | End: 2023-02-06

## 2023-02-06 RX ORDER — QUETIAPINE FUMARATE 200 MG/1
400 TABLET, FILM COATED ORAL ONCE
Status: COMPLETED | OUTPATIENT
Start: 2023-02-06 | End: 2023-02-06

## 2023-02-06 RX ORDER — ROPINIROLE 1 MG/1
4 TABLET, FILM COATED ORAL NIGHTLY
Status: DISCONTINUED | OUTPATIENT
Start: 2023-02-06 | End: 2023-02-11

## 2023-02-06 RX ORDER — EZETIMIBE 10 MG/1
1 TABLET ORAL DAILY
Status: DISCONTINUED | OUTPATIENT
Start: 2023-02-06 | End: 2023-02-06

## 2023-02-06 RX ORDER — TORSEMIDE 20 MG/1
20 TABLET ORAL DAILY
Status: DISCONTINUED | OUTPATIENT
Start: 2023-02-06 | End: 2023-02-15 | Stop reason: HOSPADM

## 2023-02-06 RX ORDER — ALBUTEROL SULFATE 90 UG/1
2 AEROSOL, METERED RESPIRATORY (INHALATION) EVERY 4 HOURS PRN
Status: DISCONTINUED | OUTPATIENT
Start: 2023-02-06 | End: 2023-02-13

## 2023-02-06 RX ORDER — AZITHROMYCIN 250 MG/1
250 TABLET, FILM COATED ORAL DAILY
Status: COMPLETED | OUTPATIENT
Start: 2023-02-07 | End: 2023-02-10

## 2023-02-06 RX ORDER — ACETAMINOPHEN 325 MG/1
650 TABLET ORAL EVERY 6 HOURS PRN
Status: DISCONTINUED | OUTPATIENT
Start: 2023-02-06 | End: 2023-02-15 | Stop reason: HOSPADM

## 2023-02-06 RX ORDER — SODIUM CHLORIDE 9 MG/ML
INJECTION, SOLUTION INTRAVENOUS PRN
Status: DISCONTINUED | OUTPATIENT
Start: 2023-02-06 | End: 2023-02-15 | Stop reason: HOSPADM

## 2023-02-06 RX ORDER — SODIUM CHLORIDE 0.9 % (FLUSH) 0.9 %
5-40 SYRINGE (ML) INJECTION EVERY 12 HOURS SCHEDULED
Status: DISCONTINUED | OUTPATIENT
Start: 2023-02-06 | End: 2023-02-15 | Stop reason: HOSPADM

## 2023-02-06 RX ORDER — HYDROCODONE BITARTRATE AND ACETAMINOPHEN 7.5; 325 MG/1; MG/1
1 TABLET ORAL 3 TIMES DAILY PRN
Status: DISCONTINUED | OUTPATIENT
Start: 2023-02-06 | End: 2023-02-15 | Stop reason: HOSPADM

## 2023-02-06 RX ORDER — POLYETHYLENE GLYCOL 3350 17 G/17G
17 POWDER, FOR SOLUTION ORAL DAILY PRN
Status: DISCONTINUED | OUTPATIENT
Start: 2023-02-06 | End: 2023-02-15 | Stop reason: HOSPADM

## 2023-02-06 RX ORDER — VENLAFAXINE HYDROCHLORIDE 75 MG/1
225 CAPSULE, EXTENDED RELEASE ORAL DAILY
Status: DISCONTINUED | OUTPATIENT
Start: 2023-02-06 | End: 2023-02-15 | Stop reason: HOSPADM

## 2023-02-06 RX ORDER — GUAIFENESIN 600 MG/1
600 TABLET, EXTENDED RELEASE ORAL 2 TIMES DAILY PRN
COMMUNITY

## 2023-02-06 RX ORDER — AZITHROMYCIN 250 MG/1
500 TABLET, FILM COATED ORAL DAILY
Status: COMPLETED | OUTPATIENT
Start: 2023-02-06 | End: 2023-02-06

## 2023-02-06 RX ORDER — SODIUM CHLORIDE 0.9 % (FLUSH) 0.9 %
5-40 SYRINGE (ML) INJECTION PRN
Status: DISCONTINUED | OUTPATIENT
Start: 2023-02-06 | End: 2023-02-15 | Stop reason: HOSPADM

## 2023-02-06 RX ORDER — PRAVASTATIN SODIUM 10 MG
10 TABLET ORAL EVERY EVENING
Status: DISCONTINUED | OUTPATIENT
Start: 2023-02-06 | End: 2023-02-15 | Stop reason: HOSPADM

## 2023-02-06 RX ORDER — ROPINIROLE 1 MG/1
4 TABLET, FILM COATED ORAL ONCE
Status: COMPLETED | OUTPATIENT
Start: 2023-02-06 | End: 2023-02-06

## 2023-02-06 RX ORDER — MAGNESIUM SULFATE 1 G/100ML
1000 INJECTION INTRAVENOUS PRN
Status: DISCONTINUED | OUTPATIENT
Start: 2023-02-06 | End: 2023-02-15 | Stop reason: HOSPADM

## 2023-02-06 RX ORDER — DILTIAZEM HYDROCHLORIDE 120 MG/1
120 CAPSULE, EXTENDED RELEASE ORAL DAILY
Status: DISCONTINUED | OUTPATIENT
Start: 2023-02-06 | End: 2023-02-15 | Stop reason: HOSPADM

## 2023-02-06 RX ORDER — ACETAMINOPHEN 650 MG/1
650 SUPPOSITORY RECTAL EVERY 6 HOURS PRN
Status: DISCONTINUED | OUTPATIENT
Start: 2023-02-06 | End: 2023-02-15 | Stop reason: HOSPADM

## 2023-02-06 RX ORDER — GABAPENTIN 400 MG/1
800 CAPSULE ORAL 3 TIMES DAILY
Status: DISCONTINUED | OUTPATIENT
Start: 2023-02-06 | End: 2023-02-06

## 2023-02-06 RX ORDER — M-VIT,TX,IRON,MINS/CALC/FOLIC 27MG-0.4MG
1 TABLET ORAL DAILY
Status: DISCONTINUED | OUTPATIENT
Start: 2023-02-06 | End: 2023-02-15 | Stop reason: HOSPADM

## 2023-02-06 RX ORDER — ALPRAZOLAM 0.5 MG/1
0.5 TABLET ORAL 3 TIMES DAILY PRN
Status: DISCONTINUED | OUTPATIENT
Start: 2023-02-06 | End: 2023-02-11

## 2023-02-06 RX ORDER — PROCHLORPERAZINE EDISYLATE 5 MG/ML
10 INJECTION INTRAMUSCULAR; INTRAVENOUS EVERY 6 HOURS PRN
Status: DISCONTINUED | OUTPATIENT
Start: 2023-02-06 | End: 2023-02-15 | Stop reason: HOSPADM

## 2023-02-06 RX ORDER — ALBUTEROL SULFATE 90 UG/1
2 AEROSOL, METERED RESPIRATORY (INHALATION)
Status: DISCONTINUED | OUTPATIENT
Start: 2023-02-06 | End: 2023-02-06

## 2023-02-06 RX ORDER — NALOXONE HYDROCHLORIDE 4 MG/.1ML
1 SPRAY NASAL PRN
Status: DISCONTINUED | OUTPATIENT
Start: 2023-02-06 | End: 2023-02-06

## 2023-02-06 RX ORDER — PANTOPRAZOLE SODIUM 40 MG/1
40 TABLET, DELAYED RELEASE ORAL
Status: DISCONTINUED | OUTPATIENT
Start: 2023-02-06 | End: 2023-02-15 | Stop reason: HOSPADM

## 2023-02-06 RX ORDER — GABAPENTIN 400 MG/1
800 CAPSULE ORAL 4 TIMES DAILY
Status: DISCONTINUED | OUTPATIENT
Start: 2023-02-06 | End: 2023-02-07

## 2023-02-06 RX ORDER — ALBUTEROL SULFATE 90 UG/1
2 AEROSOL, METERED RESPIRATORY (INHALATION) 4 TIMES DAILY
Status: DISCONTINUED | OUTPATIENT
Start: 2023-02-06 | End: 2023-02-09

## 2023-02-06 RX ORDER — METHYLPREDNISOLONE SODIUM SUCCINATE 40 MG/ML
40 INJECTION, POWDER, LYOPHILIZED, FOR SOLUTION INTRAMUSCULAR; INTRAVENOUS EVERY 12 HOURS
Status: COMPLETED | OUTPATIENT
Start: 2023-02-06 | End: 2023-02-07

## 2023-02-06 RX ORDER — VENLAFAXINE HYDROCHLORIDE 75 MG/1
75 CAPSULE, EXTENDED RELEASE ORAL DAILY
Status: DISCONTINUED | OUTPATIENT
Start: 2023-02-06 | End: 2023-02-06

## 2023-02-06 RX ORDER — GUAIFENESIN/DEXTROMETHORPHAN 100-10MG/5
5 SYRUP ORAL EVERY 4 HOURS PRN
Status: DISCONTINUED | OUTPATIENT
Start: 2023-02-06 | End: 2023-02-15 | Stop reason: HOSPADM

## 2023-02-06 RX ORDER — QUETIAPINE FUMARATE 400 MG/1
400 TABLET, FILM COATED ORAL NIGHTLY
COMMUNITY

## 2023-02-06 RX ORDER — FERROUS SULFATE 325(65) MG
325 TABLET ORAL
Status: DISCONTINUED | OUTPATIENT
Start: 2023-02-06 | End: 2023-02-15 | Stop reason: HOSPADM

## 2023-02-06 RX ADMIN — QUETIAPINE FUMARATE 400 MG: 200 TABLET ORAL at 03:55

## 2023-02-06 RX ADMIN — APIXABAN 5 MG: 5 TABLET, FILM COATED ORAL at 09:19

## 2023-02-06 RX ADMIN — MAGNESIUM SULFATE HEPTAHYDRATE 1000 MG: 1 INJECTION, SOLUTION INTRAVENOUS at 05:20

## 2023-02-06 RX ADMIN — Medication 2 PUFF: at 19:39

## 2023-02-06 RX ADMIN — TORSEMIDE 20 MG: 20 TABLET ORAL at 09:19

## 2023-02-06 RX ADMIN — Medication 2 PUFF: at 11:11

## 2023-02-06 RX ADMIN — ROPINIROLE HYDROCHLORIDE 4 MG: 1 TABLET, FILM COATED ORAL at 03:55

## 2023-02-06 RX ADMIN — GABAPENTIN 800 MG: 400 CAPSULE ORAL at 23:00

## 2023-02-06 RX ADMIN — ROPINIROLE HYDROCHLORIDE 4 MG: 1 TABLET, FILM COATED ORAL at 23:00

## 2023-02-06 RX ADMIN — HYDROCODONE BITARTRATE AND ACETAMINOPHEN 1 TABLET: 7.5; 325 TABLET ORAL at 03:55

## 2023-02-06 RX ADMIN — PRAVASTATIN SODIUM 10 MG: 10 TABLET ORAL at 18:03

## 2023-02-06 RX ADMIN — TOCILIZUMAB 800 MG: 20 INJECTION, SOLUTION, CONCENTRATE INTRAVENOUS at 09:34

## 2023-02-06 RX ADMIN — QUETIAPINE FUMARATE 400 MG: 200 TABLET ORAL at 23:00

## 2023-02-06 RX ADMIN — METHYLPREDNISOLONE SODIUM SUCCINATE 40 MG: 40 INJECTION, POWDER, FOR SOLUTION INTRAMUSCULAR; INTRAVENOUS at 09:19

## 2023-02-06 RX ADMIN — Medication 2 PUFF: at 14:50

## 2023-02-06 RX ADMIN — VENLAFAXINE HYDROCHLORIDE 225 MG: 75 CAPSULE, EXTENDED RELEASE ORAL at 09:19

## 2023-02-06 RX ADMIN — Medication 2 PUFF: at 08:07

## 2023-02-06 RX ADMIN — GABAPENTIN 800 MG: 400 CAPSULE ORAL at 09:20

## 2023-02-06 RX ADMIN — SODIUM CHLORIDE, PRESERVATIVE FREE 10 ML: 5 INJECTION INTRAVENOUS at 23:01

## 2023-02-06 RX ADMIN — GABAPENTIN 800 MG: 400 CAPSULE ORAL at 18:03

## 2023-02-06 RX ADMIN — METHYLPREDNISOLONE SODIUM SUCCINATE 40 MG: 40 INJECTION, POWDER, FOR SOLUTION INTRAMUSCULAR; INTRAVENOUS at 23:00

## 2023-02-06 RX ADMIN — FERROUS SULFATE TAB 325 MG (65 MG ELEMENTAL FE) 325 MG: 325 (65 FE) TAB at 09:19

## 2023-02-06 RX ADMIN — PANTOPRAZOLE SODIUM 40 MG: 40 TABLET, DELAYED RELEASE ORAL at 06:59

## 2023-02-06 RX ADMIN — AZITHROMYCIN MONOHYDRATE 500 MG: 250 TABLET ORAL at 18:03

## 2023-02-06 RX ADMIN — DILTIAZEM HYDROCHLORIDE 120 MG: 120 CAPSULE, EXTENDED RELEASE ORAL at 09:19

## 2023-02-06 RX ADMIN — HYDROCODONE BITARTRATE AND ACETAMINOPHEN 1 TABLET: 7.5; 325 TABLET ORAL at 11:18

## 2023-02-06 RX ADMIN — ALPRAZOLAM 0.5 MG: 0.5 TABLET ORAL at 03:55

## 2023-02-06 RX ADMIN — APIXABAN 5 MG: 5 TABLET, FILM COATED ORAL at 23:00

## 2023-02-06 RX ADMIN — HYDROCODONE BITARTRATE AND ACETAMINOPHEN 1 TABLET: 7.5; 325 TABLET ORAL at 18:03

## 2023-02-06 RX ADMIN — MAGNESIUM SULFATE HEPTAHYDRATE 1000 MG: 1 INJECTION, SOLUTION INTRAVENOUS at 06:59

## 2023-02-06 RX ADMIN — SODIUM CHLORIDE, PRESERVATIVE FREE 10 ML: 5 INJECTION INTRAVENOUS at 09:20

## 2023-02-06 RX ADMIN — MULTIPLE VITAMINS W/ MINERALS TAB 1 TABLET: TAB at 09:19

## 2023-02-06 RX ADMIN — CEFTRIAXONE SODIUM 1000 MG: 1 INJECTION, POWDER, FOR SOLUTION INTRAMUSCULAR; INTRAVENOUS at 03:48

## 2023-02-06 RX ADMIN — Medication 2 PUFF: at 08:06

## 2023-02-06 RX ADMIN — APIXABAN 5 MG: 5 TABLET, FILM COATED ORAL at 03:49

## 2023-02-06 RX ADMIN — ALPRAZOLAM 0.5 MG: 0.5 TABLET ORAL at 23:05

## 2023-02-06 ASSESSMENT — PAIN SCALES - GENERAL
PAINLEVEL_OUTOF10: 6
PAINLEVEL_OUTOF10: 7
PAINLEVEL_OUTOF10: 8
PAINLEVEL_OUTOF10: 0
PAINLEVEL_OUTOF10: 6

## 2023-02-06 ASSESSMENT — PAIN DESCRIPTION - ORIENTATION: ORIENTATION: LEFT;RIGHT

## 2023-02-06 ASSESSMENT — PAIN DESCRIPTION - LOCATION
LOCATION: BACK;SHOULDER
LOCATION: BACK
LOCATION: LEG;BACK

## 2023-02-06 ASSESSMENT — PAIN DESCRIPTION - DESCRIPTORS
DESCRIPTORS: THROBBING
DESCRIPTORS: THROBBING

## 2023-02-06 NOTE — PROGRESS NOTES
RT Inhaler-Nebulizer Bronchodilator Protocol Note    There is a bronchodilator order in the chart from a provider indicating to follow the RT Bronchodilator Protocol and there is an Initiate RT Inhaler-Nebulizer Bronchodilator Protocol order as well (see protocol at bottom of note). CXR Findings:  XR CHEST PORTABLE    Result Date: 2/5/2023  Chronic airspace changes have slightly progressed in the right lung suspected to represent pleural thickening and pleural fluid along with edema versus atelectasis. Lesser pattern of interstitial change slightly increased on the left. A superimposed viral pattern of pneumonitis may be considered clinically. The findings from the last RT Protocol Assessment were as follows:   History Pulmonary Disease: Chronic pulmonary disease  Respiratory Pattern: Dyspnea on exertion or RR 21-25 bpm  Breath Sounds: Slightly diminished and/or crackles  Cough: Strong, spontaneous, non-productive  Indication for Bronchodilator Therapy: Decreased or absent breath sounds  Bronchodilator Assessment Score: 6    Aerosolized bronchodilator medication orders have been revised according to the RT Inhaler-Nebulizer Bronchodilator Protocol below. Respiratory Therapist to perform RT Therapy Protocol Assessment initially then follow the protocol. Repeat RT Therapy Protocol Assessment PRN for score 0-3 or on second treatment, BID, and PRN for scores above 3. No Indications - adjust the frequency to every 6 hours PRN wheezing or bronchospasm, if no treatments needed after 48 hours then discontinue using Per Protocol order mode. If indication present, adjust the RT bronchodilator orders based on the Bronchodilator Assessment Score as indicated below.   Use Inhaler orders unless patient has one or more of the following: on home nebulizer, not able to hold breath for 10 seconds, is not alert and oriented, cannot activate and use MDI correctly, or respiratory rate 25 breaths per minute or more, then use the equivalent nebulizer order(s) with same Frequency and PRN reasons based on the score. If a patient is on this medication at home then do not decrease Frequency below that used at home. 0-3 - enter or revise RT bronchodilator order(s) to equivalent RT Bronchodilator order with Frequency of every 4 hours PRN for wheezing or increased work of breathing using Per Protocol order mode. 4-6 - enter or revise RT Bronchodilator order(s) to two equivalent RT bronchodilator orders with one order with BID Frequency and one order with Frequency of every 4 hours PRN wheezing or increased work of breathing using Per Protocol order mode. 7-10 - enter or revise RT Bronchodilator order(s) to two equivalent RT bronchodilator orders with one order with TID Frequency and one order with Frequency of every 4 hours PRN wheezing or increased work of breathing using Per Protocol order mode. 11-13 - enter or revise RT Bronchodilator order(s) to one equivalent RT bronchodilator order with QID Frequency and an Albuterol order with Frequency of every 4 hours PRN wheezing or increased work of breathing using Per Protocol order mode. Greater than 13 - enter or revise RT Bronchodilator order(s) to one equivalent RT bronchodilator order with every 4 hours Frequency and an Albuterol order with Frequency of every 2 hours PRN wheezing or increased work of breathing using Per Protocol order mode. PATIENT WILL BENEFIT FROM TREATMENTS. Pedro Bucio     Electronically signed by Chriss Prader, RCP on 2/6/2023 at 2:53 AM

## 2023-02-06 NOTE — PROGRESS NOTES
RT Inhaler-Nebulizer Bronchodilator Protocol Note    There is a bronchodilator order in the chart from a provider indicating to follow the RT Bronchodilator Protocol and there is an Initiate RT Inhaler-Nebulizer Bronchodilator Protocol order as well (see protocol at bottom of note). CXR Findings:  XR CHEST PORTABLE    Result Date: 2/5/2023  Chronic airspace changes have slightly progressed in the right lung suspected to represent pleural thickening and pleural fluid along with edema versus atelectasis. Lesser pattern of interstitial change slightly increased on the left. A superimposed viral pattern of pneumonitis may be considered clinically. The findings from the last RT Protocol Assessment were as follows:   History Pulmonary Disease: (P) Chronic pulmonary disease  Respiratory Pattern: (P) Dyspnea on exertion or RR 21-25 bpm  Breath Sounds: (P) Slightly diminished and/or crackles  Cough: (P) Strong, spontaneous, non-productive  Indication for Bronchodilator Therapy: (P) Decreased or absent breath sounds  Bronchodilator Assessment Score: (P) 6    Aerosolized bronchodilator medication orders have been revised according to the RT Inhaler-Nebulizer Bronchodilator Protocol below. Respiratory Therapist to perform RT Therapy Protocol Assessment initially then follow the protocol. Repeat RT Therapy Protocol Assessment PRN for score 0-3 or on second treatment, BID, and PRN for scores above 3. No Indications - adjust the frequency to every 6 hours PRN wheezing or bronchospasm, if no treatments needed after 48 hours then discontinue using Per Protocol order mode. If indication present, adjust the RT bronchodilator orders based on the Bronchodilator Assessment Score as indicated below.   Use Inhaler orders unless patient has one or more of the following: on home nebulizer, not able to hold breath for 10 seconds, is not alert and oriented, cannot activate and use MDI correctly, or respiratory rate 25 breaths per minute or more, then use the equivalent nebulizer order(s) with same Frequency and PRN reasons based on the score. If a patient is on this medication at home then do not decrease Frequency below that used at home. 0-3 - enter or revise RT bronchodilator order(s) to equivalent RT Bronchodilator order with Frequency of every 4 hours PRN for wheezing or increased work of breathing using Per Protocol order mode. 4-6 - enter or revise RT Bronchodilator order(s) to two equivalent RT bronchodilator orders with one order with BID Frequency and one order with Frequency of every 4 hours PRN wheezing or increased work of breathing using Per Protocol order mode. 7-10 - enter or revise RT Bronchodilator order(s) to two equivalent RT bronchodilator orders with one order with TID Frequency and one order with Frequency of every 4 hours PRN wheezing or increased work of breathing using Per Protocol order mode. 11-13 - enter or revise RT Bronchodilator order(s) to one equivalent RT bronchodilator order with QID Frequency and an Albuterol order with Frequency of every 4 hours PRN wheezing or increased work of breathing using Per Protocol order mode. Greater than 13 - enter or revise RT Bronchodilator order(s) to one equivalent RT bronchodilator order with every 4 hours Frequency and an Albuterol order with Frequency of every 2 hours PRN wheezing or increased work of breathing using Per Protocol order mode. RT to enter RT Home Evaluation for COPD & MDI Assessment order using Per Protocol order mode.     Electronically signed by Ian Encinas RCP on 2/6/2023 at 8:11 AM

## 2023-02-06 NOTE — ED PROVIDER NOTES
[facility]      Pt Name: Daja Decker  MRN: [id number]  [place] 1950  Date of evaluation: 2/5/2023  Provider: Yves Lyn MD    CHIEF COMPLAINT       Chief Complaint   Patient presents with    Shortness of Breath     Over past month, hx COPD CHF         HISTORY OF PRESENT ILLNESS   (Location/Symptom, Timing/Onset, Context/Setting, Quality, Duration, Modifying Factors, Severity)  Note limiting factors. Daja Decker is a 67 y.o. female with past medical history of tension, diabetes, COPD on 3 L nasal cannula oxygen chronically at home, paroxysmal atrial fibrillation on anticoagulation, congestive heart failure, remote lung cancer status post pulmonary wedge resection here today for shortness of breath. Patient states that for weeks now she has been short of breath. She is had increasing work of breathing with only minimal cough. She has been having fevers. Notes some mild runny nose and nasal congestion. Has also had noted diarrhea. Denies dysuria or hematuria. No chest pain. No abdominal pain. She states she had to go up on her oxygen to 5 L for comfort. Note she has been to outside hospitals numerous times over the past 1 to 2 months it was most recently discharged the preceding Thursday for \"COPD, CHF and A. fib\". HPI    Nursing Notes were reviewed. REVIEW OF SYSTEMS    (2-9 systems for level 4, 10 or more for level 5)     Review of Systems    Please see HPI for pertinent positive and negative review of system findings. A full 10 system ROS was performed and otherwise negative.         PAST MEDICAL HISTORY     Past Medical History:   Diagnosis Date    A-fib [facility])     Arthritis     Clostridium difficile diarrhea 6/26/15    COPD (chronic obstructive pulmonary disease) (HCC)     COPD exacerbation (HCC)     Emphysema of lung (HCC)     Hyperlipidemia     Hypertension     O2 dependent     2 L/min at night    Pneumonia 2/5/2018    Primary cancer of right upper lobe of lung ([facility] [address] 3/11/2020    Rib pain     Sleep apnea     does not use cpap    Small bowel obstruction ([facility] [address]     pt denies         SURGICAL HISTORY       Past Surgical History:   Procedure Laterality Date    ANKLE ARTHROSCOPY      x2    APPENDECTOMY      BRONCHOSCOPY N/A 3/15/2020    BRONCHOSCOPY THERAPUTIC ASPIRATION INITIAL performed by Macrina Anguiano MD at [address]  3/17/2020    BRONCHOSCOPY THERAPUTIC ASPIRATION INITIAL performed by Jovana Reynoso MD at [address] [facility] Left     CHOLECYSTECTOMY      COLONOSCOPY      COLONOSCOPY N/A 10/22/2021    COLONOSCOPY WITH BIOPSY performed by Rosie Weathers MD at [address] (CERVIX STATUS UNKNOWN)      ROTATOR CUFF REPAIR Bilateral     THORACOSCOPY Right 3/11/2020    RIGHT VIDEO ASSISTED THORACOSCOPIC SURGERY; WEDGE EXCISION OF RIGHT UPPER LOBE FOLLOWED BY RIGHT UPPER LOBECTOMY; INTERCOSTAL NERVE BLOCK performed by Macrina Anguiano MD at [address] [facility] 10/22/2021    EGD BIOPSY performed by Rosie Weathers MD at [address]       Previous Medications    ALBUTEROL (PROVENTIL) (2.5 MG/3ML) 0.083% NEBULIZER SOLUTION    Take 3 mLs by nebulization every 6 hours as needed for Wheezing    ALBUTEROL SULFATE HFA (VENTOLIN HFA) 108 (90 BASE) MCG/ACT INHALER    Inhale 2 puffs into the lungs every 6 hours as needed for Wheezing or Shortness of Breath    ALPRAZOLAM (XANAX) 0.25 MG TABLET    Take 0.25 mg by mouth nightly as needed for Sleep. Pt taking . 5mg    APIXABAN (ELIQUIS) 5 MG TABS TABLET    Take 1 tablet by mouth 2 times daily    ARIPIPRAZOLE (ABILIFY) 2 MG TABLET    TAKE ONE TABLET BY MOUTH nightly AT bedtime    DILTIAZEM (CARDIZEM CD) 120 MG EXTENDED RELEASE CAPSULE    Take one capsule by mouth daily    EZETIMIBE (ZETIA) 10 MG TABLET    TAKE 1 TABLET BY MOUTH EVERY DAY    FERROUS SULFATE 325 (65 FE) MG TABLET    Take 325 mg by mouth daily (with breakfast)    FEXOFENADINE HCL (MUCINEX ALLERGY PO)    Take by mouth daily    FLUTICASONE-UMECLIDIN-VILANT (TRELEGY ELLIPTA) 100-62.5-25 MCG/INH AEPB    INHALE 1 PUFF BY MOUTH EVERY DAY AS DIRECTED    GABAPENTIN (NEURONTIN) 400 MG CAPSULE    Take 800 mg by mouth 4 times daily. Bid    HYDROCODONE-ACETAMINOPHEN (NORCO) 7.5-325 MG PER TABLET    TAKE 1 TABLET BY MOUTH 3 TIMES A DAY AS NEEDED FOR PAIN    MULTIPLE VITAMINS-MINERALS (THERAPEUTIC MULTIVITAMIN-MINERALS) TABLET    Take 1 tablet by mouth daily    NARCAN 4 MG/0.1ML LIQD NASAL SPRAY    SPRAY one SPRAY in one nostril as needed (opioid reversal). MAY REPEAT in 2-3 minutes in other nostril if needed. NYSTATIN (MYCOSTATIN) 541775 UNIT/GM CREAM        OMEPRAZOLE (PRILOSEC) 40 MG DELAYED RELEASE CAPSULE    TAKE ONE CAPSULE BY MOUTH EVERY DAY    OXYGEN    Inhale 4 L into the lungs Indications: 2.5 L nightly    PRAVASTATIN (PRAVACHOL) 10 MG TABLET    TAKE ONE TABLET BY MOUTH EVERY EVENING    QUETIAPINE (SEROQUEL XR) 300 MG XR TABLET    Take 500 mg by mouth nightly Takes 4mg q night    ROPINIROLE (REQUIP) 2 MG TABLET    Take 3 mg by mouth nightly Pt takes 4 mg    TORSEMIDE (DEMADEX) 20 MG TABLET    TAKE 1 TABLET BY MOUTH EVERY DAY    VENLAFAXINE (EFFEXOR XR) 75 MG EXTENDED RELEASE CAPSULE    venlafaxine ER 75 mg capsule,extended release 24 hr    VENLAFAXINE (EFFEXOR-XR) 150 MG XR CAPSULE    Take 150 mg by mouth daily       ALLERGIES     Buspirone, Codeine, Doxycycline, Lisinopril, and Penicillins    FAMILY HISTORY       Family History   Problem Relation Age of Onset    Cancer Mother     Heart Failure Father           SOCIAL HISTORY       Social History     Socioeconomic History    Marital status:       Spouse name: None    Number of children: None    Years of education: None    Highest education level: None   Tobacco Use    Smoking status: Former     Packs/day: 0.50     Years: 50.00     Pack years: 25.00     Types: Cigarettes     Quit date: 2022     Years since quittin.7    Smokeless tobacco: Never    Tobacco comments:     Smokes like two cigarttes a day, uses vape. Vaping Use    Vaping Use: Every day    Substances: Nicotine   Substance and Sexual Activity    Alcohol use: No     Alcohol/week: 0.0 standard drinks    Drug use: No    Sexual activity: Not Currently       SCREENINGS    Hooper Coma Scale  Eye Opening: Spontaneous  Best Verbal Response: Oriented  Best Motor Response: Obeys commands  Hooper Coma Scale Score: 15          PHYSICAL EXAM    (up to 7 for level 4, 8 or more for level 5)     ED Triage Vitals [23]   BP Temp Temp Source Heart Rate Resp SpO2 Height Weight   (!) 143/96 (!) 100.5 °F (38.1 °C) Oral 89 24 100 % -- 209 lb (94.8 kg)       Physical Exam    General appearance:  Cooperative. Comfortable appearing with increased work of breathing. Skin:  Warm. Dry. Eye:  Extraocular movements intact. Ears, nose, mouth and throat:  Oral mucosa moist,  Neck:  Trachea midline. Heart:  Regular rate and rhythm  Perfusion:  intact  Respiratory: Increased work of breathing with prolonged expiratory phase and very distant breath sounds. Abdominal:   Non distended. Nontender  Neurological:  Alert and oriented x 3. Moves all extremities spontaneously  Musculoskeletal:   Normal ROM, no deformities. Trace to 1+ lower extremity pitting edema.           Psychiatric:  Normal mood      DIAGNOSTIC RESULTS       Labs Reviewed   COVID-19, RAPID - Abnormal; Notable for the following components:       Result Value    SARS-CoV-2, NAAT DETECTED (*)     All other components within normal limits   CBC WITH AUTO DIFFERENTIAL - Abnormal; Notable for the following components:    RBC 3.81 (*)     Hemoglobin 11.5 (*)     Hematocrit 34.8 (*)     All other components within normal limits   COMPREHENSIVE METABOLIC PANEL W/ REFLEX TO MG FOR LOW K - Abnormal; Notable for the following components:    Chloride 93 (*) AST 41 (*)     All other components within normal limits   BLOOD GAS, VENOUS - Abnormal; Notable for the following components:    pH, Isak 7.547 (*)     pO2, Isak 48.3 (*)     HCO3, Venous 34.5 (*)     Base Excess, Isak 11.1 (*)     Carboxyhemoglobin 2.8 (*)     All other components within normal limits   RAPID INFLUENZA A/B ANTIGENS   CULTURE, BLOOD 1   CULTURE, BLOOD 2   LACTIC ACID   PROTIME-INR   APTT   TROPONIN   URINALYSIS WITH REFLEX TO CULTURE   BRAIN NATRIURETIC PEPTIDE       Interpretation per the Radiologist below, if obtained/available at the time of this note:    XR CHEST PORTABLE   Final Result   Chronic airspace changes have slightly progressed in the right lung suspected   to represent pleural thickening and pleural fluid along with edema versus   atelectasis. Lesser pattern of interstitial change slightly increased on the   left. A superimposed viral pattern of pneumonitis may be considered   clinically. All other labs/imaging were within normal range or not returned as of this dictation. EMERGENCY DEPARTMENT COURSE and DIFFERENTIAL DIAGNOSIS/MDM:   Vitals:    Vitals:    02/05/23 2158 02/05/23 2237   BP: (!) 143/96 (!) 136/91   Pulse: 89 81   Resp: 24 24   Temp: (!) 100.5 °F (38.1 °C)    TempSrc: Oral    SpO2: 100% 100%   Weight: 209 lb (94.8 kg)        EKG *Interpreted by me*: Sinus rhythm with premature atrial complexes rate of 93 bpm.  Right bundle branch block. No ST elevation. Differential Diagnosis: COVID infection, healthcare associated pneumonia, CHF exacerbation, COPD exacerbation, ACS    Patient presented emergency department today complaining of shortness of breath. Minimal cough. States she has been feeling well and has had significant diarrhea. Found to be febrile here. Significantly tachypneic with increased work of breathing. No significant wheezing. Chest x-ray performed shows some acute on chronic changes infiltrates but no obvious consolidation. Laboratory studies note a normal white blood cell count. Hemoglobin stable. Troponin negative. Lactic acid within normal limits. VBG without significant hypercapnia. Ultimately tested positive for COVID-19 which I feel is the likely underlying etiology to her symptoms. Due to her underlying significant comorbidities with chronic respiratory failure with an acute component and increased oxygen requirement with increased work of breathing now on BiPAP do feel that she warrants admission to the hospital.  Resting comfortably on BiPAP at this time. Plan to admit to [facility]    Addendum: After the patient's VBG did return showing no significant hypercapnia we did transition the patient back to nasal cannula oxygen. She is maintaining her saturations on 4 to 5 L at this time. He is feeling somewhat improved. Medications and Route:   Medications   0.9 % sodium chloride bolus (1,000 mLs IntraVENous New Bag 2/5/23 2231)   methylPREDNISolone sodium (SOLU-MEDROL) injection 125 mg (has no administration in time range)   acetaminophen (TYLENOL) tablet 325 mg (325 mg Oral Given 2/5/23 2230)       History From: Patient         Chronic Conditions: Noted in HPI    CONSULTS: (Who and What was discussed)  IP CONSULT TO HOSPITALIST            Disposition Considerations (Tests not ordered but considered, Shared Decision Making, Pt Expectation of Test or Tx.):     Lia Leyva M.D., am the primary clinician of record. MDM      CONSULTS     IP CONSULT TO HOSPITALIST    Critical Care:     CRITICAL CARE TIME    I personally saw the patient and independently provided 30 minutes of non-concurrent critical care out of the total shared critical care time provided, excluding separately reportable procedures. There was a high probability of clinically significant/life threatening deterioration in the patient's condition which required my urgent intervention.   This time was spent reviewing the patient chart, interpreting diagnostic/laboratory data, initial management of BiPAP settings, administration of supplemental oxygen for hypoxic respiratory failure      REASSESSMENT          PROCEDURE     Unless otherwise noted below, none     Procedures      FINAL IMPRESSION      1. COVID-19 virus infection    2. Acute on chronic respiratory failure with hypoxia [facility])            DISPOSITION/PLAN   DISPOSITION Decision To Admit 02/05/2023 10:42:30 PM        PATIENT REFERRED TO:  No follow-up provider specified. DISCHARGE MEDICATIONS:  New Prescriptions    No medications on file     Controlled Substances Monitoring:     No flowsheet data found.     (Please note that portions of this note were completed with a voice recognition program.  Efforts were made to edit the dictations but occasionally words are mis-transcribed.)    Jeana Regan MD (electronically signed)  Attending Emergency Physician            Minesh Wilson MD  02/05/23 5667

## 2023-02-06 NOTE — PROGRESS NOTES
Pulmonary Consult has been called to Dr. Elizabeth Key on 2/6/23. Spoke with answering service.  7:42 AM    Braxton Vazquez RN  2/6/2023

## 2023-02-06 NOTE — ACP (ADVANCE CARE PLANNING)
Advance Care Planning     General Advance Care Planning (ACP) Conversation    Date of Conversation: 2/5/2023  Conducted with: Patient with Decision Making Capacity    Healthcare Decision Maker:    Primary Decision Maker: Manuel White - [phone]  Click here to complete Healthcare Decision Makers including selection of the Healthcare Decision Maker Relationship (ie \"Primary\"). Today we documented Decision Maker(s) consistent with Legal Next of Kin hierarchy.     Content/Action Overview:  Pt has total body donation - CM requested copy for chart  Reviewed DNR/DNI and patient elects Full Code (Attempt Resuscitation)    Length of Voluntary ACP Conversation in minutes:  <16 minutes (Non-Billable)    Jenni Lacy RN

## 2023-02-06 NOTE — PROGRESS NOTES
Shift assessment completed. See flow sheet. Medications given. Patient is A&O x4. Vitals are stable. Patient is currently on 3.5L of O2. Patient denies further needs at this time. Call light within reach.

## 2023-02-06 NOTE — ED NOTES
Report given to EMS team and pt transported to [facility] at this time.       Monica Nj RN  02/06/23 2257

## 2023-02-06 NOTE — PROGRESS NOTES
Patient placed back on BIPAP while napping at this time      02/06/23 0661   NIV Type   $NIV $Daily Charge   Skin Assessment Clean, dry, & intact   Suction Setup and Functional Yes   NIV Started/Stopped On   Equipment Type V60   Mode Bilevel   Mask Type Full face mask   Mask Size Medium   Settings/Measurements   PIP Observed 16 cm H20   IPAP 16 cmH20   CPAP/EPAP 8 cmH2O   Vt (Measured) 363 mL   Rate Ordered 18   Resp 24   FiO2  45 %   I Time/ I Time % 0.9 s   Minute Volume (L/min) 7.4 Liters   Mask Leak (lpm) 0 lpm   Comfort Level Good   Using Accessory Muscles No   SpO2 97   Alarm Settings   Alarms On Y   Low Pressure (cmH2O) 8 cmH2O   High Pressure (cmH2O) 30 cmH2O   Apnea (secs) 20 secs   RR Low (bpm) 18   RR High (bpm) 40 br/min

## 2023-02-06 NOTE — CONSULTS
Reason for referral and CC: SOB, COVID 23, COPD    HISTORY OF PRESENT ILLNESS: 68 yo female with a h/o COPD presented with SOB and malaise. COVID +. On 4L O2 now and 3lpm at home. Wet but non-productive cough. + wheezing. Past Medical History:   Diagnosis Date    A-fib [facility])     Arthritis     Clostridium difficile diarrhea 6/26/15    COPD (chronic obstructive pulmonary disease) (HCC)     COPD exacerbation (HCC)     Emphysema of lung (HCC)     Hyperlipidemia     Hypertension     O2 dependent     2 L/min at night    Pneumonia 2/5/2018    Primary cancer of right upper lobe of lung ([facility] 3/11/2020    Rib pain     Sleep apnea     does not use cpap    Small bowel obstruction ([facility]     pt denies     Past Surgical History:   Procedure Laterality Date    ANKLE ARTHROSCOPY      x2    APPENDECTOMY      BRONCHOSCOPY N/A 3/15/2020    BRONCHOSCOPY THERAPUTIC ASPIRATION INITIAL performed by Avni Dacosta MD at [address]  3/17/2020    BRONCHOSCOPY THERAPUTIC ASPIRATION INITIAL performed by Anthony Horn MD at [address] [facility] Left     CHOLECYSTECTOMY      COLONOSCOPY      COLONOSCOPY N/A 10/22/2021    [facility]SY [facility] (CERVIX STATUS UNKNOWN)      ROTATOR CUFF REPAIR Bilateral     THORACOSCOPY Right 3/11/2020    RIGHT VIDEO ASSISTED THORACOSCOPIC SURGERY; WEDGE EXCISION OF RIGHT UPPER LOBE FOLLOWED BY RIGHT UPPER LOBECTOMY; INTERCOSTAL NERVE BLOCK performed by Avni Dacosta MD at [address] [facility] N/A 10/22/2021    EGD BIOPSY performed by Kari Gorman MD at [address] History  family history includes Cancer in her mother; Heart Failure in her father. Social History:  reports that she quit smoking about 9 months ago. Her smoking use included cigarettes. She has a 100.00 pack-year smoking history.  She has never used smokeless tobacco. reports no history of alcohol use. ALLERGIES:  Patient is allergic to buspirone, codeine, doxycycline, lisinopril, and penicillins. Continuous Infusions:   sodium chloride       Scheduled Meds:   apixaban  5 mg Oral BID    dilTIAZem  120 mg Oral Daily    ferrous sulfate  325 mg Oral Daily with breakfast    pantoprazole  40 mg Oral QAM AC    therapeutic multivitamin-minerals  1 tablet Oral Daily    pravastatin  10 mg Oral QPM    QUEtiapine  400 mg Oral Nightly    rOPINIRole  4 mg Oral Nightly    torsemide  20 mg Oral Daily    sodium chloride flush  5-40 mL IntraVENous 2 times per day    methylPREDNISolone  40 mg IntraVENous Q12H    Followed by    Reji Green ON 2/8/2023] predniSONE  40 mg Oral Daily    cefTRIAXone (ROCEPHIN) IV  1,000 mg IntraVENous Q24H    albuterol sulfate HFA  2 puff Inhalation 4x daily    And    ipratropium  2 puff Inhalation 4x daily    gabapentin  800 mg Oral 4x Daily    venlafaxine  225 mg Oral Daily     PRN Meds:  ALPRAZolam, HYDROcodone-acetaminophen, guaiFENesin-dextromethorphan, sodium chloride flush, sodium chloride, polyethylene glycol, acetaminophen **OR** acetaminophen, prochlorperazine, albuterol sulfate HFA, magnesium sulfate    REVIEW OF SYSTEMS:  Constitutional: Negative for fever  HENT: Negative for sore throat  Eyes: Negative for redness   Respiratory: + for dyspnea, cough  Cardiovascular: Negative for chest pain  Gastrointestinal: Negative for vomiting, diarrhea   Genitourinary: Negative for hematuria   Musculoskeletal: Negative for arthralgias   Skin: Negative for rash  Neurological: Negative for syncope  Hematological: Negative for adenopathy  Psychiatric/Behavorial: Negative for anxiety    PHYSICAL EXAM: /65   Pulse 85   Temp 97.2 °F (36.2 °C) (Oral)   Resp 18   Wt 229 lb 1.6 oz (103.9 kg)   SpO2 94%   BMI 36.98 kg/m²  on 4L  Constitutional:  No acute distress. Eyes: PERRL. Conjunctivae anicteric. ENT: Normal nose. Normal tongue. Neck:  Trachea is midline. No thyroid tenderness. Respiratory: No accessory muscle usage. + wheezes. No rales. No Rhonchi. Cardiovascular: Normal S1S2. No digit clubbing. No digit cyanosis. No LE edema. Gastrointestinal: No mass palpated. No tenderness palpated. Skin: No rash on the exposed extremities. No Nodules or induration on exposed extremities. Psychiatric: No anxiety or Agitation. Alert and Oriented to person, place and time. CBC:   Recent Labs     02/05/23 2153 02/06/23 0417   WBC 5.3 5.2   HGB 11.5* 11.3*   HCT 34.8* 34.7*   MCV 91.4 95.6    267     BMP:   Recent Labs     02/05/23 2153 02/06/23 0417    134*   K 4.2 3.6   CL 93* 95*   CO2 32 26   BUN 10 10   CREATININE 0.6 0.6        Recent Labs     02/05/23 2153 02/06/23 0417   AST 41* 19   ALT 24 19   BILITOT <0.2 <0.2   ALKPHOS 92 90     Recent Labs     02/05/23 2153   PROTIME 12.9   INR 0.98     No results for input(s): NITRITE, COLORU, PHUR, LABCAST, WBCUA, RBCUA, MUCUS, TRICHOMONAS, YEAST, BACTERIA, CLARITYU, SPECGRAV, LEUKOCYTESUR, UROBILINOGEN, BILIRUBINUR, BLOODU, GLUCOSEU, AMORPHOUS in the last 72 hours. Invalid input(s): KETONESU  No results for input(s): PHART, GMK3GCY, PO2ART in the last 72 hours. Chest imaging was reviewed by me and showed 2/5/23 CXR  Chronic airspace changes have slightly progressed in the right lung suspected   to represent pleural thickening and pleural fluid along with edema versus   atelectasis. Lesser pattern of interstitial change slightly increased on the   left. A superimposed viral pattern of pneumonitis may be considered   clinically. ASSESSMENT:  Acute on chronic hypoxic respiratory failure   COPD exacerbation  COVID 19 disease  MEG on home Bipap    PLAN:  Droplet Plus Airborne Precautions   Bipap PRN and QHS - pt told she can use home unit if available  Supplemental oxygen to maintain SaO2 >92%; wean as tolerated  Intensive inhaled bronchodilator therapy. IV solumedrol 40 mg IV Q12 hrs.  Plan to switch to oral prednisone taper when improved. SARIKApack  Sputum GS&C.       Thank you Hoa Tang MD for this consult

## 2023-02-06 NOTE — H&P
Hospital Medicine History & Physical      PCP: [facility]    Date of Service: Pt seen/examined on 2/6/23 and admitted on 2/6/23 to Inpatient. Chief Complaint   Patient presents with    Shortness of Breath     Over past month, hx COPD CHF       History Of Present Illness: The patient is a 67 y.o. female with PMH below, presents with SOB/ROMERO, fever, cough, diarrhea, congestion. Pt reports the last few days she has not felt well. She has felt more SOB for the last month and has been seen at OSH a few times. She is normally on 3L O2 but has to turn it up to 5L at home to remain comfortable. She was briefly put on NIV for WOB at [facility] but was able to be weaned off prior to admission. She is currently requiring 4L. She has hx of COPD, CHF, a fib on AC. She has remote Hx of RUL NSCLCa s/p remote wedge resection. She was found to be COVID +.       Past Medical History:        Diagnosis Date    A-fib [facility])     Arthritis     Clostridium difficile diarrhea 6/26/15    COPD (chronic obstructive pulmonary disease) (HCC)     COPD exacerbation (HCC)     Emphysema of lung (HCC)     Hyperlipidemia     Hypertension     O2 dependent     2 L/min at night    Pneumonia 2/5/2018    Primary cancer of right upper lobe of lung ([address] 3/11/2020    Rib pain     Sleep apnea     does not use cpap    Small bowel obstruction ([address]     pt denies       Past Surgical History:        Procedure Laterality Date    ANKLE ARTHROSCOPY      x2    APPENDECTOMY      BRONCHOSCOPY N/A 3/15/2020    BRONCHOSCOPY THERAPUTIC ASPIRATION INITIAL performed by Faviola Rose MD at [facility]  3/17/2020    BRONCHOSCOPY THERAPUTIC ASPIRATION INITIAL performed by Sj Licea MD at [address] [place] [address] Left     CHOLECYSTECTOMY      COLONOSCOPY      COLONOSCOPY N/A 10/22/2021    COLONOSCOPY WITH BIOPSY performed by Juan Colón MD at [place] [address] ([address] [place] [address] ROTATOR CUFF REPAIR Bilateral     THORACOSCOPY Right 3/11/2020    RIGHT VIDEO ASSISTED THORACOSCOPIC SURGERY; WEDGE EXCISION OF RIGHT UPPER LOBE FOLLOWED BY RIGHT UPPER LOBECTOMY; INTERCOSTAL NERVE BLOCK performed by Walter Aguilar MD at [address] [place] [address] 10/22/2021    EGD BIOPSY performed by Pablo Rosales MD at [address]       Medications Prior to Admission:    Prior to Admission medications    Medication Sig Start Date End Date Taking? Authorizing Provider   albuterol sulfate HFA (VENTOLIN HFA) 108 (90 Base) MCG/ACT inhaler Inhale 2 puffs into the lungs every 6 hours as needed for Wheezing or Shortness of Breath 10/19/22   Kandi Flores MD   ARIPiprazole (ABILIFY) 2 MG tablet TAKE ONE TABLET BY MOUTH nightly AT bedtime 7/29/22   Historical Provider, MD   NARCAN 4 MG/0.1ML LIQD nasal spray SPRAY one SPRAY in one nostril as needed (opioid reversal). MAY REPEAT in 2-3 minutes in other nostril if needed.  9/1/22   Historical Provider, MD   omeprazole (PRILOSEC) 40 MG delayed release capsule TAKE ONE CAPSULE BY MOUTH EVERY DAY 9/17/22   Historical Provider, MD   pravastatin (PRAVACHOL) 10 MG tablet TAKE ONE TABLET BY MOUTH EVERY EVENING 8/30/22   Historical Provider, MD   torsemide (DEMADEX) 20 MG tablet TAKE 1 TABLET BY MOUTH EVERY DAY 8/29/22   Historical Provider, MD   fluticasone-umeclidin-vilant (TRELEGY ELLIPTA) 100-62.5-25 MCG/INH AEPB INHALE 1 PUFF BY MOUTH EVERY DAY AS DIRECTED 3/23/22   Kandi Flores MD   venlafaxine (EFFEXOR XR) 75 MG extended release capsule venlafaxine ER 75 mg capsule,extended release 24 hr    Historical Provider, MD   dilTIAZem (CARDIZEM CD) 120 MG extended release capsule Take one capsule by mouth daily 2/23/22   Garrison Day MD   apixaban (ELIQUIS) 5 MG TABS tablet Take 1 tablet by mouth 2 times daily 2/23/22   Garrison Day MD   ezetimibe (ZETIA) 10 MG tablet TAKE 1 TABLET BY MOUTH EVERY DAY 2/8/22   Garrison Day MD albuterol (PROVENTIL) (2.5 MG/3ML) 0.083% nebulizer solution Take 3 mLs by nebulization every 6 hours as needed for Wheezing 9/23/21   Kelly Peñaloza MD   ALPRAZolam Hernandez Latus) 0.25 MG tablet Take 0.25 mg by mouth nightly as needed for Sleep. Pt taking . 5mg    Historical Provider, MD   gabapentin (NEURONTIN) 400 MG capsule Take 800 mg by mouth 4 times daily. Bid    Historical Provider, MD   HYDROcodone-acetaminophen (NORCO) 7.5-325 MG per tablet TAKE 1 TABLET BY MOUTH 3 TIMES A DAY AS NEEDED FOR PAIN 2/12/20   Historical Provider, MD   nystatin (MYCOSTATIN) 130194 UNIT/GM cream  2/19/20   Historical Provider, MD   Fexofenadine HCl (MUCINEX ALLERGY PO) Take by mouth daily    Historical Provider, MD   ferrous sulfate 325 (65 Fe) MG tablet Take 325 mg by mouth daily (with breakfast)    Historical Provider, MD   rOPINIRole (REQUIP) 2 MG tablet Take 3 mg by mouth nightly Pt takes 4 mg    Historical Provider, MD   venlafaxine (EFFEXOR-XR) 150 MG XR capsule Take 150 mg by mouth daily    Historical Provider, MD   Multiple Vitamins-Minerals (THERAPEUTIC MULTIVITAMIN-MINERALS) tablet Take 1 tablet by mouth daily    Historical Provider, MD   QUEtiapine (SEROQUEL XR) 300 MG XR tablet Take 500 mg by mouth nightly Takes 4mg q night    Historical Provider, MD   OXYGEN Inhale 4 L into the lungs Indications: 2.5 L nightly    Historical Provider, MD       Allergies:  Buspirone, Codeine, Doxycycline, Lisinopril, and Penicillins    Social History:    TOBACCO:   reports that she quit smoking about 9 months ago. Her smoking use included cigarettes. She has a 25.00 pack-year smoking history. She has never used smokeless tobacco.  ETOH:   reports no history of alcohol use. Family History:  Reviewed in detail and negative for DM, Early CAD, Cancer (except as below).  Positive as follows:        Problem Relation Age of Onset    Cancer Mother     Heart Failure Father        REVIEW OF SYSTEMS:   Pertinent positives/negatives as follows: SOB/ROMERO, fever, cough, diarrhea, congestion, and as discussed in HPI, otherwise a complete ROS performed and all other systems are negative. PHYSICAL EXAM PERFORMED:  BP (!) 139/90   Pulse 77   Temp (!) 100.5 °F (38.1 °C) (Oral)   Resp 24   Wt 209 lb (94.8 kg)   SpO2 100%   BMI 33.73 kg/m²   GEN:  A&Ox3, appears ill. Increased WOB. HEENT:  NC/AT,EOMI, MMM, no erythema/exudates or visible masses. CVS:  Normal S1,S2. RRR. Without M/G/R.   LUNG:   Diminished bilat. No wheezes, rales or rhonchi. Tachypnea. ABD:  Soft, ND/NT, BS+ x4. Without G/R.  EXT: 2+ pulses, no c/c. BLE edema. Brisk cap refill. PSY:  Thought process intact, affect appropriate. RAMIRO:  CN III-XII grossly intact. Moves all 4 spontaneously. Sensory grossly intact. SKIN: No rash or lesions on visible skin. Chart review shows recent radiographs:  XR CHEST PORTABLE    Result Date: 2/5/2023  EXAMINATION: ONE XRAY VIEW OF THE CHEST 2/5/2023 10:00 pm COMPARISON: 01/06/2022 radiograph HISTORY: ORDERING SYSTEM PROVIDED HISTORY: cough, sob, fever TECHNOLOGIST PROVIDED HISTORY: Reason for exam:->cough, sob, fever Reason for Exam: sob FINDINGS: The heart is enlarged. Normal mediastinum. Chronic pattern of pleural thickening and airspace change in the right lung. Scattered interstitial opacities have increased in the left lung with a probable small pleural effusion. No skeletal finding. Chronic airspace changes have slightly progressed in the right lung suspected to represent pleural thickening and pleural fluid along with edema versus atelectasis. Lesser pattern of interstitial change slightly increased on the left. A superimposed viral pattern of pneumonitis may be considered clinically.        EKG:    EKG 12 Lead [[id number]]    Collected: 02/05/23 2140    Updated: 02/05/23 2158     Ventricular Rate 93 BPM    Atrial Rate 93 BPM    P-R Interval 162 ms    QRS Duration 148 ms    Q-T Interval 414 ms    QTc Calculation (Bazett) 514 ms    P Axis 3 degrees    R Axis 108 degrees    T Axis 50 degrees    Diagnosis Sinus rhythm with Premature atrial complexesRight bundle branch blockAbnormal ECGWhen compared with ECG of 06-JAN-2022 12:05,QRS axis Shifted right       CBC:  Recent Labs     02/05/23 2153   WBC 5.3   HGB 11.5*   HCT 34.8*         RENAL  Recent Labs     02/05/23 2153      K 4.2   CL 93*   CO2 32   BUN 10   CREATININE 0.6   GLUCOSE 97     Hemoglobin a1c:  Lab Results   Component Value Date    LABA1C 5.9 01/13/2018    LABA1C 5.6 09/26/2017    LABA1C 5.5 06/26/2015       LFT'S:  Recent Labs     02/05/23 2153   AST 41*   ALT 24   BILITOT <0.2   ALKPHOS 92     COAG:  Recent Labs     02/05/23 2153   INR 0.98     CARDIAC ENZYMES:   Recent Labs     02/05/23 2153   TROPONINI <0.01     Lab Results   Component Value Date    PROBNP 201 (H) 02/05/2023    PROBNP 251 (H) 01/06/2022    PROBNP 297 (H) 05/12/2020     LACTIC ACID:  Recent Labs     02/05/23 2153   LACTA 1.0     VBG:  Recent Labs     02/05/23 2153   PHVEN 7.547*   DPP5BIB 40.6   ZYM5DQK 34.5*   PO2VEN 48.3*   [id number] 89        PHYSICIAN CERTIFICATION  I certify that Sheryle Stalls is expected to be hospitalized for 2 midnights based on the following assessment and plan:    ASSESSMENT/PLAN:  Acute on chronic respiratory failure with hypoxia, likely related to COVID PNA, aeCOPD. Supplemental O2 to maintain SPO2 ? 92%, continuous pulse ox. She was on BIPAP at [facility] for some time was able to be weaned off. Currently requiring 4 L O2. Patient normally on 3 L O2 at home. Wean as tolerated. Pulm c/s. Sepsis (temp, RR; lact nml, PCT when she arrives here). Suspect 2/2 COVID. No clear sign of bacterial PNA. Will place on ceftriaxone for now. F/u cx. COVID PNA, Dx on 2/5. Pharmacy to dose IV tociluzimab. IBD, Lx, droplet, Robitussin DM, steroids as below. aeCOPD, IV solumedrol, IV ceftriaxone, PRN/LIZ intensive NEB therapy.   Check respiratory culture and procalcitonin. Hold home regimen for now. Hx NSCLA lung cancer s/p remote RUL lobectomy. Prolonged QTc, 514 ms, avoid QT prolonging agents as able. DVT Prophylaxis: Eliquis  Diet: gen  Code Status: Full Code   PT/OT Eval Status: Will order if needed and as patient condition allows  Dispo - Admit to inpatient     Jeffrey Oden MD    Thank you [facility] for the opportunity to be involved in this patient's care. If you have any questions or concerns please feel free to contact me via the [facility] at [phone]. This chart was generated using the [address] [place] [address] dictation system. I created this record but it may contain dictation errors given the limitations of this technology.

## 2023-02-06 NOTE — PROGRESS NOTES
PM assessment completed. Scheduled medications given per MAR. VSS 4 liter NC, A/O x4. Patient does not appear to be in distress . Patient denies any needs at this time. Call light in reach, will monitor. Patient admitted to room [facility] from [facility]. Patient oriented to room, call light, bed rails, phone, lights and bathroom. Patient instructed about the schedule of the day including: vital sign frequency, lab draws, possible tests, frequency of MD and staff rounds, daily weights, I &O's and prescribed diet. Telemetry box in place, patient aware of placement and reason. Bed locked, in lowest position, side rails up 2/4, call light within reach. Recliner Assessment  Patient is not able to demonstrated the ability to move from a reclining position to an upright position within the recliner. however patient is alert, oriented and able to provide informed consent       4 Eyes Skin Assessment     The patient is being assess for   Admission    I agree that 2 RN's have performed a thorough Head to Toe Skin Assessment on the patient. ALL assessment sites listed below have been assessed. Areas assessed for pressure by both nurses:   [x]   Head, Face, and Ears   [x]   Shoulders, Back, and Chest, Abdomen  [x]   Arms, Elbows, and Hands   [x]   Coccyx, Sacrum, and Ischium  [x]   Legs, Feet, and Heels        Skin Assessed Under all Medical Devices by both nurses:  Heels red and blanchable. Scattered bruising and abrasion               All Mepilex Borders were peeled back and area peeked at by both nurses:  No: na  Please list where Mepilex Borders are located:               **SHARE this note so that the co-signing nurse is able to place an eSignature**    Co-signer eSignature: Electronically signed by Latha Carter RN on 2/8/23 at 7:27 AM EST    Does the Patient have Skin Breakdown related to pressure?   No     (Insert Photo here)         Michelet Prevention initiated:  No  Wound Care Orders initiated:  No      WOC nurse consulted for Pressure Injury (Stage 3,4, Unstageable, DTI, NWPT, Complex wounds)and New or Established Ostomies:  No      Primary Nurse eSignature: Electronically signed by Evelina Oliva RN on 2/6/23 at 2:35 AM EST

## 2023-02-06 NOTE — CONSULTS
Pharmacy Consult for Tocilizumab Initiation per Dr. Carlos Manuel Orr per [facility] OF THE [facility] P&T Committee  **All criteria need to be met to receive   Tocilizumab for COVID-19 patients**   Age    >22 years old   Yes   Ordering Provider    Restricted to ID, intensivists, or pulmonology  Hospitalist may order in ID absence      Yes   Laboratory Results    Confirmed positive COVID-19  CRP >75 mg/L     C-REACTIVE PROTEIN:  No results found for: [facility]  Ordered 2/6     Clinical Status       Within 7 days of symptom onset (< 7 days) OR   Within 2 days of hospital admission OR  Within 24 hours of mechanical ventilation       yes   Concomitant Therapy    Receiving systemic steroids for treatment of COVID 19     yes   Oxygen Status     <92% OR requiring supplemental oxygen    Yes     Contraindications    1. Invasive active mycobacterial or fungal infection  2. Platelet <700,198 or active bleeding  4. Significant immunosuppression    ?     None     Dosing Recommendations:  (One dose maximum)    Dose when compounding from IV vial:  800 mg: Patient weight >90 kg x 1 dose   600 mg: Patient weight >65 kg to ? 90 kg x 1 dose   400 mg: Patient weight >40 kg to ? 65 kg x 1 dose   8 mg/kg: Patient weight ? 40 kg x 1 dose       Thank you for the consult,  Lucretia Lamb, PharmD, [facility], 2/6/2023 6:21 AM

## 2023-02-06 NOTE — CARE COORDINATION
Case Management Assessment  Initial Evaluation    Date/Time of Evaluation: 2/6/2023 2:15 PM  Assessment Completed by: Erika Quiroga RN    If patient is discharged prior to next notation, then this note serves as note for discharge by case management. Patient Name: Alfredito Car                   YOB: 1950  Diagnosis: Acute on chronic respiratory failure with hypoxia ([address] [J96.21]  COVID-19 virus infection [U07.1]                   Date / Time: 2/5/2023  9:34 PM    Patient Admission Status: Inpatient   Readmission Risk (Low < 19, Mod (19-27), High > 27): Readmission Risk Score: 13.8    Current PCP: [facility]  PCP verified by CM? (P) Yes ([facility] PCP - Idris Slater)    Chart Reviewed: Yes      History Provided by: (P) Patient  Patient Orientation: (P) Alert and Oriented, Person, Place, Situation    Patient Cognition: (P) Alert    Hospitalization in the last 30 days (Readmission):  No    If yes, Readmission Assessment in CM Navigator will be completed.     Advance Directives:      Code Status: Full Code   Patient's Primary Decision Maker is: (P) Legal Next of Kin    [facility] - [phone]    Discharge Planning:    Patient lives with: (P) Friends Type of Home: (P) Apartment  Primary Care Giver: (P) Self  Patient Support Systems include: (P) Family Members, Friends/Neighbors   Current Financial resources: (P) Medicaid, Medicare  Current community resources: (P) ECF/Home Care  Current services prior to admission: (P) Oxygen Therapy, Durable Medical Equipment            Current DME: (P) Home Aerosol, Oxygen Therapy (Comment) (Inogen 3.5 liters at baseline, +portability)            Type of Home Care services:  (P) Aide Services, OT, PT, Nursing Services, Housekeeping (has HHA w/ [facility] and SN w/ [facility])    ADLS  Prior functional level: (P) Independent in ADLs/IADLs  Current functional level: (P) Independent in ADLs/IADLs    PT AM-PAC:   /24  OT AM-PAC:   /24    Family can provide assistance at DC: (P) Yes  Would you like Case Management to discuss the discharge plan with any other family members/significant others, and if so, who? (P) Yes (daughters  Valarie and Siobhan Gr)  Plans to Return to Present Housing: (P) Yes  Other Identified Issues/Barriers to RETURNING to current housing: YES  Potential Assistance needed at discharge: (P) N/A            Potential DME:    Patient expects to discharge to: (P) [address] [place] [address] for transportation at discharge:      Financial    Payor: Ludwin Rasmussen / Plan: MEDICARE PART A AND B / Product Type: *No Product type* /     Does insurance require precert for SNF: No    Potential assistance Purchasing Medications: (P) No  Meds-to-Beds request:        [address] [place] [address] [phone] - F [phone]  [place] [id number] [facility] [address]  Phone: [phone] Fax: [phone]    [facility] [place] [address] [phone] - F [phone]  [address] [facility], [address] [address]  Phone: [phone] Fax: [phone]      Notes:    Factors facilitating achievement of predicted outcomes: Family support, Friend support, Motivated, Cooperative, Pleasant, Has needed Durable Medical Equipment at home, and Home is wheelchair accessible    Barriers to discharge: NONE    Additional Case Management Notes: Chart reviewed. Met with pt and explained the role of the CM. Plans to return home upon DC and [facility] only) but can add PT/OT if needed. Has HHA/ through [employer] [facility]). Inogen Home O2 (3.5 liters at baseline. No new DME needs identified.  CM will follow and assist with DCP      The Plan for Transition of Care is related to the following treatment goals of Acute on chronic respiratory failure with hypoxia ([address] [J96.21]  COVID-19 virus infection [E55.9]    IF APPLICABLE: The Patient and/or patient representative [facility] and her family were provided with a choice of provider and agrees with the discharge plan. Freedom of choice list with basic dialogue that supports the patient's individualized plan of care/goals and shares the quality data associated with the providers was provided to:     Patient Representative Name:       The Patient and/or Patient Representative Agree with the Discharge Plan?       Carly Delgado RN  Case Management Department  Ph: [phone]

## 2023-02-07 PROCEDURE — 6370000000 HC RX 637 (ALT 250 FOR IP): Performed by: INTERNAL MEDICINE

## 2023-02-07 PROCEDURE — 2700000000 HC OXYGEN THERAPY PER DAY

## 2023-02-07 PROCEDURE — 94640 AIRWAY INHALATION TREATMENT: CPT

## 2023-02-07 PROCEDURE — 6360000002 HC RX W HCPCS: Performed by: INTERNAL MEDICINE

## 2023-02-07 PROCEDURE — 2060000000 HC ICU INTERMEDIATE R&B

## 2023-02-07 PROCEDURE — 2580000003 HC RX 258: Performed by: INTERNAL MEDICINE

## 2023-02-07 PROCEDURE — 94761 N-INVAS EAR/PLS OXIMETRY MLT: CPT

## 2023-02-07 PROCEDURE — 99233 SBSQ HOSP IP/OBS HIGH 50: CPT | Performed by: INTERNAL MEDICINE

## 2023-02-07 PROCEDURE — 94660 CPAP INITIATION&MGMT: CPT

## 2023-02-07 RX ORDER — GABAPENTIN 400 MG/1
400 CAPSULE ORAL 2 TIMES DAILY
Status: DISCONTINUED | OUTPATIENT
Start: 2023-02-07 | End: 2023-02-15 | Stop reason: HOSPADM

## 2023-02-07 RX ORDER — GUAIFENESIN 600 MG/1
600 TABLET, EXTENDED RELEASE ORAL 2 TIMES DAILY
Status: DISCONTINUED | OUTPATIENT
Start: 2023-02-07 | End: 2023-02-15 | Stop reason: HOSPADM

## 2023-02-07 RX ADMIN — FERROUS SULFATE TAB 325 MG (65 MG ELEMENTAL FE) 325 MG: 325 (65 FE) TAB at 09:08

## 2023-02-07 RX ADMIN — Medication 2 PUFF: at 11:42

## 2023-02-07 RX ADMIN — VENLAFAXINE HYDROCHLORIDE 225 MG: 75 CAPSULE, EXTENDED RELEASE ORAL at 09:07

## 2023-02-07 RX ADMIN — ALPRAZOLAM 0.5 MG: 0.5 TABLET ORAL at 13:26

## 2023-02-07 RX ADMIN — Medication 2 PUFF: at 08:02

## 2023-02-07 RX ADMIN — SODIUM CHLORIDE, PRESERVATIVE FREE 10 ML: 5 INJECTION INTRAVENOUS at 09:09

## 2023-02-07 RX ADMIN — QUETIAPINE FUMARATE 400 MG: 200 TABLET ORAL at 22:15

## 2023-02-07 RX ADMIN — GABAPENTIN 400 MG: 400 CAPSULE ORAL at 20:16

## 2023-02-07 RX ADMIN — Medication 2 PUFF: at 20:33

## 2023-02-07 RX ADMIN — MULTIPLE VITAMINS W/ MINERALS TAB 1 TABLET: TAB at 09:08

## 2023-02-07 RX ADMIN — ROPINIROLE HYDROCHLORIDE 4 MG: 1 TABLET, FILM COATED ORAL at 20:16

## 2023-02-07 RX ADMIN — PANTOPRAZOLE SODIUM 40 MG: 40 TABLET, DELAYED RELEASE ORAL at 06:26

## 2023-02-07 RX ADMIN — Medication 2 PUFF: at 15:54

## 2023-02-07 RX ADMIN — METHYLPREDNISOLONE SODIUM SUCCINATE 40 MG: 40 INJECTION, POWDER, FOR SOLUTION INTRAMUSCULAR; INTRAVENOUS at 09:08

## 2023-02-07 RX ADMIN — GABAPENTIN 800 MG: 400 CAPSULE ORAL at 09:08

## 2023-02-07 RX ADMIN — AZITHROMYCIN MONOHYDRATE 250 MG: 250 TABLET ORAL at 09:07

## 2023-02-07 RX ADMIN — APIXABAN 5 MG: 5 TABLET, FILM COATED ORAL at 09:08

## 2023-02-07 RX ADMIN — SODIUM CHLORIDE, PRESERVATIVE FREE 10 ML: 5 INJECTION INTRAVENOUS at 22:29

## 2023-02-07 RX ADMIN — ALPRAZOLAM 0.5 MG: 0.5 TABLET ORAL at 22:16

## 2023-02-07 RX ADMIN — APIXABAN 5 MG: 5 TABLET, FILM COATED ORAL at 20:16

## 2023-02-07 RX ADMIN — METHYLPREDNISOLONE SODIUM SUCCINATE 40 MG: 40 INJECTION, POWDER, FOR SOLUTION INTRAMUSCULAR; INTRAVENOUS at 20:15

## 2023-02-07 RX ADMIN — GUAIFENESIN 600 MG: 600 TABLET, EXTENDED RELEASE ORAL at 13:25

## 2023-02-07 RX ADMIN — HYDROCODONE BITARTRATE AND ACETAMINOPHEN 1 TABLET: 7.5; 325 TABLET ORAL at 13:25

## 2023-02-07 RX ADMIN — DILTIAZEM HYDROCHLORIDE 120 MG: 120 CAPSULE, EXTENDED RELEASE ORAL at 09:09

## 2023-02-07 RX ADMIN — GUAIFENESIN 600 MG: 600 TABLET, EXTENDED RELEASE ORAL at 20:15

## 2023-02-07 RX ADMIN — TORSEMIDE 20 MG: 20 TABLET ORAL at 09:07

## 2023-02-07 RX ADMIN — PRAVASTATIN SODIUM 10 MG: 10 TABLET ORAL at 17:00

## 2023-02-07 RX ADMIN — HYDROCODONE BITARTRATE AND ACETAMINOPHEN 1 TABLET: 7.5; 325 TABLET ORAL at 22:15

## 2023-02-07 ASSESSMENT — PAIN DESCRIPTION - PAIN TYPE: TYPE: CHRONIC PAIN

## 2023-02-07 ASSESSMENT — PAIN DESCRIPTION - ORIENTATION
ORIENTATION: RIGHT;LEFT;MID
ORIENTATION: LOWER

## 2023-02-07 ASSESSMENT — PAIN SCALES - GENERAL
PAINLEVEL_OUTOF10: 8
PAINLEVEL_OUTOF10: 7
PAINLEVEL_OUTOF10: 4

## 2023-02-07 ASSESSMENT — PAIN DESCRIPTION - DESCRIPTORS
DESCRIPTORS: ACHING;DISCOMFORT;SQUEEZING
DESCRIPTORS: STABBING;BURNING

## 2023-02-07 ASSESSMENT — PAIN DESCRIPTION - LOCATION
LOCATION: BACK;HEAD
LOCATION: BACK;HEAD

## 2023-02-07 ASSESSMENT — PAIN SCALES - WONG BAKER: WONGBAKER_NUMERICALRESPONSE: 2

## 2023-02-07 NOTE — PROGRESS NOTES
IM Progress Note    Admit Date:  2/5/2023      Patient admitted with COVID-19 infection and acute on chronic hypoxic respiratory failure. She is normally on oxygen 2 L. Currently requiring O2 3.5 to 4 L    Subjective:  Ms. Jose Stone is sitting up on the bed. she does not feel well. Complains of persistent shortness of breath and wheezing. She feels very weak and wobbly. Per RN she was very lethargic, gabapentin dose held . Patient is now awake alert and oriented and sitting up on the side of the bed. Objective:   /70   Pulse 90   Temp 97.7 °F (36.5 °C) (Oral)   Resp 20   Wt 229 lb 1.6 oz (103.9 kg)   SpO2 95%   BMI 36.98 kg/m²     Intake/Output Summary (Last 24 hours) at 2/7/2023 1249  Last data filed at 2/7/2023 1031  Gross per 24 hour   Intake 1325 ml   Output 750 ml   Net 575 ml         Physical Exam:  GEN:        A&Ox3, appears ill. Increased WOB. Dyspneic in mild distress sitting up on the side of the bed  HEENT:   NC/AT,EOMI, MMM, no erythema/exudates or visible masses. CVS:        Normal S1,S2. RRR. Without M/G/R.   LUNG:     Diminished bilat. Tachypnea. Bilateral diffuse wheezes present  ABD:        Soft, ND/NT, BS+ x4. Without G/R.  EXT:        2+ pulses, no c/c. BLE trace edema. Brisk cap refill. PSY:        Thought process intact, affect appropriate. RAMIRO:        CN III-XII grossly intact. Moves all 4 spontaneously. Sensory grossly intact. SKIN:       No rash or lesions on visible skin.          Medications:   Scheduled Meds:   gabapentin  400 mg Oral BID    apixaban  5 mg Oral BID    dilTIAZem  120 mg Oral Daily    ferrous sulfate  325 mg Oral Daily with breakfast    pantoprazole  40 mg Oral QAM AC    therapeutic multivitamin-minerals  1 tablet Oral Daily    pravastatin  10 mg Oral QPM    QUEtiapine  400 mg Oral Nightly    rOPINIRole  4 mg Oral Nightly    torsemide  20 mg Oral Daily    sodium chloride flush  5-40 mL IntraVENous 2 times per day    methylPREDNISolone  40 mg IntraVENous Q12H    Followed by    [facility] ON 2/8/2023] predniSONE  40 mg Oral Daily    albuterol sulfate HFA  2 puff Inhalation 4x daily    And    ipratropium  2 puff Inhalation 4x daily    venlafaxine  225 mg Oral Daily    azithromycin  250 mg Oral Daily       Continuous Infusions:   sodium chloride         Data:  CBC:   Recent Labs     02/05/23 2153 02/06/23  0417   WBC 5.3 5.2   RBC 3.81* 3.63*   HGB 11.5* 11.3*   HCT 34.8* 34.7*   MCV 91.4 95.6   RDW 13.7 14.0    267     BMP:   Recent Labs     02/05/23 2153 02/06/23  0417    134*   K 4.2 3.6   CL 93* 95*   CO2 32 26   BUN 10 10   CREATININE 0.6 0.6     BNP: No results for input(s): BNP in the last 72 hours. PT/INR:   Recent Labs     02/05/23 2153   PROTIME 12.9   INR 0.98     APTT:   Recent Labs     02/05/23 2153   APTT 27.2     CARDIAC ENZYMES:   Recent Labs     02/05/23 2153   TROPONINI <0.01     FASTING LIPID PANEL:  Lab Results   Component Value Date    CHOL 180 11/02/2015    HDL 64 (H) 02/04/2021    TRIG 201 (H) 02/10/2018     LIVER PROFILE:   Recent Labs     02/05/23 2153 02/06/23  0417   AST 41* 19   ALT 24 19   BILITOT <0.2 <0.2   ALKPHOS 92 90          Cultures  COVID-19 detected  Rapid influenza A and B neg  Blood cultures no growth to date    Radiology  XR CHEST PORTABLE   Final Result   Chronic airspace changes have slightly progressed in the right lung suspected   to represent pleural thickening and pleural fluid along with edema versus   atelectasis. Lesser pattern of interstitial change slightly increased on the   left. A superimposed viral pattern of pneumonitis may be considered   clinically. Assessment:  Principal Problem:    Acute on chronic respiratory failure with hypoxia (HCC)  Resolved Problems:    * No resolved hospital problems. *      Plan:    # Acute on chronic respiratory failure with hypoxia  -  related to COVID PNA, aeCOPD. - Supplemental O2 to maintain SPO2 ? 92%, continuous pulse ox.    She was on BIPAP at [facility] for some time- > was able to be weaned off. Currently requiring 4 L O2. Patient normally on 2 L O2 at home. Wean as tolerated. Pulm c/s. # Sepsis   - POA (temp, RR;) Suspect 2/2 COVID. No clear sign of bacterial PNA. F/u cx. # COVID PNA  - Dx on 2/5.  - IV tociluzimab given 2/6  - pulm cx  - IBD, Lx, droplet, Robitussin DM, steroids as below. Tocilizumab given yesterday2/6 . Continue steroids and inhaled bronchodilators    # aeCOPD  -IV solumedrol, IV ceftriaxone, PRN/LIZ intensive NEB therapy. Check respiratory culture     # Hx NSCLA lung cancer s/p remote RUL lobectomy. # Prolonged QTc, 514 ms, avoid QT prolonging agents as able. # Home medications reviewed and resumed    # Patient lethargic per nursing staff earlier today,  gabapentin dose held. I will decrease her dose from 800 mg 4 times daily to 400 mg twice daily and monitor closely . she is awake alert and oriented now and her mental status is clear. DVT Prophylaxis: Eliquis  Diet: gen  Code Status: Full Code   PT/OT Eval Status:  Will order if needed and as patient condition allows  Dispo - Admit to inpatient      Vick Jimenez MD   2/7/2023 12:49 PM

## 2023-02-07 NOTE — PROGRESS NOTES
Shift assessment complete. VSS. Patient reported back and leg pain rated 8/10. Patient also got up to the bsc and then repositioned herself in bed. Call light within reach. Bed in lowest position. No additional needs at this time.  Dore Dandy, RN

## 2023-02-07 NOTE — PROGRESS NOTES
Bedside report and transfer of care given to Tawanda Nieto, [address] [place] [address]. Pt currently resting in bed with the call light within reach. Pt denies any other care needs at this time. Pt stable at this time.

## 2023-02-07 NOTE — PROGRESS NOTES
02/07/23 0015   NIV Type   $NIV $Daily Charge   NIV Started/Stopped (S)  On   Equipment Type v60   Mode Bilevel   Mask Type Full face mask   Mask Size Medium   Settings/Measurements   IPAP 16 cmH20   CPAP/EPAP 8 cmH2O   Vt (Measured) 488 mL   Rate Ordered 18   Resp 18   FiO2  45 %   I Time/ I Time % 0.9 s   Minute Volume (L/min) 8.1 Liters   Mask Leak (lpm) 0 lpm   Comfort Level Good   Using Accessory Muscles No   SpO2 98   Patient's Home Machine No   Alarm Settings   Alarms On Y   Low Pressure (cmH2O) 8 cmH2O   High Pressure (cmH2O) 30 cmH2O   Delay Alarm 20 sec(s)   RR Low (bpm) 18   RR High (bpm) 40 br/min

## 2023-02-07 NOTE — PROGRESS NOTES
Patient resting in bed eating breakfast. patient is pink, warm, and dry. Respirations E/E on 4l/m/nc. Iv site unremarkable. No S/S of acute distress noted. Head to toe completed at this time. Medication given at this time. Call light in easy reach, patient reminded to use call light for needs and assistance. Bed locked and in lowest position side rails up x2.

## 2023-02-07 NOTE — PROGRESS NOTES
Pulmonary Progress Note  CC: COVID, COPD    Subjective:  Still c/o fatigue and SOB  Used Bipap overnight and some during the day as well    EXAM: /70   Pulse 85   Temp 97.7 °F (36.5 °C) (Oral)   Resp 22   Wt 229 lb 1.6 oz (103.9 kg)   SpO2 92%   BMI 36.98 kg/m²  on 4L  Constitutional:  No acute distress. Eyes: PERRL. Conjunctivae anicteric. ENT: Normal nose. Normal tongue. Neck:  Trachea is midline. No thyroid tenderness. Respiratory: No accessory muscle usage. decreased breath sounds. No wheezes. + rales. No Rhonchi. Cardiovascular: Normal S1S2. No digit clubbing. No digit cyanosis. No LE edema. Psychiatric: No anxiety or Agitation. Alert and Oriented to person, place and time.     Scheduled Meds:   apixaban  5 mg Oral BID    dilTIAZem  120 mg Oral Daily    ferrous sulfate  325 mg Oral Daily with breakfast    pantoprazole  40 mg Oral QAM AC    therapeutic multivitamin-minerals  1 tablet Oral Daily    pravastatin  10 mg Oral QPM    QUEtiapine  400 mg Oral Nightly    rOPINIRole  4 mg Oral Nightly    torsemide  20 mg Oral Daily    sodium chloride flush  5-40 mL IntraVENous 2 times per day    methylPREDNISolone  40 mg IntraVENous Q12H    Followed by    Reji Green ON 2/8/2023] predniSONE  40 mg Oral Daily    albuterol sulfate HFA  2 puff Inhalation 4x daily    And    ipratropium  2 puff Inhalation 4x daily    gabapentin  800 mg Oral 4x Daily    venlafaxine  225 mg Oral Daily    azithromycin  250 mg Oral Daily     Continuous Infusions:   sodium chloride       PRN Meds:  ALPRAZolam, HYDROcodone-acetaminophen, guaiFENesin-dextromethorphan, sodium chloride flush, sodium chloride, polyethylene glycol, acetaminophen **OR** acetaminophen, prochlorperazine, albuterol sulfate HFA, magnesium sulfate    Labs:  CBC:   Recent Labs     02/05/23 2153 02/06/23 0417   WBC 5.3 5.2   HGB 11.5* 11.3*   HCT 34.8* 34.7*   MCV 91.4 95.6    267     BMP:   Recent Labs     02/05/23 2153 02/06/23 0417    134* K 4.2 3.6   CL 93* 95*   CO2 32 26   BUN 10 10   CREATININE 0.6 0.6     Chest imaging was reviewed by me and showed 2/5/23 CXR  Chronic airspace changes have slightly progressed in the right lung suspected   to represent pleural thickening and pleural fluid along with edema versus   atelectasis. Lesser pattern of interstitial change slightly increased on the   left. A superimposed viral pattern of pneumonitis may be considered   clinically. ASSESSMENT:  Acute on chronic hypoxic respiratory failure   COPD exacerbation  COVID 19 disease  MEG on home Bipap     PLAN:  Droplet Plus Airborne Precautions   Bipap PRN and QHS - pt told she can use home unit if available  Supplemental oxygen to maintain SaO2 >92%; wean as tolerated  Intensive inhaled bronchodilator therapy. IV solumedrol 40 mg IV Q12 hrs. Plan to switch to oral prednisone taper when improved. Diane  Sputum GS&C.

## 2023-02-07 NOTE — PROGRESS NOTES
02/07/23 0330   NIV Type   NIV Started/Stopped On   Equipment Type v60   Mode Bilevel   Mask Type Full face mask   Mask Size Medium   Settings/Measurements   IPAP 16 cmH20   CPAP/EPAP 8 cmH2O   Vt (Measured) 457 mL   Rate Ordered 18   Resp 18   FiO2  45 %   I Time/ I Time % 0.9 s   Minute Volume (L/min) 7.4 Liters   Mask Leak (lpm) 15 lpm   Comfort Level Good   Using Accessory Muscles No   SpO2 98   Patient's Home Machine No

## 2023-02-07 NOTE — PROGRESS NOTES
RT Inhaler-Nebulizer Bronchodilator Protocol Note    There is a bronchodilator order in the chart from a provider indicating to follow the RT Bronchodilator Protocol and there is an Initiate RT Inhaler-Nebulizer Bronchodilator Protocol order as well (see protocol at bottom of note). CXR Findings:  XR CHEST PORTABLE    Result Date: 2/5/2023  Chronic airspace changes have slightly progressed in the right lung suspected to represent pleural thickening and pleural fluid along with edema versus atelectasis. Lesser pattern of interstitial change slightly increased on the left. A superimposed viral pattern of pneumonitis may be considered clinically. The findings from the last RT Protocol Assessment were as follows:   History Pulmonary Disease: Chronic pulmonary disease  Respiratory Pattern: Dyspnea on exertion or RR 21-25 bpm  Breath Sounds: Slightly diminished and/or crackles  Cough: Strong, spontaneous, non-productive  Indication for Bronchodilator Therapy: Decreased or absent breath sounds  Bronchodilator Assessment Score: 6    Aerosolized bronchodilator medication orders have been revised according to the RT Inhaler-Nebulizer Bronchodilator Protocol below. Respiratory Therapist to perform RT Therapy Protocol Assessment initially then follow the protocol. Repeat RT Therapy Protocol Assessment PRN for score 0-3 or on second treatment, BID, and PRN for scores above 3. No Indications - adjust the frequency to every 6 hours PRN wheezing or bronchospasm, if no treatments needed after 48 hours then discontinue using Per Protocol order mode. If indication present, adjust the RT bronchodilator orders based on the Bronchodilator Assessment Score as indicated below.   Use Inhaler orders unless patient has one or more of the following: on home nebulizer, not able to hold breath for 10 seconds, is not alert and oriented, cannot activate and use MDI correctly, or respiratory rate 25 breaths per minute or more, then use the equivalent nebulizer order(s) with same Frequency and PRN reasons based on the score. If a patient is on this medication at home then do not decrease Frequency below that used at home. 0-3 - enter or revise RT bronchodilator order(s) to equivalent RT Bronchodilator order with Frequency of every 4 hours PRN for wheezing or increased work of breathing using Per Protocol order mode. 4-6 - enter or revise RT Bronchodilator order(s) to two equivalent RT bronchodilator orders with one order with BID Frequency and one order with Frequency of every 4 hours PRN wheezing or increased work of breathing using Per Protocol order mode. 7-10 - enter or revise RT Bronchodilator order(s) to two equivalent RT bronchodilator orders with one order with TID Frequency and one order with Frequency of every 4 hours PRN wheezing or increased work of breathing using Per Protocol order mode. 11-13 - enter or revise RT Bronchodilator order(s) to one equivalent RT bronchodilator order with QID Frequency and an Albuterol order with Frequency of every 4 hours PRN wheezing or increased work of breathing using Per Protocol order mode. Greater than 13 - enter or revise RT Bronchodilator order(s) to one equivalent RT bronchodilator order with every 4 hours Frequency and an Albuterol order with Frequency of every 2 hours PRN wheezing or increased work of breathing using Per Protocol order mode.          Electronically signed by Madeline Gardner RCP on 2/6/2023 at 7:49 PM

## 2023-02-07 NOTE — FLOWSHEET NOTE
02/07/23 1315   Vital Signs   Temp 98.7 °F (37.1 °C)   Temp Source Oral   Heart Rate 89   Heart Rate Source Monitor   Resp 20   BP (!) 168/89   MAP (Calculated) 115   BP Method Automatic   Pain Assessment   Pain Assessment 0-10   Pain Level 8   Patient's Stated Pain Goal 3   Pain Location Back;Head   Pain Orientation Lower   Pain Descriptors Stabbing;Burning   Functional Pain Assessment Prevents or interferes some active activities and ADLs   Pain Type Chronic pain   Non-Pharmaceutical Pain Intervention(s) Repositioned   Oxygen Therapy   SpO2 93 %   O2 Device Nasal cannula   O2 Flow Rate (L/min) 4 L/min   Patient resting in bed watching tv. Vitals and pain medication given at this time. Patient adjusted in bed. No S/S of acute distress noted. Call light in easy reach patient reminded to use call light for needs and assistance. Bed locked and in lowest position side rails up x2.

## 2023-02-07 NOTE — PROGRESS NOTES
RT Inhaler-Nebulizer Bronchodilator Protocol Note    There is a bronchodilator order in the chart from a provider indicating to follow the RT Bronchodilator Protocol and there is an Initiate RT Inhaler-Nebulizer Bronchodilator Protocol order as well (see protocol at bottom of note). CXR Findings:  XR CHEST PORTABLE    Result Date: 2/5/2023  Chronic airspace changes have slightly progressed in the right lung suspected to represent pleural thickening and pleural fluid along with edema versus atelectasis. Lesser pattern of interstitial change slightly increased on the left. A superimposed viral pattern of pneumonitis may be considered clinically. The findings from the last RT Protocol Assessment were as follows:   History Pulmonary Disease: (P) Chronic pulmonary disease  Respiratory Pattern: (P) Dyspnea on exertion or RR 21-25 bpm  Breath Sounds: (P) Slightly diminished and/or crackles  Cough: (P) Strong, spontaneous, non-productive  Indication for Bronchodilator Therapy: (P) Decreased or absent breath sounds  Bronchodilator Assessment Score: (P) 6    Aerosolized bronchodilator medication orders have been revised according to the RT Inhaler-Nebulizer Bronchodilator Protocol below. Respiratory Therapist to perform RT Therapy Protocol Assessment initially then follow the protocol. Repeat RT Therapy Protocol Assessment PRN for score 0-3 or on second treatment, BID, and PRN for scores above 3. No Indications - adjust the frequency to every 6 hours PRN wheezing or bronchospasm, if no treatments needed after 48 hours then discontinue using Per Protocol order mode. If indication present, adjust the RT bronchodilator orders based on the Bronchodilator Assessment Score as indicated below.   Use Inhaler orders unless patient has one or more of the following: on home nebulizer, not able to hold breath for 10 seconds, is not alert and oriented, cannot activate and use MDI correctly, or respiratory rate 25 breaths per minute or more, then use the equivalent nebulizer order(s) with same Frequency and PRN reasons based on the score. If a patient is on this medication at home then do not decrease Frequency below that used at home. 0-3 - enter or revise RT bronchodilator order(s) to equivalent RT Bronchodilator order with Frequency of every 4 hours PRN for wheezing or increased work of breathing using Per Protocol order mode. 4-6 - enter or revise RT Bronchodilator order(s) to two equivalent RT bronchodilator orders with one order with BID Frequency and one order with Frequency of every 4 hours PRN wheezing or increased work of breathing using Per Protocol order mode. 7-10 - enter or revise RT Bronchodilator order(s) to two equivalent RT bronchodilator orders with one order with TID Frequency and one order with Frequency of every 4 hours PRN wheezing or increased work of breathing using Per Protocol order mode. 11-13 - enter or revise RT Bronchodilator order(s) to one equivalent RT bronchodilator order with QID Frequency and an Albuterol order with Frequency of every 4 hours PRN wheezing or increased work of breathing using Per Protocol order mode. Greater than 13 - enter or revise RT Bronchodilator order(s) to one equivalent RT bronchodilator order with every 4 hours Frequency and an Albuterol order with Frequency of every 2 hours PRN wheezing or increased work of breathing using Per Protocol order mode. RT to enter RT Home Evaluation for COPD & MDI Assessment order using Per Protocol order mode.     Electronically signed by Ortiz Kelly RCP on 2/7/2023 at 8:05 AM

## 2023-02-07 NOTE — PROGRESS NOTES
Patient placed on BIPAP for nap at this time.      02/07/23 1504   NIV Type   Skin Assessment Clean, dry, & intact   Suction Setup and Functional Yes   NIV Started/Stopped On   Equipment Type V60   Mode Bilevel   Mask Type Full face mask   Mask Size Medium   Settings/Measurements   PIP Observed 19 cm H20   IPAP 18 cmH20   CPAP/EPAP 8 cmH2O   Vt (Measured) 366 mL   Rate Ordered 18   Resp 19   FiO2  45 %   I Time/ I Time % 0.9 s   Minute Volume (L/min) 7.5 Liters   Mask Leak (lpm) 16 lpm   Comfort Level Good   Using Accessory Muscles No

## 2023-02-08 PROCEDURE — 94761 N-INVAS EAR/PLS OXIMETRY MLT: CPT

## 2023-02-08 PROCEDURE — 6370000000 HC RX 637 (ALT 250 FOR IP): Performed by: INTERNAL MEDICINE

## 2023-02-08 PROCEDURE — 2700000000 HC OXYGEN THERAPY PER DAY

## 2023-02-08 PROCEDURE — 2060000000 HC ICU INTERMEDIATE R&B

## 2023-02-08 PROCEDURE — 94660 CPAP INITIATION&MGMT: CPT

## 2023-02-08 PROCEDURE — 94640 AIRWAY INHALATION TREATMENT: CPT

## 2023-02-08 PROCEDURE — 2580000003 HC RX 258: Performed by: INTERNAL MEDICINE

## 2023-02-08 PROCEDURE — 99233 SBSQ HOSP IP/OBS HIGH 50: CPT | Performed by: INTERNAL MEDICINE

## 2023-02-08 RX ADMIN — ROPINIROLE HYDROCHLORIDE 4 MG: 1 TABLET, FILM COATED ORAL at 20:03

## 2023-02-08 RX ADMIN — PRAVASTATIN SODIUM 10 MG: 10 TABLET ORAL at 17:35

## 2023-02-08 RX ADMIN — QUETIAPINE FUMARATE 400 MG: 200 TABLET ORAL at 20:03

## 2023-02-08 RX ADMIN — HYDROCODONE BITARTRATE AND ACETAMINOPHEN 1 TABLET: 7.5; 325 TABLET ORAL at 15:32

## 2023-02-08 RX ADMIN — PREDNISONE 40 MG: 20 TABLET ORAL at 08:48

## 2023-02-08 RX ADMIN — AZITHROMYCIN MONOHYDRATE 250 MG: 250 TABLET ORAL at 08:48

## 2023-02-08 RX ADMIN — GUAIFENESIN AND DEXTROMETHORPHAN 5 ML: 100; 10 SYRUP ORAL at 08:49

## 2023-02-08 RX ADMIN — Medication 2 PUFF: at 20:29

## 2023-02-08 RX ADMIN — TORSEMIDE 20 MG: 20 TABLET ORAL at 08:49

## 2023-02-08 RX ADMIN — GABAPENTIN 400 MG: 400 CAPSULE ORAL at 20:07

## 2023-02-08 RX ADMIN — GABAPENTIN 400 MG: 400 CAPSULE ORAL at 08:48

## 2023-02-08 RX ADMIN — DILTIAZEM HYDROCHLORIDE 120 MG: 120 CAPSULE, EXTENDED RELEASE ORAL at 08:49

## 2023-02-08 RX ADMIN — APIXABAN 5 MG: 5 TABLET, FILM COATED ORAL at 20:03

## 2023-02-08 RX ADMIN — Medication 2 PUFF: at 11:29

## 2023-02-08 RX ADMIN — Medication 2 PUFF: at 15:06

## 2023-02-08 RX ADMIN — SODIUM CHLORIDE, PRESERVATIVE FREE 10 ML: 5 INJECTION INTRAVENOUS at 20:05

## 2023-02-08 RX ADMIN — APIXABAN 5 MG: 5 TABLET, FILM COATED ORAL at 08:49

## 2023-02-08 RX ADMIN — GUAIFENESIN 600 MG: 600 TABLET, EXTENDED RELEASE ORAL at 20:03

## 2023-02-08 RX ADMIN — GUAIFENESIN 600 MG: 600 TABLET, EXTENDED RELEASE ORAL at 08:48

## 2023-02-08 RX ADMIN — MULTIPLE VITAMINS W/ MINERALS TAB 1 TABLET: TAB at 08:48

## 2023-02-08 RX ADMIN — SODIUM CHLORIDE, PRESERVATIVE FREE 10 ML: 5 INJECTION INTRAVENOUS at 08:50

## 2023-02-08 RX ADMIN — ALPRAZOLAM 0.5 MG: 0.5 TABLET ORAL at 15:30

## 2023-02-08 RX ADMIN — Medication 2 PUFF: at 08:00

## 2023-02-08 RX ADMIN — VENLAFAXINE HYDROCHLORIDE 225 MG: 75 CAPSULE, EXTENDED RELEASE ORAL at 08:48

## 2023-02-08 RX ADMIN — FERROUS SULFATE TAB 325 MG (65 MG ELEMENTAL FE) 325 MG: 325 (65 FE) TAB at 08:49

## 2023-02-08 RX ADMIN — PANTOPRAZOLE SODIUM 40 MG: 40 TABLET, DELAYED RELEASE ORAL at 05:40

## 2023-02-08 ASSESSMENT — PAIN SCALES - GENERAL
PAINLEVEL_OUTOF10: 8
PAINLEVEL_OUTOF10: 2
PAINLEVEL_OUTOF10: 8

## 2023-02-08 ASSESSMENT — PAIN DESCRIPTION - LOCATION
LOCATION: BACK
LOCATION: BACK

## 2023-02-08 ASSESSMENT — PAIN - FUNCTIONAL ASSESSMENT
PAIN_FUNCTIONAL_ASSESSMENT: PREVENTS OR INTERFERES SOME ACTIVE ACTIVITIES AND ADLS
PAIN_FUNCTIONAL_ASSESSMENT: ACTIVITIES ARE NOT PREVENTED

## 2023-02-08 ASSESSMENT — PAIN DESCRIPTION - PAIN TYPE: TYPE: CHRONIC PAIN

## 2023-02-08 ASSESSMENT — PAIN DESCRIPTION - DESCRIPTORS
DESCRIPTORS: STABBING
DESCRIPTORS: SQUEEZING

## 2023-02-08 ASSESSMENT — PAIN DESCRIPTION - ORIENTATION
ORIENTATION: LOWER
ORIENTATION: LOWER

## 2023-02-08 ASSESSMENT — PAIN SCALES - WONG BAKER: WONGBAKER_NUMERICALRESPONSE: 2

## 2023-02-08 NOTE — PROGRESS NOTES
Bedside report and transfer of care given to Pasha Vega [facility]. Pt currently resting in bed with the call light within reach. Pt denies any other care needs at this time. Pt stable at this time.

## 2023-02-08 NOTE — FLOWSHEET NOTE
02/07/23 2005   Vital Signs   Temp 98.8 °F (37.1 °C)   Temp Source Oral   Heart Rate 88   Heart Rate Source Monitor   Resp 22   BP (!) 169/81   MAP (Calculated) 110   BP Location Left upper arm   BP Method Automatic   Patient Position High fowlers   Level of Consciousness 0   MEWS Score 2   Oxygen Therapy   SpO2 95 %   O2 Device Nasal cannula   O2 Flow Rate (L/min) 4 L/min   Shift assessment completed. Patient awake in bed. A/Ox4. See flowsheet for vitals. BP elevated, will continue to monitor. Scheduled PM medications given. Patient denies any further needs at this time. Call light and bedside table within reach.

## 2023-02-08 NOTE — PROGRESS NOTES
Writer called to room by tele sitter for desaturation in oxygen. Patient was sitting on the side of the bed eating breakfast. Patientis a/o x4. Patient was gray in color and short of breath. Patient was also sitting on her oxygen cord. Patient helped back into bed and pulled up in bed oxygen sensor was placed on patient o2 saturation was at 78% on 3.5l/m/nc. oxygen increased to 15 l/m/nc to help increase oxygen saturation. Heart rate was 113. Oxygen went up to 94% on 15l/m/nc. Patient color went back to pink. Turned oxygen down to 9l/m/nc. Oxygen currently 91% on 9l/m/nc. Heart rate down to 89. Respiratory contacted and up dated. Patient currently sitting in bed eating breakfast stable at 92% on 9l/m/nc.

## 2023-02-08 NOTE — PROGRESS NOTES
Progress Note    Admit Date:  2/5/2023      Patient admitted with COVID-19 infection and acute on chronic hypoxic respiratory failure. She is normally on oxygen 2 L. Currently requiring O2 3.5 to 4 L    Subjective:  2/7  Ms. Burke Urbano is sitting up on the bed. she does not feel well. Complains of persistent shortness of breath and wheezing. She feels very weak and wobbly. Per RN she was very lethargic, gabapentin dose held . Patient is now awake alert and oriented and sitting up on the side of the bed.    - >2/8   Worsening hypoxemia overnight patient started desaturating on 3.5 to 4 L->  O2 requirement increased to 9 L this morning. .  Currently weaned down to 7 L .  patient still very dyspneic and anxious and wheezy. Objective:   BP (!) 174/69   Pulse 87   Temp 98.4 °F (36.9 °C) (Axillary)   Resp 18   Wt 229 lb 1.6 oz (103.9 kg)   SpO2 94%   BMI 36.98 kg/m²     Intake/Output Summary (Last 24 hours) at 2/8/2023 1550  Last data filed at 2/8/2023 0849  Gross per 24 hour   Intake 365 ml   Output 300 ml   Net 65 ml           Physical Exam:  GEN:        A&Ox3, appears ill. Increased WOB. Dyspneic in mild distress. sitting up on the side of the bed  HEENT:   NC/AT,EOMI, MMM, no erythema/exudates or visible masses. CVS:        Normal S1,S2. RRR. Without M/G/R.   LUNG:     Diminished bilat. Tachypnea. Bilateral diffuse wheezes present  ABD:        Soft, ND/NT, BS+ x4. Without G/R.  EXT:        2+ pulses, no c/c. BLE trace edema. Brisk cap refill. PSY:        Thought process intact, affect appropriate. RAMIRO:        CN III-XII grossly intact. Moves all 4 spontaneously. Sensory grossly intact. SKIN:       No rash or lesions on visible skin.          Medications:   Scheduled Meds:   gabapentin  400 mg Oral BID    guaiFENesin  600 mg Oral BID    apixaban  5 mg Oral BID    dilTIAZem  120 mg Oral Daily    ferrous sulfate  325 mg Oral Daily with breakfast    pantoprazole  40 mg Oral QAM AC therapeutic multivitamin-minerals  1 tablet Oral Daily    pravastatin  10 mg Oral QPM    QUEtiapine  400 mg Oral Nightly    rOPINIRole  4 mg Oral Nightly    torsemide  20 mg Oral Daily    sodium chloride flush  5-40 mL IntraVENous 2 times per day    predniSONE  40 mg Oral Daily    albuterol sulfate HFA  2 puff Inhalation 4x daily    And    ipratropium  2 puff Inhalation 4x daily    venlafaxine  225 mg Oral Daily    azithromycin  250 mg Oral Daily       Continuous Infusions:   sodium chloride         Data:  CBC:   Recent Labs     02/05/23 2153 02/06/23 0417   WBC 5.3 5.2   RBC 3.81* 3.63*   HGB 11.5* 11.3*   HCT 34.8* 34.7*   MCV 91.4 95.6   RDW 13.7 14.0    267       BMP:   Recent Labs     02/05/23 2153 02/06/23 0417    134*   K 4.2 3.6   CL 93* 95*   CO2 32 26   BUN 10 10   CREATININE 0.6 0.6       BNP: No results for input(s): BNP in the last 72 hours. PT/INR:   Recent Labs     02/05/23 2153   PROTIME 12.9   INR 0.98       APTT:   Recent Labs     02/05/23 2153   APTT 27.2       CARDIAC ENZYMES:   Recent Labs     02/05/23 2153   TROPONINI <0.01       FASTING LIPID PANEL:  Lab Results   Component Value Date    CHOL 180 11/02/2015    HDL 64 (H) 02/04/2021    TRIG 201 (H) 02/10/2018     LIVER PROFILE:   Recent Labs     02/05/23 2153 02/06/23 0417   AST 41* 19   ALT 24 19   BILITOT <0.2 <0.2   ALKPHOS 92 90            Cultures  COVID-19 detected  Rapid influenza A and B neg  Blood cultures no growth to date    Radiology  XR CHEST PORTABLE   Final Result   Chronic airspace changes have slightly progressed in the right lung suspected   to represent pleural thickening and pleural fluid along with edema versus   atelectasis. Lesser pattern of interstitial change slightly increased on the   left. A superimposed viral pattern of pneumonitis may be considered   clinically.                Assessment:  Principal Problem:    Acute on chronic respiratory failure with hypoxia (HCC)  Resolved Problems:    * No resolved hospital problems. *      Plan:    # Acute on chronic respiratory failure with hypoxia  -  related to COVID PNA, aeCOPD. - Supplemental O2 to maintain SPO2 ? 92%, continuous pulse ox. -Hypoxemia is worsening. was  requiring 4 L O2-> O2 per minute up to 9 L this morning, currently on 7 L. Patient normally on 2 L O2 at home. Wean as tolerated. -Patient uses BiPAP at home - continue.  -  Pulm c/s. Currently on supplemental oxygen 7 L. # Sepsis   - POA (temp, RR;) Suspect 2/2 COVID. No clear sign of bacterial PNA. F/u cx. # COVID PNA  - Dx on 2/5.  - IV tociluzimab given 2/6  - pulm cx  - IBD, Lx, droplet, Robitussin DM, steroids as below. Continue steroids and inhaled bronchodilators    # aeCOPD  -IV solumedrol, IV ceftriaxone, PRN/LIZ intensive NEB therapy. Check respiratory culture     # Hx NSCLA lung cancer s/p remote RUL lobectomy. # Prolonged QTc, 514 ms, avoid QT prolonging agents as able. # Home medications reviewed and resumed    # 2/7 -patient was noted to be very lethargic-  gabapentin dose held. decrease her dose from 800 mg 4 times daily to 400 mg twice daily and monitor closely . she is awake alert and oriented now and her mental status is clear. #Anxiety. On Xanax as needed     DVT Prophylaxis: Eliquis  ADULT DIET;  Regular  Code Status: Full Code   PT/OT            Carmine Webb MD   2/8/2023 3:50 PM

## 2023-02-08 NOTE — PROGRESS NOTES
02/07/23 2220   NIV Type   Skin Assessment Clean, dry, & intact   Skin Protection for O2 Device Yes   Location Nose   NIV Started/Stopped On   Equipment Type V60   Mode Bilevel   Mask Type Full face mask   Mask Size Medium   Settings/Measurements   PIP Observed 19 cm H20   IPAP 18 cmH20   CPAP/EPAP 8 cmH2O   Vt (Measured) 543 mL   Rate Ordered 18   Resp 23   Insp Rise Time (%) 2 %   FiO2  45 %   I Time/ I Time % 0.9 s   Minute Volume (L/min) 13.9 Liters   Mask Leak (lpm) 7 lpm   Comfort Level Good   Using Accessory Muscles No   SpO2 98   Patient's Home Machine No   Oxygen Therapy/Pulse Ox   O2 Therapy Oxygen   O2 Device PAP (positive airway pressure)   Heart Rate 89   SpO2 98 %

## 2023-02-08 NOTE — FLOWSHEET NOTE
02/08/23 1415   Vital Signs   Temp 98.4 °F (36.9 °C)   Temp Source Axillary   Heart Rate 87   Heart Rate Source Monitor   Resp 18   BP (!) 174/69   MAP (Calculated) 104   BP Location Right lower arm   BP Method Automatic   Patient Position High fowlers   Oxygen Therapy   SpO2 94 %   O2 Device Nasal cannula   O2 Flow Rate (L/min) 7 L/min   Patient resting in bed family just left. Patient is pink, warm and dry. Patient family brought in her own home bipap to use at hospital. Call light in easy reach patient reminded to use call light for needs and assistance.

## 2023-02-08 NOTE — PROGRESS NOTES
02/07/23 2033   RT Protocol   History Pulmonary Disease 2   Respiratory pattern 4   Breath sounds 6   Cough 0   Indications for Bronchodilator Therapy Wheezing associated with pulm disorder   Bronchodilator Assessment Score 12   RT Inhaler-Nebulizer Bronchodilator Protocol Note    There is a bronchodilator order in the chart from a provider indicating to follow the RT Bronchodilator Protocol and there is an Initiate RT Inhaler-Nebulizer Bronchodilator Protocol order as well (see protocol at bottom of note). CXR Findings:  XR CHEST PORTABLE    Result Date: 2/5/2023  Chronic airspace changes have slightly progressed in the right lung suspected to represent pleural thickening and pleural fluid along with edema versus atelectasis. Lesser pattern of interstitial change slightly increased on the left. A superimposed viral pattern of pneumonitis may be considered clinically. The findings from the last RT Protocol Assessment were as follows:   History Pulmonary Disease: Chronic pulmonary disease  Respiratory Pattern: Mild dyspnea at rest, irregular pattern, or RR 21-25 bpm  Breath Sounds: Inspiratory and expiratory or bilateral wheezing and/or rhonchi  Cough: Strong, spontaneous, non-productive  Indication for Bronchodilator Therapy: Wheezing associated with pulm disorder  Bronchodilator Assessment Score: 12    Aerosolized bronchodilator medication orders have been revised according to the RT Inhaler-Nebulizer Bronchodilator Protocol below. Respiratory Therapist to perform RT Therapy Protocol Assessment initially then follow the protocol. Repeat RT Therapy Protocol Assessment PRN for score 0-3 or on second treatment, BID, and PRN for scores above 3. No Indications - adjust the frequency to every 6 hours PRN wheezing or bronchospasm, if no treatments needed after 48 hours then discontinue using Per Protocol order mode.      If indication present, adjust the RT bronchodilator orders based on the Bronchodilator Assessment Score as indicated below. Use Inhaler orders unless patient has one or more of the following: on home nebulizer, not able to hold breath for 10 seconds, is not alert and oriented, cannot activate and use MDI correctly, or respiratory rate 25 breaths per minute or more, then use the equivalent nebulizer order(s) with same Frequency and PRN reasons based on the score. If a patient is on this medication at home then do not decrease Frequency below that used at home. 0-3 - enter or revise RT bronchodilator order(s) to equivalent RT Bronchodilator order with Frequency of every 4 hours PRN for wheezing or increased work of breathing using Per Protocol order mode. 4-6 - enter or revise RT Bronchodilator order(s) to two equivalent RT bronchodilator orders with one order with BID Frequency and one order with Frequency of every 4 hours PRN wheezing or increased work of breathing using Per Protocol order mode. 7-10 - enter or revise RT Bronchodilator order(s) to two equivalent RT bronchodilator orders with one order with TID Frequency and one order with Frequency of every 4 hours PRN wheezing or increased work of breathing using Per Protocol order mode. 11-13 - enter or revise RT Bronchodilator order(s) to one equivalent RT bronchodilator order with QID Frequency and an Albuterol order with Frequency of every 4 hours PRN wheezing or increased work of breathing using Per Protocol order mode. Greater than 13 - enter or revise RT Bronchodilator order(s) to one equivalent RT bronchodilator order with every 4 hours Frequency and an Albuterol order with Frequency of every 2 hours PRN wheezing or increased work of breathing using Per Protocol order mode.        Electronically signed by Ivana Davila RCP on 2/7/2023 at 8:37 PM

## 2023-02-08 NOTE — PROGRESS NOTES
RT Inhaler-Nebulizer Bronchodilator Protocol Note    There is a bronchodilator order in the chart from a provider indicating to follow the RT Bronchodilator Protocol and there is an Initiate RT Inhaler-Nebulizer Bronchodilator Protocol order as well (see protocol at bottom of note). CXR Findings:  No results found. The findings from the last RT Protocol Assessment were as follows:   History Pulmonary Disease: Chronic pulmonary disease  Respiratory Pattern: Dyspnea on exertion or RR 21-25 bpm  Breath Sounds: Inspiratory and expiratory or bilateral wheezing and/or rhonchi  Cough: Strong, spontaneous, non-productive  Indication for Bronchodilator Therapy: Wheezing associated with pulm disorder  Bronchodilator Assessment Score: 10    Aerosolized bronchodilator medication orders have been revised according to the RT Inhaler-Nebulizer Bronchodilator Protocol below. Respiratory Therapist to perform RT Therapy Protocol Assessment initially then follow the protocol. Repeat RT Therapy Protocol Assessment PRN for score 0-3 or on second treatment, BID, and PRN for scores above 3. No Indications - adjust the frequency to every 6 hours PRN wheezing or bronchospasm, if no treatments needed after 48 hours then discontinue using Per Protocol order mode. If indication present, adjust the RT bronchodilator orders based on the Bronchodilator Assessment Score as indicated below. Use Inhaler orders unless patient has one or more of the following: on home nebulizer, not able to hold breath for 10 seconds, is not alert and oriented, cannot activate and use MDI correctly, or respiratory rate 25 breaths per minute or more, then use the equivalent nebulizer order(s) with same Frequency and PRN reasons based on the score. If a patient is on this medication at home then do not decrease Frequency below that used at home.     0-3 - enter or revise RT bronchodilator order(s) to equivalent RT Bronchodilator order with Frequency of every 4 hours PRN for wheezing or increased work of breathing using Per Protocol order mode. 4-6 - enter or revise RT Bronchodilator order(s) to two equivalent RT bronchodilator orders with one order with BID Frequency and one order with Frequency of every 4 hours PRN wheezing or increased work of breathing using Per Protocol order mode. 7-10 - enter or revise RT Bronchodilator order(s) to two equivalent RT bronchodilator orders with one order with TID Frequency and one order with Frequency of every 4 hours PRN wheezing or increased work of breathing using Per Protocol order mode. 11-13 - enter or revise RT Bronchodilator order(s) to one equivalent RT bronchodilator order with QID Frequency and an Albuterol order with Frequency of every 4 hours PRN wheezing or increased work of breathing using Per Protocol order mode. Greater than 13 - enter or revise RT Bronchodilator order(s) to one equivalent RT bronchodilator order with every 4 hours Frequency and an Albuterol order with Frequency of every 2 hours PRN wheezing or increased work of breathing using Per Protocol order mode. RT to enter RT Home Evaluation for COPD & MDI Assessment order using Per Protocol order mode.     Electronically signed by Alfonso Torres Back on 2/8/2023 at 8:04 AM

## 2023-02-09 LAB
A/G RATIO: 0.8 (ref 1.1–2.2)
ALBUMIN SERPL-MCNC: 3.1 G/DL (ref 3.4–5)
ALP BLD-CCNC: 93 U/L (ref 40–129)
ALT SERPL-CCNC: 18 U/L (ref 10–40)
ANION GAP SERPL CALCULATED.3IONS-SCNC: 11 MMOL/L (ref 3–16)
AST SERPL-CCNC: 16 U/L (ref 15–37)
BILIRUB SERPL-MCNC: <0.2 MG/DL (ref 0–1)
BLOOD CULTURE, ROUTINE: NORMAL
BUN BLDV-MCNC: 27 MG/DL (ref 7–20)
CALCIUM SERPL-MCNC: 8.1 MG/DL (ref 8.3–10.6)
CHLORIDE BLD-SCNC: 90 MMOL/L (ref 99–110)
CO2: 30 MMOL/L (ref 21–32)
CREAT SERPL-MCNC: 0.7 MG/DL (ref 0.6–1.2)
CULTURE, BLOOD 2: NORMAL
GFR SERPL CREATININE-BSD FRML MDRD: >60 ML/MIN/{1.73_M2}
GLUCOSE BLD-MCNC: 120 MG/DL (ref 70–99)
GLUCOSE BLD-MCNC: 82 MG/DL (ref 70–99)
HCT VFR BLD CALC: 34.6 % (ref 36–48)
HEMOGLOBIN: 11.1 G/DL (ref 12–16)
MCH RBC QN AUTO: 30.5 PG (ref 26–34)
MCHC RBC AUTO-ENTMCNC: 32 G/DL (ref 31–36)
MCV RBC AUTO: 95.2 FL (ref 80–100)
PDW BLD-RTO: 13.7 % (ref 12.4–15.4)
PERFORMED ON: ABNORMAL
PLATELET # BLD: 271 K/UL (ref 135–450)
PMV BLD AUTO: 7.6 FL (ref 5–10.5)
POTASSIUM REFLEX MAGNESIUM: 3.9 MMOL/L (ref 3.5–5.1)
RBC # BLD: 3.63 M/UL (ref 4–5.2)
SODIUM BLD-SCNC: 131 MMOL/L (ref 136–145)
TOTAL PROTEIN: 6.9 G/DL (ref 6.4–8.2)
WBC # BLD: 17.4 K/UL (ref 4–11)

## 2023-02-09 PROCEDURE — 84132 ASSAY OF SERUM POTASSIUM: CPT

## 2023-02-09 PROCEDURE — 84450 TRANSFERASE (AST) (SGOT): CPT

## 2023-02-09 PROCEDURE — 6370000000 HC RX 637 (ALT 250 FOR IP): Performed by: INTERNAL MEDICINE

## 2023-02-09 PROCEDURE — 80053 COMPREHEN METABOLIC PANEL: CPT

## 2023-02-09 PROCEDURE — 36415 COLL VENOUS BLD VENIPUNCTURE: CPT

## 2023-02-09 PROCEDURE — 84460 ALANINE AMINO (ALT) (SGPT): CPT

## 2023-02-09 PROCEDURE — 94640 AIRWAY INHALATION TREATMENT: CPT

## 2023-02-09 PROCEDURE — 94761 N-INVAS EAR/PLS OXIMETRY MLT: CPT

## 2023-02-09 PROCEDURE — 99233 SBSQ HOSP IP/OBS HIGH 50: CPT | Performed by: INTERNAL MEDICINE

## 2023-02-09 PROCEDURE — 2700000000 HC OXYGEN THERAPY PER DAY

## 2023-02-09 PROCEDURE — 85027 COMPLETE CBC AUTOMATED: CPT

## 2023-02-09 PROCEDURE — 2580000003 HC RX 258: Performed by: INTERNAL MEDICINE

## 2023-02-09 PROCEDURE — 2060000000 HC ICU INTERMEDIATE R&B

## 2023-02-09 RX ORDER — IPRATROPIUM BROMIDE AND ALBUTEROL SULFATE 2.5; .5 MG/3ML; MG/3ML
1 SOLUTION RESPIRATORY (INHALATION)
Status: DISCONTINUED | OUTPATIENT
Start: 2023-02-09 | End: 2023-02-09

## 2023-02-09 RX ORDER — IPRATROPIUM BROMIDE AND ALBUTEROL SULFATE 2.5; .5 MG/3ML; MG/3ML
1 SOLUTION RESPIRATORY (INHALATION) 4 TIMES DAILY
Status: DISCONTINUED | OUTPATIENT
Start: 2023-02-10 | End: 2023-02-12

## 2023-02-09 RX ADMIN — DILTIAZEM HYDROCHLORIDE 120 MG: 120 CAPSULE, EXTENDED RELEASE ORAL at 09:45

## 2023-02-09 RX ADMIN — Medication 2 PUFF: at 11:11

## 2023-02-09 RX ADMIN — APIXABAN 5 MG: 5 TABLET, FILM COATED ORAL at 09:45

## 2023-02-09 RX ADMIN — GABAPENTIN 400 MG: 400 CAPSULE ORAL at 20:38

## 2023-02-09 RX ADMIN — HYDROCODONE BITARTRATE AND ACETAMINOPHEN 1 TABLET: 7.5; 325 TABLET ORAL at 00:49

## 2023-02-09 RX ADMIN — AZITHROMYCIN MONOHYDRATE 250 MG: 250 TABLET ORAL at 09:45

## 2023-02-09 RX ADMIN — ALPRAZOLAM 0.5 MG: 0.5 TABLET ORAL at 18:14

## 2023-02-09 RX ADMIN — QUETIAPINE FUMARATE 400 MG: 200 TABLET ORAL at 20:38

## 2023-02-09 RX ADMIN — Medication 2 PUFF: at 07:15

## 2023-02-09 RX ADMIN — TORSEMIDE 20 MG: 20 TABLET ORAL at 09:45

## 2023-02-09 RX ADMIN — MULTIPLE VITAMINS W/ MINERALS TAB 1 TABLET: TAB at 09:45

## 2023-02-09 RX ADMIN — GUAIFENESIN 600 MG: 600 TABLET, EXTENDED RELEASE ORAL at 09:45

## 2023-02-09 RX ADMIN — APIXABAN 5 MG: 5 TABLET, FILM COATED ORAL at 20:38

## 2023-02-09 RX ADMIN — GUAIFENESIN 600 MG: 600 TABLET, EXTENDED RELEASE ORAL at 20:38

## 2023-02-09 RX ADMIN — FERROUS SULFATE TAB 325 MG (65 MG ELEMENTAL FE) 325 MG: 325 (65 FE) TAB at 09:54

## 2023-02-09 RX ADMIN — Medication 2 PUFF: at 11:10

## 2023-02-09 RX ADMIN — Medication 2 PUFF: at 20:48

## 2023-02-09 RX ADMIN — SODIUM CHLORIDE, PRESERVATIVE FREE 10 ML: 5 INJECTION INTRAVENOUS at 20:39

## 2023-02-09 RX ADMIN — PANTOPRAZOLE SODIUM 40 MG: 40 TABLET, DELAYED RELEASE ORAL at 05:13

## 2023-02-09 RX ADMIN — PREDNISONE 40 MG: 20 TABLET ORAL at 09:45

## 2023-02-09 RX ADMIN — HYDROCODONE BITARTRATE AND ACETAMINOPHEN 1 TABLET: 7.5; 325 TABLET ORAL at 09:54

## 2023-02-09 RX ADMIN — IPRATROPIUM BROMIDE AND ALBUTEROL SULFATE 1 AMPULE: 2.5; .5 SOLUTION RESPIRATORY (INHALATION) at 15:06

## 2023-02-09 RX ADMIN — PRAVASTATIN SODIUM 10 MG: 10 TABLET ORAL at 18:10

## 2023-02-09 RX ADMIN — ROPINIROLE HYDROCHLORIDE 4 MG: 1 TABLET, FILM COATED ORAL at 20:38

## 2023-02-09 RX ADMIN — SODIUM CHLORIDE, PRESERVATIVE FREE 10 ML: 5 INJECTION INTRAVENOUS at 09:49

## 2023-02-09 RX ADMIN — HYDROCODONE BITARTRATE AND ACETAMINOPHEN 1 TABLET: 7.5; 325 TABLET ORAL at 18:14

## 2023-02-09 RX ADMIN — VENLAFAXINE HYDROCHLORIDE 225 MG: 75 CAPSULE, EXTENDED RELEASE ORAL at 09:45

## 2023-02-09 RX ADMIN — GABAPENTIN 400 MG: 400 CAPSULE ORAL at 09:45

## 2023-02-09 ASSESSMENT — PAIN DESCRIPTION - LOCATION
LOCATION: GENERALIZED;BACK
LOCATION: BACK
LOCATION: BACK
LOCATION: BACK;CHEST

## 2023-02-09 ASSESSMENT — PAIN SCALES - GENERAL
PAINLEVEL_OUTOF10: 8
PAINLEVEL_OUTOF10: 8
PAINLEVEL_OUTOF10: 7

## 2023-02-09 NOTE — PROGRESS NOTES
Bedside report and transfer of care given to [facility]. Pt currently resting in bed with the call light within reach. Pt denies any other care needs at this time. Pt stable at this time.

## 2023-02-09 NOTE — PROGRESS NOTES
Hand off report given to  Kofi Sargent RN. Patient is stable showing no signs of distress and has no current needs at this time. Call light is in reach and bed is in lowest position. Care is transferred at this time.

## 2023-02-09 NOTE — PROGRESS NOTES
RT Inhaler-Nebulizer Bronchodilator Protocol Note    There is a bronchodilator order in the chart from a provider indicating to follow the RT Bronchodilator Protocol and there is an Initiate RT Inhaler-Nebulizer Bronchodilator Protocol order as well (see protocol at bottom of note). CXR Findings:  No results found. The findings from the last RT Protocol Assessment were as follows:   History Pulmonary Disease: Chronic pulmonary disease  Respiratory Pattern: Dyspnea on exertion or RR 21-25 bpm  Breath Sounds: Inspiratory and expiratory or bilateral wheezing and/or rhonchi  Cough: Strong, spontaneous, non-productive  Indication for Bronchodilator Therapy: Wheezing associated with pulm disorder  Bronchodilator Assessment Score: 10    Aerosolized bronchodilator medication orders have been revised according to the RT Inhaler-Nebulizer Bronchodilator Protocol below. Respiratory Therapist to perform RT Therapy Protocol Assessment initially then follow the protocol. Repeat RT Therapy Protocol Assessment PRN for score 0-3 or on second treatment, BID, and PRN for scores above 3. No Indications - adjust the frequency to every 6 hours PRN wheezing or bronchospasm, if no treatments needed after 48 hours then discontinue using Per Protocol order mode. If indication present, adjust the RT bronchodilator orders based on the Bronchodilator Assessment Score as indicated below. Use Inhaler orders unless patient has one or more of the following: on home nebulizer, not able to hold breath for 10 seconds, is not alert and oriented, cannot activate and use MDI correctly, or respiratory rate 25 breaths per minute or more, then use the equivalent nebulizer order(s) with same Frequency and PRN reasons based on the score. If a patient is on this medication at home then do not decrease Frequency below that used at home.     0-3 - enter or revise RT bronchodilator order(s) to equivalent RT Bronchodilator order with Frequency of every 4 hours PRN for wheezing or increased work of breathing using Per Protocol order mode. 4-6 - enter or revise RT Bronchodilator order(s) to two equivalent RT bronchodilator orders with one order with BID Frequency and one order with Frequency of every 4 hours PRN wheezing or increased work of breathing using Per Protocol order mode. 7-10 - enter or revise RT Bronchodilator order(s) to two equivalent RT bronchodilator orders with one order with TID Frequency and one order with Frequency of every 4 hours PRN wheezing or increased work of breathing using Per Protocol order mode. 11-13 - enter or revise RT Bronchodilator order(s) to one equivalent RT bronchodilator order with QID Frequency and an Albuterol order with Frequency of every 4 hours PRN wheezing or increased work of breathing using Per Protocol order mode. Greater than 13 - enter or revise RT Bronchodilator order(s) to one equivalent RT bronchodilator order with every 4 hours Frequency and an Albuterol order with Frequency of every 2 hours PRN wheezing or increased work of breathing using Per Protocol order mode.        Electronically signed by Ariadna Howard RCP on 2/8/2023 at 8:33 PM

## 2023-02-09 NOTE — PROGRESS NOTES
Pulmonary Progress Note  CC: COVID, COPD    Subjective:      Refused bipap  EXAM: BP (!) 151/72   Pulse 93   Temp 99 °F (37.2 °C) (Oral)   Resp 18   Wt 227 lb 6.4 oz (103.1 kg)   SpO2 94%   BMI 36.70 kg/m²  on 7L  Constitutional:  No acute distress. Eyes: PERRL. Conjunctivae anicteric. ENT: Normal nose. Normal tongue. Neck:  Trachea is midline. No thyroid tenderness. Respiratory: No accessory muscle usage. decreased breath sounds. No wheezes. + rales. No Rhonchi. Cardiovascular: Normal S1S2. No digit clubbing. No digit cyanosis. No LE edema. Psychiatric: No anxiety or Agitation. Alert and Oriented to person, place and time.     Scheduled Meds:   gabapentin  400 mg Oral BID    guaiFENesin  600 mg Oral BID    apixaban  5 mg Oral BID    dilTIAZem  120 mg Oral Daily    ferrous sulfate  325 mg Oral Daily with breakfast    pantoprazole  40 mg Oral QAM AC    therapeutic multivitamin-minerals  1 tablet Oral Daily    pravastatin  10 mg Oral QPM    QUEtiapine  400 mg Oral Nightly    rOPINIRole  4 mg Oral Nightly    torsemide  20 mg Oral Daily    sodium chloride flush  5-40 mL IntraVENous 2 times per day    predniSONE  40 mg Oral Daily    albuterol sulfate HFA  2 puff Inhalation 4x daily    And    ipratropium  2 puff Inhalation 4x daily    venlafaxine  225 mg Oral Daily    azithromycin  250 mg Oral Daily     Continuous Infusions:   sodium chloride       PRN Meds:  ALPRAZolam, HYDROcodone-acetaminophen, guaiFENesin-dextromethorphan, sodium chloride flush, sodium chloride, polyethylene glycol, acetaminophen **OR** acetaminophen, prochlorperazine, albuterol sulfate HFA, magnesium sulfate    Labs:  CBC:   Recent Labs     02/09/23  0556   WBC 17.4*   HGB 11.1*   HCT 34.6*   MCV 95.2          BMP:   Recent Labs     02/09/23  0526 02/09/23  0554   *  --    K  --  3.9   CL 90*  --    CO2 30  --    BUN 27*  --    CREATININE 0.7  --        Chest imaging was reviewed by me and showed 2/5/23 CXR  Chronic airspace changes have slightly progressed in the right lung suspected   to represent pleural thickening and pleural fluid along with edema versus   atelectasis. Lesser pattern of interstitial change slightly increased on the   left. A superimposed viral pattern of pneumonitis may be considered   clinically. ASSESSMENT:  Acute on chronic hypoxic respiratory failure   COPD exacerbation  COVID 19 disease  MEG on home Bipap     PLAN:  Droplet Plus Airborne Precautions   Bipap PRN and QHS - pt told she can use home unit if available  Supplemental oxygen to maintain SaO2 >92%; wean as tolerated  Intensive inhaled bronchodilator therapy. Change to Decadron 6mg PO daily  Zpack  Sputum GS&C.

## 2023-02-09 NOTE — FLOWSHEET NOTE
02/09/23 0045   Vital Signs   Temp 97.7 °F (36.5 °C)   Temp Source Axillary   Heart Rate 78   Heart Rate Source Monitor   BP (!) 169/81   MAP (Calculated) 110   BP Location Right lower arm   BP Method Automatic   Level of Consciousness 0   Oxygen Therapy   SpO2 98 %   O2 Device Heated high flow cannula   Height and Weight   Weight 227 lb 6.4 oz (103.1 kg)   Weight Method Bed scale   BMI (Calculated) 0     Vitals taken, shown above. Patient is stable with no current needs at this time. Call light in reach. Bed in lowest position.

## 2023-02-09 NOTE — FLOWSHEET NOTE
02/08/23 1945   Vital Signs   Temp 98.8 °F (37.1 °C)   Temp Source Axillary   Heart Rate 94   Heart Rate Source Monitor   Resp 22   BP (!) 174/91   MAP (Calculated) 119   BP Location Right lower arm   BP Method Automatic   Level of Consciousness 0   MEWS Score 2   Oxygen Therapy   SpO2 98 %   Pulse Oximeter Device Mode Continuous   O2 Flow Rate (L/min) 7 L/min     Shift assessment completed see flow sheet. Patient in bed alert and oriented X4. Patient on 7L O2, showing no signs of distress. Evening medications given per order. No other needs at this time. Call light in reach.

## 2023-02-09 NOTE — PROGRESS NOTES
Pt resting in bed this morning, shift assessment complete and all meds given per MAR. Pt A&O x 4, VSS. Vitals:    02/09/23 0915   BP: (!) 151/72   Pulse: 93   Resp: 18   Temp: 99 °F (37.2 °C)   SpO2: 94%     Pt currently on 7L of Oxygen, Lungs diminished, frequent nagging cough, pain rated 8/10, Pt encouraged to wear her home CPAP 30 minutes after eating meals today. Pt c/o of feeling sleeping and not feeling well today, and stated she took her machine off in her sleep last night. Purewick implemented since patient SOBOE, and torsemide given. Beverage provided with ice. Bed in lowest position, call light in hand. Pt denied any further needs.

## 2023-02-09 NOTE — PROGRESS NOTES
Nurse went in to grab vitals on patient but patient was sleeping, RR greater than 12, pt showing no s/s of distress. Will come back later to grab vitals.

## 2023-02-09 NOTE — PROGRESS NOTES
Progress Note    Admit Date:  2/5/2023      Patient admitted with COVID-19 infection and acute on chronic hypoxic respiratory failure. Worsening hypoxemia on 2/8-with O2 requirement up to 9 L. Wean down to 7 L. Subjective:    Ms. Gina Blanco is doing about the same today . Having episodes of dyspnea-she uses her BiPAP and this helps her  O2 requirements remain at 7 L . She sitting up on the bed. she does not feel well. Complains of persistent shortness of breath and wheezing. She feels very weak and wobbly. Feels anxious. Objective:   /76   Pulse 85   Temp 97.6 °F (36.4 °C) (Oral)   Resp 16   Wt 227 lb 6.4 oz (103.1 kg)   SpO2 94%   BMI 36.70 kg/m²     Intake/Output Summary (Last 24 hours) at 2/9/2023 1837  Last data filed at 2/9/2023 1825  Gross per 24 hour   Intake --   Output 675 ml   Net -675 ml           Physical Exam:  GEN:        A&Ox3, appears ill. Increased WOB. Dyspneic in mild distress. sitting up on the side of the bed  HEENT:   NC/AT,EOMI, MMM, no erythema/exudates or visible masses. CVS:        Normal S1,S2. RRR. Without M/G/R.   LUNG:     Diminished bilat. Bilateral mild wheezes present  ABD:        Soft, ND/NT, BS+ x4. Without G/R.  EXT:        2+ pulses, no c/c. BLE trace edema. Brisk cap refill. PSY:        Thought process intact, affect appropriate. RAMIRO:        CN III-XII grossly intact. Moves all 4 spontaneously. Sensory grossly intact. SKIN:       No rash or lesions on visible skin.          Medications:   Scheduled Meds:   [START ON 2/10/2023] dexamethasone  6 mg Oral Daily    ipratropium-albuterol  1 ampule Inhalation Q4H WA    gabapentin  400 mg Oral BID    guaiFENesin  600 mg Oral BID    apixaban  5 mg Oral BID    dilTIAZem  120 mg Oral Daily    ferrous sulfate  325 mg Oral Daily with breakfast    pantoprazole  40 mg Oral QAM AC    therapeutic multivitamin-minerals  1 tablet Oral Daily    pravastatin  10 mg Oral QPM    QUEtiapine  400 mg Oral Nightly    rOPINIRole  4 mg Oral Nightly    torsemide  20 mg Oral Daily    sodium chloride flush  5-40 mL IntraVENous 2 times per day    venlafaxine  225 mg Oral Daily    azithromycin  250 mg Oral Daily       Continuous Infusions:   sodium chloride         Data:  CBC:   Recent Labs     02/09/23  0556   WBC 17.4*   RBC 3.63*   HGB 11.1*   HCT 34.6*   MCV 95.2   RDW 13.7          BMP:   Recent Labs     02/09/23  0526 02/09/23  0554   *  --    K  --  3.9   CL 90*  --    CO2 30  --    BUN 27*  --    CREATININE 0.7  --        BNP: No results for input(s): BNP in the last 72 hours. PT/INR:   No results for input(s): PROTIME, INR in the last 72 hours. APTT:   No results for input(s): APTT in the last 72 hours. CARDIAC ENZYMES:   No results for input(s): CKMB, CKMBINDEX, TROPONINI in the last 72 hours. Invalid input(s): CKTOTAL;3    FASTING LIPID PANEL:  Lab Results   Component Value Date    CHOL 180 11/02/2015    HDL 64 (H) 02/04/2021    TRIG 201 (H) 02/10/2018     LIVER PROFILE:   Recent Labs     02/09/23  0526 02/09/23  0554   AST  --  16   ALT  --  18   BILITOT <0.2  --    ALKPHOS 93  --             Cultures  COVID-19 detected  Rapid influenza A and B neg  Blood cultures no growth to date    Radiology  XR CHEST PORTABLE   Final Result   Chronic airspace changes have slightly progressed in the right lung suspected   to represent pleural thickening and pleural fluid along with edema versus   atelectasis. Lesser pattern of interstitial change slightly increased on the   left. A superimposed viral pattern of pneumonitis may be considered   clinically. Assessment:  Principal Problem:    Acute on chronic respiratory failure with hypoxia (HCC)  Resolved Problems:    * No resolved hospital problems. *      Plan:    # Acute on chronic respiratory failure with hypoxia  -  related to COVID PNA, aeCOPD. - Supplemental O2 to maintain SPO2 ? 92%, continuous pulse ox.     -Hypoxemia worsening-> was  requiring 4 L O2-> O2  up to 9 L on 2/8-> improving now and stable at 7 L . Patient normally on 2 L O2 at home. Wean as tolerated. -Patient uses BiPAP at home - continue.  -  Pulm c/s. Currently on supplemental oxygen 7 L. # Sepsis   - POA (temp, RR;) Suspect 2/2 COVID. No clear sign of bacterial PNA. F/u cx. # COVID PNA  - Dx on 2/5.  - IV tociluzimab given 2/6  - pulm cx  -Droplet plus precautions  - Continue steroids and inhaled bronchodilators    # aeCOPD  -IV solumedrol, IV ceftriaxone, PRN/LIZ intensive NEB therapy. Check respiratory culture     # Hx NSCLA lung cancer s/p remote RUL lobectomy. # Prolonged QTc, 514 ms, avoid QT prolonging agents as able. # Home medications reviewed and resumed    # 2/7 -patient was noted to be very lethargic-  gabapentin dose held. decrease her dose from 800 mg 4 times daily to 400 mg twice daily and monitor closely . she is awake alert and oriented now and her mental status is clear. #Anxiety. On Xanax as needed     DVT Prophylaxis: Eliquis  ADULT DIET;  Regular  Code Status: Full Code   PT/OT            Shadia Richey MD   2/9/2023 6:37 PM

## 2023-02-09 NOTE — PROGRESS NOTES
Patient still refusing bipap. I have asked several times if she was ready to put it on and patient has refused each time.

## 2023-02-10 PROCEDURE — 94640 AIRWAY INHALATION TREATMENT: CPT

## 2023-02-10 PROCEDURE — 2580000003 HC RX 258: Performed by: INTERNAL MEDICINE

## 2023-02-10 PROCEDURE — 94761 N-INVAS EAR/PLS OXIMETRY MLT: CPT

## 2023-02-10 PROCEDURE — 2700000000 HC OXYGEN THERAPY PER DAY

## 2023-02-10 PROCEDURE — 99233 SBSQ HOSP IP/OBS HIGH 50: CPT | Performed by: INTERNAL MEDICINE

## 2023-02-10 PROCEDURE — 6370000000 HC RX 637 (ALT 250 FOR IP): Performed by: INTERNAL MEDICINE

## 2023-02-10 PROCEDURE — 2060000000 HC ICU INTERMEDIATE R&B

## 2023-02-10 PROCEDURE — 6360000002 HC RX W HCPCS: Performed by: INTERNAL MEDICINE

## 2023-02-10 RX ORDER — FUROSEMIDE 10 MG/ML
20 INJECTION INTRAMUSCULAR; INTRAVENOUS ONCE
Status: COMPLETED | OUTPATIENT
Start: 2023-02-10 | End: 2023-02-10

## 2023-02-10 RX ORDER — METHYLPREDNISOLONE SODIUM SUCCINATE 40 MG/ML
40 INJECTION, POWDER, LYOPHILIZED, FOR SOLUTION INTRAMUSCULAR; INTRAVENOUS EVERY 6 HOURS
Status: COMPLETED | OUTPATIENT
Start: 2023-02-10 | End: 2023-02-11

## 2023-02-10 RX ADMIN — SODIUM CHLORIDE, PRESERVATIVE FREE 10 ML: 5 INJECTION INTRAVENOUS at 21:58

## 2023-02-10 RX ADMIN — TORSEMIDE 20 MG: 20 TABLET ORAL at 08:45

## 2023-02-10 RX ADMIN — FERROUS SULFATE TAB 325 MG (65 MG ELEMENTAL FE) 325 MG: 325 (65 FE) TAB at 08:45

## 2023-02-10 RX ADMIN — QUETIAPINE FUMARATE 400 MG: 200 TABLET ORAL at 22:01

## 2023-02-10 RX ADMIN — GABAPENTIN 400 MG: 400 CAPSULE ORAL at 08:46

## 2023-02-10 RX ADMIN — GUAIFENESIN 600 MG: 600 TABLET, EXTENDED RELEASE ORAL at 20:12

## 2023-02-10 RX ADMIN — ROPINIROLE HYDROCHLORIDE 4 MG: 1 TABLET, FILM COATED ORAL at 20:12

## 2023-02-10 RX ADMIN — GABAPENTIN 400 MG: 400 CAPSULE ORAL at 20:13

## 2023-02-10 RX ADMIN — IPRATROPIUM BROMIDE AND ALBUTEROL SULFATE 1 AMPULE: 2.5; .5 SOLUTION RESPIRATORY (INHALATION) at 21:01

## 2023-02-10 RX ADMIN — VENLAFAXINE HYDROCHLORIDE 225 MG: 75 CAPSULE, EXTENDED RELEASE ORAL at 08:45

## 2023-02-10 RX ADMIN — APIXABAN 5 MG: 5 TABLET, FILM COATED ORAL at 08:46

## 2023-02-10 RX ADMIN — DILTIAZEM HYDROCHLORIDE 120 MG: 120 CAPSULE, EXTENDED RELEASE ORAL at 08:46

## 2023-02-10 RX ADMIN — IPRATROPIUM BROMIDE AND ALBUTEROL SULFATE 1 AMPULE: 2.5; .5 SOLUTION RESPIRATORY (INHALATION) at 15:16

## 2023-02-10 RX ADMIN — FUROSEMIDE 20 MG: 10 INJECTION, SOLUTION INTRAMUSCULAR; INTRAVENOUS at 18:50

## 2023-02-10 RX ADMIN — MULTIPLE VITAMINS W/ MINERALS TAB 1 TABLET: TAB at 08:46

## 2023-02-10 RX ADMIN — APIXABAN 5 MG: 5 TABLET, FILM COATED ORAL at 20:12

## 2023-02-10 RX ADMIN — HYDROCODONE BITARTRATE AND ACETAMINOPHEN 1 TABLET: 7.5; 325 TABLET ORAL at 08:49

## 2023-02-10 RX ADMIN — ALPRAZOLAM 0.5 MG: 0.5 TABLET ORAL at 08:49

## 2023-02-10 RX ADMIN — PRAVASTATIN SODIUM 10 MG: 10 TABLET ORAL at 17:20

## 2023-02-10 RX ADMIN — HYDROCODONE BITARTRATE AND ACETAMINOPHEN 1 TABLET: 7.5; 325 TABLET ORAL at 20:13

## 2023-02-10 RX ADMIN — DEXAMETHASONE 6 MG: 4 TABLET ORAL at 08:46

## 2023-02-10 RX ADMIN — SODIUM CHLORIDE, PRESERVATIVE FREE 10 ML: 5 INJECTION INTRAVENOUS at 08:46

## 2023-02-10 RX ADMIN — IPRATROPIUM BROMIDE AND ALBUTEROL SULFATE 1 AMPULE: 2.5; .5 SOLUTION RESPIRATORY (INHALATION) at 11:49

## 2023-02-10 RX ADMIN — GUAIFENESIN 600 MG: 600 TABLET, EXTENDED RELEASE ORAL at 08:45

## 2023-02-10 RX ADMIN — AZITHROMYCIN MONOHYDRATE 250 MG: 250 TABLET ORAL at 08:46

## 2023-02-10 RX ADMIN — METHYLPREDNISOLONE SODIUM SUCCINATE 40 MG: 40 INJECTION, POWDER, FOR SOLUTION INTRAMUSCULAR; INTRAVENOUS at 18:50

## 2023-02-10 RX ADMIN — PANTOPRAZOLE SODIUM 40 MG: 40 TABLET, DELAYED RELEASE ORAL at 05:47

## 2023-02-10 RX ADMIN — IPRATROPIUM BROMIDE AND ALBUTEROL SULFATE 1 AMPULE: 2.5; .5 SOLUTION RESPIRATORY (INHALATION) at 07:41

## 2023-02-10 ASSESSMENT — PAIN DESCRIPTION - LOCATION
LOCATION: BACK;CHEST
LOCATION: BACK

## 2023-02-10 ASSESSMENT — PAIN - FUNCTIONAL ASSESSMENT: PAIN_FUNCTIONAL_ASSESSMENT: ACTIVITIES ARE NOT PREVENTED

## 2023-02-10 ASSESSMENT — PAIN SCALES - GENERAL
PAINLEVEL_OUTOF10: 0
PAINLEVEL_OUTOF10: 7
PAINLEVEL_OUTOF10: 8

## 2023-02-10 ASSESSMENT — PAIN DESCRIPTION - DESCRIPTORS
DESCRIPTORS: ACHING;DISCOMFORT
DESCRIPTORS: ACHING

## 2023-02-10 ASSESSMENT — PAIN DESCRIPTION - ORIENTATION: ORIENTATION: MID

## 2023-02-10 NOTE — FLOWSHEET NOTE
02/10/23 0841   Vital Signs   Temp 99 °F (37.2 °C)   Temp Source Oral   Heart Rate 95   Resp 20   BP (!) 174/91   MAP (Calculated) 119   BP Location Left upper arm   Level of Consciousness 0   MEWS Score 1   Oxygen Therapy   SpO2 94 %   O2 Device High flow nasal cannula   O2 Flow Rate (L/min) 7 L/min     Patient resting quietly in bed. No s/s of distress noted. Shift assessment complete, see flow sheet. PRN xanax and norco given at this time. Denies needs at this time. Call light in reach. Will monitor.

## 2023-02-10 NOTE — PROGRESS NOTES
Patient assisted back to bed from [facility]. Sheet and bed pad changed at this time.

## 2023-02-10 NOTE — PROGRESS NOTES
IM Progress Note    Admit Date:  2/5/2023      Patient admitted with COVID-19 infection and acute on chronic hypoxic respiratory failure. Worsening hypoxemia on 2/8-with O2 requirement up to 9 L. Wean down to 7 L. She has remained on O2 7 L for the last few days. Subjective:    Ms. Sarina Daniels is doing about the same today . Does not feel any better  Having episodes of dyspnea-she uses her BiPAP and this helps her  O2 requirements remain at 7 L . She sitting up on the bed. she does not feel well. Complains of persistent shortness of breath and wheezing. She feels very weak and wobbly. Wheezing again    Objective:   BP (!) 163/69   Pulse 74   Temp 98.7 °F (37.1 °C) (Oral)   Resp 20   Wt 229 lb 1.6 oz (103.9 kg)   SpO2 97%   BMI 36.98 kg/m²     Intake/Output Summary (Last 24 hours) at 2/10/2023 1818  Last data filed at 2/9/2023 1825  Gross per 24 hour   Intake --   Output 525 ml   Net -525 ml           Physical Exam:  GEN:        A&Ox3, appears ill. Increased WOB. Dyspneic in mild distress. sitting up on the side of the bed  HEENT:   NC/AT,EOMI, MMM, no erythema/exudates or visible masses. CVS:        Normal S1,S2. RRR. Without M/G/R.   LUNG:     Diminished bilat. Bilateral mild wheezes present  ABD:        Soft, ND/NT, BS+ x4. Without G/R.  EXT:        2+ pulses, no c/c. BLE trace edema. Brisk cap refill. PSY:        Thought process intact, affect appropriate. RAMIRO:        CN III-XII grossly intact. Moves all 4 spontaneously. Sensory grossly intact. SKIN:       No rash or lesions on visible skin.          Medications:   Scheduled Meds:   dexamethasone  6 mg Oral Daily    ipratropium-albuterol  1 ampule Inhalation 4x daily    gabapentin  400 mg Oral BID    guaiFENesin  600 mg Oral BID    apixaban  5 mg Oral BID    dilTIAZem  120 mg Oral Daily    ferrous sulfate  325 mg Oral Daily with breakfast    pantoprazole  40 mg Oral QAM AC    therapeutic multivitamin-minerals  1 tablet Oral Daily pravastatin  10 mg Oral QPM    QUEtiapine  400 mg Oral Nightly    rOPINIRole  4 mg Oral Nightly    torsemide  20 mg Oral Daily    sodium chloride flush  5-40 mL IntraVENous 2 times per day    venlafaxine  225 mg Oral Daily       Continuous Infusions:   sodium chloride         Data:  CBC:   Recent Labs     02/09/23  0556   WBC 17.4*   RBC 3.63*   HGB 11.1*   HCT 34.6*   MCV 95.2   RDW 13.7          BMP:   Recent Labs     02/09/23  0526 02/09/23  0554   *  --    K  --  3.9   CL 90*  --    CO2 30  --    BUN 27*  --    CREATININE 0.7  --        BNP: No results for input(s): BNP in the last 72 hours. PT/INR:   No results for input(s): PROTIME, INR in the last 72 hours. APTT:   No results for input(s): APTT in the last 72 hours. CARDIAC ENZYMES:   No results for input(s): CKMB, CKMBINDEX, TROPONINI in the last 72 hours. Invalid input(s): CKTOTAL;3    FASTING LIPID PANEL:  Lab Results   Component Value Date    CHOL 180 11/02/2015    HDL 64 (H) 02/04/2021    TRIG 201 (H) 02/10/2018     LIVER PROFILE:   Recent Labs     02/09/23  0526 02/09/23  0554   AST  --  16   ALT  --  18   BILITOT <0.2  --    ALKPHOS 93  --             Cultures  COVID-19 detected  Rapid influenza A and B neg  Blood cultures no growth to date    Radiology  XR CHEST PORTABLE   Final Result   Chronic airspace changes have slightly progressed in the right lung suspected   to represent pleural thickening and pleural fluid along with edema versus   atelectasis. Lesser pattern of interstitial change slightly increased on the   left. A superimposed viral pattern of pneumonitis may be considered   clinically. Assessment:  Principal Problem:    Acute on chronic respiratory failure with hypoxia (HCC)  Resolved Problems:    * No resolved hospital problems. *      Plan:    # Acute on chronic respiratory failure with hypoxia  -  related to COVID PNA, aeCOPD. - Supplemental O2 to maintain SPO2 ? 92%, continuous pulse ox. -Hypoxemia worsening-> was  requiring 4 L O2-> O2  up to 9 L on 2/8-> improving now and stable at 7 L . Patient normally on 2 L O2 at home. Wean as tolerated. -Patient uses BiPAP at home - continue.  -  Pulm c/s. Currently remains on supplemental oxygen 7 L. Trial of IV Lasix. Continued on oral torsemide    # Sepsis   - POA (temp, RR;) Suspect 2/2 COVID. No clear sign of bacterial PNA. F/u cx. # COVID PNA  - Dx on 2/5.  - IV tociluzimab given 2/6  - pulm cx  -Droplet plus precautions  - Continue steroids and inhaled bronchodilators    # aeCOPD  -IV solumedrol, IV ceftriaxone, PRN/LIZ intensive NEB therapy. Check respiratory culture   -Patient is on oral Decadron now. We will give her a couple doses of IV Solu-Medrol and see if this helps her wheezing. #Chronic diastolic CHF  -Continue torsemide  -We will give 1 dose of IV Lasix    # Hx NSCLA lung cancer s/p remote RUL lobectomy. # Prolonged QTc, 514 ms, avoid QT prolonging agents as able. # Home medications reviewed and resumed    # 2/7 -patient was noted to be very lethargic-  gabapentin dose held. decrease her dose from 800 mg 4 times daily to 400 mg twice daily and monitor closely . she is awake alert and oriented now and her mental status is clear. #Anxiety. On Xanax as needed         DVT Prophylaxis: Eliquis  ADULT DIET;  Regular  Code Status: Full Code   PT/OT            Vick Jimenez MD   2/10/2023 6:18 PM

## 2023-02-10 NOTE — CARE COORDINATION
INTERDISCIPLINARY PLAN OF CARE CONFERENCE    Date/Time: 2/10/2023 3:21 PM  Completed by: Alyssia Reich RN, Case Management      Patient Name:  Luisana Mulligan  YOB: 1950  Admitting Diagnosis: Acute on chronic respiratory failure with hypoxia ([facility] [address] [J96.21]  COVID-19 virus infection [U07.1]     Admit Date/Time:  2/5/2023  9:34 PM    Chart reviewed. Interdisciplinary team contacted or reviewed plan related to patient progress and discharge plans. Disciplines included Case Management, Nursing, and Dietitian. Current Status: IP 02/05/2023  PT/OT recommendation for discharge plan of care: ordered    Expected D/C Disposition:  Home  Discharge Plan Comments:  [facility]. Lives w/ room mate. Plans to return home upon DC.   +Covid and currently requiring 7 liters high flow O2. Poor activity tolerance. Pt/OT ordered for DC recs when appropriate. Prior to admission pt was actives with [employer] for HHA/housekeeping services, [facility] for SN (PT/OT can be added) and has [employer] home O2 with portable concentrator and bedside concentrator.  +CM following    Home O2 in place on admit: Yes 3.5 at baseline)  Pt informed of need to bring portable home O2 tank on day of discharge for nursing to connect prior to leaving:  Not Indicated  Verbalized agreement/Understanding:  Not Indicated

## 2023-02-10 NOTE — PROGRESS NOTES
RT Inhaler-Nebulizer Bronchodilator Protocol Note    There is a bronchodilator order in the chart from a provider indicating to follow the RT Bronchodilator Protocol and there is an Initiate RT Inhaler-Nebulizer Bronchodilator Protocol order as well (see protocol at bottom of note). CXR Findings:  No results found. The findings from the last RT Protocol Assessment were as follows:   History Pulmonary Disease: Chronic pulmonary disease  Respiratory Pattern: Dyspnea on exertion or RR 21-25 bpm  Breath Sounds: Inspiratory and expiratory or bilateral wheezing and/or rhonchi  Cough: Strong, spontaneous, non-productive  Indication for Bronchodilator Therapy: Wheezing associated with pulm disorder  Bronchodilator Assessment Score: 10    Aerosolized bronchodilator medication orders have been revised according to the RT Inhaler-Nebulizer Bronchodilator Protocol below. Respiratory Therapist to perform RT Therapy Protocol Assessment initially then follow the protocol. Repeat RT Therapy Protocol Assessment PRN for score 0-3 or on second treatment, BID, and PRN for scores above 3. No Indications - adjust the frequency to every 6 hours PRN wheezing or bronchospasm, if no treatments needed after 48 hours then discontinue using Per Protocol order mode. If indication present, adjust the RT bronchodilator orders based on the Bronchodilator Assessment Score as indicated below. Use Inhaler orders unless patient has one or more of the following: on home nebulizer, not able to hold breath for 10 seconds, is not alert and oriented, cannot activate and use MDI correctly, or respiratory rate 25 breaths per minute or more, then use the equivalent nebulizer order(s) with same Frequency and PRN reasons based on the score. If a patient is on this medication at home then do not decrease Frequency below that used at home.     0-3 - enter or revise RT bronchodilator order(s) to equivalent RT Bronchodilator order with Frequency of every 4 hours PRN for wheezing or increased work of breathing using Per Protocol order mode. 4-6 - enter or revise RT Bronchodilator order(s) to two equivalent RT bronchodilator orders with one order with BID Frequency and one order with Frequency of every 4 hours PRN wheezing or increased work of breathing using Per Protocol order mode. 7-10 - enter or revise RT Bronchodilator order(s) to two equivalent RT bronchodilator orders with one order with TID Frequency and one order with Frequency of every 4 hours PRN wheezing or increased work of breathing using Per Protocol order mode. 11-13 - enter or revise RT Bronchodilator order(s) to one equivalent RT bronchodilator order with QID Frequency and an Albuterol order with Frequency of every 4 hours PRN wheezing or increased work of breathing using Per Protocol order mode. Greater than 13 - enter or revise RT Bronchodilator order(s) to one equivalent RT bronchodilator order with every 4 hours Frequency and an Albuterol order with Frequency of every 2 hours PRN wheezing or increased work of breathing using Per Protocol order mode.        Electronically signed by Luis Oates RCP on 2/9/2023 at 8:54 PM

## 2023-02-10 NOTE — FLOWSHEET NOTE
02/09/23 1805   Vital Signs   Temp 97.6 °F (36.4 °C)   Temp Source Oral   Heart Rate 85   Resp 16   /76   MAP (Calculated) 93   Pain Assessment   Pain Assessment 0-10   Pain Level 7   Pain Location Back; Chest   Oxygen Therapy   SpO2 94 %   O2 Device High flow nasal cannula   O2 Flow Rate (L/min) 7 L/min

## 2023-02-10 NOTE — PROGRESS NOTES
Pulmonary Progress Note  CC: COVID, COPD    Subjective:      Still sob  EXAM: BP (!) 163/69   Pulse 74   Temp 98.7 °F (37.1 °C) (Oral)   Resp 20   Wt 229 lb 1.6 oz (103.9 kg)   SpO2 97%   BMI 36.98 kg/m²  on 7L  Constitutional:  No acute distress. Eyes: PERRL. Conjunctivae anicteric. ENT: Normal nose. Normal tongue. Neck:  Trachea is midline. No thyroid tenderness. Respiratory: No accessory muscle usage. decreased breath sounds. No wheezes. + rales. No Rhonchi. Cardiovascular: Normal S1S2. No digit clubbing. No digit cyanosis. No LE edema. Psychiatric: No anxiety or Agitation. Alert and Oriented to person, place and time.     Scheduled Meds:   dexamethasone  6 mg Oral Daily    ipratropium-albuterol  1 ampule Inhalation 4x daily    gabapentin  400 mg Oral BID    guaiFENesin  600 mg Oral BID    apixaban  5 mg Oral BID    dilTIAZem  120 mg Oral Daily    ferrous sulfate  325 mg Oral Daily with breakfast    pantoprazole  40 mg Oral QAM AC    therapeutic multivitamin-minerals  1 tablet Oral Daily    pravastatin  10 mg Oral QPM    QUEtiapine  400 mg Oral Nightly    rOPINIRole  4 mg Oral Nightly    torsemide  20 mg Oral Daily    sodium chloride flush  5-40 mL IntraVENous 2 times per day    venlafaxine  225 mg Oral Daily     Continuous Infusions:   sodium chloride       PRN Meds:  ALPRAZolam, HYDROcodone-acetaminophen, guaiFENesin-dextromethorphan, sodium chloride flush, sodium chloride, polyethylene glycol, acetaminophen **OR** acetaminophen, prochlorperazine, albuterol sulfate HFA, magnesium sulfate    Labs:  CBC:   Recent Labs     02/09/23  0556   WBC 17.4*   HGB 11.1*   HCT 34.6*   MCV 95.2          BMP:   Recent Labs     02/09/23  0526 02/09/23  0554   *  --    K  --  3.9   CL 90*  --    CO2 30  --    BUN 27*  --    CREATININE 0.7  --        Chest imaging was reviewed by me and showed 2/5/23 CXR  Chronic airspace changes have slightly progressed in the right lung suspected   to represent pleural thickening and pleural fluid along with edema versus   atelectasis. Lesser pattern of interstitial change slightly increased on the   left. A superimposed viral pattern of pneumonitis may be considered   clinically. ASSESSMENT:  Acute on chronic hypoxic respiratory failure   COPD exacerbation  COVID 19 disease  MEG on home Bipap     PLAN:  Droplet Plus Airborne Precautions   Bipap PRN and QHS - pt told she can use home unit if available  Supplemental oxygen to maintain SaO2 >92%; wean as tolerated  Intensive inhaled bronchodilator therapy. Change to Decadron 6mg PO daily  Zpack  Sputum GS&C.

## 2023-02-10 NOTE — FLOWSHEET NOTE
02/10/23 0241   Vital Signs   Temp 98.8 °F (37.1 °C)   Temp Source Oral   Heart Rate 95   Heart Rate Source Monitor   Resp 18   /77   MAP (Calculated) 98   BP Location Right upper arm   Level of Consciousness 0   MEWS Score 1   Oxygen Therapy   SpO2 96 %   Pulse Oximeter Device Mode Intermittent   Pulse Oximeter Device Location Finger   O2 Device High flow nasal cannula   O2 Flow Rate (L/min) 7 L/min   Height and Weight   Weight 229 lb 1.6 oz (103.9 kg)   Weight Method Bed scale   BMI (Calculated) 0     Pt awake in bed. VS as shown above. Pt denies any further needs at this time. Call light in reach and bed in lowest position.

## 2023-02-10 NOTE — PLAN OF CARE
Problem: Discharge Planning  Goal: Discharge to home or other facility with appropriate resources  Outcome: Progressing     Problem: Pain  Goal: Verbalizes/displays adequate comfort level or baseline comfort level  2/9/2023 2209 by Abiodun Brian RN  Outcome: Progressing  2/9/2023 1025 by Clay Choi RN  Outcome: Progressing     Problem: Safety - Adult  Goal: Free from fall injury  2/9/2023 2209 by Abiodun Brian RN  Outcome: Progressing  2/9/2023 1025 by Clay Choi RN  Outcome: Progressing     Problem: Skin/Tissue Integrity  Goal: Absence of new skin breakdown  Description: 1. Monitor for areas of redness and/or skin breakdown  2. Assess vascular access sites hourly  3. Every 4-6 hours minimum:  Change oxygen saturation probe site  4. Every 4-6 hours:  If on nasal continuous positive airway pressure, respiratory therapy assess nares and determine need for appliance change or resting period.   Outcome: Progressing     Problem: Chronic Conditions and Co-morbidities  Goal: Patient's chronic conditions and co-morbidity symptoms are monitored and maintained or improved  Outcome: Progressing

## 2023-02-11 LAB
ANION GAP SERPL CALCULATED.3IONS-SCNC: 7 MMOL/L (ref 3–16)
BUN BLDV-MCNC: 22 MG/DL (ref 7–20)
CALCIUM SERPL-MCNC: 8.7 MG/DL (ref 8.3–10.6)
CHLORIDE BLD-SCNC: 90 MMOL/L (ref 99–110)
CO2: 41 MMOL/L (ref 21–32)
CREAT SERPL-MCNC: <0.5 MG/DL (ref 0.6–1.2)
GFR SERPL CREATININE-BSD FRML MDRD: >60 ML/MIN/{1.73_M2}
GLUCOSE BLD-MCNC: 158 MG/DL (ref 70–99)
HCT VFR BLD CALC: 34.2 % (ref 36–48)
HEMOGLOBIN: 11.2 G/DL (ref 12–16)
MCH RBC QN AUTO: 30.8 PG (ref 26–34)
MCHC RBC AUTO-ENTMCNC: 32.8 G/DL (ref 31–36)
MCV RBC AUTO: 94 FL (ref 80–100)
PDW BLD-RTO: 14 % (ref 12.4–15.4)
PLATELET # BLD: 229 K/UL (ref 135–450)
PMV BLD AUTO: 7.9 FL (ref 5–10.5)
POTASSIUM REFLEX MAGNESIUM: 3.9 MMOL/L (ref 3.5–5.1)
RBC # BLD: 3.63 M/UL (ref 4–5.2)
SODIUM BLD-SCNC: 138 MMOL/L (ref 136–145)
WBC # BLD: 8.8 K/UL (ref 4–11)

## 2023-02-11 PROCEDURE — 36415 COLL VENOUS BLD VENIPUNCTURE: CPT

## 2023-02-11 PROCEDURE — 6360000002 HC RX W HCPCS: Performed by: INTERNAL MEDICINE

## 2023-02-11 PROCEDURE — 97530 THERAPEUTIC ACTIVITIES: CPT

## 2023-02-11 PROCEDURE — 6370000000 HC RX 637 (ALT 250 FOR IP): Performed by: INTERNAL MEDICINE

## 2023-02-11 PROCEDURE — 2060000000 HC ICU INTERMEDIATE R&B

## 2023-02-11 PROCEDURE — 80048 BASIC METABOLIC PNL TOTAL CA: CPT

## 2023-02-11 PROCEDURE — 93005 ELECTROCARDIOGRAM TRACING: CPT | Performed by: INTERNAL MEDICINE

## 2023-02-11 PROCEDURE — 94640 AIRWAY INHALATION TREATMENT: CPT

## 2023-02-11 PROCEDURE — 99233 SBSQ HOSP IP/OBS HIGH 50: CPT | Performed by: INTERNAL MEDICINE

## 2023-02-11 PROCEDURE — 97162 PT EVAL MOD COMPLEX 30 MIN: CPT

## 2023-02-11 PROCEDURE — 2700000000 HC OXYGEN THERAPY PER DAY

## 2023-02-11 PROCEDURE — 2580000003 HC RX 258: Performed by: INTERNAL MEDICINE

## 2023-02-11 PROCEDURE — 94761 N-INVAS EAR/PLS OXIMETRY MLT: CPT

## 2023-02-11 PROCEDURE — 85027 COMPLETE CBC AUTOMATED: CPT

## 2023-02-11 PROCEDURE — 97166 OT EVAL MOD COMPLEX 45 MIN: CPT

## 2023-02-11 RX ORDER — LORAZEPAM 1 MG/1
1 TABLET ORAL EVERY 4 HOURS PRN
Status: DISCONTINUED | OUTPATIENT
Start: 2023-02-11 | End: 2023-02-13

## 2023-02-11 RX ORDER — ROPINIROLE 2 MG/1
4 TABLET, FILM COATED ORAL 3 TIMES DAILY
Status: DISCONTINUED | OUTPATIENT
Start: 2023-02-11 | End: 2023-02-15 | Stop reason: HOSPADM

## 2023-02-11 RX ORDER — NICOTINE 21 MG/24HR
1 PATCH, TRANSDERMAL 24 HOURS TRANSDERMAL DAILY
Status: DISCONTINUED | OUTPATIENT
Start: 2023-02-11 | End: 2023-02-15 | Stop reason: HOSPADM

## 2023-02-11 RX ORDER — METHYLPREDNISOLONE SODIUM SUCCINATE 40 MG/ML
40 INJECTION, POWDER, LYOPHILIZED, FOR SOLUTION INTRAMUSCULAR; INTRAVENOUS EVERY 6 HOURS
Status: COMPLETED | OUTPATIENT
Start: 2023-02-12 | End: 2023-02-12

## 2023-02-11 RX ORDER — METHYLPREDNISOLONE SODIUM SUCCINATE 40 MG/ML
40 INJECTION, POWDER, LYOPHILIZED, FOR SOLUTION INTRAMUSCULAR; INTRAVENOUS EVERY 6 HOURS
Status: DISCONTINUED | OUTPATIENT
Start: 2023-02-11 | End: 2023-02-11

## 2023-02-11 RX ADMIN — METHYLPREDNISOLONE SODIUM SUCCINATE 40 MG: 40 INJECTION, POWDER, FOR SOLUTION INTRAMUSCULAR; INTRAVENOUS at 20:07

## 2023-02-11 RX ADMIN — ALPRAZOLAM 0.5 MG: 0.5 TABLET ORAL at 09:15

## 2023-02-11 RX ADMIN — APIXABAN 5 MG: 5 TABLET, FILM COATED ORAL at 20:00

## 2023-02-11 RX ADMIN — APIXABAN 5 MG: 5 TABLET, FILM COATED ORAL at 08:54

## 2023-02-11 RX ADMIN — SODIUM CHLORIDE, PRESERVATIVE FREE 10 ML: 5 INJECTION INTRAVENOUS at 20:00

## 2023-02-11 RX ADMIN — IPRATROPIUM BROMIDE AND ALBUTEROL SULFATE 1 AMPULE: 2.5; .5 SOLUTION RESPIRATORY (INHALATION) at 11:49

## 2023-02-11 RX ADMIN — GUAIFENESIN 600 MG: 600 TABLET, EXTENDED RELEASE ORAL at 20:00

## 2023-02-11 RX ADMIN — PANTOPRAZOLE SODIUM 40 MG: 40 TABLET, DELAYED RELEASE ORAL at 05:14

## 2023-02-11 RX ADMIN — IPRATROPIUM BROMIDE AND ALBUTEROL SULFATE 1 AMPULE: 2.5; .5 SOLUTION RESPIRATORY (INHALATION) at 21:09

## 2023-02-11 RX ADMIN — GUAIFENESIN 600 MG: 600 TABLET, EXTENDED RELEASE ORAL at 08:54

## 2023-02-11 RX ADMIN — FERROUS SULFATE TAB 325 MG (65 MG ELEMENTAL FE) 325 MG: 325 (65 FE) TAB at 08:56

## 2023-02-11 RX ADMIN — IPRATROPIUM BROMIDE AND ALBUTEROL SULFATE 1 AMPULE: 2.5; .5 SOLUTION RESPIRATORY (INHALATION) at 07:30

## 2023-02-11 RX ADMIN — VENLAFAXINE HYDROCHLORIDE 225 MG: 75 CAPSULE, EXTENDED RELEASE ORAL at 08:53

## 2023-02-11 RX ADMIN — QUETIAPINE FUMARATE 400 MG: 200 TABLET ORAL at 21:43

## 2023-02-11 RX ADMIN — PRAVASTATIN SODIUM 10 MG: 10 TABLET ORAL at 20:07

## 2023-02-11 RX ADMIN — ROPINIROLE HYDROCHLORIDE 4 MG: 2 TABLET, FILM COATED ORAL at 21:43

## 2023-02-11 RX ADMIN — GABAPENTIN 400 MG: 400 CAPSULE ORAL at 08:55

## 2023-02-11 RX ADMIN — TORSEMIDE 20 MG: 20 TABLET ORAL at 08:54

## 2023-02-11 RX ADMIN — DEXAMETHASONE 6 MG: 4 TABLET ORAL at 08:55

## 2023-02-11 RX ADMIN — GABAPENTIN 400 MG: 400 CAPSULE ORAL at 20:00

## 2023-02-11 RX ADMIN — METHYLPREDNISOLONE SODIUM SUCCINATE 40 MG: 40 INJECTION, POWDER, FOR SOLUTION INTRAMUSCULAR; INTRAVENOUS at 01:15

## 2023-02-11 RX ADMIN — DILTIAZEM HYDROCHLORIDE 120 MG: 120 CAPSULE, EXTENDED RELEASE ORAL at 08:56

## 2023-02-11 RX ADMIN — ALPRAZOLAM 0.5 MG: 0.5 TABLET ORAL at 14:48

## 2023-02-11 RX ADMIN — SODIUM CHLORIDE, PRESERVATIVE FREE 10 ML: 5 INJECTION INTRAVENOUS at 08:57

## 2023-02-11 RX ADMIN — LORAZEPAM 1 MG: 1 TABLET ORAL at 20:07

## 2023-02-11 RX ADMIN — IPRATROPIUM BROMIDE AND ALBUTEROL SULFATE 1 AMPULE: 2.5; .5 SOLUTION RESPIRATORY (INHALATION) at 15:13

## 2023-02-11 RX ADMIN — MULTIPLE VITAMINS W/ MINERALS TAB 1 TABLET: TAB at 08:54

## 2023-02-11 ASSESSMENT — PAIN SCALES - GENERAL
PAINLEVEL_OUTOF10: 7
PAINLEVEL_OUTOF10: 6

## 2023-02-11 NOTE — PROGRESS NOTES
Vapes were reported to be in drawer by PT/OT, PCA. Primary RN and MD educated patient. Vapes are locked in patient drawer for discharge. 2 small pink vapes were locked by Primary RN at [address]. Nicotine patch ordered.

## 2023-02-11 NOTE — PROGRESS NOTES
Report given to [facility], RN. Care transferred.

## 2023-02-11 NOTE — PROGRESS NOTES
Patient assessed and AM medications given. RT had weaned to 6L. Patient was reporting SOB and RR were elevated increased O2 to 7L. Will attempting to wean this afternoon. Denies any further needs at this time. Call light within reach.

## 2023-02-11 NOTE — PROGRESS NOTES
Patient agitated after speaking with MD.  Family at bedside. Patient requested PRN anxiety medication. Agreed. Educated that next dose could not be taken until bedtime. Patient satisfied. Has new PT/OT orders. Spoke with PT most likely will see tomorrow. Patient still requiring 7L O2. Call light within reach.

## 2023-02-11 NOTE — PROGRESS NOTES
Pulmonary Progress Note  CC: COVID, COPD    Subjective:      Still sob but wants to get oob  EXAM: BP (!) 157/82   Pulse 73   Temp 98.7 °F (37.1 °C) (Oral)   Resp 16   Wt 227 lb 8 oz (103.2 kg)   SpO2 95%   BMI 36.72 kg/m²  on 6L  Constitutional:  No acute distress. Eyes: PERRL. Conjunctivae anicteric. ENT: Normal nose. Normal tongue. Neck:  Trachea is midline. No thyroid tenderness. Respiratory: No accessory muscle usage. decreased breath sounds. No wheezes. + rales. No Rhonchi. Cardiovascular: Normal S1S2. No digit clubbing. No digit cyanosis. No LE edema. Psychiatric: No anxiety or Agitation. Alert and Oriented to person, place and time.     Scheduled Meds:   dexamethasone  6 mg Oral Daily    ipratropium-albuterol  1 ampule Inhalation 4x daily    gabapentin  400 mg Oral BID    guaiFENesin  600 mg Oral BID    apixaban  5 mg Oral BID    dilTIAZem  120 mg Oral Daily    ferrous sulfate  325 mg Oral Daily with breakfast    pantoprazole  40 mg Oral QAM AC    therapeutic multivitamin-minerals  1 tablet Oral Daily    pravastatin  10 mg Oral QPM    QUEtiapine  400 mg Oral Nightly    rOPINIRole  4 mg Oral Nightly    torsemide  20 mg Oral Daily    sodium chloride flush  5-40 mL IntraVENous 2 times per day    venlafaxine  225 mg Oral Daily     Continuous Infusions:   sodium chloride       PRN Meds:  ALPRAZolam, HYDROcodone-acetaminophen, guaiFENesin-dextromethorphan, sodium chloride flush, sodium chloride, polyethylene glycol, acetaminophen **OR** acetaminophen, prochlorperazine, albuterol sulfate HFA, magnesium sulfate    Labs:  CBC:   Recent Labs     02/09/23  0556 02/11/23  0434   WBC 17.4* 8.8   HGB 11.1* 11.2*   HCT 34.6* 34.2*   MCV 95.2 94.0    229       BMP:   Recent Labs     02/09/23  0526 02/09/23  0554 02/11/23  0434   *  --  138   K  --  3.9 3.9   CL 90*  --  90*   CO2 30  --  41*   BUN 27*  --  22*   CREATININE 0.7  --  <0.5*       Chest imaging was reviewed by me and showed 2/5/23 CXR  Chronic airspace changes have slightly progressed in the right lung suspected   to represent pleural thickening and pleural fluid along with edema versus   atelectasis. Lesser pattern of interstitial change slightly increased on the   left. A superimposed viral pattern of pneumonitis may be considered   clinically. ASSESSMENT:  Acute on chronic hypoxic respiratory failure   COPD exacerbation  COVID 19 disease  MEG on home Bipap     PLAN:  Droplet Plus Airborne Precautions   Bipap PRN and QHS - pt told she can use home unit if available  Supplemental oxygen to maintain SaO2 >92%; wean as tolerated  Intensive inhaled bronchodilator therapy.   Decadron 6mg PO daily  Completed a Zpack  PT/OT, out of bed

## 2023-02-11 NOTE — PROGRESS NOTES
PM assessment completed. Scheduled medications given per MAR. Purewick in place, VSS 7 liter NC, A/O x4, denies any needs at this time. Call light in reach, will monitor, bed alarm on.

## 2023-02-11 NOTE — PROGRESS NOTES
RT Inhaler-Nebulizer Bronchodilator Protocol Note    There is a bronchodilator order in the chart from a provider indicating to follow the RT Bronchodilator Protocol and there is an Initiate RT Inhaler-Nebulizer Bronchodilator Protocol order as well (see protocol at bottom of note). CXR Findings:  No results found. The findings from the last RT Protocol Assessment were as follows:   History Pulmonary Disease: (P) Chronic pulmonary disease  Respiratory Pattern: (P) Dyspnea on exertion or RR 21-25 bpm  Breath Sounds: (P) Inspiratory and expiratory or bilateral wheezing and/or rhonchi  Cough: (P) Strong, spontaneous, non-productive  Indication for Bronchodilator Therapy: (P) Wheezing associated with pulm disorder  Bronchodilator Assessment Score: (P) 10    Aerosolized bronchodilator medication orders have been revised according to the RT Inhaler-Nebulizer Bronchodilator Protocol below. Respiratory Therapist to perform RT Therapy Protocol Assessment initially then follow the protocol. Repeat RT Therapy Protocol Assessment PRN for score 0-3 or on second treatment, BID, and PRN for scores above 3. No Indications - adjust the frequency to every 6 hours PRN wheezing or bronchospasm, if no treatments needed after 48 hours then discontinue using Per Protocol order mode. If indication present, adjust the RT bronchodilator orders based on the Bronchodilator Assessment Score as indicated below. Use Inhaler orders unless patient has one or more of the following: on home nebulizer, not able to hold breath for 10 seconds, is not alert and oriented, cannot activate and use MDI correctly, or respiratory rate 25 breaths per minute or more, then use the equivalent nebulizer order(s) with same Frequency and PRN reasons based on the score. If a patient is on this medication at home then do not decrease Frequency below that used at home.     0-3 - enter or revise RT bronchodilator order(s) to equivalent RT Bronchodilator order with Frequency of every 4 hours PRN for wheezing or increased work of breathing using Per Protocol order mode. 4-6 - enter or revise RT Bronchodilator order(s) to two equivalent RT bronchodilator orders with one order with BID Frequency and one order with Frequency of every 4 hours PRN wheezing or increased work of breathing using Per Protocol order mode. 7-10 - enter or revise RT Bronchodilator order(s) to two equivalent RT bronchodilator orders with one order with TID Frequency and one order with Frequency of every 4 hours PRN wheezing or increased work of breathing using Per Protocol order mode. 11-13 - enter or revise RT Bronchodilator order(s) to one equivalent RT bronchodilator order with QID Frequency and an Albuterol order with Frequency of every 4 hours PRN wheezing or increased work of breathing using Per Protocol order mode. Greater than 13 - enter or revise RT Bronchodilator order(s) to one equivalent RT bronchodilator order with every 4 hours Frequency and an Albuterol order with Frequency of every 2 hours PRN wheezing or increased work of breathing using Per Protocol order mode. RT to enter RT Home Evaluation for COPD & MDI Assessment order using Per Protocol order mode.     Electronically signed by Portia Porter RCP on 2/11/2023 at 7:34 AM

## 2023-02-11 NOTE — PROGRESS NOTES
Patient's rhythm converted to Afib then back to NS with PAC. MD ordered Stat EKG. Patient denies chest pain. Continues to be very anxious. Trouble getting an O2 sensor to work due to constant movement. Remains on 7L.

## 2023-02-11 NOTE — PROGRESS NOTES
RT Inhaler-Nebulizer Bronchodilator Protocol Note    There is a bronchodilator order in the chart from a provider indicating to follow the RT Bronchodilator Protocol and there is an Initiate RT Inhaler-Nebulizer Bronchodilator Protocol order as well (see protocol at bottom of note). CXR Findings:  No results found. The findings from the last RT Protocol Assessment were as follows:   History Pulmonary Disease: Chronic pulmonary disease  Respiratory Pattern: Dyspnea on exertion or RR 21-25 bpm  Breath Sounds: Inspiratory and expiratory or bilateral wheezing and/or rhonchi  Cough: Strong, spontaneous, non-productive  Indication for Bronchodilator Therapy: Wheezing associated with pulm disorder  Bronchodilator Assessment Score: 10    Aerosolized bronchodilator medication orders have been revised according to the RT Inhaler-Nebulizer Bronchodilator Protocol below. Respiratory Therapist to perform RT Therapy Protocol Assessment initially then follow the protocol. Repeat RT Therapy Protocol Assessment PRN for score 0-3 or on second treatment, BID, and PRN for scores above 3. No Indications - adjust the frequency to every 6 hours PRN wheezing or bronchospasm, if no treatments needed after 48 hours then discontinue using Per Protocol order mode. If indication present, adjust the RT bronchodilator orders based on the Bronchodilator Assessment Score as indicated below. Use Inhaler orders unless patient has one or more of the following: on home nebulizer, not able to hold breath for 10 seconds, is not alert and oriented, cannot activate and use MDI correctly, or respiratory rate 25 breaths per minute or more, then use the equivalent nebulizer order(s) with same Frequency and PRN reasons based on the score. If a patient is on this medication at home then do not decrease Frequency below that used at home.     0-3 - enter or revise RT bronchodilator order(s) to equivalent RT Bronchodilator order with Frequency of every 4 hours PRN for wheezing or increased work of breathing using Per Protocol order mode. 4-6 - enter or revise RT Bronchodilator order(s) to two equivalent RT bronchodilator orders with one order with BID Frequency and one order with Frequency of every 4 hours PRN wheezing or increased work of breathing using Per Protocol order mode. 7-10 - enter or revise RT Bronchodilator order(s) to two equivalent RT bronchodilator orders with one order with TID Frequency and one order with Frequency of every 4 hours PRN wheezing or increased work of breathing using Per Protocol order mode. 11-13 - enter or revise RT Bronchodilator order(s) to one equivalent RT bronchodilator order with QID Frequency and an Albuterol order with Frequency of every 4 hours PRN wheezing or increased work of breathing using Per Protocol order mode. Greater than 13 - enter or revise RT Bronchodilator order(s) to one equivalent RT bronchodilator order with every 4 hours Frequency and an Albuterol order with Frequency of every 2 hours PRN wheezing or increased work of breathing using Per Protocol order mode.          Electronically signed by Vladimir Gil RCP on 2/10/2023 at 9:02 PM

## 2023-02-11 NOTE — PROGRESS NOTES
Inpatient Physical Therapy Evaluation    Unit: PCU  Date:  2/11/2023  Patient Name:    Radha Aaron  Admitting diagnosis:  Acute on chronic respiratory failure with hypoxia [facility]) [J96.21]  COVID-19 virus infection [U07.1]  Admit Date:  2/5/2023  Precautions/Restrictions/WB Status/ Lines/ Wounds/ Oxygen: Fall risk, Bed/chair alarm, Lines (IV, Supplemental O2 (7L), and Purewick catheter), Telemetry, Continuous pulse oximetry, and Isolation Precautions: Droplet Plus - COVID      RN cleared patient for activity level. Spo2 to remain above 92%, currently on 7 L-RN does not want to increase at this time  Treatment Time:  15:50-17:00  Treatment Number:  1  Timed Code Treatment Minutes: 60 minutes  Total Treatment Minutes:  70  minutes    Patient Stated Goals for Therapy: \" go home \"          Discharge Recommendations: Continue to Assess  DME needs for discharge: Continue to Assess       Therapy recommendation for EMS Transport: can transport by wheelchair    Therapy recommendations for staff:   Assist of 1 with use of rolling walker (RW) and gait belt for all transfers to/from [facility]  to/from [facility]    History Of Present Illness:     Alisha: \"The patient is a 67 y.o. female with PMH below, presents with SOB/ROMERO, fever, cough, diarrhea, congestion. Pt reports the last few days she has not felt well. She has felt more SOB for the last month and has been seen at OSH a few times. She is normally on 3L O2 but has to turn it up to 5L at home to remain comfortable. She was briefly put on NIV for WOB at [facility] but was able to be weaned off prior to admission. She is currently requiring 4L. She has hx of COPD, CHF, a fib on AC. She has remote Hx of RUL NSCLCa s/p remote wedge resection. She was found to be COVID +. \"    Past Medical History:    Past Medical History []Expand by Default            Diagnosis Date    A-fib ([facility] [address]      Arthritis      Clostridium difficile diarrhea 6/26/15    COPD (chronic obstructive pulmonary disease) ([facility] [address]      COPD exacerbation ([facility] [address]      Emphysema of lung ([facility] [address]      Hyperlipidemia      Hypertension      O2 dependent       2 L/min at night    Pneumonia 2018    Primary cancer of right upper lobe of lung ([facility] [address] 3/11/2020    Rib pain      Sleep apnea       does not use cpap    Small bowel obstruction ([facility] [address]       pt denies         Home Health S4 Level Recommendation:  Level 3 Safety    AM-PAC Mobility Score    AM-PAC Inpatient Mobility Raw Score : 18       Subjective  Patient lying reclined bed with no family present. Pt agreeable to this PT session. Cognition    A&O x4   Able to follow 2 step commands    Pain   Yes  Location: back and chest  Ratin /10  Pain Medicine Status: No request made    Preadmission Environment:   Pt. Lives                                              with friend- has 24hr assist.  Home environment:                            apartment   Steps to enter first floor:                     No steps  Steps to second floor/basement:        N/A  Laundry:                                              HHA completes  Bathroom:                                           walk in shower, grab bars in shower, shower chair , grab bars around toilet, and comfort height toilet  Pt sleeps in a:                                     \"Day bed\"  Equipment owned:                              rollator, home O2 (3.5L) continous, BiPap, nebulizer, pulse ox, and lifealert     Preadmission Status:  Pt. Able to drive: Yes  Pt. Fully independent with ADLs: Yes- requires occasional assistance for LB dressing. (Shoes). Pt. Required assistance from family for: Cleaning, Cooking, and Laundry   Pt. independent for functional transfers and utilized No Device for mobility in home and Rollator out in community  History of falls:            No  Home Health Services:aide 2 hours/day,  3 days/week    Objective  Does this pt have an acute or acute on chronic diagnosis of CHF?  No    Upper Extremity ROM/Strength  Please see OT evaluation. Lower Extremity ROM / Strength   AROM WFL: Yes  ROM limitations:     BLE strength impaired, but not formally assessed with MMT. Lower Extremity Sensation    NT    Coordination and Tone  NT    Balance  Static Sitting:  Good ; SBA  Dynamic Sitting:  Good ; SBA  Comments:     Static Standing: Good - ; CGA  Dynamic Standing: Good - ; CGA  Comments: using RW/gait belt    Posture  Seated: Forward head and neck and Thoracic kyphosis  Standing: Forward head and neck and Thoracic kyphosis    Bed Mobility   Supine to Sit:    SBA  Sit to Supine:   Not Tested  Rolling:   Not Tested  Scooting in sitting: SBA  Scooting in supine:  Not Tested  Bridging:  Not Tested    Transfer Training     Sit to stand:   CGA  Stand to sit:   CGA  Bed to Chair:   CGA with use of gait belt and rolling walker (RW)    Gait gait completed as indicated below  Distance:      4 ft  Deviations (firm surface/linoleum):  decreased myke  Assistive Device Used:    gait belt and rolling walker (RW)  Level of Assist:    CGA  Comment: no LOB    Stair Training deferred, pt does not have stairs in home environment    Therapeutic Exercises Initiated    Exercises in BOLD performed in unit today. Items not bolded are carried forward from prior visits for continuity of the record. *For CHF pt, see ADDENDUM below for therex details. Exercise/Equipment Resistance/Repetitions Other comments                                        Activity Tolerance   Pt completed therapy session with SOB/ROMERO       BP (mmHg) HR (bpm) SpO2 (%) on 7 L Comments   Supine at rest 167/64 92 bpm  96 bpm 91%  93%     Seated at EOB   bpm %     Standing   bpm %     End of session   86 bpm 91%         Positioning Needs   Pt reclined in chair, alarm set, positioned in proper neutral alignment and pressure relief provided. Call light provided and all needs within reach    Other Activities  None.     Patient/Family Education   Pt educated on role of inpatient PT, POC, importance of continued activity, safety awareness, transfer techniques, and calling for assist with mobility. Assessment  Pt seen today for physical therapy Evaluation. Pt demonstrated decreased Activity tolerance as well as decreased independence with Ambulation and Transfers. Patient will benefit from skilled PT to maximize functional mobility while in acute care. Continue to assessf unctional mobility and oxygen requirements before determining discharge plan. Goals : To be met in 3 visits:  1). Independent with LE Ex x 10 reps  2). CHF goal: N/A    To be met in 6 visits:  1). Supine to/from sit: Independent  2). Sit to/from stand: Independent  3). Bed to chair: Independent  4). Gait: Ambulate  125 ft.   with  SBA and use of rolling walker (RW)  5). Tolerate B LE exercises 3 sets of 10-15 reps      Rehabilitation Potential: Good  Strengths for achieving goals include:   Pt motivated, PLOF, and Pt cooperative   Barriers to achieving goals include:    Complexity of condition    Plan    To be seen 3-5 x / week  while in acute care setting for therapeutic exercises, bed mobility, transfers, progressive gait training, balance training, and family/patient education. Signature: Kirill Maria, PT #[id number]     If patient discharges from this facility prior to next visit, this note will serve as the Discharge Summary.

## 2023-02-11 NOTE — PROGRESS NOTES
Occupational Therapy  Inpatient Occupational Therapy Evaluation and Treatment    Unit: PCU  Date:  2/11/2023  Patient Name:    Gissel Curiel  Admitting diagnosis:  Acute on chronic respiratory failure with hypoxia [facility]) [J96.21]  COVID-19 virus infection [U07.1]  Admit Date:  2/5/2023  Precautions/Restrictions/WB Status/ Lines/ Wounds/ Oxygen: Fall risk, Bed/chair alarm, Lines (Supplemental O2 (7L) and Purewick catheter), Telemetry, and Continuous pulse oximetry    Treatment Time:  6670-9955  Treatment Number:  1  Timed Code Treatment Minutes: 63 minutes  Total Treatment Minutes:  53  minutes    Patient Goals for Therapy: \" go home \"          Discharge Recommendations: Continue to assess; Home PRN assist pending progress. DME needs for discharge: Defer to facility         Therapy recommendations for staff:   Assist of 1 with use of Gait Belt and Rolling Walker (RW) for all transfers to/from BSC/chair    History of Present Illness:   Per H&P  \"The patient is a 67 y.o. female with PMH below, presents with SOB/ROMERO, fever, cough, diarrhea, congestion. Pt reports the last few days she has not felt well. She has felt more SOB for the last month and has been seen at OSH a few times. She is normally on 3L O2 but has to turn it up to 5L at home to remain comfortable. She was briefly put on NIV for WOB at [facility] but was able to be weaned off prior to admission. She is currently requiring 4L. She has hx of COPD, CHF, a fib on AC. She has remote Hx of RUL NSCLCa s/p remote wedge resection. She was found to be COVID +.   \"    Past Medical History:    Past Medical History []Expand by Default            Diagnosis Date    A-fib ([facility] [address]      Arthritis      Clostridium difficile diarrhea 6/26/15    COPD (chronic obstructive pulmonary disease) ([facility])      COPD exacerbation ([facility])      Emphysema of lung ([facility])      Hyperlipidemia      Hypertension      O2 dependent       2 L/min at night    Pneumonia 2/5/2018    Primary cancer of right upper lobe of lung ([facility] [address] 3/11/2020    Rib pain      Sleep apnea       does not use cpap    Small bowel obstruction ([facility] [address]       pt denies          Home Health S4 Level Recommendation:  NA    AM-PAC Score: AM-PAC Inpatient Daily Activity Raw Score: 18     Subjective  Patient lying supine in bed with no family present. Pt agreeable to this OT session. Following session pt asked this writer if he could grab her e-cigarette from the bedside table. This therapist explained that he could not do that. Pt replied, \"I'll just walk over there and grab it when you leave\". RN and MD made aware. Cognition:    A&O x4   Able to follow 2 step commands    Pain:   Yes  Location: back and chest  Ratin /10  Pain Medicine Status: No request made    Preadmission Environment:   Pt. Lives     with friend- has 24hr assist.  Home environment:    apartment   Steps to enter first floor:   No steps  Steps to second floor/basement: N/A  Laundry:     HHA completes  Bathroom:     walk in shower, grab bars in shower, shower chair , grab bars around toilet, and comfort height toilet  Pt sleeps in a:    \"Day bed\"  Equipment owned:    rollator, home O2 (3.5L) continous, BiPap, nebulizer, pulse ox, and lifealert    Preadmission Status:  Pt. Able to drive: Yes  Pt. Fully independent with ADLs: Yes- requires occasional assistance for LB dressing. (Shoes). Pt. Required assistance from family for: Cleaning, Cooking, and Laundry   Pt. independent for functional transfers and utilized No Device for mobility in home and Rollator out in community  History of falls: No  Home Health Services:aide 2 hours/day,  3 days/week    Objective:    Upper Extremity ROM:    WFL,  pt able to perform all bed mobility, transfers, and gait without ROM limitation.     Upper Extremity Strength:    BUE strength WFL, but not formally assessed w/ MMT    Upper Extremity Sensation:    NT    Upper Extremity Proprioception:  WNL    Coordination and Tone  WNL    Balance:  Sitting:    SBA- EOB  Standing:    CGA- with RW and gait belt      Bed Mobility:   Supine to Sit:    SBA  Sit to Supine:   Not Tested  Rolling:   Not Tested  Scooting in sitting: SBA  Scooting in supine:  Not Tested    Transfers:    Sit to stand:    CGA  Stand to sit:    CGA  Bed to chair:     CGA, With RW and gait belt  Bed/ chair to standard toilet:  Not Tested  Bed/chair to [facility]:   Not Tested    Pt completed transfer to chair (~4 feet). See PT note for gait analysis. ADLs:  Dressing:      UE:   Not Tested  LE:    Max A - to don/doff socks. Bathing:    UE:  Not Tested  LE:  Not Tested    Eating:   Not Tested    Toileting:  Not Tested    Grooming/hygiene: Not Tested    Activity Tolerance   Pt completed therapy session with SOB/ROMERO     BP (mmHg) HR (bpm) SpO2 (%) on 7 L Comments   Supine at rest 167/64 92 bpm  96 bpm 91%  93%    Seated at EOB  bpm %    Standing  bpm %    End of session  86 bpm 91%      Positioning Needs   Pt up in chair, alarm set, call light provided and all needs within reach . Ther Ex / Activities Initiated:   N/A    Patient/Family Education   Pt educated on role of inpatient OT, plan of care, importance of continued activity, safety awareness, pursed lip breathing, pacing activity, and calling for assist with mobility. Assessment:    Pt seen for Occupational therapy evaluation in acute care setting. Pt demonstrated decreased Activity tolerance, ADLs, Safety Awareness, and Transfers. Pt functioning below baseline and will likely benefit from skilled occupational therapy services to maximize safety and independence. Continue to assess pending progress. Goal(s) : To be met in 3 Visits:  1). Bed to toilet/BSC:        SBA  2.) Pt will complete 3/3 CHF goals      N/A    To be met in 5 Visits:  1). Supine to/from Sit in preporation for ADL task: Independent  2.) Toileting         Supervision  3.) Grooming        Supervision  4).  Upper Body Dressing: Supervision  5). Lower Body Dressing:       SBA  6). Pt to demonstrate UE therapeutic exs x 15 reps with minimal cues    Rehabilitation Potential: Good  Strengths for achieving goals include: PLOF and Family Support   Barriers to achieving goals include:  Complexity of condition    Plan: To be seen 3-5 x/wk while in acute care setting for therapeutic exercises, bed mobility, transfers, family/patient education, ADL/IADL retraining, and energy conservation training. Signature: Rodriguez Moore.  Jurgen Carson OTR/L #[id number]        If patient discharges from this facility prior to next visit, this note will serve as the Discharge Summary

## 2023-02-12 PROBLEM — I47.1 SVT (SUPRAVENTRICULAR TACHYCARDIA) (HCC): Status: ACTIVE | Noted: 2023-02-12

## 2023-02-12 PROBLEM — U07.1 COVID-19 VIRUS INFECTION: Status: ACTIVE | Noted: 2023-02-12

## 2023-02-12 PROBLEM — I47.10 SVT (SUPRAVENTRICULAR TACHYCARDIA): Status: ACTIVE | Noted: 2023-01-01

## 2023-02-12 LAB
ANION GAP SERPL CALCULATED.3IONS-SCNC: 6 MMOL/L (ref 3–16)
BUN BLDV-MCNC: 30 MG/DL (ref 7–20)
CALCIUM SERPL-MCNC: 9.1 MG/DL (ref 8.3–10.6)
CHLORIDE BLD-SCNC: 89 MMOL/L (ref 99–110)
CO2: 43 MMOL/L (ref 21–32)
CREAT SERPL-MCNC: 0.6 MG/DL (ref 0.6–1.2)
EKG ATRIAL RATE: 122 BPM
EKG ATRIAL RATE: 86 BPM
EKG DIAGNOSIS: NORMAL
EKG DIAGNOSIS: NORMAL
EKG P AXIS: 37 DEGREES
EKG P AXIS: 44 DEGREES
EKG P-R INTERVAL: 152 MS
EKG P-R INTERVAL: 156 MS
EKG Q-T INTERVAL: 396 MS
EKG Q-T INTERVAL: 402 MS
EKG QRS DURATION: 148 MS
EKG QRS DURATION: 148 MS
EKG QTC CALCULATION (BAZETT): 481 MS
EKG QTC CALCULATION (BAZETT): 538 MS
EKG R AXIS: 61 DEGREES
EKG R AXIS: 88 DEGREES
EKG T AXIS: 30 DEGREES
EKG T AXIS: 54 DEGREES
EKG VENTRICULAR RATE: 111 BPM
EKG VENTRICULAR RATE: 86 BPM
GFR SERPL CREATININE-BSD FRML MDRD: >60 ML/MIN/{1.73_M2}
GLUCOSE BLD-MCNC: 141 MG/DL (ref 70–99)
HCT VFR BLD CALC: 33.3 % (ref 36–48)
HEMOGLOBIN: 11.1 G/DL (ref 12–16)
MAGNESIUM: 2.1 MG/DL (ref 1.8–2.4)
MCH RBC QN AUTO: 30.9 PG (ref 26–34)
MCHC RBC AUTO-ENTMCNC: 33.3 G/DL (ref 31–36)
MCV RBC AUTO: 92.9 FL (ref 80–100)
PDW BLD-RTO: 13.7 % (ref 12.4–15.4)
PLATELET # BLD: 237 K/UL (ref 135–450)
PMV BLD AUTO: 8 FL (ref 5–10.5)
POTASSIUM REFLEX MAGNESIUM: 3.8 MMOL/L (ref 3.5–5.1)
RBC # BLD: 3.59 M/UL (ref 4–5.2)
SODIUM BLD-SCNC: 138 MMOL/L (ref 136–145)
WBC # BLD: 7.7 K/UL (ref 4–11)

## 2023-02-12 PROCEDURE — 99233 SBSQ HOSP IP/OBS HIGH 50: CPT | Performed by: INTERNAL MEDICINE

## 2023-02-12 PROCEDURE — 2500000003 HC RX 250 WO HCPCS: Performed by: INTERNAL MEDICINE

## 2023-02-12 PROCEDURE — 93010 ELECTROCARDIOGRAM REPORT: CPT | Performed by: INTERNAL MEDICINE

## 2023-02-12 PROCEDURE — 6370000000 HC RX 637 (ALT 250 FOR IP): Performed by: INTERNAL MEDICINE

## 2023-02-12 PROCEDURE — 2060000000 HC ICU INTERMEDIATE R&B

## 2023-02-12 PROCEDURE — 85027 COMPLETE CBC AUTOMATED: CPT

## 2023-02-12 PROCEDURE — 94640 AIRWAY INHALATION TREATMENT: CPT

## 2023-02-12 PROCEDURE — 6360000002 HC RX W HCPCS: Performed by: INTERNAL MEDICINE

## 2023-02-12 PROCEDURE — 36415 COLL VENOUS BLD VENIPUNCTURE: CPT

## 2023-02-12 PROCEDURE — 2580000003 HC RX 258: Performed by: INTERNAL MEDICINE

## 2023-02-12 PROCEDURE — 83735 ASSAY OF MAGNESIUM: CPT

## 2023-02-12 PROCEDURE — 94761 N-INVAS EAR/PLS OXIMETRY MLT: CPT

## 2023-02-12 PROCEDURE — 99223 1ST HOSP IP/OBS HIGH 75: CPT | Performed by: INTERNAL MEDICINE

## 2023-02-12 PROCEDURE — 2700000000 HC OXYGEN THERAPY PER DAY

## 2023-02-12 PROCEDURE — 80048 BASIC METABOLIC PNL TOTAL CA: CPT

## 2023-02-12 PROCEDURE — 93005 ELECTROCARDIOGRAM TRACING: CPT | Performed by: INTERNAL MEDICINE

## 2023-02-12 RX ORDER — LEVALBUTEROL TARTRATE 45 UG/1
2 AEROSOL, METERED ORAL 4 TIMES DAILY
Status: DISCONTINUED | OUTPATIENT
Start: 2023-02-12 | End: 2023-02-15 | Stop reason: HOSPADM

## 2023-02-12 RX ORDER — QUETIAPINE FUMARATE 200 MG/1
400 TABLET, FILM COATED ORAL NIGHTLY
Status: DISCONTINUED | OUTPATIENT
Start: 2023-02-12 | End: 2023-02-12

## 2023-02-12 RX ORDER — VENLAFAXINE HYDROCHLORIDE 75 MG/1
225 CAPSULE, EXTENDED RELEASE ORAL DAILY
Status: DISCONTINUED | OUTPATIENT
Start: 2023-02-12 | End: 2023-02-12

## 2023-02-12 RX ORDER — METOPROLOL TARTRATE 5 MG/5ML
5 INJECTION INTRAVENOUS ONCE
Status: COMPLETED | OUTPATIENT
Start: 2023-02-12 | End: 2023-02-12

## 2023-02-12 RX ADMIN — LORAZEPAM 1 MG: 1 TABLET ORAL at 18:03

## 2023-02-12 RX ADMIN — GABAPENTIN 400 MG: 400 CAPSULE ORAL at 08:24

## 2023-02-12 RX ADMIN — METOPROLOL TARTRATE 25 MG: 25 TABLET, FILM COATED ORAL at 14:50

## 2023-02-12 RX ADMIN — PANTOPRAZOLE SODIUM 40 MG: 40 TABLET, DELAYED RELEASE ORAL at 05:37

## 2023-02-12 RX ADMIN — SODIUM CHLORIDE, PRESERVATIVE FREE 10 ML: 5 INJECTION INTRAVENOUS at 08:24

## 2023-02-12 RX ADMIN — SODIUM CHLORIDE, PRESERVATIVE FREE 10 ML: 5 INJECTION INTRAVENOUS at 20:54

## 2023-02-12 RX ADMIN — APIXABAN 5 MG: 5 TABLET, FILM COATED ORAL at 20:53

## 2023-02-12 RX ADMIN — METOPROLOL TARTRATE 5 MG: 5 INJECTION, SOLUTION INTRAVENOUS at 10:15

## 2023-02-12 RX ADMIN — PRAVASTATIN SODIUM 10 MG: 10 TABLET ORAL at 18:01

## 2023-02-12 RX ADMIN — LORAZEPAM 1 MG: 1 TABLET ORAL at 08:32

## 2023-02-12 RX ADMIN — METHYLPREDNISOLONE SODIUM SUCCINATE 40 MG: 40 INJECTION, POWDER, FOR SOLUTION INTRAMUSCULAR; INTRAVENOUS at 02:40

## 2023-02-12 RX ADMIN — QUETIAPINE FUMARATE 400 MG: 200 TABLET ORAL at 20:53

## 2023-02-12 RX ADMIN — IPRATROPIUM BROMIDE AND ALBUTEROL SULFATE 1 AMPULE: 2.5; .5 SOLUTION RESPIRATORY (INHALATION) at 11:11

## 2023-02-12 RX ADMIN — GABAPENTIN 400 MG: 400 CAPSULE ORAL at 20:53

## 2023-02-12 RX ADMIN — VENLAFAXINE HYDROCHLORIDE 225 MG: 75 CAPSULE, EXTENDED RELEASE ORAL at 08:23

## 2023-02-12 RX ADMIN — Medication 2 PUFF: at 15:25

## 2023-02-12 RX ADMIN — APIXABAN 5 MG: 5 TABLET, FILM COATED ORAL at 08:24

## 2023-02-12 RX ADMIN — LORAZEPAM 1 MG: 1 TABLET ORAL at 22:23

## 2023-02-12 RX ADMIN — Medication 2 PUFF: at 20:33

## 2023-02-12 RX ADMIN — ROPINIROLE HYDROCHLORIDE 4 MG: 2 TABLET, FILM COATED ORAL at 08:32

## 2023-02-12 RX ADMIN — MULTIPLE VITAMINS W/ MINERALS TAB 1 TABLET: TAB at 08:24

## 2023-02-12 RX ADMIN — GUAIFENESIN 600 MG: 600 TABLET, EXTENDED RELEASE ORAL at 20:53

## 2023-02-12 RX ADMIN — METOPROLOL TARTRATE 25 MG: 25 TABLET, FILM COATED ORAL at 20:53

## 2023-02-12 RX ADMIN — FERROUS SULFATE TAB 325 MG (65 MG ELEMENTAL FE) 325 MG: 325 (65 FE) TAB at 08:24

## 2023-02-12 RX ADMIN — TORSEMIDE 20 MG: 20 TABLET ORAL at 08:23

## 2023-02-12 RX ADMIN — IPRATROPIUM BROMIDE AND ALBUTEROL SULFATE 1 AMPULE: 2.5; .5 SOLUTION RESPIRATORY (INHALATION) at 08:04

## 2023-02-12 RX ADMIN — ROPINIROLE HYDROCHLORIDE 4 MG: 2 TABLET, FILM COATED ORAL at 20:53

## 2023-02-12 RX ADMIN — GUAIFENESIN 600 MG: 600 TABLET, EXTENDED RELEASE ORAL at 08:24

## 2023-02-12 RX ADMIN — ROPINIROLE HYDROCHLORIDE 4 MG: 2 TABLET, FILM COATED ORAL at 14:50

## 2023-02-12 RX ADMIN — DILTIAZEM HYDROCHLORIDE 120 MG: 120 CAPSULE, EXTENDED RELEASE ORAL at 08:23

## 2023-02-12 NOTE — PROGRESS NOTES
Patient bed alarm set. Ambulating in room alone. Educated patient. Patient continues to get out of bed and transfer before staff can put PPE on to enter room. Call light in reach.

## 2023-02-12 NOTE — PROGRESS NOTES
02/11/23 2100   RT Protocol   History Pulmonary Disease 2   Respiratory pattern 2   Breath sounds 6   Cough 0   Indications for Bronchodilator Therapy Wheezing associated with pulm disorder   Bronchodilator Assessment Score 10   RT Inhaler-Nebulizer Bronchodilator Protocol Note    There is a bronchodilator order in the chart from a provider indicating to follow the RT Bronchodilator Protocol and there is an Initiate RT Inhaler-Nebulizer Bronchodilator Protocol order as well (see protocol at bottom of note). CXR Findings:  No results found. The findings from the last RT Protocol Assessment were as follows:   History Pulmonary Disease: Chronic pulmonary disease  Respiratory Pattern: Dyspnea on exertion or RR 21-25 bpm  Breath Sounds: Inspiratory and expiratory or bilateral wheezing and/or rhonchi  Cough: Strong, spontaneous, non-productive  Indication for Bronchodilator Therapy: Wheezing associated with pulm disorder  Bronchodilator Assessment Score: 10    Aerosolized bronchodilator medication orders have been revised according to the RT Inhaler-Nebulizer Bronchodilator Protocol below. Respiratory Therapist to perform RT Therapy Protocol Assessment initially then follow the protocol. Repeat RT Therapy Protocol Assessment PRN for score 0-3 or on second treatment, BID, and PRN for scores above 3. No Indications - adjust the frequency to every 6 hours PRN wheezing or bronchospasm, if no treatments needed after 48 hours then discontinue using Per Protocol order mode. If indication present, adjust the RT bronchodilator orders based on the Bronchodilator Assessment Score as indicated below.   Use Inhaler orders unless patient has one or more of the following: on home nebulizer, not able to hold breath for 10 seconds, is not alert and oriented, cannot activate and use MDI correctly, or respiratory rate 25 breaths per minute or more, then use the equivalent nebulizer order(s) with same Frequency and PRN reasons based on the score. If a patient is on this medication at home then do not decrease Frequency below that used at home. 0-3 - enter or revise RT bronchodilator order(s) to equivalent RT Bronchodilator order with Frequency of every 4 hours PRN for wheezing or increased work of breathing using Per Protocol order mode. 4-6 - enter or revise RT Bronchodilator order(s) to two equivalent RT bronchodilator orders with one order with BID Frequency and one order with Frequency of every 4 hours PRN wheezing or increased work of breathing using Per Protocol order mode. 7-10 - enter or revise RT Bronchodilator order(s) to two equivalent RT bronchodilator orders with one order with TID Frequency and one order with Frequency of every 4 hours PRN wheezing or increased work of breathing using Per Protocol order mode. 11-13 - enter or revise RT Bronchodilator order(s) to one equivalent RT bronchodilator order with QID Frequency and an Albuterol order with Frequency of every 4 hours PRN wheezing or increased work of breathing using Per Protocol order mode. Greater than 13 - enter or revise RT Bronchodilator order(s) to one equivalent RT bronchodilator order with every 4 hours Frequency and an Albuterol order with Frequency of every 2 hours PRN wheezing or increased work of breathing using Per Protocol order mode.        Electronically signed by Guillermina Live RCP on 2/11/2023 at 9:17 PM

## 2023-02-12 NOTE — PROGRESS NOTES
Consult has been called to Cardiology at [phone] [facility] aware  Jeff Paget  2/12/2023

## 2023-02-12 NOTE — PROGRESS NOTES
IM Progress Note    Admit Date:  2/5/2023      Patient admitted with COVID-19 infection and acute on chronic hypoxic respiratory failure. Worsening hypoxemia - O2 requirement up to 9 L. Weaned down to 7 L. She has remained on O2 7 L for the last few days. Persistent wheezing. On 2/11-we figured out that patient has been vaping in her room. I spoke with the patient at length and patient now admits that she has been sneaking around and vaping quite often in the room . I discussed with her about serious side effects of vaping related lung injury . I believe most of her symptoms are related to vaping . After a long discussion- patient was okay about us taking her vape pen away and getting it locked up . RN was at bedside . Subjective:    Ms. Milagros Ervin is a little better today , shortness of breath and wheezing has improved . O2 requirement weaned down to 6 L . Charlene Cooper Continued on BiPAP nightly and as needed . Episodes of tachyarrhythmia today -telemetry with SVT . Patient has known history of A-fib . I gave IV Lopressor 1 dose and this is helped her tachycardia . Patient having nicotine withdrawal symptoms and increasing anxiety , given Ativan and this is helping her symptoms . Patient is currently awake alert and oriented looks fatigued but in no distress . Charlene Cooper CODE STATUS discussed with patient again . patient has been mentioning DNR related to the RN . Charlene Cooper Patient clearly states now that in the event of a cold she does not want to be resuscitated. .Does not want to ever be intubated or placed on a ventilator . does not want CPR or shocking in the event of a CODE BLUE .         Objective:   BP (!) 166/76   Pulse 66   Temp 98.2 °F (36.8 °C) (Oral)   Resp 18   Wt 227 lb 8 oz (103.2 kg)   SpO2 94%   BMI 36.72 kg/m²     Intake/Output Summary (Last 24 hours) at 2/12/2023 1440  Last data filed at 2/12/2023 1409  Gross per 24 hour   Intake 540 ml   Output 1250 ml   Net -710 ml Physical Exam:  GEN:        A&Ox3, appears ill. Mild respiratory distress, less dyspneic and decreased wheezing today  HEENT:   NC/AT,EOMI, MMM, no erythema/exudates or visible masses. CVS:        Normal S1,S2. RRR. Without M/G/R.   LUNG:     Diminished bilat. Bilateral + wheezes present  ABD:        Soft, ND/NT, BS+ x4. Without G/R.  EXT:        2+ pulses, no c/c. BLE trace edema. Brisk cap refill. PSY:        Thought process intact, affect appropriate. RAMIRO:        CN III-XII grossly intact. Moves all 4 spontaneously. Sensory grossly intact. SKIN:       No rash or lesions on visible skin. Medications:   Scheduled Meds:   metoprolol tartrate  25 mg Oral BID    levalbuterol  2 puff Inhalation 4x daily    nicotine  1 patch TransDERmal Daily    rOPINIRole  4 mg Oral TID    gabapentin  400 mg Oral BID    guaiFENesin  600 mg Oral BID    apixaban  5 mg Oral BID    dilTIAZem  120 mg Oral Daily    ferrous sulfate  325 mg Oral Daily with breakfast    pantoprazole  40 mg Oral QAM AC    therapeutic multivitamin-minerals  1 tablet Oral Daily    pravastatin  10 mg Oral QPM    QUEtiapine  400 mg Oral Nightly    torsemide  20 mg Oral Daily    sodium chloride flush  5-40 mL IntraVENous 2 times per day    venlafaxine  225 mg Oral Daily       Continuous Infusions:   sodium chloride         Data:  CBC:   Recent Labs     02/11/23  0434 02/12/23 0426   WBC 8.8 7.7   RBC 3.63* 3.59*   HGB 11.2* 11.1*   HCT 34.2* 33.3*   MCV 94.0 92.9   RDW 14.0 13.7    237       BMP:   Recent Labs     02/11/23 0434 02/12/23 0426    138   K 3.9 3.8   CL 90* 89*   CO2 41* 43*   BUN 22* 30*   CREATININE <0.5* 0.6       BNP: No results for input(s): BNP in the last 72 hours. PT/INR:   No results for input(s): PROTIME, INR in the last 72 hours. APTT:   No results for input(s): APTT in the last 72 hours. CARDIAC ENZYMES:   No results for input(s): CKMB, CKMBINDEX, TROPONINI in the last 72 hours.     Invalid input(s): CKTOTAL;3    FASTING LIPID PANEL:  Lab Results   Component Value Date    CHOL 180 11/02/2015    HDL 64 (H) 02/04/2021    TRIG 201 (H) 02/10/2018     LIVER PROFILE:   No results for input(s): AST, ALT, ALB, BILIDIR, BILITOT, ALKPHOS in the last 72 hours. Cultures  COVID-19 detected  Rapid influenza A and B neg  Blood cultures no growth to date    Radiology  XR CHEST PORTABLE   Final Result   Chronic airspace changes have slightly progressed in the right lung suspected   to represent pleural thickening and pleural fluid along with edema versus   atelectasis. Lesser pattern of interstitial change slightly increased on the   left. A superimposed viral pattern of pneumonitis may be considered   clinically. CT CHEST HIGH RESOLUTION    (Results Pending)       EKG reviewed. Shows normal sinus rhythm with right bundle branch block. .. Unchanged from before    Assessment:  Principal Problem:    Acute on chronic respiratory failure with hypoxia (HCC)  Active Problems:    COVID-19 virus infection    SVT (supraventricular tachycardia) (HCC)  Resolved Problems:    * No resolved hospital problems. *      Plan:    # Acute on chronic respiratory failure with hypoxia  -  related to COVID PNA, aeCOPD, and WBPGO-x-fxmgtkmwd/vaping associated lung injury  - Supplemental O2 to maintain SPO2 ? 92%, continuous pulse ox.    -Persistent hypoxemia . O2 4 L ->9 L-> 7 L.  continued to require 7 L of oxygen for several days-> weaned down to 6 L now. Patient normally on 2 L O2 at home. Wean as tolerated. -Vape pen has been removed from her room. Patient admitted that she was vaping intermittently in her room for the last several days  -Patient uses BiPAP at home - continue.  - Pulmonary following    #Tobacco abuse  Vaping abuse  -With her vaping history and persistent dyspnea and wheezing , and imaging showing pneumonitis pattern - I am concerned about vaping associated lung injury.    We will check high-resolution chest CT-this will be done tomorrow. may need bronch with biopsy  -Continue IV Solu-Medrol. Vape pen has been removed from her room. Added  Ativan for anxiety and withdrawal symptoms   Added  nicotine patch. # Sepsis   - POA (temp, RR;) Suspect 2/2 COVID. No clear sign of bacterial PNA. F/u cx. # COVID PNA  - Dx on 2/5.  - IV tociluzimab given 2/6  - pulm cx  -Droplet plus precautions  - Continue steroids and inhaled bronchodilators    # aeCOPD  -IV solumedrol, IV ceftriaxone, PRN/LIZ intensive NEB therapy. Change DuoNeb nebulizer to Xopenex. check respiratory culture    # PSVT  #History of paroxysmal A-fib on anticoagulation  -Patient on Cardizem CD1 20 mg daily and Eliquis-continued  -Lopressor added for PSVT episode. Keep on low-dose and monitor  -Cardiology consulted  -Change DuoNeb nebulizers to Xopenex    #Chronic diastolic CHF  -Continue torsemide    # Hx NSCLA lung cancer s/p remote RUL lobectomy. # Prolonged QTc, 514 ms, avoid QT prolonging agents as able. # Altered mentation  Acute toxic encephalopathy-not present on admission.  - on  2/7 -patient was noted to be very lethargic-  gabapentin dose held. decreased her dose from 800 mg 4 times daily to 400 mg twice daily and monitor closely . she is awake alert and oriented now and her mental status is clear. # Anxiety, restless legs. On Xanax as needed; will switch to Juanjose Comfort is helping her better.  -Continue Seroquel and Effexor as at home    #Weakness  -Consult PT OT     DVT Prophylaxis: Eliquis  ADULT DIET;  Regular     DNR-CCA    2/12 CODE STATUS addressed with patient and changed to DNR Ailin Flores MD   2/12/2023 2:40 PM

## 2023-02-12 NOTE — CONSULTS
[facility]  Cardiac Consult     Referring Provider:  [facility]         History of Present Illness:  66 y/o female with h/o paroxysmal atrial fib on chronic anticoagulation with eliquis admitted with COVID who we were asked to see for SVT. She was admitted with dyspnea and hypoxia and found to have COVID. She has had significant hypoxia and still remains on 6 liters O2. Today she began having episodes of SVT. She was eventually given 5 mg lopressor iv with improvement. She says she could feel her heart pounding with this. Past Medical History:   has a past medical history of A-fib ([address] Arthritis, Clostridium difficile diarrhea, COPD (chronic obstructive pulmonary disease) ([address] COPD exacerbation ([address] Emphysema of lung ([address] Hyperlipidemia, Hypertension, O2 dependent, Pneumonia, Primary cancer of right upper lobe of lung ([address] Rib pain, Sleep apnea, and Small bowel obstruction ([address] Surgical History:   has a past surgical history that includes Rotator cuff repair (Bilateral); Cholecystectomy; Ankle arthroscopy; Hysterectomy; Carpal tunnel release (Left); Appendectomy; Tonsillectomy; Thoracoscopy (Right, 3/11/2020); bronchoscopy (N/A, 3/15/2020); bronchoscopy (3/17/2020); Colonoscopy; Upper gastrointestinal endoscopy (N/A, 10/22/2021); and Colonoscopy (N/A, 10/22/2021). Social History:   reports that she quit smoking about 9 months ago. Her smoking use included cigarettes. She has a 100.00 pack-year smoking history. She has never used smokeless tobacco. She reports that she does not drink alcohol and does not use drugs. Family History:  family history includes Cancer in her mother; Heart Failure in her father.      Medications:   nicotine  1 patch TransDERmal Daily    rOPINIRole  4 mg Oral TID    ipratropium-albuterol  1 ampule Inhalation 4x daily    gabapentin  400 mg Oral BID    guaiFENesin  600 mg Oral BID    apixaban  5 mg Oral BID    dilTIAZem  120 mg Oral Daily ferrous sulfate  325 mg Oral Daily with breakfast    pantoprazole  40 mg Oral QAM AC    therapeutic multivitamin-minerals  1 tablet Oral Daily    pravastatin  10 mg Oral QPM    QUEtiapine  400 mg Oral Nightly    torsemide  20 mg Oral Daily    sodium chloride flush  5-40 mL IntraVENous 2 times per day    venlafaxine  225 mg Oral Daily         Allergies:  Buspirone, Codeine, Doxycycline, Lisinopril, and Penicillins     [x] Medications and dosages reviewed. ROS:  [x]Full ROS obtained and negative except as mentioned in HPI      Physical Examination:    Vitals:    02/12/23 0816   BP: (!) 166/76   Pulse: 50   Resp: 20   Temp: 98.2 °F (36.8 °C)   SpO2: 94%        GENERAL: Acute and chronically ill appearing female mildly dyspneic  NEUROLOGICAL: Alert and oriented  PSYCH: Calm affect  SKIN: Warm and dry, No visible rash,   EYES: Pupils equal and round, Sclera non-icteric,   HENT:  External ears and nose unremarkable, mucus membranes moist  MUSCULOSKELETAL: Normal cephalic, neck supple  CAROTID: Normal upstroke, no bruits  CARDIAC: JVP normal, Normal PMI, regular rate and rhythm, normal S1S2, no murmur, rub, or gallop  RESPIRATORY: Normal respiratory effort, scattered rales, mild expiratory wheeze  EXTREMITIES: No edema  GASTROINTESTINAL: normal bowel sounds, soft, non-tender, No hepatomegaly     All testing and labs listed below were personally reviewed. ECHO 2016  Normal global wall motion. Visual EF is 55-60%   Doppler evidence of grade I (impaired) diastolic dysfunction. Calculated EF 52%. Left atrial cavity is mildly dilated. Right ventricle cavity is mildly dilated. Structurally normal mitral valve with mild regurgitation. Structurally normal tricuspid valve with trace regurgitation. IVC is dilated with respiratory response of <50%. CXR-personally reviewed-Rotated, chronic airspace changes  FINDINGS:   The heart is enlarged. Normal mediastinum.   Chronic pattern of pleural   thickening and airspace change in the right lung. Scattered interstitial   opacities have increased in the left lung with a probable small pleural   effusion. No skeletal finding. Impression   Chronic airspace changes have slightly progressed in the right lung suspected   to represent pleural thickening and pleural fluid along with edema versus   atelectasis. Lesser pattern of interstitial change slightly increased on the   left. A superimposed viral pattern of pneumonitis may be considered   clinically. LABS  WBC7.7K/uL RBC3.59 Low M/uL Ypgemfslyd10.1 Low g/dL Mssaquekdo25.3 Low % MCV92.9fL MCH30.9pg MCHC33.3g/dL RDW13.7% Ppyrfolhv856N/uL     Nvonkf856gbow/L Potassium reflex Magnesium3.8mmol/L Snqolota30 Low mmol/L  High mmol/L Anion Gap6 Dajgcyn409 High mg/dL BUN30 High mg/dL Creatinine0.6mg/dL     EKG:  Sinus, frequent SVT    CARDIAC CATH 8/2022-[facility]  Normal cors    ECHO [facility] 8/2022  EF=50-55%, apical hypokinesis. ASSESSMENT:    Respiratory Failure:  Acute on chronic respiratory failure with underlying COPD  Severe exacerbation likely due to [address] with high O2 requirement  Continue to follow  Continue Duoneb    SVT:  Episodes SVT  Likely exacerbated by stress of illness.   Resolved with iv lopressor  Start po lopressor 25 BID  Continue cardizem  Will not tolerate high dose lopressor with COPD    Plan:  Add lopressor 25 BID  Follow tele and exam for wheezing    Thank you for allowing me to participate in the care of this individual.      Mili Charles M.D., [facility] [place]

## 2023-02-12 NOTE — PROGRESS NOTES
Pulmonary Progress Note  CC: COVID, COPD    Subjective:  P2 weaned some, finally feels a little better    EXAM: BP (!) 166/76   Pulse 50   Temp 98.2 °F (36.8 °C) (Oral)   Resp 20   Wt 227 lb 8 oz (103.2 kg)   SpO2 94%   BMI 36.72 kg/m²  on 4.5  Constitutional:  No acute distress. Eyes: PERRL. Conjunctivae anicteric. ENT: Normal nose. Normal tongue. Neck:  Trachea is midline. No thyroid tenderness. Respiratory: No accessory muscle usage. decreased breath sounds. No wheezes. + rales. No Rhonchi. Cardiovascular: Normal S1S2. No digit clubbing. No digit cyanosis. No LE edema. Psychiatric: No anxiety or Agitation. Alert and Oriented to person, place and time.     Scheduled Meds:   nicotine  1 patch TransDERmal Daily    rOPINIRole  4 mg Oral TID    [Held by provider] ipratropium-albuterol  1 ampule Inhalation 4x daily    gabapentin  400 mg Oral BID    guaiFENesin  600 mg Oral BID    apixaban  5 mg Oral BID    dilTIAZem  120 mg Oral Daily    ferrous sulfate  325 mg Oral Daily with breakfast    pantoprazole  40 mg Oral QAM AC    therapeutic multivitamin-minerals  1 tablet Oral Daily    pravastatin  10 mg Oral QPM    QUEtiapine  400 mg Oral Nightly    torsemide  20 mg Oral Daily    sodium chloride flush  5-40 mL IntraVENous 2 times per day    venlafaxine  225 mg Oral Daily     Continuous Infusions:   sodium chloride       PRN Meds:  LORazepam, HYDROcodone-acetaminophen, guaiFENesin-dextromethorphan, sodium chloride flush, sodium chloride, polyethylene glycol, acetaminophen **OR** acetaminophen, prochlorperazine, albuterol sulfate HFA, magnesium sulfate    Labs:  CBC:   Recent Labs     02/11/23  0434 02/12/23  0426   WBC 8.8 7.7   HGB 11.2* 11.1*   HCT 34.2* 33.3*   MCV 94.0 92.9    237       BMP:   Recent Labs     02/11/23  0434 02/12/23  0426    138   K 3.9 3.8   CL 90* 89*   CO2 41* 43*   BUN 22* 30*   CREATININE <0.5* 0.6       Chest imaging was reviewed by me and showed 2/5/23 CXR  Chronic airspace changes have slightly progressed in the right lung suspected   to represent pleural thickening and pleural fluid along with edema versus   atelectasis. Lesser pattern of interstitial change slightly increased on the   left. A superimposed viral pattern of pneumonitis may be considered   clinically. ASSESSMENT:  Acute on chronic hypoxic respiratory failure   COPD exacerbation  COVID 19 disease  MEG on home Bipap     PLAN:  Droplet Plus Airborne Precautions   Bipap PRN and QHS - pt told she can use home unit if available  Supplemental oxygen to maintain SaO2 >92%; wean as tolerated  Intensive inhaled bronchodilator therapy.   Decadron 6mg PO daily  Completed a Zpack  On apixiban  PT/OT, out of bed

## 2023-02-12 NOTE — PLAN OF CARE
Problem: Pain  Goal: Verbalizes/displays adequate comfort level or baseline comfort level  2/11/2023 2118 by Phu Millan RN  Outcome: Progressing     Problem: Safety - Adult  Goal: Free from fall injury  2/11/2023 2118 by Phu Millan RN  Outcome: Progressing     Problem: Skin/Tissue Integrity  Goal: Absence of new skin breakdown  Description: 1. Monitor for areas of redness and/or skin breakdown  2. Assess vascular access sites hourly  3. Every 4-6 hours minimum:  Change oxygen saturation probe site  4. Every 4-6 hours:  If on nasal continuous positive airway pressure, respiratory therapy assess nares and determine need for appliance change or resting period. 2/11/2023 2118 by Phu Millan RN  Outcome: Progressing   Will continue to monitor and care per plan protocol.

## 2023-02-12 NOTE — PROGRESS NOTES
RT Inhaler-Nebulizer Bronchodilator Protocol Note    There is a bronchodilator order in the chart from a provider indicating to follow the RT Bronchodilator Protocol and there is an Initiate RT Inhaler-Nebulizer Bronchodilator Protocol order as well (see protocol at bottom of note). CXR Findings:  No results found. The findings from the last RT Protocol Assessment were as follows:   History Pulmonary Disease: (P) Chronic pulmonary disease  Respiratory Pattern: (P) Dyspnea on exertion or RR 21-25 bpm  Breath Sounds: (P) Intermittent or unilateral wheezes  Cough: (P) Strong, spontaneous, non-productive  Indication for Bronchodilator Therapy: (P) Wheezing associated with pulm disorder  Bronchodilator Assessment Score: (P) 8    Aerosolized bronchodilator medication orders have been revised according to the RT Inhaler-Nebulizer Bronchodilator Protocol below. Respiratory Therapist to perform RT Therapy Protocol Assessment initially then follow the protocol. Repeat RT Therapy Protocol Assessment PRN for score 0-3 or on second treatment, BID, and PRN for scores above 3. No Indications - adjust the frequency to every 6 hours PRN wheezing or bronchospasm, if no treatments needed after 48 hours then discontinue using Per Protocol order mode. If indication present, adjust the RT bronchodilator orders based on the Bronchodilator Assessment Score as indicated below. Use Inhaler orders unless patient has one or more of the following: on home nebulizer, not able to hold breath for 10 seconds, is not alert and oriented, cannot activate and use MDI correctly, or respiratory rate 25 breaths per minute or more, then use the equivalent nebulizer order(s) with same Frequency and PRN reasons based on the score. If a patient is on this medication at home then do not decrease Frequency below that used at home.     0-3 - enter or revise RT bronchodilator order(s) to equivalent RT Bronchodilator order with Frequency of every 4 hours PRN for wheezing or increased work of breathing using Per Protocol order mode. 4-6 - enter or revise RT Bronchodilator order(s) to two equivalent RT bronchodilator orders with one order with BID Frequency and one order with Frequency of every 4 hours PRN wheezing or increased work of breathing using Per Protocol order mode. 7-10 - enter or revise RT Bronchodilator order(s) to two equivalent RT bronchodilator orders with one order with TID Frequency and one order with Frequency of every 4 hours PRN wheezing or increased work of breathing using Per Protocol order mode. 11-13 - enter or revise RT Bronchodilator order(s) to one equivalent RT bronchodilator order with QID Frequency and an Albuterol order with Frequency of every 4 hours PRN wheezing or increased work of breathing using Per Protocol order mode. Greater than 13 - enter or revise RT Bronchodilator order(s) to one equivalent RT bronchodilator order with every 4 hours Frequency and an Albuterol order with Frequency of every 2 hours PRN wheezing or increased work of breathing using Per Protocol order mode. RT to enter RT Home Evaluation for COPD & MDI Assessment order using Per Protocol order mode.     Electronically signed by Ortiz Kelly RCP on 2/12/2023 at 8:09 AM

## 2023-02-13 ENCOUNTER — APPOINTMENT (OUTPATIENT)
Dept: CT IMAGING | Age: 73
DRG: 205 | End: 2023-02-13
Payer: MEDICARE

## 2023-02-13 LAB
ANION GAP SERPL CALCULATED.3IONS-SCNC: 6 MMOL/L (ref 3–16)
BUN BLDV-MCNC: 37 MG/DL (ref 7–20)
CALCIUM SERPL-MCNC: 9 MG/DL (ref 8.3–10.6)
CHLORIDE BLD-SCNC: 86 MMOL/L (ref 99–110)
CO2: 44 MMOL/L (ref 21–32)
CREAT SERPL-MCNC: 0.6 MG/DL (ref 0.6–1.2)
GFR SERPL CREATININE-BSD FRML MDRD: >60 ML/MIN/{1.73_M2}
GLUCOSE BLD-MCNC: 90 MG/DL (ref 70–99)
HCT VFR BLD CALC: 35 % (ref 36–48)
HEMOGLOBIN: 11.2 G/DL (ref 12–16)
MAGNESIUM: 2.1 MG/DL (ref 1.8–2.4)
MCH RBC QN AUTO: 30.4 PG (ref 26–34)
MCHC RBC AUTO-ENTMCNC: 32.1 G/DL (ref 31–36)
MCV RBC AUTO: 94.8 FL (ref 80–100)
PDW BLD-RTO: 14.2 % (ref 12.4–15.4)
PLATELET # BLD: 224 K/UL (ref 135–450)
PMV BLD AUTO: 8 FL (ref 5–10.5)
POTASSIUM REFLEX MAGNESIUM: 3.3 MMOL/L (ref 3.5–5.1)
RBC # BLD: 3.69 M/UL (ref 4–5.2)
SODIUM BLD-SCNC: 136 MMOL/L (ref 136–145)
WBC # BLD: 6.8 K/UL (ref 4–11)

## 2023-02-13 PROCEDURE — 6370000000 HC RX 637 (ALT 250 FOR IP): Performed by: INTERNAL MEDICINE

## 2023-02-13 PROCEDURE — 2500000003 HC RX 250 WO HCPCS: Performed by: INTERNAL MEDICINE

## 2023-02-13 PROCEDURE — 94640 AIRWAY INHALATION TREATMENT: CPT

## 2023-02-13 PROCEDURE — 97535 SELF CARE MNGMENT TRAINING: CPT

## 2023-02-13 PROCEDURE — 2700000000 HC OXYGEN THERAPY PER DAY

## 2023-02-13 PROCEDURE — 99233 SBSQ HOSP IP/OBS HIGH 50: CPT | Performed by: INTERNAL MEDICINE

## 2023-02-13 PROCEDURE — 97530 THERAPEUTIC ACTIVITIES: CPT

## 2023-02-13 PROCEDURE — 36415 COLL VENOUS BLD VENIPUNCTURE: CPT

## 2023-02-13 PROCEDURE — 94761 N-INVAS EAR/PLS OXIMETRY MLT: CPT

## 2023-02-13 PROCEDURE — 85027 COMPLETE CBC AUTOMATED: CPT

## 2023-02-13 PROCEDURE — 97110 THERAPEUTIC EXERCISES: CPT

## 2023-02-13 PROCEDURE — 6360000002 HC RX W HCPCS: Performed by: INTERNAL MEDICINE

## 2023-02-13 PROCEDURE — 80048 BASIC METABOLIC PNL TOTAL CA: CPT

## 2023-02-13 PROCEDURE — 83735 ASSAY OF MAGNESIUM: CPT

## 2023-02-13 PROCEDURE — 2580000003 HC RX 258: Performed by: INTERNAL MEDICINE

## 2023-02-13 PROCEDURE — 99232 SBSQ HOSP IP/OBS MODERATE 35: CPT | Performed by: INTERNAL MEDICINE

## 2023-02-13 PROCEDURE — 71250 CT THORAX DX C-: CPT

## 2023-02-13 PROCEDURE — 2060000000 HC ICU INTERMEDIATE R&B

## 2023-02-13 RX ORDER — LORAZEPAM 0.5 MG/1
0.5 TABLET ORAL EVERY 6 HOURS PRN
Status: DISCONTINUED | OUTPATIENT
Start: 2023-02-13 | End: 2023-02-14

## 2023-02-13 RX ORDER — POTASSIUM CHLORIDE 20 MEQ/1
20 TABLET, EXTENDED RELEASE ORAL
Status: DISCONTINUED | OUTPATIENT
Start: 2023-02-13 | End: 2023-02-15 | Stop reason: HOSPADM

## 2023-02-13 RX ORDER — LEVALBUTEROL TARTRATE 45 UG/1
1 AEROSOL, METERED ORAL EVERY 4 HOURS PRN
Status: DISCONTINUED | OUTPATIENT
Start: 2023-02-13 | End: 2023-02-15 | Stop reason: HOSPADM

## 2023-02-13 RX ORDER — DEXAMETHASONE SODIUM PHOSPHATE 10 MG/ML
6 INJECTION, SOLUTION INTRAMUSCULAR; INTRAVENOUS EVERY 24 HOURS
Status: DISCONTINUED | OUTPATIENT
Start: 2023-02-13 | End: 2023-02-15 | Stop reason: HOSPADM

## 2023-02-13 RX ADMIN — QUETIAPINE FUMARATE 400 MG: 200 TABLET ORAL at 21:47

## 2023-02-13 RX ADMIN — LORAZEPAM 1 MG: 1 TABLET ORAL at 05:58

## 2023-02-13 RX ADMIN — GUAIFENESIN 600 MG: 600 TABLET, EXTENDED RELEASE ORAL at 09:03

## 2023-02-13 RX ADMIN — Medication 2 PUFF: at 07:30

## 2023-02-13 RX ADMIN — PANTOPRAZOLE SODIUM 40 MG: 40 TABLET, DELAYED RELEASE ORAL at 05:58

## 2023-02-13 RX ADMIN — ROPINIROLE HYDROCHLORIDE 4 MG: 2 TABLET, FILM COATED ORAL at 21:47

## 2023-02-13 RX ADMIN — ROPINIROLE HYDROCHLORIDE 4 MG: 2 TABLET, FILM COATED ORAL at 14:14

## 2023-02-13 RX ADMIN — VENLAFAXINE HYDROCHLORIDE 225 MG: 75 CAPSULE, EXTENDED RELEASE ORAL at 09:03

## 2023-02-13 RX ADMIN — GABAPENTIN 400 MG: 400 CAPSULE ORAL at 09:03

## 2023-02-13 RX ADMIN — PRAVASTATIN SODIUM 10 MG: 10 TABLET ORAL at 17:18

## 2023-02-13 RX ADMIN — Medication 2 PUFF: at 11:28

## 2023-02-13 RX ADMIN — METOPROLOL TARTRATE 25 MG: 25 TABLET, FILM COATED ORAL at 09:03

## 2023-02-13 RX ADMIN — SODIUM CHLORIDE, PRESERVATIVE FREE 10 ML: 5 INJECTION INTRAVENOUS at 21:47

## 2023-02-13 RX ADMIN — Medication 2 PUFF: at 21:38

## 2023-02-13 RX ADMIN — FERROUS SULFATE TAB 325 MG (65 MG ELEMENTAL FE) 325 MG: 325 (65 FE) TAB at 09:03

## 2023-02-13 RX ADMIN — APIXABAN 5 MG: 5 TABLET, FILM COATED ORAL at 21:48

## 2023-02-13 RX ADMIN — DILTIAZEM HYDROCHLORIDE 120 MG: 120 CAPSULE, EXTENDED RELEASE ORAL at 09:03

## 2023-02-13 RX ADMIN — APIXABAN 5 MG: 5 TABLET, FILM COATED ORAL at 09:03

## 2023-02-13 RX ADMIN — POTASSIUM CHLORIDE 20 MEQ: 1500 TABLET, EXTENDED RELEASE ORAL at 17:18

## 2023-02-13 RX ADMIN — DEXAMETHASONE SODIUM PHOSPHATE 6 MG: 10 INJECTION INTRAMUSCULAR; INTRAVENOUS at 14:14

## 2023-02-13 RX ADMIN — METOPROLOL TARTRATE 25 MG: 25 TABLET, FILM COATED ORAL at 21:47

## 2023-02-13 RX ADMIN — SODIUM CHLORIDE, PRESERVATIVE FREE 10 ML: 5 INJECTION INTRAVENOUS at 09:03

## 2023-02-13 RX ADMIN — GABAPENTIN 400 MG: 400 CAPSULE ORAL at 21:47

## 2023-02-13 RX ADMIN — Medication 2 PUFF: at 15:00

## 2023-02-13 RX ADMIN — ROPINIROLE HYDROCHLORIDE 4 MG: 2 TABLET, FILM COATED ORAL at 09:03

## 2023-02-13 RX ADMIN — GUAIFENESIN 600 MG: 600 TABLET, EXTENDED RELEASE ORAL at 21:47

## 2023-02-13 RX ADMIN — HYDROCODONE BITARTRATE AND ACETAMINOPHEN 1 TABLET: 7.5; 325 TABLET ORAL at 09:03

## 2023-02-13 RX ADMIN — TORSEMIDE 20 MG: 20 TABLET ORAL at 09:03

## 2023-02-13 RX ADMIN — MULTIPLE VITAMINS W/ MINERALS TAB 1 TABLET: TAB at 09:03

## 2023-02-13 ASSESSMENT — PAIN SCALES - GENERAL
PAINLEVEL_OUTOF10: 0
PAINLEVEL_OUTOF10: 8

## 2023-02-13 ASSESSMENT — PAIN DESCRIPTION - LOCATION: LOCATION: BACK

## 2023-02-13 ASSESSMENT — PAIN DESCRIPTION - ORIENTATION: ORIENTATION: LOWER

## 2023-02-13 ASSESSMENT — PAIN - FUNCTIONAL ASSESSMENT: PAIN_FUNCTIONAL_ASSESSMENT: ACTIVITIES ARE NOT PREVENTED

## 2023-02-13 ASSESSMENT — PAIN DESCRIPTION - DESCRIPTORS: DESCRIPTORS: ACHING;DISCOMFORT

## 2023-02-13 NOTE — FLOWSHEET NOTE
02/13/23 0857   Vital Signs   Temp 98.3 °F (36.8 °C)   Temp Source Oral   Heart Rate 75   Resp 20   BP (!) 160/75   MAP (Calculated) 103   BP Location Right lower arm   Level of Consciousness 0   MEWS Score 1   Oxygen Therapy   SpO2 90 %   O2 Device High flow nasal cannula   O2 Flow Rate (L/min) 4 L/min     Patient sitting up on edge of bed. OT in room to work with patient. SpO2 85-90% on 4L, titrated to 5L at this time. Shift assessment complete, see flow sheet. Denies needs at this time. Call light in reach. Will monitor.

## 2023-02-13 NOTE — PROGRESS NOTES
Received call from CT stating patient was unable to follow instructions to completed high resolution chest CT. Dr. Cong Chopra aware, would like to re-attempt test tomorrow. CT aware.

## 2023-02-13 NOTE — PROGRESS NOTES
Pulmonary Progress Note  CC: COVID, COPD    Subjective:    Appears comfortable  4 L O2-uses 4 L at home    EXAM: BP (!) 160/75   Pulse 75   Temp 98.3 °F (36.8 °C) (Oral)   Resp 18   Wt 224 lb 7 oz (101.8 kg)   SpO2 90%   BMI 36.23 kg/m²  on 4  Constitutional:  No acute distress. Adriana Harpreet HEENT: no scleral icterus  Neck: No tracheal deviation present. Cardiovascular: Normal heart sounds. Pulmonary/Chest: No wheezes. No rhonchi. Few rales. No decreased breath sounds. No accessory muscle usage or stridor. Abdominal: Soft. Musculoskeletal: No cyanosis. No clubbing. Skin: Skin is warm and dry.        Scheduled Meds:   metoprolol tartrate  25 mg Oral BID    levalbuterol  2 puff Inhalation 4x daily    nicotine  1 patch TransDERmal Daily    rOPINIRole  4 mg Oral TID    gabapentin  400 mg Oral BID    guaiFENesin  600 mg Oral BID    apixaban  5 mg Oral BID    dilTIAZem  120 mg Oral Daily    ferrous sulfate  325 mg Oral Daily with breakfast    pantoprazole  40 mg Oral QAM AC    therapeutic multivitamin-minerals  1 tablet Oral Daily    pravastatin  10 mg Oral QPM    QUEtiapine  400 mg Oral Nightly    torsemide  20 mg Oral Daily    sodium chloride flush  5-40 mL IntraVENous 2 times per day    venlafaxine  225 mg Oral Daily     Continuous Infusions:   sodium chloride       PRN Meds:  levalbuterol, LORazepam, HYDROcodone-acetaminophen, guaiFENesin-dextromethorphan, sodium chloride flush, sodium chloride, polyethylene glycol, acetaminophen **OR** acetaminophen, prochlorperazine, magnesium sulfate    Labs:  CBC:   Recent Labs     02/11/23 0434 02/12/23 0426 02/13/23 0429   WBC 8.8 7.7 6.8   HGB 11.2* 11.1* 11.2*   HCT 34.2* 33.3* 35.0*   MCV 94.0 92.9 94.8    237 224     BMP:   Recent Labs     02/11/23 0434 02/12/23 0426 02/13/23 0429    138 136   K 3.9 3.8 3.3*   CL 90* 89* 86*   CO2 41* 43* 44*   BUN 22* 30* 37*   CREATININE <0.5* 0.6 0.6       Micro  2/5 BC NGTD  2/5 influenza not detected  2/5 COVID-19 detected      Imaging  Chest x-ray 2/5 imaging reviewed by me and showed  Chronic airspace changes have slightly progressed in the right lung suspected to represent pleural thickening and pleural fluid along with edema versus atelectasis. Lesser pattern of interstitial change slightly increased on the left.           ASSESSMENT:  Acute on chronic hypoxic respiratory failure   COPD exacerbation  COVID 19 disease  MEG on home Bipap     PLAN:  Supplemental oxygen to maintain SaO2 >92%; wean as tolerated  Droplet Plus Airborne Precautions   Bipap PRN and QHS -okay to use home unit  Inhaled bronchodilators  Off IV Solu-Medrol-resume Decadron for 3 more days to complete total of 10 days of steroids  Post Tocilizumab 2/6/2022  Completed 5 days of Zithromax  On apixiban  DC plan is okay with pulmonary

## 2023-02-13 NOTE — PROGRESS NOTES
Progress Note    Admit Date:  2/5/2023      Patient admitted with COVID-19 infection and acute on chronic hypoxic respiratory failure. Worsening hypoxemia - O2 requirement up to 9 L. Weaned down to 7 L. She has remained on O2 7 L for the last few days. Persistent wheezing. On 2/11-we figured out that patient has been vaping in her room. I spoke with the patient at length and patient now admits that she has been sneaking around and vaping quite often in the room . I discussed with her about serious side effects of vaping related lung injury . I believe most of her symptoms are related to vaping . After a long discussion- patient was okay about us taking her vape pen away and getting it locked up . RN was at bedside . Subjective:    2/12  Ms. Chiara Pickard is a little better today , shortness of breath and wheezing has improved . O2 requirement weaned down to 6 L . John Copping Continued on BiPAP nightly and as needed . Episodes of tachyarrhythmia today -telemetry with SVT . Patient has known history of A-fib . I gave IV Lopressor 1 dose and this is helped her tachycardia . Patient having nicotine withdrawal symptoms and increasing anxiety , given Ativan and this is helping her symptoms . Patient is currently awake alert and oriented looks fatigued but in no distress . John Carmona CODE STATUS discussed with patient again . patient has been mentioning DNR related to the RN . John Carmona Patient clearly states now that in the event of a cold she does not want to be resuscitated. .Does not want to ever be intubated or placed on a ventilator . does not want CPR or shocking in the event of a CODE BLUE .    2/13   -->   Patient appears very fatigued today. she appears sleepy and groggy today  She was unable to follow commands from HRCT. Oxygen saturation stable on 5 L. Will place back on BiPAP - I have discussed with RN.      Patient has been getting Ativan since yesterday for anxiety;  she got 3 doses of Ativan 1 mg yesterday and 1 dose this morning. I believe she might be reacting to this        Objective:   BP (!) 161/81   Pulse 67   Temp 98.8 °F (37.1 °C) (Oral)   Resp 20   Wt 224 lb 7 oz (101.8 kg)   SpO2 94%   BMI 36.23 kg/m²     Intake/Output Summary (Last 24 hours) at 2/13/2023 1713  Last data filed at 2/13/2023 1240  Gross per 24 hour   Intake --   Output 675 ml   Net -675 ml           Physical Exam:  GEN:        Patient is awake and oriented but sleepy and groggy today . No  respiratory distress, less dyspneic and decreased wheezing today  HEENT:   NC/AT,EOMI, MMM, no erythema/exudates or visible masses. CVS:        Normal S1,S2. RRR. Without M/G/R.   LUNG:     Diminished bilat. Bilateral + wheezes present  ABD:        Soft, ND/NT, BS+ x4. Without G/R.  EXT:        2+ pulses, no c/c. BLE trace edema. Brisk cap refill. PSY:        Thought process intact, affect appropriate. RAMIRO:        CN III-XII grossly intact. Moves all 4 spontaneously. Sensory grossly intact. SKIN:       No rash or lesions on visible skin.          Medications:   Scheduled Meds:   dexamethasone  6 mg IntraVENous Q24H    potassium chloride  20 mEq Oral Daily with breakfast    metoprolol tartrate  25 mg Oral BID    levalbuterol  2 puff Inhalation 4x daily    nicotine  1 patch TransDERmal Daily    rOPINIRole  4 mg Oral TID    gabapentin  400 mg Oral BID    guaiFENesin  600 mg Oral BID    apixaban  5 mg Oral BID    dilTIAZem  120 mg Oral Daily    ferrous sulfate  325 mg Oral Daily with breakfast    pantoprazole  40 mg Oral QAM AC    therapeutic multivitamin-minerals  1 tablet Oral Daily    pravastatin  10 mg Oral QPM    QUEtiapine  400 mg Oral Nightly    torsemide  20 mg Oral Daily    sodium chloride flush  5-40 mL IntraVENous 2 times per day    venlafaxine  225 mg Oral Daily       Continuous Infusions:   sodium chloride         Data:  CBC:   Recent Labs     02/11/23  0434 02/12/23  0426 02/13/23 0429   WBC 8.8 7.7 6.8   RBC 3.63* 3.59* 3.69*   HGB 11.2* 11.1* 11.2*   HCT 34.2* 33.3* 35.0*   MCV 94.0 92.9 94.8   RDW 14.0 13.7 14.2    237 224       BMP:   Recent Labs     02/11/23  0434 02/12/23  0426 02/13/23  0429    138 136   K 3.9 3.8 3.3*   CL 90* 89* 86*   CO2 41* 43* 44*   BUN 22* 30* 37*   CREATININE <0.5* 0.6 0.6       BNP: No results for input(s): BNP in the last 72 hours. PT/INR:   No results for input(s): PROTIME, INR in the last 72 hours. APTT:   No results for input(s): APTT in the last 72 hours. CARDIAC ENZYMES:   No results for input(s): CKMB, CKMBINDEX, TROPONINI in the last 72 hours. Invalid input(s): CKTOTAL;3    FASTING LIPID PANEL:  Lab Results   Component Value Date    CHOL 180 11/02/2015    HDL 64 (H) 02/04/2021    TRIG 201 (H) 02/10/2018     LIVER PROFILE:   No results for input(s): AST, ALT, ALB, BILIDIR, BILITOT, ALKPHOS in the last 72 hours. Cultures  COVID-19 detected  Rapid influenza A and B neg  Blood cultures no growth to date    Radiology  CT CHEST HIGH RESOLUTION         XR CHEST PORTABLE   Final Result   Chronic airspace changes have slightly progressed in the right lung suspected   to represent pleural thickening and pleural fluid along with edema versus   atelectasis. Lesser pattern of interstitial change slightly increased on the   left. A superimposed viral pattern of pneumonitis may be considered   clinically. EKG reviewed. Shows normal sinus rhythm with right bundle branch block. .. Unchanged from before    Assessment:  Principal Problem:    Acute on chronic respiratory failure with hypoxia (HCC)  Active Problems:    COVID-19 virus infection    SVT (supraventricular tachycardia) (HCC)  Resolved Problems:    * No resolved hospital problems. *      Plan:    # Acute on chronic respiratory failure with hypoxia  -  related to COVID PNA, aeCOPD, and OFATF-g-zndtevgyb/vaping associated lung injury  - Supplemental O2 to maintain SPO2 ? 92%, continuous pulse ox. -Persistent hypoxemia . O2 4 L ->9 L-> 7 L.  continued to require 7 L of oxygen for several days-> weaned down to 6 L-> 5L  now. Patient normally on 3 to 3.5 L O2 at home. Wean as tolerated. -Vape pen has been removed from her room. Patient admitted that she was vaping intermittently in her room for the last several days  -Patient uses BiPAP at home - continue.  - Pulmonary following    #Tobacco abuse  Vaping abuse  -With her vaping history and persistent dyspnea and wheezing , and imaging showing pneumonitis pattern - I am concerned about vaping associated lung injury. We will check high-resolution chest CT-this will be done tomorrow. may need bronch with biopsy  -got  IV Solu-Medrol-> on IV Decadron now  Vape pen has been removed from her room. Added  Ativan for anxiety and withdrawal symptoms-> patient is getting very sleepy and groggy now, will decrease dose of Ativan  Added  nicotine patch. # Sepsis   - POA (temp, RR;) Suspect 2/2 COVID. No clear sign of bacterial PNA. F/u cx. # COVID PNA  - Dx on 2/5.  - IV tociluzimab given 2/6  - pulm cx  -Droplet plus precautions  - Continue steroids and inhaled bronchodilators    # aeCOPD  -IV steroids, IV ceftriaxone, PRN/LIZ intensive NEB therapy. Change DuoNeb nebulizer to Xopenex. check respiratory culture    # PSVT  #History of paroxysmal A-fib on anticoagulation  -Patient on Cardizem  mg daily and Eliquis-continued  -Lopressor added for PSVT episode. Keep on low-dose and monitor  -Cardiology consulted  -Change DuoNeb nebulizers to [address] controlled today. #Chronic diastolic CHF  -Continue torsemide    # Hx NSCLA lung cancer s/p remote RUL lobectomy. # Prolonged QTc, 514 ms, avoid QT prolonging agents as able. # Altered mentation  Acute toxic encephalopathy-not present on admission.  - on  2/7 -patient was noted to be very lethargic-  gabapentin dose held.   decreased her dose from 800 mg 4 times daily to 400 mg twice daily and monitor closely . Mental status cleared up and she became alert and oriented by     - 2/13  Patient again groggy today- cut down the dose of Ativan and place  back on BiPAP    # Anxiety, restless legs. On Xanax as needed; patient states that Xanax was not helping her anxiety and has history of Ativan . Ativan was helping her but patient now very sleepy , will decrease dose of Ativan .  -Continue Seroquel and Effexor as at home    #Weakness  -Consult PT OT     DVT Prophylaxis: Eliquis  ADULT DIET;  Regular   DNR-CCA    On 2/12 CODE STATUS addressed with patient and changed to DNR CCA as per her wishes         Connie Breen MD   2/13/2023 5:13 PM

## 2023-02-13 NOTE — PROGRESS NOTES
[facility] Daily Progress Note      Admit Date:  2/5/2023    Subjective:  Ms. Ginger Chase is hypoxic   On 4 L/min O2 Sats 80's  Monitor shows NSR no tachycardia has PAC,s   getting PT now a lot of artifact on monitor. No angina   Seen  by my associate Dr Rosa Woodard yesterday with following history  History of Present Illness:  68 y/o female with h/o paroxysmal atrial fib on chronic anticoagulation with eliquis admitted with COVID who we were asked to see for SVT. She was admitted with dyspnea and hypoxia and found to have COVID. She has had significant hypoxia and still remains on 6 liters O2. Today she began having episodes of SVT. She was eventually given 5 mg lopressor iv with improvement. She says she could feel her heart pounding with this.       ROS:  12 point ROS negative in all areas as listed below except as in [place]  Constitutional, EENT,  GI, , Musculoskeletal, skin, neurological, hematological, endocrine, Psychiatric    Past Medical History:   Diagnosis Date    A-fib ([address]     Arthritis     Clostridium difficile diarrhea 6/26/15    COPD (chronic obstructive pulmonary disease) (HCC)     COPD exacerbation (HCC)     Emphysema of lung (HCC)     Hyperlipidemia     Hypertension     O2 dependent     2 L/min at night    Pneumonia 2/5/2018    Primary cancer of right upper lobe of lung ([address] 3/11/2020    Rib pain     Sleep apnea     does not use cpap    Small bowel obstruction ([address]     pt denies     Past Surgical History:   Procedure Laterality Date    ANKLE ARTHROSCOPY      x2    APPENDECTOMY      BRONCHOSCOPY N/A 3/15/2020    BRONCHOSCOPY THERAPUTIC ASPIRATION INITIAL performed by Faviola Rose MD at [address] [facility]  3/17/2020    BRONCHOSCOPY THERAPUTIC ASPIRATION INITIAL performed by Sj Licea MD at [address] [facility] Left     CHOLECYSTECTOMY      COLONOSCOPY      COLONOSCOPY N/A 10/22/2021    COLONOSCOPY WITH BIOPSY performed by Juan Colón MD at [address] [facility]    HYSTERECTOMY (CERVIX STATUS UNKNOWN)      ROTATOR CUFF REPAIR Bilateral     THORACOSCOPY Right 3/11/2020    RIGHT VIDEO ASSISTED THORACOSCOPIC SURGERY; WEDGE EXCISION OF RIGHT UPPER LOBE FOLLOWED BY RIGHT UPPER LOBECTOMY; INTERCOSTAL NERVE BLOCK performed by Grace Smith MD at [address] 10/22/2021    EGD BIOPSY performed by Gerard Cedeño MD at [address] [facility] END[facility]       Objective:   BP (!) 160/75   Pulse 75   Temp 98.3 °F (36.8 °C) (Oral)   Resp 20   Wt 224 lb 7 oz (101.8 kg)   SpO2 90%   BMI 36.23 kg/m²     Intake/Output Summary (Last 24 hours) at 2/13/2023 0915  Last data filed at 2/13/2023 0441  Gross per 24 hour   Intake 180 ml   Output 1625 ml   Net -1445 ml       TELEMETRY: Sinus     Physical Exam:  General: No Respiratory distress, appears well developed and well nourished. Eyes:  Sclera nonicteric  Nose/Sinuses:  negative findings: nose shows no deformity, asymmetry, or inflammation, nasal mucosa normal, septum midline with no perforation or bleeding  Back:  no pain to palpation  Joint:  no active joint inflammation  Musculoskeletal:  negative  Skin:  Warm and dry  Neck:  Negative for JVD and Carotid Bruits. Chest: scatterd rales exp wheezing   Cardiovascular:  RRR, S1S2 normal, no murmur, no rub or thrill.   Abdomen:  Soft normal liver and spleen  Extremities:   No edema, clubbing, cyanosis,  Pulses: Femoral and pedal pulses are normal.  Neuro: intact    Medications:    metoprolol tartrate  25 mg Oral BID    levalbuterol  2 puff Inhalation 4x daily    nicotine  1 patch TransDERmal Daily    rOPINIRole  4 mg Oral TID    gabapentin  400 mg Oral BID    guaiFENesin  600 mg Oral BID    apixaban  5 mg Oral BID    dilTIAZem  120 mg Oral Daily    ferrous sulfate  325 mg Oral Daily with breakfast    pantoprazole  40 mg Oral QAM AC    therapeutic multivitamin-minerals  1 tablet Oral Daily    pravastatin  10 mg Oral QPM    QUEtiapine 400 mg Oral Nightly    torsemide  20 mg Oral Daily    sodium chloride flush  5-40 mL IntraVENous 2 times per day    venlafaxine  225 mg Oral Daily      sodium chloride       levalbuterol, LORazepam, HYDROcodone-acetaminophen, guaiFENesin-dextromethorphan, sodium chloride flush, sodium chloride, polyethylene glycol, acetaminophen **OR** acetaminophen, prochlorperazine, magnesium sulfate    Lab Data:  CBC:   Recent Labs     02/11/23 0434 02/12/23 0426 02/13/23 0429   WBC 8.8 7.7 6.8   HGB 11.2* 11.1* 11.2*   HCT 34.2* 33.3* 35.0*   MCV 94.0 92.9 94.8    237 224     BMP:   Recent Labs     02/11/23 0434 02/12/23 0426 02/13/23 0429    138 136   K 3.9 3.8 3.3*   CL 90* 89* 86*   CO2 41* 43* 44*   BUN 22* 30* 37*   CREATININE <0.5* 0.6 0.6     LIVER PROFILE: No results for input(s): AST, ALT, LIPASE, BILIDIR, BILITOT, ALKPHOS in the last 72 hours. Invalid input(s): AMYLASE,  ALB  PT/INR: No results for input(s): PROTIME, INR in the last 72 hours. APTT: No results for input(s): APTT in the last 72 hours. BNP:  No results for input(s): BNP in the last 72 hours. IMAGING:      ECHO 2016  Normal global wall motion. Visual EF is 55-60%   Doppler evidence of grade I (impaired) diastolic dysfunction. Calculated EF 52%. Left atrial cavity is mildly dilated. Right ventricle cavity is mildly dilated. Structurally normal mitral valve with mild regurgitation. Structurally normal tricuspid valve with trace regurgitation. IVC is dilated with respiratory response of <50%. Assessment:  Parox afib currently NSR PAC continue Apixaban and diltiazem metoprolol  Acute resp failure with COVID related pneumonia per hospitalist team  3. Primary hypertension on metoprolol and dilatiazem  4.  Hyperlipidemia continue pravastatin    Will her prn please call    Fede Wagner MD, MD 2/13/2023 9:15 AM

## 2023-02-13 NOTE — FLOWSHEET NOTE
02/12/23 1913   Vital Signs   Temp 99.2 °F (37.3 °C)   Temp Source Oral   Heart Rate 74   Heart Rate Source Monitor   Resp 20   /72   MAP (Calculated) 94   BP Location Right lower arm   BP Method Automatic   Patient Position Semi fowlers   Oxygen Therapy   SpO2 92 %   O2 Device High flow nasal cannula   O2 Flow Rate (L/min) 4 L/min   Call light and bedside table within reach. Bed alarm is set. Shift assessment added.

## 2023-02-13 NOTE — ACP (ADVANCE CARE PLANNING)
Advance Care Planning     Advance Care Planning Inpatient Note  [facility] Department    Today's Date: 2/13/2023  Unit: [address] PCU TELEMETRY    Received request from [id number] [facility] Provider and patient. Upon review of chart and communication with care team, patient's decision making abilities are not in question. . Patient was/were present in the room during visit. Goals of ACP Conversation:  Discuss advance care planning documents    Health Care Decision Makers:       Primary Decision Maker: Mckenzie Guillen Child - [phone]  Summary:  Completed New Documents    Advance Care Planning Documents (Patient Wishes):  Healthcare Power of /Advance Directive Appointment of Health Care Agent     Assessment:  Patient was clear that she desired to complete her HCDPOA. She noted that she wants her daughter, Mahnaz Pritchard, to care for her decisions if she is unable. She noted that she has donated her body to science and her paperwork is at home. Encouraged her to have this documentation on her chart. There were points during our discussion when she appeared confused, but after affirming what we did today through teach back method and with her nurse, both her nurse and I felt confident that the patient was aware of her decisions and desiring to complete her [address] Interventions:  Requested patient/family to submit existing document for our records: paperwork on body donation    Care Preferences Communicated:   No    Outcomes/Plan:  ACP Discussion: Completed  New advance directive completed. Returned original document(s) to patient, as well as copies for distribution to appointed agents  Copy of advance directive given to staff to scan into medical record.   Teach Back Method used to verify the patient's and/or Healthcare Decision Maker's understanding of key information in the advance directive documents    Electronically signed by Elidia Dakin, [facility] on 2/13/2023 at 1:28 PM

## 2023-02-13 NOTE — PROGRESS NOTES
Hand off report given to [facility], RN. Patient is stable showing no signs of distress and has no current needs at this time. Call light is in reach and bed is in lowest position. Care is transferred at this time.

## 2023-02-13 NOTE — PROGRESS NOTES
Occupational Therapy  Inpatient Occupational Therapy Evaluation and Treatment    Unit: PCU  Date:  2/13/2023  Patient Name:    Steve Berry  Admitting diagnosis:  Acute on chronic respiratory failure with hypoxia [facility]) [J96.21]  COVID-19 virus infection [U07.1]  Admit Date:  2/5/2023  Precautions/Restrictions/WB Status/ Lines/ Wounds/ Oxygen: Fall risk, Bed/chair alarm, Lines (Supplemental O2 (4L) and Purewick catheter), Telemetry, and Continuous pulse oximetry    Treatment Time:  850-940   Treatment Number:  1  Timed Code Treatment Minutes:50 minutes  Total Treatment Minutes: 50  minutes    Patient Goals for Therapy: \" go home \"          Discharge Recommendations: Continue to assess; Home PRN assist pending progress. DME needs for discharge: Defer to facility         Therapy recommendations for staff:   Assist of 1 with use of Gait Belt and Rolling Walker (RW) for all transfers to/from BSC/chair    History of Present Illness:   Per H&P  \"The patient is a 67 y.o. female with PMH below, presents with SOB/ROMERO, fever, cough, diarrhea, congestion. Pt reports the last few days she has not felt well. She has felt more SOB for the last month and has been seen at OSH a few times. She is normally on 3L O2 but has to turn it up to 5L at home to remain comfortable. She was briefly put on NIV for WOB at [facility] but was able to be weaned off prior to admission. She is currently requiring 4L. She has hx of COPD, CHF, a fib on AC. She has remote Hx of RUL NSCLCa s/p remote wedge resection. She was found to be COVID +.   \"    Past Medical History:    Past Medical History []Expand by Default            Diagnosis Date    A-fib ([facility] [address]      Arthritis      Clostridium difficile diarrhea 6/26/15    COPD (chronic obstructive pulmonary disease) (HCC)      COPD exacerbation (HCC)      Emphysema of lung (HCC)      Hyperlipidemia      Hypertension      O2 dependent       2 L/min at night    Pneumonia 2/5/2018    Primary cancer of right upper lobe of lung ([facility] 3/11/2020    Rib pain      Sleep apnea       does not use cpap    Small bowel obstruction ([facility] [address]       pt denies          Home Health S4 Level Recommendation:  NA    AM-PAC Score: AM-PAC Inpatient Daily Activity Raw Score: 18     Subjective  Patient lying supine in bed with no family present. Pt agreeable to this OT session. Cognition:    A&O x4   Able to follow 2 step commands    Pain:   no complaints of pain   Location: NA  Rating: NA  Pain Medicine Status: No request made    Preadmission Environment:   Pt. Lives     with friend- has 24hr assist.  Home environment:    apartment   Steps to enter first floor:   No steps  Steps to second floor/basement: N/A  Laundry:     HHA completes  Bathroom:     walk in shower, grab bars in shower, shower chair , grab bars around toilet, and comfort height toilet  Pt sleeps in a:    \"Day bed\"  Equipment owned:    rollator, home O2 (3.5L) continous, BiPap, nebulizer, pulse ox, and lifealert    Preadmission Status:  Pt. Able to drive: Yes  Pt. Fully independent with ADLs: Yes- requires occasional assistance for LB dressing. (Shoes). Pt. Required assistance from family for: Cleaning, Cooking, and Laundry   Pt. independent for functional transfers and utilized No Device for mobility in home and Rollator out in community  History of falls: No  Home Health Services:aide 2 hours/day,  3 days/week      Balance:  Sitting:    SBA- EOB  Standing:    CGA- SBA  with RW and gait belt      Bed Mobility:   Supine to Sit:    Sup   Sit to Supine:   Not Tested  Rolling:   Not Tested  Scooting in sitting: Sup   Scooting in supine:  Not Tested    Transfers:    Sit to stand:    CGA- SBA  Stand to sit:    CGA- SBA   Bed to chair:     CGA, With RW and gait belt  Bed/ chair to standard toilet:  Not Tested  Bed/chair to [facility]:   Not Tested    ADLs:  Dressing:      UE:    Min assist due to lines   LE:    NT.    Bathing:    UE:  Not Tested  LE:  Not Tested    Eating:   IND with drinking from cup     Toileting:  Not Tested    Grooming/hygiene: Not Tested    Activity Tolerance   Pt completed therapy session with SOB/ROMERO     BP (mmHg) HR (bpm) SpO2 (%) on 7 L Comments   Seated edged of bed  160/75 73 bpm   6 liters 85% %  %    After functional mobility   91 bpm 6 liters  88-91%%           End of session  87 bpm 5 liters 89-90%    Notified nurse that the pts 02 was left on 5 liters - nurse ok with 02  Spo2 in bed prior to OT 85-88% on 4 liters so 02 was turned up during treatment. Nurse aware. Positioning Needs   Pt up in chair, alarm set, call light provided and all needs within reach . Ther Ex / Activities Initiated:   Shoulder shrugs 10x  Shoulder retraction 10x     Patient/Family Education   Pt educated on role of inpatient OT, plan of care, importance of continued activity, safety awareness, pursed lip breathing, pacing activity, and calling for assist with mobility since pure wick taken out     Assessment:    Pt seen for Occupational therapy evaluation in acute care setting. PT had decreased Sp02 with increased 02 level to 6 liter during OT treatment. Pt decreased to 5 liters at the end of treatment. Pt demonstrated decreased Activity tolerance, ADLs, Safety Awareness, and Transfers. Pt functioning below baseline and will likely benefit from skilled occupational therapy services to maximize safety and independence. Goal(s) : To be met in 3 Visits:  1). Bed to toilet/BSC:        SBA  2.) Pt will complete 3/3 CHF goals      N/A    To be met in 5 Visits:  1). Supine to/from Sit in preporation for ADL task: Independent  2.) Toileting         Supervision  3.) Grooming        Supervision  4). Upper Body Dressing:       Supervision  5). Lower Body Dressing:       SBA  6).  Pt to demonstrate UE therapeutic exs x 15 reps with minimal cues    Rehabilitation Potential: Good  Strengths for achieving goals include: PLOF and Family Support   Barriers to achieving goals include: Complexity of condition    Plan: To be seen 3-5 x/wk while in acute care setting for therapeutic exercises, bed mobility, transfers, family/patient education, ADL/IADL retraining, and energy conservation training.     Saul Dwyer OTR/L [id number]     If patient discharges from this facility prior to next visit, this note will serve as the Discharge Summary

## 2023-02-14 ENCOUNTER — APPOINTMENT (OUTPATIENT)
Dept: CT IMAGING | Age: 73
DRG: 205 | End: 2023-02-14
Payer: MEDICARE

## 2023-02-14 LAB
BASE EXCESS ARTERIAL: 15.8 MMOL/L (ref -3–3)
CARBOXYHEMOGLOBIN ARTERIAL: 1.1 % (ref 0–1.5)
HCO3 ARTERIAL: 42.1 MMOL/L (ref 21–29)
HEMOGLOBIN, ART, EXTENDED: 13.1 G/DL (ref 12–16)
MAGNESIUM: 2 MG/DL (ref 1.8–2.4)
METHEMOGLOBIN ARTERIAL: 0.3 %
O2 SAT, ARTERIAL: 95.6 %
O2 THERAPY: ABNORMAL
PCO2 ARTERIAL: 58.7 MMHG (ref 35–45)
PH ARTERIAL: 7.47 (ref 7.35–7.45)
PO2 ARTERIAL: 75.4 MMHG (ref 75–108)
TCO2 ARTERIAL: 44 MMOL/L

## 2023-02-14 PROCEDURE — 36415 COLL VENOUS BLD VENIPUNCTURE: CPT

## 2023-02-14 PROCEDURE — 94640 AIRWAY INHALATION TREATMENT: CPT

## 2023-02-14 PROCEDURE — 6370000000 HC RX 637 (ALT 250 FOR IP): Performed by: INTERNAL MEDICINE

## 2023-02-14 PROCEDURE — 2580000003 HC RX 258: Performed by: INTERNAL MEDICINE

## 2023-02-14 PROCEDURE — 2700000000 HC OXYGEN THERAPY PER DAY

## 2023-02-14 PROCEDURE — 99232 SBSQ HOSP IP/OBS MODERATE 35: CPT | Performed by: INTERNAL MEDICINE

## 2023-02-14 PROCEDURE — 94761 N-INVAS EAR/PLS OXIMETRY MLT: CPT

## 2023-02-14 PROCEDURE — 36600 WITHDRAWAL OF ARTERIAL BLOOD: CPT

## 2023-02-14 PROCEDURE — 6360000002 HC RX W HCPCS: Performed by: INTERNAL MEDICINE

## 2023-02-14 PROCEDURE — 2060000000 HC ICU INTERMEDIATE R&B

## 2023-02-14 PROCEDURE — 71250 CT THORAX DX C-: CPT

## 2023-02-14 PROCEDURE — 82803 BLOOD GASES ANY COMBINATION: CPT

## 2023-02-14 PROCEDURE — 83735 ASSAY OF MAGNESIUM: CPT

## 2023-02-14 PROCEDURE — 99233 SBSQ HOSP IP/OBS HIGH 50: CPT | Performed by: INTERNAL MEDICINE

## 2023-02-14 RX ORDER — ALPRAZOLAM 0.5 MG/1
0.5 TABLET ORAL 3 TIMES DAILY PRN
Status: DISCONTINUED | OUTPATIENT
Start: 2023-02-14 | End: 2023-02-15 | Stop reason: HOSPADM

## 2023-02-14 RX ADMIN — GABAPENTIN 400 MG: 400 CAPSULE ORAL at 21:07

## 2023-02-14 RX ADMIN — GUAIFENESIN 600 MG: 600 TABLET, EXTENDED RELEASE ORAL at 21:06

## 2023-02-14 RX ADMIN — PRAVASTATIN SODIUM 10 MG: 10 TABLET ORAL at 18:03

## 2023-02-14 RX ADMIN — METOPROLOL TARTRATE 25 MG: 25 TABLET, FILM COATED ORAL at 08:16

## 2023-02-14 RX ADMIN — METOPROLOL TARTRATE 25 MG: 25 TABLET, FILM COATED ORAL at 21:14

## 2023-02-14 RX ADMIN — PANTOPRAZOLE SODIUM 40 MG: 40 TABLET, DELAYED RELEASE ORAL at 05:03

## 2023-02-14 RX ADMIN — GUAIFENESIN 600 MG: 600 TABLET, EXTENDED RELEASE ORAL at 08:16

## 2023-02-14 RX ADMIN — ALPRAZOLAM 0.5 MG: 0.5 TABLET ORAL at 21:10

## 2023-02-14 RX ADMIN — Medication 2 PUFF: at 07:00

## 2023-02-14 RX ADMIN — Medication 2 PUFF: at 15:12

## 2023-02-14 RX ADMIN — QUETIAPINE FUMARATE 400 MG: 200 TABLET ORAL at 21:07

## 2023-02-14 RX ADMIN — APIXABAN 5 MG: 5 TABLET, FILM COATED ORAL at 08:16

## 2023-02-14 RX ADMIN — DEXAMETHASONE SODIUM PHOSPHATE 6 MG: 10 INJECTION INTRAMUSCULAR; INTRAVENOUS at 14:00

## 2023-02-14 RX ADMIN — APIXABAN 5 MG: 5 TABLET, FILM COATED ORAL at 21:08

## 2023-02-14 RX ADMIN — MULTIPLE VITAMINS W/ MINERALS TAB 1 TABLET: TAB at 08:17

## 2023-02-14 RX ADMIN — GABAPENTIN 400 MG: 400 CAPSULE ORAL at 08:16

## 2023-02-14 RX ADMIN — DILTIAZEM HYDROCHLORIDE 120 MG: 120 CAPSULE, EXTENDED RELEASE ORAL at 08:16

## 2023-02-14 RX ADMIN — ROPINIROLE HYDROCHLORIDE 4 MG: 2 TABLET, FILM COATED ORAL at 13:56

## 2023-02-14 RX ADMIN — ROPINIROLE HYDROCHLORIDE 4 MG: 2 TABLET, FILM COATED ORAL at 23:53

## 2023-02-14 RX ADMIN — POTASSIUM CHLORIDE 20 MEQ: 1500 TABLET, EXTENDED RELEASE ORAL at 08:16

## 2023-02-14 RX ADMIN — SODIUM CHLORIDE, PRESERVATIVE FREE 10 ML: 5 INJECTION INTRAVENOUS at 21:07

## 2023-02-14 RX ADMIN — SODIUM CHLORIDE, PRESERVATIVE FREE 10 ML: 5 INJECTION INTRAVENOUS at 08:17

## 2023-02-14 RX ADMIN — VENLAFAXINE HYDROCHLORIDE 225 MG: 75 CAPSULE, EXTENDED RELEASE ORAL at 08:16

## 2023-02-14 RX ADMIN — Medication 2 PUFF: at 11:02

## 2023-02-14 RX ADMIN — FERROUS SULFATE TAB 325 MG (65 MG ELEMENTAL FE) 325 MG: 325 (65 FE) TAB at 08:17

## 2023-02-14 RX ADMIN — ROPINIROLE HYDROCHLORIDE 4 MG: 2 TABLET, FILM COATED ORAL at 08:16

## 2023-02-14 RX ADMIN — TORSEMIDE 20 MG: 20 TABLET ORAL at 08:16

## 2023-02-14 RX ADMIN — ALPRAZOLAM 0.5 MG: 0.5 TABLET ORAL at 11:41

## 2023-02-14 RX ADMIN — Medication 2 PUFF: at 20:32

## 2023-02-14 ASSESSMENT — PAIN SCALES - GENERAL: PAINLEVEL_OUTOF10: 0

## 2023-02-14 NOTE — PROGRESS NOTES
Progress Note    Admit Date:  2/5/2023      Patient admitted with COVID-19 infection and acute on chronic hypoxic respiratory failure. Worsening hypoxemia - O2 requirement up to 9 L. Weaned down to 7 L. She has remained on O2 7 L for the last few days. Persistent wheezing. On 2/11-we figured out that patient has been vaping in her room. I spoke with the patient at length and patient now admits that she has been sneaking around and vaping quite often in the room . I discussed with her about serious side effects of vaping related lung injury . I believe most of her symptoms are related to vaping . After a long discussion- patient was okay about us taking her vape pen away and getting it locked up . RN was at bedside . Subjective:    2/12  Ms. [facility] is a little better today , shortness of breath and wheezing has improved . O2 requirement weaned down to 6 L . Indira Cesar Continued on BiPAP nightly and as needed . Episodes of tachyarrhythmia today -telemetry with SVT . Patient has known history of A-fib . I gave IV Lopressor 1 dose and this is helped her tachycardia . Patient having nicotine withdrawal symptoms and increasing anxiety , given Ativan and this is helping her symptoms . Patient is currently awake alert and oriented looks fatigued but in no distress . Indira Cesar CODE STATUS discussed with patient again . patient has been mentioning DNR related to the RN . Indira Cesar Patient clearly states now that in the event of a cold she does not want to be resuscitated. .Does not want to ever be intubated or placed on a ventilator . does not want CPR or shocking in the event of a CODE BLUE .    2/13   -->   Patient appears very fatigued today. she appears sleepy and groggy today  She was unable to follow commands from HRCT. Oxygen saturation stable on 5 L. Will place back on BiPAP - I have discussed with RN.      Patient has been getting Ativan since yesterday for anxiety;  she got 3 doses of Ativan 1 mg yesterday and 1 dose this morning. I believe she might be reacting to this      2/14  Patient was very agitated angry and restless today. .  Patient's daughter spoke to nurse and informed that patient does not tolerate Ativan as well. We will switch her back to Xanax get out got an ABG once patient was calm she was she will have BiPAP again. .    Currently patient is more calm and relaxed now she looks fatigued but she is not confused oxygen levels are stable on 6 L. Objective:   BP (!) 158/67   Pulse 74   Temp 98.2 °F (36.8 °C) (Oral)   Resp 18   Wt 226 lb 1.6 oz (102.6 kg)   SpO2 92%   BMI 36.49 kg/m²     Intake/Output Summary (Last 24 hours) at 2/14/2023 1506  Last data filed at 2/14/2023 1459  Gross per 24 hour   Intake 1255 ml   Output 1150 ml   Net 105 ml           Physical Exam:  GEN:        Patient is awake and oriented. Looks fatigued. No dyspnea. HEENT:   NC/AT,EOMI, MMM, no erythema/exudates or visible masses. CVS:        Normal S1,S2. RRR. Without M/G/R.   LUNG:     Diminished bilat. Bilateral mild wheezes present  ABD:        Soft, ND/NT, BS+ x4. Without G/R.  EXT:        2+ pulses, no c/c. BLE trace edema. Brisk cap refill. PSY:        Thought process intact, affect appropriate. RAMIRO:        CN III-XII grossly intact. Moves all 4 spontaneously. Sensory grossly intact. SKIN:       No rash or lesions on visible skin.          Medications:   Scheduled Meds:   dexamethasone  6 mg IntraVENous Q24H    potassium chloride  20 mEq Oral Daily with breakfast    metoprolol tartrate  25 mg Oral BID    levalbuterol  2 puff Inhalation 4x daily    nicotine  1 patch TransDERmal Daily    rOPINIRole  4 mg Oral TID    gabapentin  400 mg Oral BID    guaiFENesin  600 mg Oral BID    apixaban  5 mg Oral BID    dilTIAZem  120 mg Oral Daily    ferrous sulfate  325 mg Oral Daily with breakfast    pantoprazole  40 mg Oral QAM AC    therapeutic multivitamin-minerals  1 tablet Oral Daily    pravastatin  10 mg Oral QPM    QUEtiapine  400 mg Oral Nightly    torsemide  20 mg Oral Daily    sodium chloride flush  5-40 mL IntraVENous 2 times per day    venlafaxine  225 mg Oral Daily       Continuous Infusions:   sodium chloride         Data:  CBC:   Recent Labs     02/12/23 0426 02/13/23 0429   WBC 7.7 6.8   RBC 3.59* 3.69*   HGB 11.1* 11.2*   HCT 33.3* 35.0*   MCV 92.9 94.8   RDW 13.7 14.2    224       BMP:   Recent Labs     02/12/23 0426 02/13/23 0429    136   K 3.8 3.3*   CL 89* 86*   CO2 43* 44*   BUN 30* 37*   CREATININE 0.6 0.6       BNP: No results for input(s): BNP in the last 72 hours. PT/INR:   No results for input(s): PROTIME, INR in the last 72 hours. APTT:   No results for input(s): APTT in the last 72 hours. CARDIAC ENZYMES:   No results for input(s): CKMB, CKMBINDEX, TROPONINI in the last 72 hours. Invalid input(s): CKTOTAL;3    FASTING LIPID PANEL:  Lab Results   Component Value Date    CHOL 180 11/02/2015    HDL 64 (H) 02/04/2021    TRIG 201 (H) 02/10/2018     LIVER PROFILE:   No results for input(s): AST, ALT, ALB, BILIDIR, BILITOT, ALKPHOS in the last 72 hours. Cultures  COVID-19 detected  Rapid influenza A and B neg  Blood cultures no growth to date    Radiology  XR CHEST PORTABLE   Final Result   Chronic airspace changes have slightly progressed in the right lung suspected   to represent pleural thickening and pleural fluid along with edema versus   atelectasis. Lesser pattern of interstitial change slightly increased on the   left. A superimposed viral pattern of pneumonitis may be considered   clinically. CT CHEST WO CONTRAST    (Results Pending)       EKG reviewed. Shows normal sinus rhythm with right bundle branch block.  .. Unchanged from before    Assessment:  Principal Problem:    Acute on chronic respiratory failure with hypoxia (HCC)  Active Problems:    COVID-19 virus infection    SVT (supraventricular tachycardia) (HCC)  Resolved Problems:    * No resolved hospital problems. *      Plan:    # Acute on chronic respiratory failure with hypoxia  -  related to COVID PNA, aeCOPD, and OIZIM-b-mkcpeunyf/vaping associated lung injury  - Supplemental O2 to maintain SPO2 ? 92%, continuous pulse ox.    -Persistent hypoxemia . O2 4 L ->9 L-> 7 L.  continued to require 7 L of oxygen for several days-> weaned down to 6 L-> 5L  now. Patient normally on 3 to 3.5 L O2 at home. Wean as tolerated. -Vape pen has been removed from her room. Patient admitted that she was vaping intermittently in her room for the last several days  -Patient uses BiPAP at home - continue.  - Pulmonary following    #Tobacco abuse  Vaping abuse  -With her vaping history and persistent dyspnea and wheezing , and imaging showing pneumonitis pattern - I am concerned about vaping associated lung injury. We will check high-resolution chest CT-this will be done tomorrow. may need bronch with biopsy  -got  IV Solu-Medrol-> on IV Decadron now  Vape pen has been removed from her room. Added Ativan for anxiety and withdrawal symptoms-> patient became very sleepy and groggy with Ativan -discontinued now . -Continue nicotine patch. # Sepsis   - POA (temp, RR;) Suspect 2/2 COVID. No clear sign of bacterial PNA. F/u cx. # COVID PNA  - Dx on 2/5.  - IV tociluzimab given 2/6  - pulm cx  -Droplet plus precautions  - Continue steroids and inhaled bronchodilators    # aeCOPD  -IV steroids, IV ceftriaxone, PRN/LIZ intensive NEB therapy. Change DuoNeb nebulizer to Xopenex. check respiratory culture    # PSVT  #History of paroxysmal A-fib on anticoagulation  -Patient on Cardizem  mg daily and Eliquis-continued  -Lopressor added for PSVT episode. Keep on low-dose and monitor  -Cardiology consulted  -Changed DuoNeb nebulizers to Xopenex  -Rate controlled today. #Chronic diastolic CHF  -Continue torsemide    # Hx NSCLA lung cancer s/p remote RUL lobectomy.     # Prolonged QTc, 514 ms, avoid QT prolonging agents as able. # Altered mentation  Acute toxic encephalopathy-not present on admission.  - on  2/7 -patient was noted to be very lethargic-  gabapentin dose held. decreased her dose from 800 mg 4 times daily to 400 mg twice daily and monitor closely . Mental status cleared up and she became alert and oriented by     - 2/13  Patient again groggy today- cut down the dose of Ativan and place  back on BiPAP.  -> 2/14 Ativan discontinued. # Anxiety, restless legs. On Xanax as needed; patient states that Xanax was not helping her anxiety and has history of Ativan . Ativan was helping her but patient now very sleepy , will decrease dose of Ativan . Patient is not tolerating Ativan. Ativan we will switch her back to her home regimen of Xanax as needed for anxiety  -Continue Seroquel and Effexor as at home    #Weakness  -Consult PT OT     DVT Prophylaxis: Eliquis  ADULT DIET; Regular   DNR-CCA    On 2/12 CODE STATUS addressed with patient and changed to DNR CCA as per her wishes    Continue BiPAP as needed; wean oxygen as needed; she is slowly improving. Stop Ativan , monitor mental status .   hopefully can be discharged home once O2 requirement is closer to Nancy Holm MD   2/14/2023 3:06 PM

## 2023-02-14 NOTE — PROGRESS NOTES
ABGs as noted. Patient now agreeable to wear BiPAP. Home unit placed at this time.       Latest Reference Range & Units 2/14/23 12:15   Hemoglobin, Art, Extended 12.0 - 16.0 g/dL 13.1   pH, Arterial 7.350 - 7.450  7.474 (H)   pCO2, Arterial 35.0 - 45.0 mmHg 58.7 (H)   pO2, Arterial 75.0 - 108.0 mmHg 75.4   HCO3, Arterial 21.0 - 29.0 mmol/L 42.1 (H)   TCO2 (calc), Art Not Established mmol/L 44.0   Base Excess, Arterial -3.0 - 3.0 mmol/L 15.8 (H)   O2 Sat, Arterial >92 % 95.6   Methemoglobin, Arterial <1.5 % 0.3   Carboxyhgb, Arterial 0.0 - 1.5 % 1.1

## 2023-02-14 NOTE — PROGRESS NOTES
Occupational Therapy  Attempted to see patient this pm. Patient requiring BIPAP at this time and unable to engage in [facility] [place] [facility] assessment. Will reattempt 2/15/23.   Sterling Nguyen OTR/RAULITO [id number]

## 2023-02-14 NOTE — PROGRESS NOTES
Pulmonary Progress Note  CC: COVID, COPD    Subjective:    Appears comfortable  5 L O2-uses 4 L at home    EXAM: /72   Pulse 72   Temp 98.7 °F (37.1 °C) (Oral)   Resp 18   Wt 226 lb 1.6 oz (102.6 kg)   SpO2 94%   BMI 36.49 kg/m²  on 5  Constitutional:  No acute distress. [place] Kor[place] HEENT: no scleral icterus  Neck: No tracheal deviation present. Cardiovascular: Normal heart sounds. Pulmonary/Chest: No wheezes. No rhonchi. Few rales. No decreased breath sounds. No accessory muscle usage or stridor. Abdominal: Soft. Musculoskeletal: No cyanosis. No clubbing. Skin: Skin is warm and dry.        Scheduled Meds:   dexamethasone  6 mg IntraVENous Q24H    potassium chloride  20 mEq Oral Daily with breakfast    metoprolol tartrate  25 mg Oral BID    levalbuterol  2 puff Inhalation 4x daily    nicotine  1 patch TransDERmal Daily    rOPINIRole  4 mg Oral TID    gabapentin  400 mg Oral BID    guaiFENesin  600 mg Oral BID    apixaban  5 mg Oral BID    dilTIAZem  120 mg Oral Daily    ferrous sulfate  325 mg Oral Daily with breakfast    pantoprazole  40 mg Oral QAM AC    therapeutic multivitamin-minerals  1 tablet Oral Daily    pravastatin  10 mg Oral QPM    QUEtiapine  400 mg Oral Nightly    torsemide  20 mg Oral Daily    sodium chloride flush  5-40 mL IntraVENous 2 times per day    venlafaxine  225 mg Oral Daily     Continuous Infusions:   sodium chloride       PRN Meds:  levalbuterol, LORazepam, HYDROcodone-acetaminophen, guaiFENesin-dextromethorphan, sodium chloride flush, sodium chloride, polyethylene glycol, acetaminophen **OR** acetaminophen, prochlorperazine, magnesium sulfate    Labs:  CBC:   Recent Labs     02/12/23 0426 02/13/23 0429   WBC 7.7 6.8   HGB 11.1* 11.2*   HCT 33.3* 35.0*   MCV 92.9 94.8    224     BMP:   Recent Labs     02/12/23 0426 02/13/23 0429    136   K 3.8 3.3*   CL 89* 86*   CO2 43* 44*   BUN 30* 37*   CREATININE 0.6 0.6       Micro  2/5 BC NGTD  2/5 influenza not detected  2/5 COVID-19 detected      Imaging  Chest x-ray 2/5 imaging reviewed by me and showed  Chronic airspace changes have slightly progressed in the right lung suspected to represent pleural thickening and pleural fluid along with edema versus atelectasis. Lesser pattern of interstitial change slightly increased on the left.           ASSESSMENT:  Acute on chronic hypoxic respiratory failure   COPD exacerbation  COVID 19 disease  MEG on home Bipap     PLAN:  Continue supplemental oxygen to maintain SaO2 >92%; wean as tolerated  Droplet Plus Airborne Precautions   Bipap PRN and QHS -okay to use home unit  Inhaled bronchodilators  Off IV Solu-Medrol-resume Decadron for 3 more days to complete total of 10 days of steroids  Post Tocilizumab 2/6/2022  Completed 5 days of Zithromax  On apixiban  DC plan is okay with pulmonary

## 2023-02-14 NOTE — PROGRESS NOTES
Patient set of bed alarm attempting to get out of bed. Patient alert but cannot sit still. Patient strongly encouraged to were home BiPAP and patient continues to adamantly refuse. SpO2 90% on 5L, titrated to 6L at this time. SpO2 95%. RN noted patient had OTC sleep-aid in bedside drawer. Allergy relief medication on bedside table. Upon further investigation RN found bottle of Xanax in patient's purse at bedside. Patient declines taking any of her home medications. Xanax counted at this time (13 tablets) and witnessed by Taj Emmanuel RN and placed in security bag and locked in patient's drawer.

## 2023-02-14 NOTE — FLOWSHEET NOTE
02/14/23 0445   Vital Signs   Temp 98.7 °F (37.1 °C)   Temp Source Oral   Heart Rate 72   Heart Rate Source Monitor   Resp 18   /72   MAP (Calculated) 92   BP Location Left lower arm   BP Method Automatic   Patient Position Semi fowlers   Level of Consciousness 0   MEWS Score 1   Oxygen Therapy   SpO2 94 %   O2 Flow Rate (L/min) 5 L/min   Height and Weight   Weight 226 lb 1.6 oz (102.6 kg)   Weight Method Actual;Standing scale   BMI (Calculated) 0   Vitals obtained, see above. Pt resting in bed with call light in place. Pt still refusing to wear BIPAP.  Donnell Carrasco RN

## 2023-02-14 NOTE — PROGRESS NOTES
Pt  placed on home BiPAP, pt removed BiPAP several times. This nurse spoke with pt and explained the importance of wearing BIPAP, but pt refuses to wear it stating \"I won't wear it tonight and I can't sleep with it\". Nasal cannula placed back on pt.   Donald Avilez RN

## 2023-02-14 NOTE — PROGRESS NOTES
Patient not able to tolerate CT test at this time per RN. Spoke at 0900. Patient will need to be able to lay prone for CT exam, and follow breathing instructions. This was discussed with patient's RN over the phone.

## 2023-02-14 NOTE — PROGRESS NOTES
Patient alarm going off. Upon RN entering room patient up on bedside commode. Patient educated again on using call light to call for help before getting up. Patient upset, stating \"Who told my daughter I was begging for ativan! \"  RN informed patient that her daughter was not told she was begging for ativan, daughter was simply told that ativan was ordered for patient as needed. Patient continues to be upset and yelling at staff stating she wants to go home. Patient alert but remains unable to sit still and is disoriented at times. Patient was sitting on bedside commode urinating with brief still on without even realizing. When RN told patient that she was doing so patient states \"you don't think I know that! \"  Patient continues to state she wants to go home. RN informed patient she is back up to 6L O2 and she needs to help herself by wearing her BiPAP. Patient continues to refuse to wear BiPAP.

## 2023-02-14 NOTE — PROGRESS NOTES
AM medications given at this time. Patient remains alert. Stating Dr. Bruno Rider told her she could be discharged today. Updated patient that her O2 requirements are going up and she is now on 6L. Patient states she is going home today regardless. Shift assessment complete, see flow sheet. Denies needs at this time. Call light in reach. Will monitor.

## 2023-02-14 NOTE — FLOWSHEET NOTE
02/13/23 2130   Vital Signs   Temp 97.8 °F (36.6 °C)   Temp Source Oral   Heart Rate 76   Heart Rate Source Monitor   Resp 20   BP (!) 169/90   MAP (Calculated) 116   BP Location Right lower arm   BP Method Automatic   Patient Position High fowlers   Level of Consciousness 0   MEWS Score 1   Oxygen Therapy   SpO2 92 %   O2 Flow Rate (L/min) 5 L/min   Pt awake and alert in bed. Vitals obtained. Oriented times 4. Shift assessment completed, see flow sheet. Pt denies any pain at this time. Scheduled meds given, see MAR. Pt denies any further needs at this time. Call light in reach.    Blayne Aldana RN

## 2023-02-15 VITALS
RESPIRATION RATE: 20 BRPM | HEART RATE: 80 BPM | WEIGHT: 221.9 LBS | TEMPERATURE: 98 F | BODY MASS INDEX: 35.82 KG/M2 | OXYGEN SATURATION: 93 % | DIASTOLIC BLOOD PRESSURE: 54 MMHG | SYSTOLIC BLOOD PRESSURE: 139 MMHG

## 2023-02-15 PROBLEM — F41.9 ANXIETY: Status: ACTIVE | Noted: 2023-02-15

## 2023-02-15 LAB
BASE EXCESS ARTERIAL: 10.4 MMOL/L (ref -3–3)
CARBOXYHEMOGLOBIN ARTERIAL: 0.5 % (ref 0–1.5)
EKG ATRIAL RATE: 91 BPM
EKG DIAGNOSIS: NORMAL
EKG P AXIS: 22 DEGREES
EKG P-R INTERVAL: 158 MS
EKG Q-T INTERVAL: 392 MS
EKG QRS DURATION: 150 MS
EKG QTC CALCULATION (BAZETT): 482 MS
EKG R AXIS: 79 DEGREES
EKG T AXIS: 32 DEGREES
EKG VENTRICULAR RATE: 91 BPM
HCO3 ARTERIAL: 36 MMOL/L (ref 21–29)
HEMOGLOBIN, ART, EXTENDED: 12.6 G/DL (ref 12–16)
METHEMOGLOBIN ARTERIAL: 0.3 %
O2 SAT, ARTERIAL: 94.9 %
O2 THERAPY: ABNORMAL
PCO2 ARTERIAL: 52.1 MMHG (ref 35–45)
PH ARTERIAL: 7.46 (ref 7.35–7.45)
PO2 ARTERIAL: 71.9 MMHG (ref 75–108)
TCO2 ARTERIAL: 37.6 MMOL/L

## 2023-02-15 PROCEDURE — 93005 ELECTROCARDIOGRAM TRACING: CPT | Performed by: INTERNAL MEDICINE

## 2023-02-15 PROCEDURE — 2700000000 HC OXYGEN THERAPY PER DAY

## 2023-02-15 PROCEDURE — 6370000000 HC RX 637 (ALT 250 FOR IP): Performed by: INTERNAL MEDICINE

## 2023-02-15 PROCEDURE — 94761 N-INVAS EAR/PLS OXIMETRY MLT: CPT

## 2023-02-15 PROCEDURE — 99232 SBSQ HOSP IP/OBS MODERATE 35: CPT | Performed by: INTERNAL MEDICINE

## 2023-02-15 PROCEDURE — 2580000003 HC RX 258: Performed by: INTERNAL MEDICINE

## 2023-02-15 PROCEDURE — 82803 BLOOD GASES ANY COMBINATION: CPT

## 2023-02-15 PROCEDURE — 94640 AIRWAY INHALATION TREATMENT: CPT

## 2023-02-15 PROCEDURE — 93010 ELECTROCARDIOGRAM REPORT: CPT | Performed by: INTERNAL MEDICINE

## 2023-02-15 PROCEDURE — 6360000002 HC RX W HCPCS: Performed by: INTERNAL MEDICINE

## 2023-02-15 PROCEDURE — 36600 WITHDRAWAL OF ARTERIAL BLOOD: CPT

## 2023-02-15 PROCEDURE — 99239 HOSP IP/OBS DSCHRG MGMT >30: CPT | Performed by: INTERNAL MEDICINE

## 2023-02-15 RX ORDER — POTASSIUM CHLORIDE 20 MEQ/1
20 TABLET, EXTENDED RELEASE ORAL
Qty: 30 TABLET | Refills: 1 | Status: SHIPPED | OUTPATIENT
Start: 2023-02-16

## 2023-02-15 RX ORDER — NICOTINE 21 MG/24HR
1 PATCH, TRANSDERMAL 24 HOURS TRANSDERMAL DAILY
Qty: 30 PATCH | Refills: 1 | Status: SHIPPED | OUTPATIENT
Start: 2023-02-16

## 2023-02-15 RX ORDER — DEXAMETHASONE 6 MG/1
6 TABLET ORAL
Qty: 3 TABLET | Refills: 0 | Status: SHIPPED | OUTPATIENT
Start: 2023-02-15 | End: 2023-02-18

## 2023-02-15 RX ORDER — GABAPENTIN 800 MG/1
400 TABLET ORAL 2 TIMES DAILY
COMMUNITY
Start: 2023-02-15

## 2023-02-15 RX ADMIN — POTASSIUM CHLORIDE 20 MEQ: 1500 TABLET, EXTENDED RELEASE ORAL at 08:49

## 2023-02-15 RX ADMIN — GUAIFENESIN 600 MG: 600 TABLET, EXTENDED RELEASE ORAL at 08:48

## 2023-02-15 RX ADMIN — Medication 2 PUFF: at 11:33

## 2023-02-15 RX ADMIN — VENLAFAXINE HYDROCHLORIDE 225 MG: 75 CAPSULE, EXTENDED RELEASE ORAL at 08:48

## 2023-02-15 RX ADMIN — TORSEMIDE 20 MG: 20 TABLET ORAL at 08:48

## 2023-02-15 RX ADMIN — ALPRAZOLAM 0.5 MG: 0.5 TABLET ORAL at 13:56

## 2023-02-15 RX ADMIN — MULTIPLE VITAMINS W/ MINERALS TAB 1 TABLET: TAB at 08:48

## 2023-02-15 RX ADMIN — DILTIAZEM HYDROCHLORIDE 120 MG: 120 CAPSULE, EXTENDED RELEASE ORAL at 08:48

## 2023-02-15 RX ADMIN — FERROUS SULFATE TAB 325 MG (65 MG ELEMENTAL FE) 325 MG: 325 (65 FE) TAB at 08:48

## 2023-02-15 RX ADMIN — METOPROLOL TARTRATE 25 MG: 25 TABLET, FILM COATED ORAL at 08:49

## 2023-02-15 RX ADMIN — GABAPENTIN 400 MG: 400 CAPSULE ORAL at 08:49

## 2023-02-15 RX ADMIN — ROPINIROLE HYDROCHLORIDE 4 MG: 2 TABLET, FILM COATED ORAL at 13:51

## 2023-02-15 RX ADMIN — HYDROCODONE BITARTRATE AND ACETAMINOPHEN 1 TABLET: 7.5; 325 TABLET ORAL at 12:20

## 2023-02-15 RX ADMIN — Medication 2 PUFF: at 07:46

## 2023-02-15 RX ADMIN — DEXAMETHASONE SODIUM PHOSPHATE 6 MG: 10 INJECTION INTRAMUSCULAR; INTRAVENOUS at 13:50

## 2023-02-15 RX ADMIN — APIXABAN 5 MG: 5 TABLET, FILM COATED ORAL at 08:49

## 2023-02-15 RX ADMIN — Medication 2 PUFF: at 15:36

## 2023-02-15 RX ADMIN — SODIUM CHLORIDE, PRESERVATIVE FREE 10 ML: 5 INJECTION INTRAVENOUS at 08:49

## 2023-02-15 RX ADMIN — ROPINIROLE HYDROCHLORIDE 4 MG: 2 TABLET, FILM COATED ORAL at 09:08

## 2023-02-15 RX ADMIN — PANTOPRAZOLE SODIUM 40 MG: 40 TABLET, DELAYED RELEASE ORAL at 05:13

## 2023-02-15 ASSESSMENT — PAIN SCALES - GENERAL: PAINLEVEL_OUTOF10: 9

## 2023-02-15 ASSESSMENT — PAIN DESCRIPTION - LOCATION: LOCATION: ABDOMEN

## 2023-02-15 ASSESSMENT — PAIN DESCRIPTION - DESCRIPTORS: DESCRIPTORS: ACHING;DISCOMFORT

## 2023-02-15 ASSESSMENT — PAIN DESCRIPTION - ORIENTATION: ORIENTATION: MID

## 2023-02-15 NOTE — FLOWSHEET NOTE
02/14/23 2345   Vital Signs   /80   MAP (Calculated) 89   BP Location Right leg   BP Method Automatic   Oxygen Therapy   SpO2 95 %   O2 Device PAP (positive airway pressure)   Pt on BIPAP at this time

## 2023-02-15 NOTE — DISCHARGE INSTR - COC
Continuity of Care Form    Patient Name: Michelle Argueta   :  1950  MRN:  [id number]    Admit date:  2023  Discharge date:  02/15/2023    Code Status Order: DNR-CCA   Advance Directives:     Admitting Physician:  Joyce Mares MD  PCP: [facility]    Discharging Nurse: Sandra Ball, [facility] Unit/Room#: /3320-05  Discharging Unit Phone Number: [phone]    Emergency Contact:   Extended Emergency Contact Information  Primary Emergency Contact: [facility]  Address: [address] [place] [address] 53           [place], [facility] 76. [address] Phone: [phone]  Relation: Child    Past Surgical History:  Past Surgical History:   Procedure Laterality Date    ANKLE ARTHROSCOPY      x2    APPENDECTOMY      BRONCHOSCOPY N/A 3/15/2020    BRONCHOSCOPY THERAPUTIC ASPIRATION INITIAL performed by Alize Ren MD at [address]  3/17/2020    BRONCHOSCOPY THERAPUTIC ASPIRATION INITIAL performed by Ngoc Britton MD at [address] [place] [address] Left     CHOLECYSTECTOMY      COLONOSCOPY      COLONOSCOPY N/A 10/22/2021    COLONOSCOPY WITH BIOPSY [facility] (CERVIX STATUS UNKNOWN)      ROTATOR CUFF REPAIR Bilateral     THORACOSCOPY Right 3/11/2020    RIGHT VIDEO ASSISTED THORACOSCOPIC SURGERY; WEDGE EXCISION OF RIGHT UPPER LOBE FOLLOWED BY RIGHT UPPER LOBECTOMY; INTERCOSTAL NERVE BLOCK performed by Alize Ren MD at [address] [facility] N/A 10/22/2021    EGD BIOPSY performed by Varinder Dill MD at [address] [facility]       Immunization History:   Immunization History   Administered Date(s) Administered    COVID-19, MODERNA BLUE border, Primary or Immunocompromised, (age 12y+), IM, 100 mcg/0.5mL 2021, 2021    COVID-19, PFIZER PURPLE top, DILUTE for use, (age 15 y+), 30mcg/0.3mL 2021, 2021    Influenza Vaccine, unspecified formulation 11/05/2013, 11/07/2014, 09/10/2015, 11/01/2016, 10/24/2017    Influenza Virus Vaccine 11/05/2013, 10/25/2014, 11/07/2014, 08/01/2015, 11/01/2016, 09/29/2017, 10/24/2017, 10/09/2020    Influenza Whole 10/25/2014    Influenza, AFLURIA (age 1 yrs+), FLUZONE, (age 10 mo+), MDV, 0.5mL 10/24/2017    Influenza, FLUAD, (age 72 y+), Adjuvanted, 0.5mL 10/09/2020    Influenza, FLUARIX, FLULAVAL, FLUZONE (age 10 mo+) AND AFLURIA, (age 1 y+), PF, 0.5mL 10/24/2017    Influenza, FLUZONE (age 72 y+), High Dose, 0.7mL 10/17/2017, 10/17/2018, 10/02/2019, 02/14/2022    Influenza, High Dose (Fluzone 65 yrs and older) 09/10/2015, 10/17/2017, 10/17/2018, 10/02/2019    Influenza, Trivalent, Recombinant, Injectable vaccine, PF 09/29/2017    Pneumococcal Conjugate 13-valent (Balinda ) 10/25/2014, 10/24/2017, 10/09/2020    Pneumococcal Conjugate Vaccine 10/25/2014    Pneumococcal Polysaccharide ([id number]) 11/01/2016, 06/26/2019    Zoster Live (Zostavax) 06/06/2014       Active Problems:  Patient Active Problem List   Diagnosis Code    CHF (congestive heart failure) ([facility]) I50.9    COPD (chronic obstructive pulmonary disease) ([facility] J44.9    Hyperlipidemia E78.5    Essential hypertension I10    Diarrhea R19.7    Diabetes mellitus type 2 in obese ([facility]) E11.69, E66.9    Pulmonary edema J81.1    Sepsis ([facility]) A41.9    C. difficile colitis A04.72    NSTEMI (non-ST elevated myocardial infarction) ([facility] I21.4    COPD exacerbation ([facility]) J44.1    Disorder of electrolytes E87.8    Unstable angina pectoris ([facility]) I20.0    MEG (obstructive sleep apnea) G47.33    Obesity, Class III, BMI 40-49.9 (morbid obesity) ([facility]) E66.01    Acute respiratory failure with hypoxemia ([facility]) J96.01    Pneumonia of right lower lobe due to infectious organism J18.9    Acute hypercapnic respiratory failure ([facility]) J96.02    Hemothorax on right J94.2    Right rib fracture S22.31XA    Atelectasis J98.11    Simple chronic bronchitis ([facility]) J41.0    Multiple closed fractures of ribs of right side S22.41XA    Obesity (BMI 35.0-39.9 without comorbidity) E66.9    S/P chest tube placement (HCC) Z93.8    Paroxysmal atrial fibrillation (HCC) I48.0    Tobacco abuse Z72.0    Primary cancer of right upper lobe of lung (HCC) C34.11    Nodule of right lung R91.1    Non-small cell lung cancer (HCC) C34.90    Acute hypoxemic respiratory failure (HCC) J96.01    Pulmonary congestion R09.89    Left-sided chest pain R07.9    SOB (shortness of breath) R06.02    Acute on chronic respiratory failure with hypoxia (HCC) J96.21    COVID-19 virus infection U07.1    SVT (supraventricular tachycardia) (HCC) I47.1    Anxiety F41.9       Isolation/Infection:   Isolation            Droplet Plus          Patient Infection Status       Infection Onset Added Last Indicated Last Indicated By Review Planned Expiration Resolved Resolved By    COVID-19 02/05/23 02/05/23 02/05/23 COVID-19, Rapid 02/15/23 02/19/23      Resolved    COVID-19 (Rule Out) 02/05/23 02/05/23 02/05/23 COVID-19, Rapid (Ordered)   02/05/23 Rule-Out Test Resulted    COVID-19 (Rule Out) 01/06/22 01/06/22 01/06/22 COVID-19, Rapid (Ordered)   01/06/22 Rule-Out Test Resulted    COVID-19 (Rule Out) 10/19/21 10/19/21 10/19/21 COVID-19 (Ordered)   10/19/21 Rule-Out Test Resulted    COVID-19 (Rule Out) 04/27/21 04/27/21 04/27/21 COVID-19 (Ordered)   04/28/21 Rule-Out Test Resulted    COVID-19 (Rule Out) 05/12/20 05/12/20 05/13/20 COVID-19 (Ordered)   05/13/20 Rule-Out Test Resulted            Nurse Assessment:  Last Vital Signs: BP (!) 151/66   Pulse 75   Temp 98.2 °F (36.8 °C)   Resp 18   Wt 221 lb 14.4 oz (100.7 kg)   SpO2 95%   BMI 35.82 kg/m²     Last documented pain score (0-10 scale): Pain Level: 0  Last Weight:   Wt Readings from Last 1 Encounters:   02/15/23 221 lb 14.4 oz (100.7 kg)     Mental Status:  oriented, alert, and coherent    IV Access:  - None    Nursing Mobility/ADLs:  Walking   Assisted  Transfer  Assisted  Bathing Assisted  Dressing  Assisted  Toileting  Assisted  Feeding  Independent  Med Admin  Independent  Med Delivery   whole    Wound Care Documentation and Therapy:        Elimination:  Continence: Bowel: Yes  Bladder: Yes  Urinary Catheter: None   Colostomy/Ileostomy/Ileal Conduit: No       Date of Last BM: 2/15    Intake/Output Summary (Last 24 hours) at 2/15/2023 1019  Last data filed at 2/15/2023 0315  Gross per 24 hour   Intake 1140 ml   Output 550 ml   Net 590 ml     I/O last 3 completed shifts: In: 8448 [P.O.:1495]  Out: 1150 [Urine:1150]    Safety Concerns:     None    Impairments/Disabilities:      None    Nutrition Therapy:  Current Nutrition Therapy:   - Oral Diet:  General    Routes of Feeding: Oral  Liquids: Thin Liquids  Daily Fluid Restriction: no  Last Modified Barium Swallow with Video (Video Swallowing Test): not done    Treatments at the Time of Hospital Discharge:   Respiratory Treatments:   Oxygen Therapy:  is on oxygen at 4 L/min per nasal cannula.   Ventilator:    - No ventilator support    Rehab Therapies:   Weight Bearing Status/Restrictions: No weight bearing restrictions  Other Medical Equipment (for information only, NOT a DME order):  walker  Other Treatments:     Patient's personal belongings (please select all that are sent with patient):  None    RN SIGNATURE:  Electronically signed by Shelbie Husain RN on 2/15/23 at [address] PM EST    CASE MANAGEMENT/SOCIAL WORK SECTION    Inpatient Status Date: 02/05/0223    Readmission Risk Assessment Score:  Readmission Risk              Risk of Unplanned Readmission:  29           Discharging to Facility/ Agency   Name: [address] [place] [address]  Address:  Phone: [phone]  Fax: [phone]    Dialysis Facility (if applicable)   Name:  Address:  Dialysis Schedule:  Phone:  Fax:    / signature: Electronically signed by Genaro Nayak RN on 2/15/23 at 10:19 AM EST    PHYSICIAN SECTION    Prognosis: Fair    Condition at Discharge: Stable    Rehab Potential (if transferring to Rehab): Fair    Recommended Labs or Other Treatments After Discharge:     Physician Certification: I certify the above information and transfer of Karen Longoria  is necessary for the continuing treatment of the diagnosis listed and that she requires Home Care for less 30 days.      Update Admission H&P: No change in H&P    PHYSICIAN SIGNATURE:  Electronically signed by Liliane Oconnor MD on 2/15/23 at 3:13 PM EST

## 2023-02-15 NOTE — PROGRESS NOTES
Patient sitting up on edge of bed. No s/s of distress noted. Requesting repeat ABG this AM to check her pCO2. Hoping to be discharged today. O2 titrated to 4L, SpO2 94%. Shift assessment complete, see flow sheet. Denies further needs. Call light in reach. Will monitor.

## 2023-02-15 NOTE — PROGRESS NOTES
Pulmonary Progress Note  CC: COVID, COPD    Subjective:    Appears comfortable  O2 trending down, 4 L      EXAM: BP (!) 128/57   Pulse 74   Temp 98.8 °F (37.1 °C) (Oral)   Resp 18   Wt 221 lb 14.4 oz (100.7 kg)   SpO2 94%   BMI 35.82 kg/m²  on 5  Constitutional:  No acute distress. Davidsone Ramegan HEENT: no scleral icterus  Neck: No tracheal deviation present. Cardiovascular: Normal heart sounds. Pulmonary/Chest: No wheezes. No rhonchi. Minimal rales. No decreased breath sounds. No accessory muscle usage or stridor. Abdominal: Soft. Musculoskeletal: No cyanosis. No clubbing. Skin: Skin is warm and dry.        Scheduled Meds:   dexamethasone  6 mg IntraVENous Q24H    potassium chloride  20 mEq Oral Daily with breakfast    metoprolol tartrate  25 mg Oral BID    levalbuterol  2 puff Inhalation 4x daily    nicotine  1 patch TransDERmal Daily    rOPINIRole  4 mg Oral TID    gabapentin  400 mg Oral BID    guaiFENesin  600 mg Oral BID    apixaban  5 mg Oral BID    dilTIAZem  120 mg Oral Daily    ferrous sulfate  325 mg Oral Daily with breakfast    pantoprazole  40 mg Oral QAM AC    therapeutic multivitamin-minerals  1 tablet Oral Daily    pravastatin  10 mg Oral QPM    QUEtiapine  400 mg Oral Nightly    torsemide  20 mg Oral Daily    sodium chloride flush  5-40 mL IntraVENous 2 times per day    venlafaxine  225 mg Oral Daily     Continuous Infusions:   sodium chloride       PRN Meds:  ALPRAZolam, levalbuterol, HYDROcodone-acetaminophen, guaiFENesin-dextromethorphan, sodium chloride flush, sodium chloride, polyethylene glycol, acetaminophen **OR** acetaminophen, prochlorperazine, magnesium sulfate    Labs:  CBC:   Recent Labs     02/13/23 0429   WBC 6.8   HGB 11.2*   HCT 35.0*   MCV 94.8        BMP:   Recent Labs     02/13/23 0429      K 3.3*   CL 86*   CO2 44*   BUN 37*   CREATININE 0.6       Micro  2/5 BC NGTD  2/5 influenza not detected  2/5 COVID-19 detected      Imaging  Chest x-ray 2/5 imaging reviewed by me and showed  Chronic airspace changes have slightly progressed in the right lung suspected to represent pleural thickening and pleural fluid along with edema versus atelectasis. Lesser pattern of interstitial change slightly increased on the left.           ASSESSMENT:  Acute on chronic hypoxic respiratory failure   COPD exacerbation  COVID 19 disease  MEG on home Bipap     PLAN:  Continue supplemental oxygen to maintain SaO2 >92%; wean as tolerated  Droplet Plus Airborne Precautions   Bipap PRN and QHS -okay to use home unit  Inhaled bronchodilators  Off IV Solu-Medrol-resume Decadron for 3 more days to complete total of 10 days of steroids  Post Tocilizumab 2/6/2022  Completed 5 days of Zithromax  On apixiban  DC plan is okay with pulmonary

## 2023-02-15 NOTE — PROGRESS NOTES
Patient educated on discharge instructions as well as new medications use, dosage, administration and possible side effects. Patient verified knowledge. IV removed by patient. Telemetry monitor removed and returned to [facility]. Home medications and vapes returned to patient. Pt waiting for ride home.

## 2023-02-15 NOTE — PROGRESS NOTES
Progress Note    Admit Date:  2/5/2023      Patient admitted with COVID-19 infection and acute on chronic hypoxic respiratory failure. Worsening hypoxemia - O2 requirement up to 9 L. Weaned down to 7 L. She has remained on O2 7 L for the last few days. Persistent wheezing. On 2/11-we figured out that patient has been vaping in her room. I spoke with the patient at length and patient now admits that she has been sneaking around and vaping quite often in the room . I discussed with her about serious side effects of vaping related lung injury . I believe most of her symptoms are related to vaping . After a long discussion- patient was okay about us taking her vape pen away and getting it locked up . RN was at bedside . Subjective:    2/12  Ms. Jay Abu is a little better today , shortness of breath and wheezing has improved . O2 requirement weaned down to 6 L . Antwon Logan Continued on BiPAP nightly and as needed . Episodes of tachyarrhythmia today -telemetry with SVT . Patient has known history of A-fib . I gave IV Lopressor 1 dose and this is helped her tachycardia . Patient having nicotine withdrawal symptoms and increasing anxiety , given Ativan and this is helping her symptoms . Patient is currently awake alert and oriented looks fatigued but in no distress . Antwon Logan CODE STATUS discussed with patient again . patient has been mentioning DNR related to the RN . Antwon Logan Patient clearly states now that in the event of a cold she does not want to be resuscitated. .Does not want to ever be intubated or placed on a ventilator . does not want CPR or shocking in the event of a CODE BLUE .    2/13   -->   Patient appears very fatigued today. she appears sleepy and groggy today  She was unable to follow commands from HRCT. Oxygen saturation stable on 5 L. Will place back on BiPAP - I have discussed with RN.      Patient has been getting Ativan since yesterday for anxiety;  she got 3 doses of Ativan 1 mg yesterday and 1 dose this morning. I believe she might be reacting to this      2/14  Patient was very agitated angry and restless today. .  Patient's daughter spoke to nurse and informed that patient does not tolerate Ativan as well. We will switch her back to Xanax get out got an ABG once patient was calm she was she will have BiPAP again. .    Currently patient is more calm and relaxed now she looks fatigued but she is not confused oxygen levels are stable on 6 L. Objective:   BP (!) 139/54   Pulse 62   Temp 98 °F (36.7 °C) (Oral)   Resp 18   Wt 221 lb 14.4 oz (100.7 kg)   SpO2 93%   BMI 35.82 kg/m²     Intake/Output Summary (Last 24 hours) at 2/15/2023 1507  Last data filed at 2/15/2023 0315  Gross per 24 hour   Intake 240 ml   Output 200 ml   Net 40 ml           Physical Exam:  GEN:        Patient is awake and oriented. Looks fatigued. No dyspnea. HEENT:   NC/AT,EOMI, MMM, no erythema/exudates or visible masses. CVS:        Normal S1,S2. RRR. Without M/G/R.   LUNG:     Diminished bilat. Bilateral mild wheezes present  ABD:        Soft, ND/NT, BS+ x4. Without G/R.  EXT:        2+ pulses, no c/c. BLE trace edema. Brisk cap refill. PSY:        Thought process intact, affect appropriate. RAMIRO:        CN III-XII grossly intact. Moves all 4 spontaneously. Sensory grossly intact. SKIN:       No rash or lesions on visible skin.          Medications:   Scheduled Meds:   dexamethasone  6 mg IntraVENous Q24H    potassium chloride  20 mEq Oral Daily with breakfast    metoprolol tartrate  25 mg Oral BID    levalbuterol  2 puff Inhalation 4x daily    nicotine  1 patch TransDERmal Daily    rOPINIRole  4 mg Oral TID    gabapentin  400 mg Oral BID    guaiFENesin  600 mg Oral BID    apixaban  5 mg Oral BID    dilTIAZem  120 mg Oral Daily    ferrous sulfate  325 mg Oral Daily with breakfast    pantoprazole  40 mg Oral QAM AC    therapeutic multivitamin-minerals  1 tablet Oral Daily    pravastatin  10 mg Oral QPM    QUEtiapine  400 mg Oral Nightly    torsemide  20 mg Oral Daily    sodium chloride flush  5-40 mL IntraVENous 2 times per day    venlafaxine  225 mg Oral Daily       Continuous Infusions:   sodium chloride         Data:  CBC:   Recent Labs     02/13/23 0429   WBC 6.8   RBC 3.69*   HGB 11.2*   HCT 35.0*   MCV 94.8   RDW 14.2          BMP:   Recent Labs     02/13/23 0429      K 3.3*   CL 86*   CO2 44*   BUN 37*   CREATININE 0.6       BNP: No results for input(s): BNP in the last 72 hours. PT/INR:   No results for input(s): PROTIME, INR in the last 72 hours. APTT:   No results for input(s): APTT in the last 72 hours. CARDIAC ENZYMES:   No results for input(s): CKMB, CKMBINDEX, TROPONINI in the last 72 hours. Invalid input(s): CKTOTAL;3    FASTING LIPID PANEL:  Lab Results   Component Value Date    CHOL 180 11/02/2015    HDL 64 (H) 02/04/2021    TRIG 201 (H) 02/10/2018     LIVER PROFILE:   No results for input(s): AST, ALT, ALB, BILIDIR, BILITOT, ALKPHOS in the last 72 hours. Cultures  COVID-19 detected  Rapid influenza A and B neg  Blood cultures no growth to date    Radiology  XR CHEST PORTABLE   Final Result   Chronic airspace changes have slightly progressed in the right lung suspected   to represent pleural thickening and pleural fluid along with edema versus   atelectasis. Lesser pattern of interstitial change slightly increased on the   left. A superimposed viral pattern of pneumonitis may be considered   clinically. CT CHEST WO CONTRAST    (Results Pending)       EKG reviewed. Shows normal sinus rhythm with right bundle branch block. .. Unchanged from before    Assessment:  Principal Problem:    Acute on chronic respiratory failure with hypoxia (HCC)  Active Problems:    COVID-19 virus infection    SVT (supraventricular tachycardia) (HCC)    Anxiety  Resolved Problems:    * No resolved hospital problems.  *      Plan:    # Acute on chronic respiratory failure with hypoxia  -  related to COVID PNA, aeCOPD, and [id number]/vaping associated lung injury  - Supplemental O2 to maintain SPO2 ? 92%, continuous pulse ox.    -Persistent hypoxemia . O2 4 L ->9 L-> 7 L.  continued to require 7 L of oxygen for several days-> weaned down to 6 L-> 5L  now. Patient normally on 3 to 3.5 L O2 at home. Wean as tolerated. Currently O2 sat stable on 4 L she can be discharged home  -Vape pen has been removed from her room. Patient admitted that she was vaping intermittently in her room for the last several days  -Patient uses BiPAP at home - continue.  - Pulmonary following    #Tobacco abuse  Vaping abuse  -With her vaping history and persistent dyspnea and wheezing , and imaging showing pneumonitis pattern - I am concerned about vaping associated lung injury. We will check high-resolution chest CT-we could not complete this while in the hospital patient was getting anxious this can be done as an outpatient  may need bronch with biopsy  -got  IV Solu-Medrol-> on IV Decadron now  Vape pen has been removed from her room. Added Ativan for anxiety and withdrawal symptoms-> patient became very sleepy and groggy with Ativan -discontinued now . -Continue nicotine patch. # Sepsis   - POA (temp, RR;) Suspect 2/2 COVID. No clear sign of bacterial PNA. F/u cx. # COVID PNA  - Dx on 2/5.  - IV tociluzimab given 2/6  - pulm cx  -Droplet plus precautions  - Continue steroids and inhaled bronchodilators  Continued on Decadron patient will need 3 more days of Decadron on discharge    # aeCOPD  -IV steroids, IV ceftriaxone, PRN/LIZ intensive NEB therapy. Change DuoNeb nebulizer to Xopenex. check respiratory culture    # PSVT  #History of paroxysmal A-fib on anticoagulation  -Patient on Cardizem  mg daily and Eliquis-continued  -Lopressor added for PSVT episode.   Keep on low-dose and monitor  -Cardiology consulted  -Changed DuoNeb nebulizers to Xopenex  -Rate controlled today. #Chronic diastolic CHF  -Continue torsemide    # Hx NSCLA lung cancer s/p remote RUL lobectomy. # Prolonged QTc, 514 ms, avoid QT prolonging agents as able. # Altered mentation  Acute toxic encephalopathy-not present on admission.  - on  2/7 -patient was noted to be very lethargic-  gabapentin dose held. decreased her dose from 800 mg 4 times daily to 400 mg twice daily and monitor closely . Mental status cleared up and she became alert and oriented by     - 2/13  Patient again groggy today- cut down the dose of Ativan and place  back on BiPAP.  -> 2/14 Ativan discontinued. # Anxiety, restless legs. On Xanax as needed; patient states that Xanax was not helping her anxiety and has history of Ativan . Ativan was helping her but patient now very sleepy , will decrease dose of Ativan . Patient is not tolerating Ativan. Ativan we will switch her back to her home regimen of Xanax as needed for anxiety  -Continue Seroquel and Effexor as at home    #Weakness  -Consult PT OT     DVT Prophylaxis: Eliquis  ADULT DIET; Regular   DNR-CCA    On 2/12 CODE STATUS addressed with patient and changed to DNR CCA as per her wishes    Continue BiPAP as needed; wean oxygen as needed; she is slowly improving. Stop Ativan , monitor mental status . hopefully can be discharged home once O2 requirement is closer to 4 L    - >  Patient oxygen saturation is now stable on 4 L and patient wishes to go home. .  She still has some wheezing but this is her baseline. .  Continued on BiPAP. Kennedy Martinez She is currently on her home setting she can be discharged home on Decadron. For 3 more days, follow-up with pulmonology as an outpatient.  .    Consider outpatient high-resolution chest CT chest CT     Mel Streeter MD   2/15/2023 3:07 PM

## 2023-02-15 NOTE — FLOWSHEET NOTE
02/14/23 2045   Vital Signs   Temp 98.7 °F (37.1 °C)   Temp Source Oral   Heart Rate (!) 108   Heart Rate Source Monitor   Resp 18   BP (!) 145/103   MAP (Calculated) 117   BP Location Left leg   BP Method Automatic   Patient Position Semi fowlers   Level of Consciousness 0   MEWS Score 2   Pain Assessment   Pain Assessment None - Denies Pain   Pain Level 0   Oxygen Therapy   O2 Device High flow nasal cannula   O2 Flow Rate (L/min) 5 L/min   Pt awake and alert in bed. Vitals obtained. BP  145/103, scheduled lopressor given. PRN xanax given for anxiety. Oriented times 4. Shift assessment completed, see flow sheet. Pt denies any pain at this time. Scheduled meds given, see MAR. Pt denies any further needs at this time. Call light in reach.

## 2023-02-15 NOTE — DISCHARGE SUMMARY
Name:  Eliot Acosta  Room:  /9713-11  MRN:    [id number]    Discharge Summary      This discharge summary is in conjunction with a complete physical exam done on the day of discharge. Discharging Physician: Dr. Alma Villafanal: 2/5/2023  Discharge:  02/15/23     HPI taken from admission H&P:    The patient is a 67 y.o. female with PMH below, presents with SOB/ROMERO, fever, cough, diarrhea, congestion. Pt reports the last few days she has not felt well. She has felt more SOB for the last month and has been seen at OSH a few times. She is normally on 3L O2 but has to turn it up to 5L at home to remain comfortable. She was briefly put on NIV for WOB at [facility] but was able to be weaned off prior to admission. She is currently requiring 4L. She has hx of COPD, CHF, a fib on AC. She has remote Hx of RUL NSCLCa s/p remote wedge resection. She was found to be COVID +. Diagnoses this Admission and Hospital Course   # Acute on chronic respiratory failure with hypoxia  -  related to COVID PNA, aeCOPD, and TZYTE-r-hwkixscst/vaping associated lung injury  - Supplemental O2 to maintain SPO2 ? 92%, continuous pulse ox.    -Persistent hypoxemia . O2 4 L ->9 L-> 7 L.  continued to require 7 L of oxygen for several days-> weaned down to 6 L-> 5L  now. Patient normally on 3 to 3.5 L O2 at home. Wean as tolerated. Currently O2 sat stable on 4 L she can be discharged home  -Vape pen has been removed from her room. Patient admitted that she was vaping intermittently in her room for the last several days  -Patient uses BiPAP at home - continue.  - Pulmonary following     #Tobacco abuse  Vaping abuse  -With her vaping history and persistent dyspnea and wheezing , and imaging showing pneumonitis pattern - I am concerned about vaping associated lung injury.    We will check high-resolution chest CT-we could not complete this while in the hospital patient was getting anxious this can be done as an outpatient  may need bronch with biopsy  -got  IV Solu-Medrol-> on IV Decadron now  Vape pen has been removed from her room. Added Ativan for anxiety and withdrawal symptoms-> patient became very sleepy and groggy with Ativan -discontinued now . -Continue nicotine patch. # Sepsis   - POA (temp, RR;) Suspect 2/2 COVID. No clear sign of bacterial PNA. F/u cx. # COVID PNA  - Dx on 2/5.  - IV tociluzimab given 2/6  - pulm cx  -Droplet plus precautions  - Continue steroids and inhaled bronchodilators  Continued on Decadron patient will need 3 more days of Decadron on discharge     # aeCOPD  -IV steroids, IV ceftriaxone, PRN/LIZ intensive NEB therapy. Change DuoNeb nebulizer to Xopenex. check respiratory culture     # PSVT  #History of paroxysmal A-fib on anticoagulation  -Patient on Cardizem  mg daily and Eliquis-continued  -Lopressor added for PSVT episode. Keep on low-dose and monitor  -Cardiology consulted  -Changed DuoNeb nebulizers to Xopenex  -Rate controlled today. #Chronic diastolic CHF  -Continue torsemide     # Hx NSCLA lung cancer s/p remote RUL lobectomy. # Prolonged QTc, 514 ms, avoid QT prolonging agents as able. # Altered mentation  Acute toxic encephalopathy-not present on admission.  - on  2/7 -patient was noted to be very lethargic-  gabapentin dose held. decreased her dose from 800 mg 4 times daily to 400 mg twice daily and monitor closely . Mental status cleared up and she became alert and oriented by      - 2/13  Patient again groggy today- cut down the dose of Ativan and place  back on BiPAP.  -> 2/14 Ativan discontinued. # Anxiety, restless legs. On Xanax as needed; patient states that Xanax was not helping her anxiety and has history of Ativan . Ativan was helping her but patient now very sleepy , will decrease dose of Ativan . Patient is not tolerating Ativan.   Ativan we will switch her back to her home regimen of Xanax as needed for anxiety  -Continue Seroquel and Effexor as at home     #Weakness  -Consult PT OT     DVT Prophylaxis: Eliquis  ADULT DIET; Regular   DNR-CCA     On 2/12 CODE STATUS addressed with patient and changed to DNR CCA as per her wishes     Continue BiPAP as needed; wean oxygen as needed; she is slowly improving. Stop Ativan , monitor mental status . hopefully can be discharged home once O2 requirement is closer to 4 L     - >  Patient oxygen saturation is now stable on 4 L and patient wishes to go home. .  She still has some wheezing but this is her baseline. .  Continued on BiPAP. Shekhar Baxter She is currently on her home setting she can be discharged home on Decadron. For 3 more days, follow-up with pulmonology as an outpatient. .     Consider outpatient high-resolution chest CT chest CT    Procedures (Please Review Full Report for Details)  N/A    Consults    Cardiology  Pulmonary       Physical Exam at Discharge:    BP (!) 139/54   Pulse 80   Temp 98 °F (36.7 °C) (Oral)   Resp 20   Wt 221 lb 14.4 oz (100.7 kg)   SpO2 93%   BMI 35.82 kg/m²     Physical Exam:  GEN:        Patient is awake and oriented. Looks fatigued. No dyspnea. HEENT:   NC/AT,EOMI, MMM, no erythema/exudates or visible masses. CVS:        Normal S1,S2. RRR. Without M/G/R.   LUNG:     Diminished bilat. Bilateral mild wheezes present  ABD:        Soft, ND/NT, BS+ x4. Without G/R.  EXT:        2+ pulses, no c/c. BLE trace edema. Brisk cap refill. PSY:        Thought process intact, affect appropriate. RAMIRO:        CN III-XII grossly intact. Moves all 4 spontaneously. Sensory grossly intact. SKIN:       No rash or lesions on visible skin.      CBC:   Recent Labs     02/13/23 0429   WBC 6.8   HGB 11.2*   HCT 35.0*   MCV 94.8        BMP:   Recent Labs     02/13/23 0429      K 3.3*   CL 86*   CO2 44*   BUN 37*   CREATININE 0.6       CULTURES    COVID-19 detected  Rapid influenza A and B neg  Blood cultures no growth to date    RADIOLOGY  XR CHEST PORTABLE   Final Result   Chronic airspace changes have slightly progressed in the right lung suspected   to represent pleural thickening and pleural fluid along with edema versus   atelectasis. Lesser pattern of interstitial change slightly increased on the   left. A superimposed viral pattern of pneumonitis may be considered   clinically. CT CHEST WO CONTRAST    (Results Pending)         Discharge Medications     Medication List        START taking these medications      dexamethasone 6 MG tablet  Commonly known as: Decadron  Take 1 tablet by mouth daily (with breakfast) for 3 days     metoprolol tartrate 25 MG tablet  Commonly known as: LOPRESSOR  Take 1 tablet by mouth 2 times daily     nicotine 21 MG/24HR  Commonly known as: NICODERM CQ  Place 1 patch onto the skin daily  Start taking on: February 16, 2023     potassium chloride 20 MEQ extended release tablet  Commonly known as: KLOR-CON M  Take 1 tablet by mouth daily (with breakfast)  Start taking on: February 16, 2023            CHANGE how you take these medications      gabapentin 800 MG tablet  Commonly known as: NEURONTIN  What changed:   how much to take  when to take this  reasons to take this  additional instructions     venlafaxine 75 MG extended release capsule  Commonly known as: EFFEXOR XR  What changed: Another medication with the same name was removed. Continue taking this medication, and follow the directions you see here.             CONTINUE taking these medications      * albuterol (2.5 MG/3ML) 0.083% nebulizer solution  Commonly known as: PROVENTIL  Take 3 mLs by nebulization every 6 hours as needed for Wheezing     * albuterol sulfate  (90 Base) MCG/ACT inhaler  Commonly known as: Ventolin HFA  Inhale 2 puffs into the lungs every 6 hours as needed for Wheezing or Shortness of Breath     ALPRAZolam 0.5 MG tablet  Commonly known as: XANAX     apixaban 5 MG Tabs tablet  Commonly known as: Eliquis  Take 1 tablet by mouth 2 times daily dilTIAZem 120 MG extended release capsule  Commonly known as: CARDIZEM CD  Take one capsule by mouth daily     ferrous sulfate 325 (65 Fe) MG tablet  Commonly known as: IRON 325     guaiFENesin 600 MG extended release tablet  Commonly known as: MUCINEX     HYDROcodone-acetaminophen 7.5-325 MG per tablet  Commonly known as: NORCO     Narcan 4 MG/0.1ML Liqd nasal spray  Generic drug: naloxone     omeprazole 40 MG delayed release capsule  Commonly known as: PRILOSEC     rOPINIRole 4 MG tablet  Commonly known as: REQUIP     SEROquel 400 MG tablet  Generic drug: QUEtiapine     therapeutic multivitamin-minerals tablet     torsemide 20 MG tablet  Commonly known as: DEMADEX     Trelegy Ellipta 100-62.5-25 MCG/ACT Aepb inhaler  Generic drug: fluticasone-umeclidin-vilant  INHALE 1 PUFF BY MOUTH EVERY DAY AS DIRECTED           * This list has 2 medication(s) that are the same as other medications prescribed for you. Read the directions carefully, and ask your doctor or other care provider to review them with you. STOP taking these medications      ARIPiprazole 2 MG tablet  Commonly known as: ABILIFY     ezetimibe 10 MG tablet  Commonly known as: ZETIA     MUCINEX ALLERGY PO     nystatin [id number] UNIT/GM cream  Commonly known as: MYCOSTATIN     OXYGEN     pravastatin 10 MG tablet  Commonly known as: PRAVACHOL               Where to Get Your Medications        These medications were sent to [address] [facility], [address] [facility],[address]  [id number] [facility] [address]      Phone: [phone]   dexamethasone 6 MG tablet  metoprolol tartrate 25 MG tablet  nicotine 21 MG/24HR  potassium chloride 20 MEQ extended release tablet           Discharged in stable condition to home     Follow Up:   Follow up with PCP in 1 week    *** Guillermo Skelton MD       Addendum   2/22/23   9:46 AM       CT chest ordered during hospitalization could not be completed as patient was anxious and restless. Plan was for outpatient follow-up. I was told that the CT was not done, and hence not reported. However, it looks like on 2/21, radiology has reviewed whatever pictures they have from 2/13-> and reported it . I have reviewed these results . Motion artifact mentioned. However concern for multiple irregular pulmonary masses. Patient needs follow-up PET-CT correlation. I called and spoke to the patient today and discussed with her about my concerns. She understands and will get an follow-up CT. she will discuss with pulmonologist.     I also discussed with and forwarded results to her pulmonologist, Dr. Romulo Forman . He will ensure that patient gets her follow chest CT .      Tito Avendano MD

## 2023-02-15 NOTE — FLOWSHEET NOTE
02/15/23 0245   Oxygen Therapy   SpO2 92 %   O2 Device Nasal cannula   O2 Flow Rate (L/min) 5 L/min   Pt off BiPAP at this time, state she is \"done for the night and won't wear it anymore\". Pt placed back on 5L NC.  Taniya Joya RN

## 2023-02-15 NOTE — CARE COORDINATION
INTERDISCIPLINARY PLAN OF CARE CONFERENCE    Date/Time: 2/15/2023 10:21 AM  Completed by: Fanny Estrella RN, Case Management      Patient Name:  Paulette Ashby  YOB: 1950  Admitting Diagnosis: Acute on chronic respiratory failure with hypoxia ([facility] [address] [J96.21]  COVID-19 virus infection [U07.1]     Admit Date/Time:  2/5/2023  9:34 PM    Chart reviewed. Interdisciplinary team contacted or reviewed plan related to patient progress and discharge plans. Disciplines included Case Management, Nursing, and Dietitian. Current Status:02/05/0223  PT/OT recommendation for discharge plan of care: [facility]  Discharge Recommendations: Continue to assess; Home PRN assist pending progress. DME needs for discharge: Defer to facility  Expected D/C Disposition:  Home  Confirmed plan with patient   Discharge Plan Comments: Chart reviewed. Met with pt at bedside and explained the role of the CM. Plans to return home and resume [place] [id number] services with [facility], add PT/OT). Daughter will provide transport home. Pt has her [employer] portable concentrator at bedside. +CM following  Home O2 in place on admit: Yes  Pt informed of need to bring portable home O2 tank on day of discharge for nursing to connect prior to leaving:  Yes  Verbalized agreement/Understanding:   Yes

## 2023-02-15 NOTE — CARE COORDINATION
DISCHARGE ORDER  Date/Time 2/15/2023 3:41 PM  Completed by: Wolf Gómez RN, Case Management    Patient Name: Ginny Troncoso      : 1950  Admitting Diagnosis: Acute on chronic respiratory failure with hypoxia ([address] [J96.21]  COVID-19 virus infection [U07.1]      Admit order Date and Status:02/15/2023  (verify MD's last order for status of admission)      Noted discharge order. If applicable PT/OT recommendation at Discharge: NA  DME recommendation by PT/OT:NA  Confirmed discharge plan    Discharge Plan: Chart reviewed. Met with pt at bedside and explained the role of the CM. Plans to DC home today with resumption of [facility], orders, JOSE/AVS and clinical documentation faxed to [address] [place] [address] at [phone]. Pt will call family for transportation home. She has [employer] portable concentrator and stationary concentrator at home. No DC needs or barriers. Date of Last IMM Given: 02/15/2023    Has Home O2 in place on admit:  Yes  Informed of need to bring portable home O2 tank on day of discharge for nursing to connect prior to leaving:   Yes  Verbalized agreement/Understanding:   Yes  Pt is being d/c'd to home today. Pt's O2 sats are 93% on 4 liters. Discharge timeout done with [facility]. All discharge needs and concerns addressed.

## 2023-02-16 ENCOUNTER — TELEPHONE (OUTPATIENT)
Dept: PULMONOLOGY | Age: 73
End: 2023-02-16

## 2023-02-16 NOTE — TELEPHONE ENCOUNTER
Pt called in stating she was discharged from the hospital on 2/15/2023. Pt stating her O2 on left finger is 60% and O2 on right finger is 97%. Pt states if she takes a few steps she has to stop to catch her breath. Pt also stated that she slept with BIPAP on last night for 7 hours. Pt stated when she woke up it felt like an elephant was sitting on her chest for about 30 minutes before it went away. Please advise.

## 2023-02-22 ENCOUNTER — TELEPHONE (OUTPATIENT)
Dept: PULMONOLOGY | Age: 73
End: 2023-02-22

## 2023-02-22 DIAGNOSIS — R91.1 LEFT LOWER LOBE PULMONARY NODULE: Primary | ICD-10-CM

## 2023-02-22 NOTE — TELEPHONE ENCOUNTER
CT/PET teddy 3/9/23 @ 8am arrival 7:30am MTO. Patient aware of date time and prep. Watch for results. Needs teddy for f/u no available appts ok to add ICU week? Order pended.

## 2023-03-09 ENCOUNTER — HOSPITAL ENCOUNTER (OUTPATIENT)
Dept: PET IMAGING | Age: 73
Discharge: HOME | End: 2023-03-09
Payer: MEDICARE

## 2023-03-09 DIAGNOSIS — R91.1 LEFT LOWER LOBE PULMONARY NODULE: ICD-10-CM

## 2023-03-09 PROCEDURE — 78815 PET IMAGE W/CT SKULL-THIGH: CPT

## 2023-03-09 PROCEDURE — A9552 F18 FDG: HCPCS | Performed by: INTERNAL MEDICINE

## 2023-03-09 PROCEDURE — 3430000000 HC RX DIAGNOSTIC RADIOPHARMACEUTICAL: Performed by: INTERNAL MEDICINE

## 2023-03-09 RX ORDER — FLUDEOXYGLUCOSE F 18 200 MCI/ML
15.89 INJECTION, SOLUTION INTRAVENOUS
Status: COMPLETED | OUTPATIENT
Start: 2023-03-09 | End: 2023-03-09

## 2023-03-09 RX ADMIN — FLUDEOXYGLUCOSE F 18 15.89 MILLICURIE: 200 INJECTION, SOLUTION INTRAVENOUS at 07:51

## 2023-03-10 ENCOUNTER — HOSPITAL ENCOUNTER (EMERGENCY)
Age: 73
Discharge: HOME | End: 2023-03-10
Attending: EMERGENCY MEDICINE
Payer: MEDICARE

## 2023-03-10 ENCOUNTER — APPOINTMENT (OUTPATIENT)
Dept: CT IMAGING | Age: 73
End: 2023-03-10
Payer: MEDICARE

## 2023-03-10 VITALS
SYSTOLIC BLOOD PRESSURE: 154 MMHG | RESPIRATION RATE: 23 BRPM | HEART RATE: 86 BPM | DIASTOLIC BLOOD PRESSURE: 90 MMHG | OXYGEN SATURATION: 99 % | TEMPERATURE: 99.2 F

## 2023-03-10 DIAGNOSIS — R10.9 ABDOMINAL PAIN, UNSPECIFIED ABDOMINAL LOCATION: Primary | ICD-10-CM

## 2023-03-10 LAB
A/G RATIO: 1.9 (ref 1.1–2.2)
ABO/RH: NORMAL
ALBUMIN SERPL-MCNC: 4.3 G/DL (ref 3.4–5)
ALP BLD-CCNC: 82 U/L (ref 40–129)
ALT SERPL-CCNC: 18 U/L (ref 10–40)
ANION GAP SERPL CALCULATED.3IONS-SCNC: 9 MMOL/L (ref 3–16)
ANTIBODY SCREEN: NORMAL
APTT: 30.5 SEC (ref 23–34.3)
AST SERPL-CCNC: 20 U/L (ref 15–37)
BASOPHILS ABSOLUTE: 0 K/UL (ref 0–0.2)
BASOPHILS RELATIVE PERCENT: 0.3 %
BILIRUB SERPL-MCNC: 0.3 MG/DL (ref 0–1)
BUN BLDV-MCNC: 10 MG/DL (ref 7–20)
CALCIUM SERPL-MCNC: 9.8 MG/DL (ref 8.3–10.6)
CHLORIDE BLD-SCNC: 100 MMOL/L (ref 99–110)
CO2: 30 MMOL/L (ref 21–32)
CREAT SERPL-MCNC: <0.5 MG/DL (ref 0.6–1.2)
EOSINOPHILS ABSOLUTE: 0 K/UL (ref 0–0.6)
EOSINOPHILS RELATIVE PERCENT: 0.5 %
GFR SERPL CREATININE-BSD FRML MDRD: >60 ML/MIN/{1.73_M2}
GLUCOSE BLD-MCNC: 130 MG/DL (ref 70–99)
HCT VFR BLD CALC: 32.7 % (ref 36–48)
HEMOGLOBIN: 10.5 G/DL (ref 12–16)
INR BLD: 1.18 (ref 0.87–1.14)
LACTIC ACID, SEPSIS: 1.3 MMOL/L (ref 0.4–1.9)
LIPASE: 18 U/L (ref 13–60)
LYMPHOCYTES ABSOLUTE: 0.9 K/UL (ref 1–5.1)
LYMPHOCYTES RELATIVE PERCENT: 9.4 %
MCH RBC QN AUTO: 30.6 PG (ref 26–34)
MCHC RBC AUTO-ENTMCNC: 32.2 G/DL (ref 31–36)
MCV RBC AUTO: 95 FL (ref 80–100)
MONOCYTES ABSOLUTE: 0.2 K/UL (ref 0–1.3)
MONOCYTES RELATIVE PERCENT: 2.1 %
NEUTROPHILS ABSOLUTE: 8.1 K/UL (ref 1.7–7.7)
NEUTROPHILS RELATIVE PERCENT: 87.7 %
PDW BLD-RTO: 14.7 % (ref 12.4–15.4)
PLATELET # BLD: 215 K/UL (ref 135–450)
PMV BLD AUTO: 6.9 FL (ref 5–10.5)
POTASSIUM REFLEX MAGNESIUM: 4.5 MMOL/L (ref 3.5–5.1)
PROTHROMBIN TIME: 15 SEC (ref 11.7–14.5)
RBC # BLD: 3.44 M/UL (ref 4–5.2)
SODIUM BLD-SCNC: 139 MMOL/L (ref 136–145)
TOTAL PROTEIN: 6.6 G/DL (ref 6.4–8.2)
WBC # BLD: 9.2 K/UL (ref 4–11)

## 2023-03-10 PROCEDURE — 80053 COMPREHEN METABOLIC PANEL: CPT

## 2023-03-10 PROCEDURE — 83605 ASSAY OF LACTIC ACID: CPT

## 2023-03-10 PROCEDURE — 74174 CTA ABD&PLVS W/CONTRAST: CPT

## 2023-03-10 PROCEDURE — 86850 RBC ANTIBODY SCREEN: CPT

## 2023-03-10 PROCEDURE — 99285 EMERGENCY DEPT VISIT HI MDM: CPT

## 2023-03-10 PROCEDURE — 86900 BLOOD TYPING SEROLOGIC ABO: CPT

## 2023-03-10 PROCEDURE — 86901 BLOOD TYPING SEROLOGIC RH(D): CPT

## 2023-03-10 PROCEDURE — 85610 PROTHROMBIN TIME: CPT

## 2023-03-10 PROCEDURE — 85730 THROMBOPLASTIN TIME PARTIAL: CPT

## 2023-03-10 PROCEDURE — 87040 BLOOD CULTURE FOR BACTERIA: CPT

## 2023-03-10 PROCEDURE — 6360000004 HC RX CONTRAST MEDICATION: Performed by: PHYSICIAN ASSISTANT

## 2023-03-10 PROCEDURE — 85025 COMPLETE CBC W/AUTO DIFF WBC: CPT

## 2023-03-10 PROCEDURE — 83690 ASSAY OF LIPASE: CPT

## 2023-03-10 RX ADMIN — IOPAMIDOL 75 ML: 755 INJECTION, SOLUTION INTRAVENOUS at 15:18

## 2023-03-10 ASSESSMENT — ENCOUNTER SYMPTOMS
EYE PAIN: 0
NAUSEA: 0
SHORTNESS OF BREATH: 1
ABDOMINAL PAIN: 1
COUGH: 0
VOMITING: 0
BACK PAIN: 0
DIARRHEA: 1

## 2023-03-10 ASSESSMENT — PAIN - FUNCTIONAL ASSESSMENT: PAIN_FUNCTIONAL_ASSESSMENT: 0-10

## 2023-03-10 ASSESSMENT — PAIN SCALES - GENERAL: PAINLEVEL_OUTOF10: 1

## 2023-03-10 NOTE — ED PROVIDER NOTES
[address] [facility]  ED  EMERGENCY DEPARTMENT ENCOUNTER        Pt Name: Luisana Mulligan  MRN: [id number]  [place] 1950  Date of evaluation: 3/10/2023  Provider: MILTON Nava  PCP: [facility]  Note Started: 11:59 AM EST 3/10/23       I have seen and evaluated this patient with my supervising physician Dr. Natalia Ramirez   Patient presents with    Abdominal Pain     Pt was called from pulmonologist and reported to come here for \"an abd wall bleed\" pt reports at times pain in abd.   + diarrhea   pt has possible left lung cancer. Pet scan reported \"Suspected right rectus sheath hematoma\" pt on eliquis        HISTORY OF PRESENT ILLNESS: 1 or more Elements     History From: patient    Luisana Mulligan is a 67 y.o. female who presents to the ED for evaluation of abnormal outpatient scan and abdominal pain. Patient had an outpatient PET scan yesterday for evaluation of her pulmonary nodules. She was called by her pulmonologist regarding abnormal findings of a suspected right rectus sheath hematoma. She is on Eliquis. She was instructed to come to the emergency department immediately for evaluation. She comes to the emergency department today for evaluation, citing that she has had some moderate abdominal pain for the last couple weeks. She has some bruising just left to the umbilicus that she was not aware of. She reports compliance with her Eliquis other than not taking the dose today. She denies any trauma. She denies chest pain, nausea, vomiting. She reports chronic diarrhea that is unchanged and also chronic shortness of breath secondary to her COPD that is unchanged. The patient denies fever or chills,  No recent travel, exposure to sick contacts, or recent antibiotics. No other acute concerns, associated symptoms or modifying factors.     Nursing Notes were all reviewed and agreed with or any disagreements were addressed in the HPI.    REVIEW OF SYSTEMS :      Review of Systems   Constitutional:  Negative for chills, fatigue and fever. Eyes:  Negative for pain. Respiratory:  Positive for shortness of breath. Negative for cough. Cardiovascular:  Negative for chest pain. Gastrointestinal:  Positive for abdominal pain and diarrhea. Negative for nausea and vomiting. Genitourinary:  Negative for dysuria. Musculoskeletal:  Negative for back pain, neck pain and neck stiffness. Skin:  Negative for rash. Neurological:  Negative for dizziness and headaches. Psychiatric/Behavioral:  Negative for confusion. Positives and Pertinent negatives as per HPI.      SURGICAL HISTORY     Past Surgical History:   Procedure Laterality Date    ANKLE ARTHROSCOPY      x2    APPENDECTOMY      BRONCHOSCOPY N/A 3/15/2020    BRONCHOSCOPY THERAPUTIC ASPIRATION INITIAL performed by Madison Cabrera MD at [address]  3/17/2020    BRONCHOSCOPY THERAPUTIC ASPIRATION INITIAL performed by Mary Zamarripa MD at [address] [place] [address] Left     CHOLECYSTECTOMY      COLONOSCOPY      COLONOSCOPY N/A 10/22/2021    COLONOSCOPY WITH BIOPSY performed by Carolyn Quinones MD at [address] [facility] (CERVIX STATUS UNKNOWN)      ROTATOR CUFF REPAIR Bilateral     THORACOSCOPY Right 3/11/2020    RIGHT VIDEO ASSISTED THORACOSCOPIC SURGERY; WEDGE EXCISION OF RIGHT UPPER LOBE FOLLOWED BY RIGHT UPPER LOBECTOMY; INTERCOSTAL NERVE BLOCK performed by Madison Cabrera MD at [address] 10/22/2021    EGD BIOPSY performed by Carolyn Quinones MD at [address] [place] [address]       Previous Medications    ALBUTEROL (PROVENTIL) (2.5 MG/3ML) 0.083% NEBULIZER SOLUTION    Take 3 mLs by nebulization every 6 hours as needed for Wheezing    ALBUTEROL SULFATE HFA (VENTOLIN HFA) 108 (90 BASE) MCG/ACT INHALER    Inhale 2 puffs into the lungs every 6 hours as needed for Wheezing or Shortness of Breath    ALPRAZOLAM (XANAX) 0.5 MG TABLET    Take 0.5 mg by mouth 3 times daily as needed for Anxiety. APIXABAN (ELIQUIS) 5 MG TABS TABLET    Take 1 tablet by mouth 2 times daily    DILTIAZEM (CARDIZEM CD) 120 MG EXTENDED RELEASE CAPSULE    Take one capsule by mouth daily    FERROUS SULFATE 325 (65 FE) MG TABLET    Take 325 mg by mouth daily (with breakfast)    FLUTICASONE-UMECLIDIN-VILANT (TRELEGY ELLIPTA) 100-62.5-25 MCG/INH AEPB    INHALE 1 PUFF BY MOUTH EVERY DAY AS DIRECTED    GABAPENTIN (NEURONTIN) 800 MG TABLET    Take 0.5 tablets by mouth 2 times daily. Dose decreased    GUAIFENESIN (MUCINEX) 600 MG EXTENDED RELEASE TABLET    Take 600 mg by mouth 2 times daily as needed for Congestion    HYDROCODONE-ACETAMINOPHEN (NORCO) 7.5-325 MG PER TABLET    TAKE 1 TABLET BY MOUTH 3 TIMES A DAY AS NEEDED FOR PAIN    METOPROLOL TARTRATE (LOPRESSOR) 25 MG TABLET    Take 1 tablet by mouth 2 times daily    MULTIPLE VITAMINS-MINERALS (THERAPEUTIC MULTIVITAMIN-MINERALS) TABLET    Take 1 tablet by mouth daily    NARCAN 4 MG/0.1ML LIQD NASAL SPRAY    SPRAY one SPRAY in one nostril as needed (opioid reversal). MAY REPEAT in 2-3 minutes in other nostril if needed.     NICOTINE (NICODERM CQ) 21 MG/24HR    Place 1 patch onto the skin daily    OMEPRAZOLE (PRILOSEC) 40 MG DELAYED RELEASE CAPSULE    TAKE ONE CAPSULE BY MOUTH EVERY DAY    POTASSIUM CHLORIDE (KLOR-CON M) 20 MEQ EXTENDED RELEASE TABLET    Take 1 tablet by mouth daily (with breakfast)    QUETIAPINE (SEROQUEL) 400 MG TABLET    Take 400 mg by mouth nightly    ROPINIROLE (REQUIP) 4 MG TABLET    Take 4 mg by mouth nightly    TORSEMIDE (DEMADEX) 20 MG TABLET    TAKE 1 TABLET BY MOUTH EVERY DAY    VENLAFAXINE (EFFEXOR XR) 75 MG EXTENDED RELEASE CAPSULE    Take 225 mg by mouth daily       ALLERGIES     Buspirone, Codeine, Doxycycline, Lisinopril, and Penicillins    FAMILYHISTORY       Family History   Problem Relation Age of Onset    Cancer Mother Heart Failure Father         SOCIAL HISTORY       Social History     Tobacco Use    Smoking status: Former     Packs/day: 2.00     Years: 50.00     Pack years: 100.00     Types: Cigarettes     Quit date: 2022     Years since quittin.8    Smokeless tobacco: Never    Tobacco comments:     Smokes like two cigarttes a day, uses vape. Vaping Use    Vaping Use: Some days    Substances: Nicotine   Substance Use Topics    Alcohol use: No     Alcohol/week: 0.0 standard drinks    Drug use: No       SCREENINGS        Romana Coma Scale  Eye Opening: Spontaneous  Best Verbal Response: Oriented  Best Motor Response: Obeys commands  Romana Coma Scale Score: 15                CIWA Assessment  BP: (!) 154/90  Heart Rate: 97           PHYSICAL EXAM  1 or more Elements     ED Triage Vitals   BP Temp Temp Source Heart Rate Resp SpO2 Height Weight   03/10/23 1153 03/10/23 1156 03/10/23 1153 03/10/23 1153 03/10/23 1153 03/10/23 1153 -- --   (!) 157/72 99.2 °F (37.3 °C) Oral 68 18 98 %         Physical Exam  Vitals and nursing note reviewed. Constitutional:       General: She is not in acute distress. Appearance: She is well-developed. She is not diaphoretic. HENT:      Head: Normocephalic and atraumatic. Eyes:      General:         Right eye: No discharge. Left eye: No discharge. Pulmonary:      Effort: Pulmonary effort is normal. No respiratory distress. Breath sounds: No stridor. Abdominal:      Tenderness: There is abdominal tenderness in the periumbilical area. Comments: Patient with bruising just left to the umbilicus mildly tender. No erythema, induration, fluctuance. Musculoskeletal:         General: Normal range of motion. Cervical back: Normal range of motion and neck supple. Skin:     General: Skin is warm and dry. Coloration: Skin is not pale. Neurological:      Mental Status: She is alert and oriented to person, place, and time.       Comments: No gross facial drooping. Moves all 4 extremities spontaneously. Psychiatric:         Mood and Affect: Mood normal.         Behavior: Behavior normal.           DIAGNOSTIC RESULTS   LABS:    Labs Reviewed   CBC WITH AUTO DIFFERENTIAL - Abnormal; Notable for the following components:       Result Value    RBC 3.44 (*)     Hemoglobin 10.5 (*)     Hematocrit 32.7 (*)     Neutrophils Absolute 8.1 (*)     Lymphocytes Absolute 0.9 (*)     All other components within normal limits   COMPREHENSIVE METABOLIC PANEL W/ REFLEX TO MG FOR LOW K - Abnormal; Notable for the following components:    Glucose 130 (*)     Creatinine <0.5 (*)     All other components within normal limits   PROTIME-INR - Abnormal; Notable for the following components:    Protime 15.0 (*)     INR 1.18 (*)     All other components within normal limits   CULTURE, BLOOD 1   CULTURE, BLOOD 2   LIPASE   LACTATE, SEPSIS   APTT   TYPE AND SCREEN       When ordered only abnormal lab results are displayed. All other labs were within normal range or not returned as of this dictation. EKG: When ordered, EKG's are interpreted by the Emergency Department Physician in the absence of a cardiologist.  Please see their note for interpretation of EKG. RADIOLOGY:   Non-plain film images such as CT, Ultrasound and MRI are read by the radiologist. Plain radiographic images are visualized and preliminarily interpreted by the ED Provider with the below findings:        Interpretation per the Radiologist below, if available at the time of this note:    CTA ABDOMEN PELVIS W CONTRAST   Final Result   Small right rectus sheath hematoma with no evidence of active extravasation   or pseudoaneurysm formation. PET CT SKULL BASE TO MID THIGH    Result Date: 3/9/2023  EXAMINATION: WHOLE BODY PET/CT 3/9/2023 TECHNIQUE: Following intravenous administration of 15.9 mCi of F18-FDG, PET imaging was acquired from the base of the head to the mid thighs.   Computed tomography was used for purposes of attenuation correction and anatomic localization. Fusion imaging was utilized for interpretation. Uptake time 50 mins. Glucose level 95 mg/dl. COMPARISON: Chest CT on 02/13/2023, PET-CT on 02/06/2020 HISTORY: Multiple left lower lobe pulmonary nodules. Right upper lobe lung cancer. FINDINGS: HEAD/NECK: No suspicious FDG uptake. CHEST: Right upper lobectomy. Significant improvement of the left lower lobe nodular opacities seen on most recent CT with maximum SUV of 1.4. No hypermetabolic intrathoracic lymph nodes. No abnormal uptake within the breasts. ABDOMEN/PELVIS: No abnormal uptake within the solid abdominal organs. No hypermetabolic lymph nodes. No abnormal pelvic uptake. BONES/SOFT TISSUE: No suspicious osseous uptake. Increased uptake within the right rectus muscle which appears asymmetrically enlarged. INCIDENTAL CT FINDINGS: Emphysema. Mild cardiomegaly. Systemic and coronary atherosclerotic disease. Sequelae of chronic granulomatous disease. Hepatomegaly. Cholecystectomy. Hysterectomy. 1. No metastatic disease. Left lower lobe nodular opacities seen on most recent CT have improved and demonstrates only minimal uptake, compatible with resolving infection. 2. Suspected right rectus sheath hematoma. Consider correlation with CTA of the abdomen to evaluate for active bleeding. No results found. PROCEDURES   Unless otherwise noted below, none     Procedures    CRITICAL CARE TIME (.cctime)   Because of high probability of sudden clinical deterioration of the patient's condition or  further deterioration, critical care time included my full attention to the patient's condition, including chart data review, documentation, medication ordering, reviewing the patient's old records, reevaluation patient's cardiac, pulmonary and neurological status. Reassessment of vital signs. Consultations with ED attending and admitting physician.  Ordering, interpreting reviewing diagnostic testing. Therefore, my critical care time was 60 minutes of direct attention to the patient's condition did not include time spent on procedures. PAST MEDICAL HISTORY      has a past medical history of A-fib ([address] Arthritis, Clostridium difficile diarrhea (6/26/15), COPD (chronic obstructive pulmonary disease) ([address] COPD exacerbation ([address] Emphysema of lung ([address] Hyperlipidemia, Hypertension, O2 dependent, Pneumonia (2/5/2018), Primary cancer of right upper lobe of lung ([address] (3/11/2020), Rib pain, Sleep apnea, and Small bowel obstruction ([address] EMERGENCY DEPARTMENT COURSE and DIFFERENTIAL DIAGNOSIS/MDM:   Vitals:    Vitals:    03/10/23 1153 03/10/23 1156 03/10/23 1418 03/10/23 1529   BP: (!) 157/72   (!) 154/90   Pulse: 68  79 97   Resp: 18  16 15   Temp:  99.2 °F (37.3 °C)     TempSrc: Oral Oral     SpO2: 98%  97% 97%       Patient was given the following medications:  Medications   iopamidol (ISOVUE-370) 76 % injection 75 mL (75 mLs IntraVENous Given 3/10/23 1518)       ED Course as of 03/10/23 1618   Fri Mar 10, 2023   1616 Outpatient patient CT; rectus sheath hematoma on eliquis; abd pain since last month [MP]      ED Course User Index  [MP] Debra Weber, DO        Is this patient to be included in the SEP-1 Core Measure due to severe sepsis or septic shock? No   Exclusion criteria - the patient is NOT to be included for SEP-1 Core Measure due to:  2+ SIRS criteria are not met    Chronic Conditions affecting care:    has a past medical history of A-fib ([address] Arthritis, Clostridium difficile diarrhea (6/26/15), COPD (chronic obstructive pulmonary disease) ([address] COPD exacerbation ([address] Emphysema of lung ([address] Hyperlipidemia, Hypertension, O2 dependent, Pneumonia (2/5/2018), Primary cancer of right upper lobe of lung ([address] (3/11/2020), Rib pain, Sleep apnea, and Small bowel obstruction ([facility]     ED COURSE & MEDICAL DECISION MAKING    - Patient seen and examined today for abdominal pain and abn PET scan yesterday showing suspected rectus sheath hematoma. Has not taken her Eliquis today   Patient is in no acute distress, nontoxic, afebrile with unremarkable vital signs. Mildly tender around umbilius with bruising just to the left as detailed above. - Differential diagnosis: Ischemic Bowel, Bowel Obstruction, PUD, GERD, Acute Cholecystitis, Pancreatitis, Hepatitis, Colitis, SMA Syndrome, Mesenteric Steal Syndrome, Splanchnic Vein Thrombosis, Acute Appendicitis, Diverticulitis, IBS, Constipation, Pyelonephritis, UTI, STD  - Outside notes reviewed: Recently admitted to hospital service on 2//23, admitted for acute on chronic respiratory failure with hypoxia, she is on 4 L oxygen for her chronic COPD, has history of CHF, atrial fibrillation on Eliquis. She also had COVID during her hospitalization.  - Labs ordered and interpreted: The patient has normal WBCs, stable hematocrit and platelets. They have no severe electrolyte abnormality or renal impairment. Lipase was normal. Negative lactic.   - at 420 my shift is ended, patient also be seen by my attending physician Dr. Renee Rice now, please see her note for final disposition and plan. I am the Primary Clinician of Record. FINAL IMPRESSION      1. Abdominal pain, unspecified abdominal location          DISPOSITION/PLAN     DISPOSITION        PATIENT REFERRED TO:  No follow-up provider specified.     DISCHARGE MEDICATIONS:  New Prescriptions    No medications on file       DISCONTINUED MEDICATIONS:  Discontinued Medications    No medications on file              (Please note that portions of this note were completed with a voice recognition program.  Efforts were made to edit the dictations but occasionally words are mis-transcribed.)    MILTON Ramires (electronically signed)        MILTON Ramires  03/10/23 [id number]

## 2023-03-10 NOTE — TELEPHONE ENCOUNTER
Valarie Cook called stating that her mother got a call about results and wasn't sure the accuracy.  Advised daughter of Dr. Fisher's response on result note with verbal understanding and Valarie states that she will take her mother to [facility] ER today.

## 2023-03-11 LAB — BLOOD CULTURE, ROUTINE: NORMAL

## 2023-03-13 NOTE — ED PROVIDER NOTES
I received signout from the [place] please see her note for further details regarding the patient's presentation. The patient was signed out pending CT scan. She has been diagnosed with a rectus sheath hematoma on a PET scan and was referred to the emergency department. The CT had resulted, showing similarly the hematoma without any interval change. The patient told nursing she did not want to wait any further, and signed out [address] I was managing multiple simultaneous emergencies and unfortunately was unable to see the patient prior to her leaving the ED. We were unable to go over her CT or a plan of care and/or disposition options.       Candi [place]  03/13/23 4021

## 2023-03-14 ENCOUNTER — TELEMEDICINE (OUTPATIENT)
Dept: PULMONOLOGY | Age: 73
End: 2023-03-14
Payer: MEDICARE

## 2023-03-14 DIAGNOSIS — G47.33 OSA (OBSTRUCTIVE SLEEP APNEA): ICD-10-CM

## 2023-03-14 DIAGNOSIS — R93.89 ABNORMAL CT OF THE CHEST: Primary | ICD-10-CM

## 2023-03-14 DIAGNOSIS — R91.1 LEFT LOWER LOBE PULMONARY NODULE: ICD-10-CM

## 2023-03-14 DIAGNOSIS — J44.9 STAGE 3 SEVERE COPD BY GOLD CLASSIFICATION (HCC): ICD-10-CM

## 2023-03-14 LAB — BACTERIA BLD CULT: NORMAL

## 2023-03-14 PROCEDURE — 3017F COLORECTAL CA SCREEN DOC REV: CPT | Performed by: INTERNAL MEDICINE

## 2023-03-14 PROCEDURE — 3023F SPIROM DOC REV: CPT | Performed by: INTERNAL MEDICINE

## 2023-03-14 PROCEDURE — G8400 PT W/DXA NO RESULTS DOC: HCPCS | Performed by: INTERNAL MEDICINE

## 2023-03-14 PROCEDURE — 1036F TOBACCO NON-USER: CPT | Performed by: INTERNAL MEDICINE

## 2023-03-14 PROCEDURE — 99214 OFFICE O/P EST MOD 30 MIN: CPT | Performed by: INTERNAL MEDICINE

## 2023-03-14 PROCEDURE — 1090F PRES/ABSN URINE INCON ASSESS: CPT | Performed by: INTERNAL MEDICINE

## 2023-03-14 PROCEDURE — G8427 DOCREV CUR MEDS BY ELIG CLIN: HCPCS | Performed by: INTERNAL MEDICINE

## 2023-03-14 PROCEDURE — 1111F DSCHRG MED/CURRENT MED MERGE: CPT | Performed by: INTERNAL MEDICINE

## 2023-03-14 PROCEDURE — G8417 CALC BMI ABV UP PARAM F/U: HCPCS | Performed by: INTERNAL MEDICINE

## 2023-03-14 PROCEDURE — 1123F ACP DISCUSS/DSCN MKR DOCD: CPT | Performed by: INTERNAL MEDICINE

## 2023-03-14 PROCEDURE — G8484 FLU IMMUNIZE NO ADMIN: HCPCS | Performed by: INTERNAL MEDICINE

## 2023-03-14 NOTE — PROGRESS NOTES
[facility]                                                            Outpatient Follow Up Note  TELEHEALTH EVALUATION: Service performed was Audio/Visual ([facility]) and not a face-to-face visit         CHIEF COMPLAINT: Follow-up CT chest/PET scan      HPI:  PET scan reviewed by me and noted below. Results were dicussed with patient and multiple good questions were answered.     Doing okay   No cough or sputum   Uses Neb 2 times/day   Uses O2 4 LPM   No smoking   Patient if off Eliquis awaiting vascular surgery tomorrow for rectus sheath hematoma     Past Medical History:   Diagnosis Date    A-fib ([address]     Arthritis     Clostridium difficile diarrhea 6/26/15    COPD (chronic obstructive pulmonary disease) (HCC)     COPD exacerbation (HCC)     Emphysema of lung (HCC)     Hyperlipidemia     Hypertension     O2 dependent     2 L/min at night    Pneumonia 2/5/2018    Primary cancer of right upper lobe of lung ([facility] [address] 3/11/2020    Rib pain     Sleep apnea     does not use cpap    Small bowel obstruction ([address]     pt denies       Past Surgical History:        Procedure Laterality Date    ANKLE ARTHROSCOPY      x2    APPENDECTOMY      BRONCHOSCOPY N/A 3/15/2020    BRONCHOSCOPY THERAPUTIC ASPIRATION INITIAL performed by Ava Godoy MD at [address] [facility] [facility]  3/17/2020    BRONCHOSCOPY THERAPUTIC ASPIRATION INITIAL performed by Shyanne Veloz MD at [address] [place] [address] Left     CHOLECYSTECTOMY      COLONOSCOPY      COLONOSCOPY N/A 10/22/2021    COLONOSCOPY WITH BIOPSY performed by Matilda Marquez MD at [facility] (CERVIX STATUS UNKNOWN)      ROTATOR CUFF REPAIR Bilateral     THORACOSCOPY Right 3/11/2020    RIGHT VIDEO ASSISTED THORACOSCOPIC SURGERY; WEDGE EXCISION OF RIGHT UPPER LOBE FOLLOWED BY RIGHT UPPER LOBECTOMY; INTERCOSTAL NERVE BLOCK performed by [facility] [place] Gisselle aCry MD at [address] [facility] N/A 10/22/2021    EGD BIOPSY performed by Shara Goodpasture, MD at [place] [address].:  is allergic to buspirone, codeine, doxycycline, lisinopril, and penicillins. Social History:    TOBACCO:   reports that she quit smoking about 10 months ago. Her smoking use included cigarettes. She has a 100.00 pack-year smoking history. She has never used smokeless tobacco.  ETOH:   reports no history of alcohol use. Family History:       Problem Relation Age of Onset    Cancer Mother     Heart Failure Father        Current Medications:    Current Outpatient Medications:     gabapentin (NEURONTIN) 800 MG tablet, Take 0.5 tablets by mouth 2 times daily. Dose decreased, Disp: , Rfl:     metoprolol tartrate (LOPRESSOR) 25 MG tablet, Take 1 tablet by mouth 2 times daily, Disp: 60 tablet, Rfl: 1    potassium chloride (KLOR-CON M) 20 MEQ extended release tablet, Take 1 tablet by mouth daily (with breakfast), Disp: 30 tablet, Rfl: 1    nicotine (NICODERM CQ) 21 MG/24HR, Place 1 patch onto the skin daily, Disp: 30 patch, Rfl: 1    QUEtiapine (SEROQUEL) 400 MG tablet, Take 400 mg by mouth nightly, Disp: , Rfl:     guaiFENesin (MUCINEX) 600 MG extended release tablet, Take 600 mg by mouth 2 times daily as needed for Congestion, Disp: , Rfl:     albuterol sulfate HFA (VENTOLIN HFA) 108 (90 Base) MCG/ACT inhaler, Inhale 2 puffs into the lungs every 6 hours as needed for Wheezing or Shortness of Breath, Disp: 18 g, Rfl: 5    NARCAN 4 MG/0.1ML LIQD nasal spray, SPRAY one SPRAY in one nostril as needed (opioid reversal). MAY REPEAT in 2-3 minutes in other nostril if needed. , Disp: , Rfl:     omeprazole (PRILOSEC) 40 MG delayed release capsule, TAKE ONE CAPSULE BY MOUTH EVERY DAY, Disp: , Rfl:     torsemide (DEMADEX) 20 MG tablet, TAKE 1 TABLET BY MOUTH EVERY DAY, Disp: , Rfl:     fluticasone-umeclidin-vilant (TRELEGY ELLIPTA) 100-62.5-25 MCG/INH AEPB, INHALE 1 PUFF BY MOUTH EVERY DAY AS DIRECTED, Disp: 60 each, Rfl: 5    venlafaxine (EFFEXOR XR) 75 MG extended release capsule, Take 225 mg by mouth daily, Disp: , Rfl:     dilTIAZem (CARDIZEM CD) 120 MG extended release capsule, Take one capsule by mouth daily, Disp: 90 capsule, Rfl: 3    apixaban (ELIQUIS) 5 MG TABS tablet, Take 1 tablet by mouth 2 times daily, Disp: 180 tablet, Rfl: 3    albuterol (PROVENTIL) (2.5 MG/3ML) 0.083% nebulizer solution, Take 3 mLs by nebulization every 6 hours as needed for Wheezing, Disp: 120 each, Rfl: 5    ALPRAZolam (XANAX) 0.5 MG tablet, Take 0.5 mg by mouth 3 times daily as needed for Anxiety. , Disp: , Rfl:     HYDROcodone-acetaminophen (NORCO) 7.5-325 MG per tablet, TAKE 1 TABLET BY MOUTH 3 TIMES A DAY AS NEEDED FOR PAIN, Disp: , Rfl:     ferrous sulfate 325 (65 Fe) MG tablet, Take 325 mg by mouth daily (with breakfast), Disp: , Rfl:     rOPINIRole (REQUIP) 4 MG tablet, Take 4 mg by mouth nightly, Disp: , Rfl:     Multiple Vitamins-Minerals (THERAPEUTIC MULTIVITAMIN-MINERALS) tablet, Take 1 tablet by mouth daily, Disp: , Rfl:     Review of Systems    Objective:   Physical Exam  not currently breastfeeding.' on RA  Neck size: Not able to obtain   Mallampati class IV. Constitutional:  No acute distress. Appears well developed and nourished. Eyes: No sclera icterus. No visible discharge. HENT: Normal appearing nose. External Ears normal.   Neck: No visualized mass. David Kane is midline   Resp: No accessory muscle use. Respiratory effort normal. No visualized signs of difficulty breathing or respiratory distress. Cardiovascular: No LE edema per patient's report   Musculoskeletal: No signs of ataxia. Normal range of motion of the neck. Skin: No significant exanthematous lesions or discoloration noted on facial skin    Neuro: Awake. Alert. Able to follow commands. No facial asymmetry. No gaze palsy. Psych: No agitation. Normal affect. No hallucinations.   Normal judgement and insight. DATA reviewed by me:   PFTs 02/04/2020 FVC 1.98(61%) FEV1 1.16(47%) FEV1/FVC 59 % TLC 4.11(76%)   DLCO 09.05(38%) 6MW 840 F LO2 90%  PFTs 08/19/2015 FVC 1.78(52%) FEV1 1.27(49%) FEV1/FVC 71 % TLC 4.52(82%)   DLCO 10.23(43%) 6MW 980 F LO2 89%  6MW 11/14/2019 720 feet Desat 88% 1L on exertin       CT chest 1/16/19  Multiple right-sided rib fractures-subacute  No acute intrathoracic abnormalities  Scattered areas of parenchymal opacities lower lobe likely atelectasis      CT chest 1/16/2020    Mediastinum: No enlarged lymph nodes. Normal caliber great vessels. Normal  heart size and pericardium. Suspect incidental benign lipomatous hypertrophy  of the interatrial septum. No significant coronary calcifications. Unremarkable esophagus and included thyroid. Lungs/pleura: Nodular thickening in the subpleural right upper lobe on series  3, image 27; coronal image 101 measuring 20 x 8 mm in axial plane and 7 mm  craniocaudal.  This nodular opacity is low in attenuation values (water  density). Subtle tree-in-bud opacities seen on the prior exam in the right  upper lobe have resolved. These were likely infectious or inflammatory. Linear and bandlike areas of scarring and/or atelectasis at the right lung  base are not substantially changed. Decreased atelectasis at the medial left  lung base. Scattered punctate calcified granulomas. No pleural effusion. Upper Abdomen: Unremarkable. Soft Tissues/Bones: No enlarged axillary or supraclavicular lymph nodes. Multiple healed rib fractures bilaterally, some of which are ununited. PET scan 2/6/2020   1.9 cm lateral right upper lobe pulmonary nodule is hypermetabolic, to a  degree concerning for malignancy until proven otherwise. No findings of FDG avid metastatic disease. Inflammatory change related to healing posterolateral left 8th rib fracture.   Lipomatous hypertrophy of the interatrial septum, an incidental finding. Fluid loculated within fissure within the lateral right mid lung demonstrates  low level activity, likely inflammatory. CT chest 9/16/2020   Pulmonary emphysema  Interval removal of right-sided chest tube  Gas and fluid collection at the right apex decrease in size  Mild bronchial wall thickening, bronchiectasis, interlobular septal wall thickening in the right base improved from prior exam  Resolution of groundglass opacities on the left side    CT chest 3/16/2021   Increased ground-glass opacities in the left upper lobe, likely  postinflammatory-infectious. Punctate noncalcified pulmonary nodule left  lower lobe is unchanged  Persistent small pleural fluid collection in the right lung apex. Pleural  gas internally has decreased  Fluid is seen in the esophagus to above the level of thoracic inlet,  suggesting reflux or esophageal dysmotility. CT chest 9/23/21   Unchanged tiny 4 mm left lower lobe pulmonary nodule. No further follow-up  recommended. Resolution of the ground-glass opacities in the left upper lobe. Resolution of the previous air and fluid in the pleural space at the right  apex. Stable postsurgical changes elsewhere. No new concerning findings on this examination. No evidence of acute  cardiopulmonary process. CT chest 3/14/2022 from outside hospital  Lower lobe PEs  Groundglass opacities with interlobular septal thickening     CT chest 7/11/2022  Stable left lower lobe nodule  No new suspicious nodules    CT chest 2/21/2023 imaging reviewed by me and showed  Motion artifact limiting the exam.  Interval development of multiple irregular pulmonary masses scattered throughout the left lower lobe which probably represents metastatic disease. Mildly dilated and atherosclerotic thoracic aorta with no aneurysm. Small calcified lymph nodes in the mediastinum with no obvious mediastinal mass or adenopathy.   Chronic obstructive lung changes with hazy ground-glass and interstitia opacities scattered along the lung bases anteriorly and posteriorly which is more prominent and probably represent early multisegmental bronchopneumonia. Linear scarring along the right upper lobe and bibasilar linear fibrosis an scarring which is more prominent on the right and has progressed    PET scan 3/9/2023 imaging reviewed by me and showed  No metastatic disease  Left lower lobe nodular opacity improved with minimal uptake  Right rectus sheath hematoma        RUL lobectomy 3/11/2020  A. Wedge excision, right upper lobe lung lesion:       - Involved with non-small cell carcinoma, squamous cell carcinoma         with lesion 1.1 cm in greatest extent. - Parenchymal resection margins are negative for malignancy. - Overlying pleural surface shows no evidence of involvement with         malignancy. - See case comment and synoptic report. B. Regional lymph node excision, right 10:       - 3 lymph nodes with reactive changes and no evidence of metastatic         carcinoma ( 0\3 ). - Pankeratin\CAM 5.2 stain is performed and supports diagnosis. C. Right upper lobe lobectomy:       - Organizing biopsy cavity changes with extravasated blood and         reactive features. Hello how are you       - No residual malignancy identified.       - Bronchial margin, vascular margin and parenchymal margins are         negative for malignancy. - 3 peribronchial lymph nodes with reactive changes and no evidence         of metastatic carcinoma (0/3). - One intraparenchymal lymph node with no evidence of involvement         with malignancy. - Pankeratin\CAM 5.2 stains were utilized to evaluate the lymph         nodes and supports final diagnosis.          D. Regional lymph node excision, right 4:       - Six lymph nodes with no evidence of metastatic carcinoma ( 0\6)       - Pankeratin\CAM 5.2 stains were utilized evaluated lymph nodes and         supports final diagnosis. PSG 8/25/16 AHI 28.4 desaturation to 71%  BiPAP titration 9/9/16 BiPAP 15/11 cmH2O      Assessment:      Severe COPD   Abnormal CT chest 2/21/2023 with multiple irregular opacities. Improving with no uptake on PET scan 3/9/2023. Abnormal CT chest 3/14/2022 with small lower lobe PEs, groundglass opacities, interlobular septal thickening. Admitted treated for CHF and COPD exacerbation. Abnormal CT chest 3/15/2020. Postoperative changes. Improvement on repeat CT 9/16/2020. Not significantly changed on CT 3/16/2021. FRANCISCO elongated nodule 20 x 8 mm. SUV 7.3 on PET scan 2/6/2020. Post RUL resection 3/11/2020 non-small cell lung cancer. [id number] stage IA  Left lower lobe nodule-stable on repeat CT chest  Abnormal esophagus on CT - fluid is seen in the esophagus   Hypoxia on exertion  Right traumatic displaced rib fractures with  hemothorax required VATS decortication and chest tube removed 2/14/18  Moderate MEG. O2 3LPM at night. Refusing BiPAP uses O2   PAF on Eliquis followed by cardiology   >120 pack year smoking. Intolerance to chantix . Quit March 2020      Plan:      Continue BiPAP   Continue Trelegy and Albuterol INH/Neb Q 4 hrs PRN   Continue O2 2-4 L. Advised to titrate O2 using her pulse oximeter- target O2 sat 90-92%. CT chest in 6 months  Patient is up to date with Covid, pneumococcal vaccine, influenza vaccine  Advised to continue with smoking cessation. Follow up in 6 months or sooner if needed               Mahnaz Fuller is a 67 y.o. female being evaluated by a Virtual Visit (video visit) encounter to address concerns as mentioned above. A caregiver was present when appropriate. Due to this being a TeleHealth encounter ([facility]), evaluation of the following organ systems was limited: Vitals/Constitutional/EENT/Resp/CV/GI//MS/Neuro/Skin/Heme-Lymph-Imm.   Pursuant to the emergency declaration under the [facility] Act, 1135 waiver authority and the Coronavirus Preparedness and Response Supplemental Appropriations Act, this Virtual Visit was conducted with patient's (and/or legal guardian's) consent, to reduce the patient's risk of exposure to COVID-19 and provide necessary medical care. The patient (and/or legal guardian) has also been advised to contact this office for worsening conditions or problems, and seek emergency medical treatment and/or call 911 if deemed necessary. Services were provided through a video synchronous discussion virtually to substitute for in-person clinic visit. Patient was located in her home, provider was located in his office. --Codey Montoya MD on 3/14/2023 at 2:31 PM    An electronic signature was used to authenticate this note.

## 2023-03-16 ENCOUNTER — TELEPHONE (OUTPATIENT)
Dept: CARDIOLOGY CLINIC | Age: 73
End: 2023-03-16

## 2023-03-16 NOTE — TELEPHONE ENCOUNTER
Pt stated that she has a hematoma in her stomach and would like to know if she should be taking Eliquis right now or stop taking eliquis due to the hematoma? Please advise.

## 2023-03-16 NOTE — TELEPHONE ENCOUNTER
Her recent blood count is OK. She can keep taking eliquis but if notes hematoma enlarging and getting worse then stop and let us know.

## 2023-03-17 NOTE — TELEPHONE ENCOUNTER
Spoke with pt daughter per sharee. Per appt on 3/14 with  they stated unable to tell if hematoma is enlarging or not. Patient if off Eliquis awaiting vascular surgery tomorrow for rectus sheath hematoma. Please advise if to remain off.

## 2023-03-17 NOTE — TELEPHONE ENCOUNTER
Yes, I would stay OFF eliquis until hematoma evaluated by surgeon and get their opinion. Then call to let us know what they advise. Thanks.

## 2023-03-28 ENCOUNTER — OFFICE VISIT (OUTPATIENT)
Dept: CARDIOLOGY CLINIC | Age: 73
End: 2023-03-28

## 2023-03-28 VITALS
WEIGHT: 221 LBS | SYSTOLIC BLOOD PRESSURE: 132 MMHG | DIASTOLIC BLOOD PRESSURE: 80 MMHG | HEART RATE: 70 BPM | BODY MASS INDEX: 35.52 KG/M2 | HEIGHT: 66 IN | OXYGEN SATURATION: 88 %

## 2023-03-28 DIAGNOSIS — I50.32 CHRONIC DIASTOLIC CONGESTIVE HEART FAILURE (HCC): Primary | ICD-10-CM

## 2023-03-28 DIAGNOSIS — I48.21 PERMANENT ATRIAL FIBRILLATION (HCC): ICD-10-CM

## 2023-03-28 DIAGNOSIS — G47.33 OSA (OBSTRUCTIVE SLEEP APNEA): ICD-10-CM

## 2023-03-28 DIAGNOSIS — Z72.0 TOBACCO ABUSE: ICD-10-CM

## 2023-03-28 DIAGNOSIS — E11.69 DIABETES MELLITUS TYPE 2 IN OBESE (HCC): ICD-10-CM

## 2023-03-28 DIAGNOSIS — Z79.899 MEDICATION MANAGEMENT: ICD-10-CM

## 2023-03-28 DIAGNOSIS — J96.11 CHRONIC RESPIRATORY FAILURE WITH HYPOXIA (HCC): ICD-10-CM

## 2023-03-28 DIAGNOSIS — E66.9 DIABETES MELLITUS TYPE 2 IN OBESE (HCC): ICD-10-CM

## 2023-03-28 DIAGNOSIS — E78.2 MIXED HYPERLIPIDEMIA: ICD-10-CM

## 2023-03-28 DIAGNOSIS — R06.02 SOB (SHORTNESS OF BREATH): ICD-10-CM

## 2023-03-28 RX ORDER — METOLAZONE 2.5 MG/1
2.5 TABLET ORAL WEEKLY
Qty: 4 TABLET | Refills: 0 | Status: SHIPPED | OUTPATIENT
Start: 2023-03-28 | End: 2023-04-19

## 2023-03-28 RX ORDER — POTASSIUM CHLORIDE 20 MEQ/1
20 TABLET, EXTENDED RELEASE ORAL
Qty: 90 TABLET | Refills: 3 | Status: SHIPPED | OUTPATIENT
Start: 2023-03-28

## 2023-03-28 RX ORDER — DILTIAZEM HYDROCHLORIDE 120 MG/1
CAPSULE, COATED, EXTENDED RELEASE ORAL
Qty: 90 CAPSULE | Refills: 3 | Status: SHIPPED | OUTPATIENT
Start: 2023-03-28

## 2023-03-28 NOTE — PATIENT INSTRUCTIONS
Plan:  Labs reviewed in epic and discussed with patient. Current medications reviewed. Refills given as warranted. Continue taking torsemide for the swelling in your legs  Recommend continuing to take metoprolol  5. Recommend taking Metolazone 2.5 mg once a week for the next 4 weeks which will work together with your torsemide to help remove fluid. 6.   You can help decrease your swelling by:  -Weighing yourself every morning   -Following a No Added Salt/Low Sodium diet of less than 3000 mg sodium daily  -Following a Fluid Limitation of less than 2 liters (64 ounces daily)     -this also includes foods like soup, ice cream, popsicles  -Elevating your legs when sitting  -Wearing compression socks during the day  7. No cardiac testing at this time. 8.   Call and let me know when surgeon says you can restart the eliquis  9.    Repeat blood work in 3 weeks after you start taking the metolazone   -call my office and let me know that you had blood work done at [facility] so I can get a copy of your blood work   -CBC, CMP, TSH, Lipids   -you will need to be fasting for blood work  -it is ok to take your medicine with sips of water or black coffee    Follow up with me in 6 months

## 2023-03-28 NOTE — PROGRESS NOTES
stenosis. Assessment:  Encounter Diagnoses   Name Primary? Chronic diastolic congestive heart failure (HCC) Yes    Permanent atrial fibrillation (HCC)     Diabetes mellitus type 2 in obese (HCC)     MEG (obstructive sleep apnea)     Mixed hyperlipidemia     Tobacco abuse     SOB (shortness of breath)     Medication management     Chronic respiratory failure with hypoxia ([facility] [address]        1. Diastolic CHF   -managed by diuretics, continue torsemide 20 mg daily  2. Permanent  Atrial Fibrillation   -rate controlled anticoagulation hold will check with Dr Piotr Gordon when it is safe to start apixaban she is at risk for stroke    3. HTN   -BP well controlled with diltiazem,   metoprolol     4. HLD   -on zetia and pravastatin   -Last lipid 7/29/22 [facility]   HDL 68         Plan:  Labs reviewed in epic and discussed with patient. Current medications reviewed. Refills given as warranted. Continue taking torsemide for the swelling in your legs  Recommend continuing to take metoprolol  5. Recommend taking Metolazone 2.5 mg once a week for the next 4 weeks which will work together with your torsemide to help remove fluid. 6.   You can help decrease your swelling by:  -Weighing yourself every morning   -Following a No Added Salt/Low Sodium diet of less than 3000 mg sodium daily  -Following a Fluid Limitation of less than 2 liters (64 ounces daily)     -this also includes foods like soup, ice cream, popsicles  -Elevating your legs when sitting  -Wearing compression socks during the day  7. No cardiac testing at this time. 8.   Call and let me know when surgeon says you can restart the eliquis  9.    Repeat blood work in 3 weeks after you start taking the metolazone   -call my office and let me know that you had blood work done at [facility] so I can get a copy of your blood work   -CBC, CMP, TSH, Lipids   -you will need to be fasting for blood work  -it is ok to take your medicine with sips of

## 2023-04-04 ENCOUNTER — INITIAL CONSULT (OUTPATIENT)
Dept: SURGERY | Age: 73
End: 2023-04-04
Payer: MEDICARE

## 2023-04-04 VITALS
HEIGHT: 66 IN | BODY MASS INDEX: 35.69 KG/M2 | SYSTOLIC BLOOD PRESSURE: 117 MMHG | DIASTOLIC BLOOD PRESSURE: 49 MMHG | HEART RATE: 87 BPM

## 2023-04-04 DIAGNOSIS — S30.1XXA RECTUS SHEATH HEMATOMA, INITIAL ENCOUNTER: Primary | ICD-10-CM

## 2023-04-04 PROCEDURE — G8417 CALC BMI ABV UP PARAM F/U: HCPCS | Performed by: SURGERY

## 2023-04-04 PROCEDURE — 1090F PRES/ABSN URINE INCON ASSESS: CPT | Performed by: SURGERY

## 2023-04-04 PROCEDURE — G8427 DOCREV CUR MEDS BY ELIG CLIN: HCPCS | Performed by: SURGERY

## 2023-04-04 PROCEDURE — 3078F DIAST BP <80 MM HG: CPT | Performed by: SURGERY

## 2023-04-04 PROCEDURE — 99203 OFFICE O/P NEW LOW 30 MIN: CPT | Performed by: SURGERY

## 2023-04-04 PROCEDURE — 3074F SYST BP LT 130 MM HG: CPT | Performed by: SURGERY

## 2023-04-04 NOTE — PROGRESS NOTES
120 each 5    ALPRAZolam (XANAX) 0.5 MG tablet Take 1 tablet by mouth 3 times daily as needed for Anxiety.       HYDROcodone-acetaminophen (NORCO) 7.5-325 MG per tablet TAKE 1 TABLET BY MOUTH 3 TIMES A DAY AS NEEDED FOR PAIN      ferrous sulfate 325 (65 Fe) MG tablet Take 1 tablet by mouth daily (with breakfast)      rOPINIRole (REQUIP) 4 MG tablet Take 1 tablet by mouth nightly      Multiple Vitamins-Minerals (THERAPEUTIC MULTIVITAMIN-MINERALS) tablet Take 1 tablet by mouth daily         Past Medical History:   Diagnosis Date    A-fib (HCC)     Arthritis     Clostridium difficile diarrhea 6/26/15    COPD (chronic obstructive pulmonary disease) (HCC)     COPD exacerbation (HCC)     Emphysema of lung (HCC)     Hyperlipidemia     Hypertension     O2 dependent     2 L/min at night    Pneumonia 2/5/2018    Primary cancer of right upper lobe of lung ([facility] [address] 3/11/2020    Rib pain     Sleep apnea     does not use cpap    Small bowel obstruction ([address]     pt denies     Past Surgical History:   Procedure Laterality Date    ANKLE ARTHROSCOPY      x2    APPENDECTOMY      BRONCHOSCOPY N/A 3/15/2020    BRONCHOSCOPY THERAPUTIC ASPIRATION INITIAL performed by Yakelin Varela MD at [address]  3/17/2020    BRONCHOSCOPY THERAPUTIC ASPIRATION INITIAL performed by Maribel Gamez MD at [facility] Left     CHOLECYSTECTOMY      COLONOSCOPY      COLONOSCOPY N/A 10/22/2021    COLONOSCOPY WITH BIOPSY performed by Cathy Jordan MD at [facility] (CERVIX STATUS UNKNOWN)      ROTATOR CUFF REPAIR Bilateral     THORACOSCOPY Right 3/11/2020    RIGHT VIDEO ASSISTED THORACOSCOPIC SURGERY; WEDGE EXCISION OF RIGHT UPPER LOBE FOLLOWED BY RIGHT UPPER LOBECTOMY; INTERCOSTAL NERVE BLOCK performed by Yakelin Varela MD at [address] [place] [address] 10/22/2021    EGD BIOPSY performed by Cathy Jordan MD at [address] [facility]     Family History

## 2023-04-20 ENCOUNTER — TELEPHONE (OUTPATIENT)
Dept: PULMONOLOGY | Age: 73
End: 2023-04-20

## 2023-09-14 ENCOUNTER — TELEPHONE (OUTPATIENT)
Dept: PULMONOLOGY | Age: 73
End: 2023-09-14

## 2023-09-14 NOTE — TELEPHONE ENCOUNTER
Patient did not show for 6 mo f/u Ct chest appointment  with Dr. Len Smith on 9/14/23    Same Day Cancellation: No    Patient rescheduled:  No    New appointment: NA    Patient was also no show on: NA    LOV 3/14/23     Assessment:   Severe COPD   Abnormal CT chest 2/21/2023 with multiple irregular opacities. Improving with no uptake on PET scan 3/9/2023. Abnormal CT chest 3/14/2022 with small lower lobe PEs, groundglass opacities, interlobular septal thickening. Admitted treated for CHF and COPD exacerbation. Abnormal CT chest 3/15/2020. Postoperative changes. Improvement on repeat CT 9/16/2020. Not significantly changed on CT 3/16/2021. FRANCISCO elongated nodule 20 x 8 mm. SUV 7.3 on PET scan 2/6/2020. Post RUL resection 3/11/2020 non-small cell lung cancer. [id number] stage IA  Left lower lobe nodule-stable on repeat CT chest  Abnormal esophagus on CT - fluid is seen in the esophagus   Hypoxia on exertion  Right traumatic displaced rib fractures with  hemothorax required VATS decortication and chest tube removed 2/14/18  Moderate MEG. O2 3LPM at night. Refusing BiPAP uses O2   PAF on Eliquis followed by cardiology   >120 pack year smoking. Intolerance to chantix . Quit March 2020                Plan:   Continue BiPAP   Continue Trelegy and Albuterol INH/Neb Q 4 hrs PRN   Continue O2 2-4 L. Advised to titrate O2 using her pulse oximeter- target O2 sat 90-92%. CT chest in 6 months  Patient is up to date with Covid, pneumococcal vaccine, influenza vaccine  Advised to continue with smoking cessation.    Follow up in 6 months or sooner if needed

## 2023-09-19 ENCOUNTER — PATIENT MESSAGE (OUTPATIENT)
Dept: PULMONOLOGY | Age: 73
End: 2023-09-19

## 2023-09-20 NOTE — TELEPHONE ENCOUNTER
From: Gaby Morrissey  To: Dr. Aria Ochoa: 2023 6:05 PM EDT  Subject: Re call and message    Mom is  as of 6/15/23  So sorry you were not notified

## 2025-01-02 NOTE — PROGRESS NOTES
Progress Note    Admit Date:  2/5/2023      Patient admitted with COVID-19 infection and acute on chronic hypoxic respiratory failure. Worsening hypoxemia on 2/8-with O2 requirement up to 9 L. Wean down to 7 L. She has remained on O2 7 L for the last few days. Persistent using    Subjective:    Ms. Alysha Sprague has been doing the same for the last several days . Continues to wheeze; remains dyspneic and has remained on 7 L of oxygen . Continued on BiPAP    Found out from respiratory therapist and PCA and nursing staff today that patient has been vaping in her room . I spoke with the patient at length and patient now admits that she has been sneaking around and vaping quite often in the room . I discussed with her about serious side effects of vaping related lung injury . I believe most of her symptoms are related to vaping . After a long discussion- patient was okay about us taking her vape PEN away and getting it locked up . RN was at bedside . Patient having significant anxiety; complains of restless legs. Complains of feeling weak. Objective:   BP (!) 149/75   Pulse 76   Temp 98.6 °F (37 °C) (Oral)   Resp 18   Wt 227 lb 8 oz (103.2 kg)   SpO2 94%   BMI 36.72 kg/m²     Intake/Output Summary (Last 24 hours) at 2/11/2023 1821  Last data filed at 2/11/2023 1738  Gross per 24 hour   Intake 1440 ml   Output 2400 ml   Net -960 ml           Physical Exam:  GEN:        A&Ox3, appears ill. Persistent dyspnea and wheezing. HEENT:   NC/AT,EOMI, MMM, no erythema/exudates or visible masses. CVS:        Normal S1,S2. RRR. Without M/G/R.   LUNG:     Diminished bilat. Bilateral + wheezes present  ABD:        Soft, ND/NT, BS+ x4. Without G/R.  EXT:        2+ pulses, no c/c. BLE trace edema. Brisk cap refill. PSY:        Thought process intact, affect appropriate. RAMIRO:        CN III-XII grossly intact. Moves all 4 spontaneously. Sensory grossly intact.   SKIN:       No rash or lesions on visible skin. Medications:   Scheduled Meds:   methylPREDNISolone  40 mg IntraVENous Q6H    nicotine  1 patch TransDERmal Daily    rOPINIRole  4 mg Oral TID    ipratropium-albuterol  1 ampule Inhalation 4x daily    gabapentin  400 mg Oral BID    guaiFENesin  600 mg Oral BID    apixaban  5 mg Oral BID    dilTIAZem  120 mg Oral Daily    ferrous sulfate  325 mg Oral Daily with breakfast    pantoprazole  40 mg Oral QAM AC    therapeutic multivitamin-minerals  1 tablet Oral Daily    pravastatin  10 mg Oral QPM    QUEtiapine  400 mg Oral Nightly    torsemide  20 mg Oral Daily    sodium chloride flush  5-40 mL IntraVENous 2 times per day    venlafaxine  225 mg Oral Daily       Continuous Infusions:   sodium chloride         Data:  CBC:   Recent Labs     02/09/23  0556 02/11/23 0434   WBC 17.4* 8.8   RBC 3.63* 3.63*   HGB 11.1* 11.2*   HCT 34.6* 34.2*   MCV 95.2 94.0   RDW 13.7 14.0    229       BMP:   Recent Labs     02/09/23  0526 02/09/23  0554 02/11/23 0434   *  --  138   K  --  3.9 3.9   CL 90*  --  90*   CO2 30  --  41*   BUN 27*  --  22*   CREATININE 0.7  --  <0.5*       BNP: No results for input(s): BNP in the last 72 hours. PT/INR:   No results for input(s): PROTIME, INR in the last 72 hours. APTT:   No results for input(s): APTT in the last 72 hours. CARDIAC ENZYMES:   No results for input(s): CKMB, CKMBINDEX, TROPONINI in the last 72 hours.     Invalid input(s): CKTOTAL;3    FASTING LIPID PANEL:  Lab Results   Component Value Date    CHOL 180 11/02/2015    HDL 64 (H) 02/04/2021    TRIG 201 (H) 02/10/2018     LIVER PROFILE:   Recent Labs     02/09/23  0526 02/09/23  0554   AST  --  16   ALT  --  18   BILITOT <0.2  --    ALKPHOS 93  --             Cultures  COVID-19 detected  Rapid influenza A and B neg  Blood cultures no growth to date    Radiology  XR CHEST PORTABLE   Final Result   Chronic airspace changes have slightly progressed in the right lung suspected   to represent pleural thickening and pleural fluid along with edema versus   atelectasis. Lesser pattern of interstitial change slightly increased on the   left. A superimposed viral pattern of pneumonitis may be considered   clinically. CT CHEST HIGH RESOLUTION    (Results Pending)       EKG reviewed. Shows normal sinus rhythm with right bundle branch block. .. Unchanged from before    Assessment:  Principal Problem:    Acute on chronic respiratory failure with hypoxia (HCC)  Resolved Problems:    * No resolved hospital problems. *      Plan:    # Acute on chronic respiratory failure with hypoxia  -  related to COVID PNA, aeCOPD, and [id number]/vaping associated lung injury  - Supplemental O2 to maintain SPO2 ? 92%, continuous pulse ox.    -Persistent hypoxemia . O2 4 L ->9 L-> 7 L.  continues to require 7 L of oxygen  Patient normally on 2 L O2 at home. Wean as tolerated. -Patient uses BiPAP at home - continue.  -  Pulmonary following    Persistent hypoxic respiratory failure with persistent dyspnea and wheezing. History of vaping . I am concerned about vaping associated lung injury. We will check high-resolution chest CT, may need bronch with biopsy, IV Solu-Medrol. .  Vape pen has been removed from her room. And add Ativan for anxiety and withdrawal symptoms   Will add nicotine patch. # Sepsis   - POA (temp, RR;) Suspect 2/2 COVID. No clear sign of bacterial PNA. F/u cx. # COVID PNA  - Dx on 2/5.  - IV tociluzimab given 2/6  - pulm cx  -Droplet plus precautions  - Continue steroids and inhaled bronchodilators    # aeCOPD  -IV solumedrol, IV ceftriaxone, PRN/LIZ intensive NEB therapy. Check respiratory culture   -Patient is on oral Decadron now. We will give her a couple doses of IV Solu-Medrol and see if this helps her wheezing. #Chronic diastolic CHF  -Continue torsemide  - give 1 dose of IV Lasix    # Hx NSCLA lung cancer s/p remote RUL lobectomy.     # Prolonged QTc, 514 ms, avoid QT prolonging agents as able. # 2/7 -patient was noted to be very lethargic-  gabapentin dose held. decreased her dose from 800 mg 4 times daily to 400 mg twice daily and monitor closely . she is awake alert and oriented now and her mental status is clear. #Anxiety, restless legs. On Xanax as needed; will switch to Ativan    #Weakness  -Consult PT OT     DVT Prophylaxis: Eliquis  ADULT DIET;  Regular  Code Status: Full Code              Kaylen Viramontes MD   2/11/2023 6:21 PM no diarrhea/no nausea/no vomiting

## (undated) DEVICE — STAPLER INT L16CM STD UNIV RELD DISP TRI-STAPLE ENDO GIA

## (undated) DEVICE — SURE SET-DOUBLE BASIN-LF: Brand: MEDLINE INDUSTRIES, INC.

## (undated) DEVICE — SUTURE NONABSORBABLE MONOFILAMENT 4-0 RB-1 36 IN BLU PROLENE 8557H

## (undated) DEVICE — CONNECTOR PERF W05XH05XL05IN BASE EQL Y SHP W O LUERLOCK FOR

## (undated) DEVICE — ELECTROSURGICAL PENCIL ROCKER SWITCH NON COATED BLADE ELECTRODE 10 FT (3 M) CORD HOLSTER: Brand: MEGADYNE

## (undated) DEVICE — SPONGE DRN W4XL4IN RAYON/POLYESTER 6 PLY NONWOVEN PRECUT

## (undated) DEVICE — SUTURE ABSORBABLE BRAIDED 2-0 CT-1 27 IN UD VICRYL J259H

## (undated) DEVICE — GLOVE ORANGE PI 7 1/2   MSG9075

## (undated) DEVICE — BLANKET WRM W40.2XL55.9IN IORT LO BODY + MISTRAL AIR

## (undated) DEVICE — FORCEP BX STD CAP 240CM RAD JAW 4

## (undated) DEVICE — 3M™ IOBAN™ 2 ANTIMICROBIAL INCISE DRAPE 6648EZ: Brand: IOBAN™ 2

## (undated) DEVICE — CONNECTOR PERF W3/8XH0.25XL0.25IN BASE UNEQUAL Y SHP W/O

## (undated) DEVICE — TISSUE RETRIEVAL SYSTEM: Brand: INZII RETRIEVAL SYSTEM

## (undated) DEVICE — PAD,NON-ADHERENT,3X8,STERILE,LF,1/PK: Brand: MEDLINE

## (undated) DEVICE — Z DISCONTINUED USE 2749457 TUBING SAMP AD W12.5XH8.4IN D9.1IN NSL ORAL SMRT CAPNOLINE

## (undated) DEVICE — PLATE ES AD W 9FT CRD 2

## (undated) DEVICE — SYRINGE MED 30ML STD CLR PLAS LUERLOCK TIP N CTRL DISP

## (undated) DEVICE — SUTURE MCRYL SZ 3-0 L27IN ABSRB UD L19MM PS-2 3/8 CIR PRIM Y427H

## (undated) DEVICE — CHLORAPREP 26ML ORANGE

## (undated) DEVICE — GARMENT,MEDLINE,DVT,INT,CALF,MED, GEN2: Brand: MEDLINE

## (undated) DEVICE — SUTURE PERMAHAND SZ 0 L30IN NONABSORBABLE BLK SILK BRAID A306H

## (undated) DEVICE — RELOAD STPL 3.5MM L60MM 0DEG UNIV TISS PUR TI 6 ROW LIN

## (undated) DEVICE — ANTI-FOG SOLUTION WITH FOAM PAD: Brand: DEVON

## (undated) DEVICE — ELECTRODE LAP L36CM PTFE WIRE J HK CLEANCOAT

## (undated) DEVICE — JEWISH HOSPITAL TURNOVER KIT: Brand: MEDLINE INDUSTRIES, INC.

## (undated) DEVICE — CONMED SCOPE SAVER BITE BLOCK, 20X27 MM: Brand: SCOPE SAVER

## (undated) DEVICE — SUTURE PERMAHAND SZ 2-0 L30IN NONABSORBABLE BLK SILK W/O A305H

## (undated) DEVICE — TROCAR: Brand: KII FIOS FIRST ENTRY

## (undated) DEVICE — [HIGH FLOW INSUFFLATOR,  DO NOT USE IF PACKAGE IS DAMAGED,  KEEP DRY,  KEEP AWAY FROM SUNLIGHT,  PROTECT FROM HEAT AND RADIOACTIVE SOURCES.]: Brand: PNEUMOSURE

## (undated) DEVICE — COVER LT HNDL BLU PLAS

## (undated) DEVICE — ADHESIVE SKIN CLSR 0.7ML TOP DERMBND ADV

## (undated) DEVICE — PROTECTOR ULN NRV PUR FOAM HK LOOP STRP ANATOMICALLY

## (undated) DEVICE — TUBING, SUCTION, 1/4" X 12', STRAIGHT: Brand: MEDLINE

## (undated) DEVICE — DRAPE,LAP,CHOLE,W/TROUGHS,STERILE: Brand: MEDLINE

## (undated) DEVICE — ENDO CARRY-ON PROCEDURE KIT INCLUDES SUCTION TUBING, LUBRICANT, GAUZE, BIOHAZARD STICKER, TRANSPORT PAD AND INTERCEPT BEDSIDE KIT.: Brand: ENDO CARRY-ON PROCEDURE KIT

## (undated) DEVICE — STANDARD HYPODERMIC NEEDLE,POLYPROPYLENE HUB: Brand: MONOJECT

## (undated) DEVICE — BAG SPEC RETRIEVALXL C2000ML MOUTH 14MM L27CM SHFT 15MM NYL

## (undated) DEVICE — TRAY CATHETER 16FR F INCLUDE BARDX IC COMPLT CARE DRNGE BG

## (undated) DEVICE — SURGICAL SET UP - SURE SET: Brand: MEDLINE INDUSTRIES, INC.

## (undated) DEVICE — DRAIN SURG SGL COLL PT TB FOR ATS BG OASIS

## (undated) DEVICE — CATHETER THORACENTESIS STR 28 FRX23 IN 6 EYELET TAPR TIP LF

## (undated) DEVICE — NEEDLE SPNL 22GA L3.5IN BLK HUB S STL REG WALL FIT STYL W/

## (undated) DEVICE — GOWN,SIRUS,POLYRNF,BRTHSLV,LG,30/CS: Brand: MEDLINE

## (undated) DEVICE — E-Z CLEAN, NON-STICK, PTFE COATED, ELECTROSURGICAL BLADE ELECTRODE, 2.5 INCH (6.35 CM): Brand: EZ CLEAN

## (undated) DEVICE — DRAIN SURG 24FR BLAK SIL HUBLESS 4 CHANNELED RADPQ EXTN TB

## (undated) DEVICE — SUTURE PERMAHAND SZ 3-0 L30IN NONABSORBABLE BLK SILK BRAID A304H

## (undated) DEVICE — SUTURE PERMA-HAND SZ 0 L18IN NONABSORBABLE BLK L30MM FSL 678G

## (undated) DEVICE — SYSTEM SMK EVAC LAP TBNG FILTER HSNG BENT STYL PNK SEE CLR